# Patient Record
Sex: MALE | Race: WHITE
[De-identification: names, ages, dates, MRNs, and addresses within clinical notes are randomized per-mention and may not be internally consistent; named-entity substitution may affect disease eponyms.]

---

## 2017-02-17 ENCOUNTER — APPOINTMENT (OUTPATIENT)
Dept: OPHTHALMOLOGY | Facility: CLINIC | Age: 82
End: 2017-02-17

## 2017-03-22 ENCOUNTER — APPOINTMENT (OUTPATIENT)
Dept: OPHTHALMOLOGY | Facility: CLINIC | Age: 82
End: 2017-03-22

## 2017-04-07 ENCOUNTER — RX RENEWAL (OUTPATIENT)
Age: 82
End: 2017-04-07

## 2017-04-07 RX ORDER — PREDNISOLONE ACETATE 10 MG/ML
1 SUSPENSION/ DROPS OPHTHALMIC 4 TIMES DAILY
Qty: 1 | Refills: 5 | Status: ACTIVE | COMMUNITY
Start: 2017-04-07 | End: 1900-01-01

## 2017-04-07 RX ORDER — MOXIFLOXACIN HYDROCHLORIDE 5 MG/ML
0.5 SOLUTION/ DROPS OPHTHALMIC 4 TIMES DAILY
Qty: 1 | Refills: 2 | Status: ACTIVE | COMMUNITY
Start: 2017-04-07 | End: 1900-01-01

## 2017-04-26 ENCOUNTER — APPOINTMENT (OUTPATIENT)
Dept: OPHTHALMOLOGY | Facility: CLINIC | Age: 82
End: 2017-04-26

## 2017-04-27 ENCOUNTER — OUTPATIENT (OUTPATIENT)
Dept: OUTPATIENT SERVICES | Facility: HOSPITAL | Age: 82
LOS: 1 days | End: 2017-04-27
Payer: MEDICARE

## 2017-04-27 ENCOUNTER — TRANSCRIPTION ENCOUNTER (OUTPATIENT)
Age: 82
End: 2017-04-27

## 2017-04-27 VITALS
SYSTOLIC BLOOD PRESSURE: 143 MMHG | RESPIRATION RATE: 18 BRPM | HEART RATE: 71 BPM | OXYGEN SATURATION: 98 % | DIASTOLIC BLOOD PRESSURE: 57 MMHG

## 2017-04-27 VITALS
HEART RATE: 64 BPM | DIASTOLIC BLOOD PRESSURE: 62 MMHG | OXYGEN SATURATION: 98 % | TEMPERATURE: 98 F | WEIGHT: 145.51 LBS | SYSTOLIC BLOOD PRESSURE: 149 MMHG | RESPIRATION RATE: 17 BRPM | HEIGHT: 66 IN

## 2017-04-27 DIAGNOSIS — Z98.890 OTHER SPECIFIED POSTPROCEDURAL STATES: Chronic | ICD-10-CM

## 2017-04-27 DIAGNOSIS — Z90.89 ACQUIRED ABSENCE OF OTHER ORGANS: Chronic | ICD-10-CM

## 2017-04-27 DIAGNOSIS — H25.812 COMBINED FORMS OF AGE-RELATED CATARACT, LEFT EYE: ICD-10-CM

## 2017-04-27 PROCEDURE — 66984 XCAPSL CTRC RMVL W/O ECP: CPT | Mod: LT

## 2017-04-27 PROCEDURE — C1780: CPT

## 2017-04-27 NOTE — ASU DISCHARGE PLAN (ADULT/PEDIATRIC). - SPECIAL INSTRUCTIONS
Use predforte and ciprofloxacin 3 times today and once tomorrow morning.  Keep eye shield on the eye at all times when drops are not going in.  Call for severe pain, nausea or vomiting

## 2017-04-28 ENCOUNTER — APPOINTMENT (OUTPATIENT)
Dept: OPHTHALMOLOGY | Facility: CLINIC | Age: 82
End: 2017-04-28

## 2017-05-05 ENCOUNTER — APPOINTMENT (OUTPATIENT)
Dept: OPHTHALMOLOGY | Facility: CLINIC | Age: 82
End: 2017-05-05

## 2017-05-05 DIAGNOSIS — H25.13 AGE-RELATED NUCLEAR CATARACT, BILATERAL: ICD-10-CM

## 2017-05-19 ENCOUNTER — APPOINTMENT (OUTPATIENT)
Dept: OPHTHALMOLOGY | Facility: CLINIC | Age: 82
End: 2017-05-19

## 2017-06-16 ENCOUNTER — APPOINTMENT (OUTPATIENT)
Dept: OPHTHALMOLOGY | Facility: CLINIC | Age: 82
End: 2017-06-16

## 2017-09-18 ENCOUNTER — APPOINTMENT (OUTPATIENT)
Dept: OPHTHALMOLOGY | Facility: CLINIC | Age: 82
End: 2017-09-18
Payer: MEDICARE

## 2017-09-18 DIAGNOSIS — H31.011 MACULA SCARS OF POSTERIOR POLE (POSTINFLAMMATORY) (POST-TRAUMATIC), RIGHT EYE: ICD-10-CM

## 2017-09-18 PROCEDURE — 92012 INTRM OPH EXAM EST PATIENT: CPT

## 2018-03-19 ENCOUNTER — APPOINTMENT (OUTPATIENT)
Dept: OPHTHALMOLOGY | Facility: CLINIC | Age: 83
End: 2018-03-19
Payer: MEDICARE

## 2018-03-19 PROCEDURE — 92134 CPTRZ OPH DX IMG PST SGM RTA: CPT

## 2018-03-19 PROCEDURE — 92014 COMPRE OPH EXAM EST PT 1/>: CPT

## 2018-03-23 ENCOUNTER — RX RENEWAL (OUTPATIENT)
Age: 83
End: 2018-03-23

## 2018-06-11 ENCOUNTER — RX RENEWAL (OUTPATIENT)
Age: 83
End: 2018-06-11

## 2018-10-03 ENCOUNTER — APPOINTMENT (OUTPATIENT)
Dept: OPHTHALMOLOGY | Facility: CLINIC | Age: 83
End: 2018-10-03
Payer: MEDICARE

## 2018-10-03 PROCEDURE — 92012 INTRM OPH EXAM EST PATIENT: CPT

## 2018-10-04 PROBLEM — C61 MALIGNANT NEOPLASM OF PROSTATE: Chronic | Status: ACTIVE | Noted: 2017-04-27

## 2018-10-04 PROBLEM — D64.9 ANEMIA, UNSPECIFIED: Chronic | Status: ACTIVE | Noted: 2017-04-27

## 2018-10-04 PROBLEM — I10 ESSENTIAL (PRIMARY) HYPERTENSION: Chronic | Status: ACTIVE | Noted: 2017-04-27

## 2018-10-04 PROBLEM — E78.5 HYPERLIPIDEMIA, UNSPECIFIED: Chronic | Status: ACTIVE | Noted: 2017-04-27

## 2018-10-04 PROBLEM — M10.9 GOUT, UNSPECIFIED: Chronic | Status: ACTIVE | Noted: 2017-04-27

## 2019-04-03 ENCOUNTER — APPOINTMENT (OUTPATIENT)
Dept: OPHTHALMOLOGY | Facility: CLINIC | Age: 84
End: 2019-04-03
Payer: MEDICARE

## 2019-04-03 DIAGNOSIS — H25.89 OTHER AGE-RELATED CATARACT: ICD-10-CM

## 2019-04-03 DIAGNOSIS — H35.30 UNSPECIFIED MACULAR DEGENERATION: ICD-10-CM

## 2019-04-03 DIAGNOSIS — Z98.42 CATARACT EXTRACTION STATUS, LEFT EYE: ICD-10-CM

## 2019-04-03 DIAGNOSIS — H40.053 OCULAR HYPERTENSION, BILATERAL: ICD-10-CM

## 2019-04-03 PROCEDURE — 92014 COMPRE OPH EXAM EST PT 1/>: CPT

## 2019-04-03 PROCEDURE — 92134 CPTRZ OPH DX IMG PST SGM RTA: CPT

## 2019-12-17 ENCOUNTER — INPATIENT (INPATIENT)
Facility: HOSPITAL | Age: 84
LOS: 6 days | Discharge: HOME | End: 2019-12-24
Attending: INTERNAL MEDICINE | Admitting: INTERNAL MEDICINE
Payer: MEDICARE

## 2019-12-17 VITALS
SYSTOLIC BLOOD PRESSURE: 140 MMHG | RESPIRATION RATE: 20 BRPM | DIASTOLIC BLOOD PRESSURE: 64 MMHG | OXYGEN SATURATION: 92 % | TEMPERATURE: 98 F | HEART RATE: 78 BPM

## 2019-12-17 DIAGNOSIS — Z90.89 ACQUIRED ABSENCE OF OTHER ORGANS: Chronic | ICD-10-CM

## 2019-12-17 DIAGNOSIS — Z98.890 OTHER SPECIFIED POSTPROCEDURAL STATES: Chronic | ICD-10-CM

## 2019-12-17 LAB
BASOPHILS # BLD AUTO: 0.04 K/UL — SIGNIFICANT CHANGE UP (ref 0–0.2)
BASOPHILS NFR BLD AUTO: 0.5 % — SIGNIFICANT CHANGE UP (ref 0–2)
EOSINOPHIL # BLD AUTO: 0.03 K/UL — SIGNIFICANT CHANGE UP (ref 0–0.5)
EOSINOPHIL NFR BLD AUTO: 0.4 % — SIGNIFICANT CHANGE UP (ref 0–6)
HCT VFR BLD CALC: 25.4 % — LOW (ref 39–50)
HGB BLD-MCNC: 7.8 G/DL — LOW (ref 13–17)
IMM GRANULOCYTES NFR BLD AUTO: 0.4 % — SIGNIFICANT CHANGE UP (ref 0–1.5)
LYMPHOCYTES # BLD AUTO: 0.64 K/UL — LOW (ref 1–3.3)
LYMPHOCYTES # BLD AUTO: 8.4 % — LOW (ref 13–44)
MCHC RBC-ENTMCNC: 30.7 % — LOW (ref 32–36)
MCHC RBC-ENTMCNC: 32 PG — SIGNIFICANT CHANGE UP (ref 27–34)
MCV RBC AUTO: 104.1 FL — HIGH (ref 80–100)
MONOCYTES # BLD AUTO: 0.79 K/UL — SIGNIFICANT CHANGE UP (ref 0–0.9)
MONOCYTES NFR BLD AUTO: 10.4 % — SIGNIFICANT CHANGE UP (ref 2–14)
NEUTROPHILS # BLD AUTO: 6.05 K/UL — SIGNIFICANT CHANGE UP (ref 1.8–7.4)
NEUTROPHILS NFR BLD AUTO: 79.9 % — HIGH (ref 43–77)
NRBC # FLD: 0 K/UL — SIGNIFICANT CHANGE UP (ref 0–0)
PLATELET # BLD AUTO: 236 K/UL — SIGNIFICANT CHANGE UP (ref 150–400)
PMV BLD: 10.9 FL — SIGNIFICANT CHANGE UP (ref 7–13)
RBC # BLD: 2.44 M/UL — LOW (ref 4.2–5.8)
RBC # FLD: 18.1 % — HIGH (ref 10.3–14.5)
WBC # BLD: 7.58 K/UL — SIGNIFICANT CHANGE UP (ref 3.8–10.5)
WBC # FLD AUTO: 7.58 K/UL — SIGNIFICANT CHANGE UP (ref 3.8–10.5)

## 2019-12-17 PROCEDURE — 71045 X-RAY EXAM CHEST 1 VIEW: CPT | Mod: 26

## 2019-12-17 NOTE — ED ADULT NURSE NOTE - OBJECTIVE STATEMENT
Lu RN: Patient received in room #25 c/o shortness of breath and dyspnea on exertion x4 days. Patient is from and asissted living home; A&OX3, ambulatory with cane. Patient denies any O2 use at home. patient received with NC. Patient denies any pain. pitting edema observed to B/L LE. VS as noted. Report given to primary RN Nora. Lu RN: Patient received in room #25 c/o shortness of breath and dyspnea on exertion x4 days. Patient is from [facility] asissted living home; A&OX3, ambulatory with cane. Patient denies any O2 use at home. patient received with NC. Patient denies any pain. pitting edema observed to B/L LE. VS as noted. Report given to primary RN Nora.

## 2019-12-17 NOTE — ED PROVIDER NOTE - CLINICAL SUMMARY MEDICAL DECISION MAKING FREE TEXT BOX
89 y/o M with CAD, COPD, HTN, Prostate CA presents with dyspnea upon exertion x 5 days. symptoms likely due to COPD vs heart failure. plan for labs including trop, probnp and CXR

## 2019-12-17 NOTE — ED PROVIDER NOTE - ATTENDING CONTRIBUTION TO CARE
Attending note:   After face to face evaluation of this patient, I concur with above noted hx, pe, and care plan for this patient.  89 y/o M who denies chf or copd with dyapnea on exertion of any type for four days.   Lungs clear, +chronic pedal vi.   Evaluation in progress Attending note:   After face to face evaluation of this patient, I concur with above noted hx, pe, and care plan for this patient.  89 y/o M who denies chf or copd with dyspnea on exertion of any type for four days.   Lungs clear, +chronic pedal edema.   Evaluation in progress

## 2019-12-17 NOTE — ED ADULT NURSE NOTE - INTERVENTIONS DEFINITIONS
Stretcher in lowest position, wheels locked, appropriate side rails in place/Monitor for mental status changes and reorient to person, place, and time/Reinforce activity limits and safety measures with patient and family/Call bell, personal items and telephone within reach/Instruct patient to call for assistance/[place] to call system/Non-slip footwear when patient is off stretcher/Physically safe environment: no spills, clutter or unnecessary equipment

## 2019-12-17 NOTE — ED PROVIDER NOTE - OBJECTIVE STATEMENT
Patient is a 89 y/o M pmhx of CAD, COPD, HTN, Prostate CA presents with c/o dyspnea upon exertion x 3-5 days. He reports when walking or exerting himself he feels SOB, no associated chest pain, palpitations, dizziness, lightheadedness, diaphoresis, cough, URI like symptoms, n/v/d. patient states that he can lay flat doesn't require multiple pillows to sleep.

## 2019-12-18 DIAGNOSIS — E11.9 TYPE 2 DIABETES MELLITUS WITHOUT COMPLICATIONS: ICD-10-CM

## 2019-12-18 DIAGNOSIS — I10 ESSENTIAL (PRIMARY) HYPERTENSION: ICD-10-CM

## 2019-12-18 DIAGNOSIS — D64.9 ANEMIA, UNSPECIFIED: ICD-10-CM

## 2019-12-18 DIAGNOSIS — I25.10 ATHEROSCLEROTIC HEART DISEASE OF NATIVE CORONARY ARTERY WITHOUT ANGINA PECTORIS: ICD-10-CM

## 2019-12-18 DIAGNOSIS — H40.9 UNSPECIFIED GLAUCOMA: ICD-10-CM

## 2019-12-18 DIAGNOSIS — R60.0 LOCALIZED EDEMA: ICD-10-CM

## 2019-12-18 DIAGNOSIS — R06.02 SHORTNESS OF BREATH: ICD-10-CM

## 2019-12-18 DIAGNOSIS — R09.89 OTHER SPECIFIED SYMPTOMS AND SIGNS INVOLVING THE CIRCULATORY AND RESPIRATORY SYSTEMS: ICD-10-CM

## 2019-12-18 DIAGNOSIS — N18.9 CHRONIC KIDNEY DISEASE, UNSPECIFIED: ICD-10-CM

## 2019-12-18 DIAGNOSIS — Z29.9 ENCOUNTER FOR PROPHYLACTIC MEASURES, UNSPECIFIED: ICD-10-CM

## 2019-12-18 DIAGNOSIS — I35.0 NONRHEUMATIC AORTIC (VALVE) STENOSIS: ICD-10-CM

## 2019-12-18 LAB
ALBUMIN SERPL ELPH-MCNC: 3.7 G/DL — SIGNIFICANT CHANGE UP (ref 3.3–5)
ALBUMIN SERPL ELPH-MCNC: 3.8 G/DL — SIGNIFICANT CHANGE UP (ref 3.3–5)
ALP SERPL-CCNC: 91 U/L — SIGNIFICANT CHANGE UP (ref 40–120)
ALP SERPL-CCNC: 92 U/L — SIGNIFICANT CHANGE UP (ref 40–120)
ALT FLD-CCNC: 27 U/L — SIGNIFICANT CHANGE UP (ref 4–41)
ALT FLD-CCNC: 27 U/L — SIGNIFICANT CHANGE UP (ref 4–41)
ANION GAP SERPL CALC-SCNC: 16 MMO/L — HIGH (ref 7–14)
ANION GAP SERPL CALC-SCNC: 17 MMO/L — HIGH (ref 7–14)
ANION GAP SERPL CALC-SCNC: 17 MMO/L — HIGH (ref 7–14)
APPEARANCE UR: CLEAR — SIGNIFICANT CHANGE UP
APTT BLD: 30.5 SEC — SIGNIFICANT CHANGE UP (ref 27.5–36.3)
AST SERPL-CCNC: 25 U/L — SIGNIFICANT CHANGE UP (ref 4–40)
AST SERPL-CCNC: 26 U/L — SIGNIFICANT CHANGE UP (ref 4–40)
B PERT DNA SPEC QL NAA+PROBE: NOT DETECTED — SIGNIFICANT CHANGE UP
BASOPHILS # BLD AUTO: 0.03 K/UL — SIGNIFICANT CHANGE UP (ref 0–0.2)
BASOPHILS # BLD AUTO: 0.05 K/UL — SIGNIFICANT CHANGE UP (ref 0–0.2)
BASOPHILS NFR BLD AUTO: 0.4 % — SIGNIFICANT CHANGE UP (ref 0–2)
BASOPHILS NFR BLD AUTO: 0.8 % — SIGNIFICANT CHANGE UP (ref 0–2)
BILIRUB SERPL-MCNC: 0.3 MG/DL — SIGNIFICANT CHANGE UP (ref 0.2–1.2)
BILIRUB SERPL-MCNC: 0.3 MG/DL — SIGNIFICANT CHANGE UP (ref 0.2–1.2)
BILIRUB UR-MCNC: NEGATIVE — SIGNIFICANT CHANGE UP
BLOOD UR QL VISUAL: SIGNIFICANT CHANGE UP
BUN SERPL-MCNC: 56 MG/DL — HIGH (ref 7–23)
BUN SERPL-MCNC: 57 MG/DL — HIGH (ref 7–23)
BUN SERPL-MCNC: 58 MG/DL — HIGH (ref 7–23)
C PNEUM DNA SPEC QL NAA+PROBE: NOT DETECTED — SIGNIFICANT CHANGE UP
CALCIUM SERPL-MCNC: 8.9 MG/DL — SIGNIFICANT CHANGE UP (ref 8.4–10.5)
CALCIUM SERPL-MCNC: 8.9 MG/DL — SIGNIFICANT CHANGE UP (ref 8.4–10.5)
CALCIUM SERPL-MCNC: 9.2 MG/DL — SIGNIFICANT CHANGE UP (ref 8.4–10.5)
CHLORIDE SERPL-SCNC: 108 MMOL/L — HIGH (ref 98–107)
CHLORIDE SERPL-SCNC: 110 MMOL/L — HIGH (ref 98–107)
CHLORIDE SERPL-SCNC: 110 MMOL/L — HIGH (ref 98–107)
CHOLEST SERPL-MCNC: 118 MG/DL — LOW (ref 120–199)
CO2 SERPL-SCNC: 18 MMOL/L — LOW (ref 22–31)
CO2 SERPL-SCNC: 18 MMOL/L — LOW (ref 22–31)
CO2 SERPL-SCNC: 21 MMOL/L — LOW (ref 22–31)
COLOR SPEC: SIGNIFICANT CHANGE UP
CREAT SERPL-MCNC: 2.52 MG/DL — HIGH (ref 0.5–1.3)
CREAT SERPL-MCNC: 2.54 MG/DL — HIGH (ref 0.5–1.3)
CREAT SERPL-MCNC: 2.57 MG/DL — HIGH (ref 0.5–1.3)
EOSINOPHIL # BLD AUTO: 0.07 K/UL — SIGNIFICANT CHANGE UP (ref 0–0.5)
EOSINOPHIL # BLD AUTO: 0.12 K/UL — SIGNIFICANT CHANGE UP (ref 0–0.5)
EOSINOPHIL NFR BLD AUTO: 1 % — SIGNIFICANT CHANGE UP (ref 0–6)
EOSINOPHIL NFR BLD AUTO: 2 % — SIGNIFICANT CHANGE UP (ref 0–6)
FERRITIN SERPL-MCNC: 53.67 NG/ML — SIGNIFICANT CHANGE UP (ref 30–400)
FLUAV H1 2009 PAND RNA SPEC QL NAA+PROBE: NOT DETECTED — SIGNIFICANT CHANGE UP
FLUAV H1 RNA SPEC QL NAA+PROBE: NOT DETECTED — SIGNIFICANT CHANGE UP
FLUAV H3 RNA SPEC QL NAA+PROBE: NOT DETECTED — SIGNIFICANT CHANGE UP
FLUAV SUBTYP SPEC NAA+PROBE: NOT DETECTED — SIGNIFICANT CHANGE UP
FLUBV RNA SPEC QL NAA+PROBE: NOT DETECTED — SIGNIFICANT CHANGE UP
FOLATE SERPL-MCNC: 14.7 NG/ML — SIGNIFICANT CHANGE UP (ref 4.7–20)
GLUCOSE BLDC GLUCOMTR-MCNC: 116 MG/DL — HIGH (ref 70–99)
GLUCOSE BLDC GLUCOMTR-MCNC: 121 MG/DL — HIGH (ref 70–99)
GLUCOSE BLDC GLUCOMTR-MCNC: 123 MG/DL — HIGH (ref 70–99)
GLUCOSE BLDC GLUCOMTR-MCNC: 157 MG/DL — HIGH (ref 70–99)
GLUCOSE SERPL-MCNC: 132 MG/DL — HIGH (ref 70–99)
GLUCOSE SERPL-MCNC: 142 MG/DL — HIGH (ref 70–99)
GLUCOSE SERPL-MCNC: 168 MG/DL — HIGH (ref 70–99)
GLUCOSE UR-MCNC: NEGATIVE — SIGNIFICANT CHANGE UP
HADV DNA SPEC QL NAA+PROBE: NOT DETECTED — SIGNIFICANT CHANGE UP
HAV IGM SER-ACNC: NONREACTIVE — SIGNIFICANT CHANGE UP
HBA1C BLD-MCNC: 5.5 % — SIGNIFICANT CHANGE UP (ref 4–5.6)
HBV CORE IGM SER-ACNC: NONREACTIVE — SIGNIFICANT CHANGE UP
HBV SURFACE AG SER-ACNC: NONREACTIVE — SIGNIFICANT CHANGE UP
HCOV PNL SPEC NAA+PROBE: SIGNIFICANT CHANGE UP
HCT VFR BLD CALC: 24.9 % — LOW (ref 39–50)
HCT VFR BLD CALC: 25.6 % — LOW (ref 39–50)
HCV AB S/CO SERPL IA: 0.2 S/CO — SIGNIFICANT CHANGE UP (ref 0–0.99)
HCV AB SERPL-IMP: SIGNIFICANT CHANGE UP
HDLC SERPL-MCNC: 58 MG/DL — HIGH (ref 35–55)
HGB BLD-MCNC: 7.5 G/DL — LOW (ref 13–17)
HGB BLD-MCNC: 7.8 G/DL — LOW (ref 13–17)
HMPV RNA SPEC QL NAA+PROBE: NOT DETECTED — SIGNIFICANT CHANGE UP
HPIV1 RNA SPEC QL NAA+PROBE: NOT DETECTED — SIGNIFICANT CHANGE UP
HPIV2 RNA SPEC QL NAA+PROBE: NOT DETECTED — SIGNIFICANT CHANGE UP
HPIV3 RNA SPEC QL NAA+PROBE: NOT DETECTED — SIGNIFICANT CHANGE UP
HPIV4 RNA SPEC QL NAA+PROBE: NOT DETECTED — SIGNIFICANT CHANGE UP
IMM GRANULOCYTES NFR BLD AUTO: 0.1 % — SIGNIFICANT CHANGE UP (ref 0–1.5)
IMM GRANULOCYTES NFR BLD AUTO: 0.3 % — SIGNIFICANT CHANGE UP (ref 0–1.5)
INR BLD: 1.05 — SIGNIFICANT CHANGE UP (ref 0.88–1.17)
IRON SATN MFR SERPL: 19 UG/DL — LOW (ref 45–165)
IRON SATN MFR SERPL: 271 UG/DL — SIGNIFICANT CHANGE UP (ref 155–535)
KETONES UR-MCNC: NEGATIVE — SIGNIFICANT CHANGE UP
LDH SERPL L TO P-CCNC: 280 U/L — HIGH (ref 135–225)
LEUKOCYTE ESTERASE UR-ACNC: NEGATIVE — SIGNIFICANT CHANGE UP
LIPID PNL WITH DIRECT LDL SERPL: 52 MG/DL — SIGNIFICANT CHANGE UP
LYMPHOCYTES # BLD AUTO: 0.71 K/UL — LOW (ref 1–3.3)
LYMPHOCYTES # BLD AUTO: 0.8 K/UL — LOW (ref 1–3.3)
LYMPHOCYTES # BLD AUTO: 10 % — LOW (ref 13–44)
LYMPHOCYTES # BLD AUTO: 13.4 % — SIGNIFICANT CHANGE UP (ref 13–44)
MAGNESIUM SERPL-MCNC: 2.7 MG/DL — HIGH (ref 1.6–2.6)
MAGNESIUM SERPL-MCNC: 3 MG/DL — HIGH (ref 1.6–2.6)
MCHC RBC-ENTMCNC: 30.1 % — LOW (ref 32–36)
MCHC RBC-ENTMCNC: 30.5 % — LOW (ref 32–36)
MCHC RBC-ENTMCNC: 31.3 PG — SIGNIFICANT CHANGE UP (ref 27–34)
MCHC RBC-ENTMCNC: 31.5 PG — SIGNIFICANT CHANGE UP (ref 27–34)
MCV RBC AUTO: 102.8 FL — HIGH (ref 80–100)
MCV RBC AUTO: 104.6 FL — HIGH (ref 80–100)
MONOCYTES # BLD AUTO: 0.5 K/UL — SIGNIFICANT CHANGE UP (ref 0–0.9)
MONOCYTES # BLD AUTO: 0.67 K/UL — SIGNIFICANT CHANGE UP (ref 0–0.9)
MONOCYTES NFR BLD AUTO: 8.4 % — SIGNIFICANT CHANGE UP (ref 2–14)
MONOCYTES NFR BLD AUTO: 9.5 % — SIGNIFICANT CHANGE UP (ref 2–14)
NEUTROPHILS # BLD AUTO: 4.47 K/UL — SIGNIFICANT CHANGE UP (ref 1.8–7.4)
NEUTROPHILS # BLD AUTO: 5.58 K/UL — SIGNIFICANT CHANGE UP (ref 1.8–7.4)
NEUTROPHILS NFR BLD AUTO: 75.1 % — SIGNIFICANT CHANGE UP (ref 43–77)
NEUTROPHILS NFR BLD AUTO: 79 % — HIGH (ref 43–77)
NITRITE UR-MCNC: NEGATIVE — SIGNIFICANT CHANGE UP
NRBC # FLD: 0 K/UL — SIGNIFICANT CHANGE UP (ref 0–0)
NRBC # FLD: 0 K/UL — SIGNIFICANT CHANGE UP (ref 0–0)
NT-PROBNP SERPL-SCNC: SIGNIFICANT CHANGE UP PG/ML
NT-PROBNP SERPL-SCNC: SIGNIFICANT CHANGE UP PG/ML
PH UR: 6 — SIGNIFICANT CHANGE UP (ref 5–8)
PHOSPHATE SERPL-MCNC: 5.3 MG/DL — HIGH (ref 2.5–4.5)
PLATELET # BLD AUTO: 254 K/UL — SIGNIFICANT CHANGE UP (ref 150–400)
PLATELET # BLD AUTO: 257 K/UL — SIGNIFICANT CHANGE UP (ref 150–400)
PMV BLD: 11.2 FL — SIGNIFICANT CHANGE UP (ref 7–13)
PMV BLD: 11.5 FL — SIGNIFICANT CHANGE UP (ref 7–13)
POTASSIUM SERPL-MCNC: 4 MMOL/L — SIGNIFICANT CHANGE UP (ref 3.5–5.3)
POTASSIUM SERPL-MCNC: 4.4 MMOL/L — SIGNIFICANT CHANGE UP (ref 3.5–5.3)
POTASSIUM SERPL-MCNC: 4.5 MMOL/L — SIGNIFICANT CHANGE UP (ref 3.5–5.3)
POTASSIUM SERPL-SCNC: 4 MMOL/L — SIGNIFICANT CHANGE UP (ref 3.5–5.3)
POTASSIUM SERPL-SCNC: 4.4 MMOL/L — SIGNIFICANT CHANGE UP (ref 3.5–5.3)
POTASSIUM SERPL-SCNC: 4.5 MMOL/L — SIGNIFICANT CHANGE UP (ref 3.5–5.3)
PROT SERPL-MCNC: 6.7 G/DL — SIGNIFICANT CHANGE UP (ref 6–8.3)
PROT SERPL-MCNC: 6.8 G/DL — SIGNIFICANT CHANGE UP (ref 6–8.3)
PROT UR-MCNC: 70 — SIGNIFICANT CHANGE UP
PROTHROM AB SERPL-ACNC: 11.7 SEC — SIGNIFICANT CHANGE UP (ref 9.8–13.1)
PTH-INTACT SERPL-MCNC: 89.27 PG/ML — HIGH (ref 15–65)
RBC # BLD: 2.38 M/UL — LOW (ref 4.2–5.8)
RBC # BLD: 2.49 M/UL — LOW (ref 4.2–5.8)
RBC # FLD: 18 % — HIGH (ref 10.3–14.5)
RBC # FLD: 18.1 % — HIGH (ref 10.3–14.5)
RBC CASTS # UR COMP ASSIST: SIGNIFICANT CHANGE UP (ref 0–?)
RSV RNA SPEC QL NAA+PROBE: NOT DETECTED — SIGNIFICANT CHANGE UP
RV+EV RNA SPEC QL NAA+PROBE: NOT DETECTED — SIGNIFICANT CHANGE UP
SODIUM SERPL-SCNC: 144 MMOL/L — SIGNIFICANT CHANGE UP (ref 135–145)
SODIUM SERPL-SCNC: 145 MMOL/L — SIGNIFICANT CHANGE UP (ref 135–145)
SODIUM SERPL-SCNC: 146 MMOL/L — HIGH (ref 135–145)
SP GR SPEC: 1.01 — SIGNIFICANT CHANGE UP (ref 1–1.04)
T4 FREE SERPL-MCNC: 1.06 NG/DL — SIGNIFICANT CHANGE UP (ref 0.9–1.8)
TRIGL SERPL-MCNC: 65 MG/DL — SIGNIFICANT CHANGE UP (ref 10–149)
TROPONIN T, HIGH SENSITIVITY: 192 NG/L — CRITICAL HIGH (ref ?–14)
TROPONIN T, HIGH SENSITIVITY: 193 NG/L — CRITICAL HIGH (ref ?–14)
TROPONIN T, HIGH SENSITIVITY: 198 NG/L — CRITICAL HIGH (ref ?–14)
TSH SERPL-MCNC: 2.75 UIU/ML — SIGNIFICANT CHANGE UP (ref 0.27–4.2)
UIBC SERPL-MCNC: 252.2 UG/DL — SIGNIFICANT CHANGE UP (ref 110–370)
UROBILINOGEN FLD QL: NORMAL — SIGNIFICANT CHANGE UP
VIT B12 SERPL-MCNC: 594 PG/ML — SIGNIFICANT CHANGE UP (ref 200–900)
WBC # BLD: 5.96 K/UL — SIGNIFICANT CHANGE UP (ref 3.8–10.5)
WBC # BLD: 7.07 K/UL — SIGNIFICANT CHANGE UP (ref 3.8–10.5)
WBC # FLD AUTO: 5.96 K/UL — SIGNIFICANT CHANGE UP (ref 3.8–10.5)
WBC # FLD AUTO: 7.07 K/UL — SIGNIFICANT CHANGE UP (ref 3.8–10.5)

## 2019-12-18 PROCEDURE — 71250 CT THORAX DX C-: CPT | Mod: 26

## 2019-12-18 PROCEDURE — 99223 1ST HOSP IP/OBS HIGH 75: CPT

## 2019-12-18 PROCEDURE — 93970 EXTREMITY STUDY: CPT | Mod: 26

## 2019-12-18 PROCEDURE — 74176 CT ABD & PELVIS W/O CONTRAST: CPT | Mod: 26

## 2019-12-18 RX ORDER — ALLOPURINOL 300 MG
100 TABLET ORAL DAILY
Refills: 0 | Status: DISCONTINUED | OUTPATIENT
Start: 2019-12-18 | End: 2019-12-24

## 2019-12-18 RX ORDER — DEXTROSE 50 % IN WATER 50 %
15 SYRINGE (ML) INTRAVENOUS ONCE
Refills: 0 | Status: DISCONTINUED | OUTPATIENT
Start: 2019-12-18 | End: 2019-12-24

## 2019-12-18 RX ORDER — HEPARIN SODIUM 5000 [USP'U]/ML
5000 INJECTION INTRAVENOUS; SUBCUTANEOUS EVERY 8 HOURS
Refills: 0 | Status: DISCONTINUED | OUTPATIENT
Start: 2019-12-18 | End: 2019-12-24

## 2019-12-18 RX ORDER — CHOLECALCIFEROL (VITAMIN D3) 125 MCG
1000 CAPSULE ORAL DAILY
Refills: 0 | Status: DISCONTINUED | OUTPATIENT
Start: 2019-12-18 | End: 2019-12-24

## 2019-12-18 RX ORDER — DEXTROSE 50 % IN WATER 50 %
25 SYRINGE (ML) INTRAVENOUS ONCE
Refills: 0 | Status: DISCONTINUED | OUTPATIENT
Start: 2019-12-18 | End: 2019-12-24

## 2019-12-18 RX ORDER — ATORVASTATIN CALCIUM 80 MG/1
40 TABLET, FILM COATED ORAL AT BEDTIME
Refills: 0 | Status: DISCONTINUED | OUTPATIENT
Start: 2019-12-18 | End: 2019-12-24

## 2019-12-18 RX ORDER — FUROSEMIDE 40 MG
40 TABLET ORAL ONCE
Refills: 0 | Status: COMPLETED | OUTPATIENT
Start: 2019-12-18 | End: 2019-12-18

## 2019-12-18 RX ORDER — BRINZOLAMIDE 10 MG/ML
1 SUSPENSION/ DROPS OPHTHALMIC
Refills: 0 | Status: DISCONTINUED | OUTPATIENT
Start: 2019-12-18 | End: 2019-12-24

## 2019-12-18 RX ORDER — SENNA PLUS 8.6 MG/1
2 TABLET ORAL AT BEDTIME
Refills: 0 | Status: DISCONTINUED | OUTPATIENT
Start: 2019-12-18 | End: 2019-12-24

## 2019-12-18 RX ORDER — AMLODIPINE BESYLATE 2.5 MG/1
5 TABLET ORAL DAILY
Refills: 0 | Status: DISCONTINUED | OUTPATIENT
Start: 2019-12-18 | End: 2019-12-24

## 2019-12-18 RX ORDER — FERROUS SULFATE 325(65) MG
325 TABLET ORAL DAILY
Refills: 0 | Status: DISCONTINUED | OUTPATIENT
Start: 2019-12-18 | End: 2019-12-24

## 2019-12-18 RX ORDER — FUROSEMIDE 40 MG
40 TABLET ORAL DAILY
Refills: 0 | Status: DISCONTINUED | OUTPATIENT
Start: 2019-12-18 | End: 2019-12-19

## 2019-12-18 RX ORDER — SODIUM CHLORIDE 9 MG/ML
1000 INJECTION, SOLUTION INTRAVENOUS
Refills: 0 | Status: DISCONTINUED | OUTPATIENT
Start: 2019-12-18 | End: 2019-12-24

## 2019-12-18 RX ORDER — DEXTROSE 50 % IN WATER 50 %
12.5 SYRINGE (ML) INTRAVENOUS ONCE
Refills: 0 | Status: DISCONTINUED | OUTPATIENT
Start: 2019-12-18 | End: 2019-12-24

## 2019-12-18 RX ORDER — INSULIN LISPRO 100/ML
VIAL (ML) SUBCUTANEOUS
Refills: 0 | Status: DISCONTINUED | OUTPATIENT
Start: 2019-12-18 | End: 2019-12-24

## 2019-12-18 RX ORDER — GLUCAGON INJECTION, SOLUTION 0.5 MG/.1ML
1 INJECTION, SOLUTION SUBCUTANEOUS ONCE
Refills: 0 | Status: DISCONTINUED | OUTPATIENT
Start: 2019-12-18 | End: 2019-12-24

## 2019-12-18 RX ADMIN — HEPARIN SODIUM 5000 UNIT(S): 5000 INJECTION INTRAVENOUS; SUBCUTANEOUS at 13:59

## 2019-12-18 RX ADMIN — Medication 100 MILLIGRAM(S): at 12:16

## 2019-12-18 RX ADMIN — HEPARIN SODIUM 5000 UNIT(S): 5000 INJECTION INTRAVENOUS; SUBCUTANEOUS at 05:47

## 2019-12-18 RX ADMIN — Medication 325 MILLIGRAM(S): at 12:16

## 2019-12-18 RX ADMIN — AMLODIPINE BESYLATE 5 MILLIGRAM(S): 2.5 TABLET ORAL at 05:48

## 2019-12-18 RX ADMIN — Medication 40 MILLIGRAM(S): at 05:48

## 2019-12-18 RX ADMIN — Medication 40 MILLIGRAM(S): at 01:09

## 2019-12-18 RX ADMIN — Medication 1000 UNIT(S): at 12:15

## 2019-12-18 RX ADMIN — ATORVASTATIN CALCIUM 40 MILLIGRAM(S): 80 TABLET, FILM COATED ORAL at 21:18

## 2019-12-18 RX ADMIN — HEPARIN SODIUM 5000 UNIT(S): 5000 INJECTION INTRAVENOUS; SUBCUTANEOUS at 21:18

## 2019-12-18 NOTE — H&P ADULT - ATTENDING COMMENTS
Pt was seen & examined by me, Dr. JAZMIN Fraser on 12/18/19.    Dr. eJnsen will resume the care of pt in AM.

## 2019-12-18 NOTE — H&P ADULT - HISTORY OF PRESENT ILLNESS
Patient is a 89 y/o Male from [facility] , with pmhx of CAD, COPD, HTN, Prostate CA, S/P Radiation therapy years ago, CKD,  Anemia, DM on No Meds, GOUT, Glaucoma, Aortic Stenosis , Osteoporosis , Legs edema, A+O x 4, presents with c/o dyspnea upon exertion x 3-5 days. He reports when walking or exerting himself he feels SOB, no associated chest pain, palpitations, dizziness, lightheadedness, diaphoresis, cough, URI like symptoms, No N/V, no diarrhea, no Fever, NO CP, NO HA, no Dizziness, No abdominal pain, + chronic LBP, + constipation , Last BM Yesterday No sore Throat, uses cane to Ambulate No legs pain, C/O + Orthopnea, + dysuria, Chest X ray prelim c/w B/L Pleural Effusions, BNP 28512, Troponin , pt s/p IV Lasix 40 mg x 1 for Possible Acute CHF given by ER tonight, pt is making urine, Patient is a 89 y/o Male from [facility] , with pmhx of CAD, COPD, HTN, Prostate CA, S/P Radiation therapy years ago, CKD,  Anemia, DM on No Meds, GOUT, Glaucoma, Aortic Stenosis , Osteoporosis , Legs edema, A+O x 4, presents with c/o dyspnea upon exertion x 3-5 days. He reports when walking or exerting himself he feels SOB, no associated chest pain, palpitations, dizziness, lightheadedness, diaphoresis, cough, URI like symptoms, No N/V, no diarrhea, no Fever, NO CP, NO HA, no Dizziness, No abdominal pain, + chronic LBP, + constipation , Last BM Yesterday No sore Throat, uses cane to Ambulate No legs pain, C/O + Orthopnea, + dysuria, Chest X ray prelim c/w B/L Pleural Effusions, BNP 83307, Troponin , pt s/p IV Lasix 40 mg x 1 for Possible Acute CHF given by ER tonight, pt is making urine, I called Cardiology consult tonight, I Ordered Venous Doppler legs: R/O DVT, CT Chest/abdomen /pelvis NO Contrast  ordered due to + B/L Pleural effusions, HX of Prostate CA, CKD, Anemia, Patient is a 89 y/o Male from [facility] , with pmhx of CAD, COPD, HTN, Prostate CA, S/P Radiation therapy years ago, CKD,  Anemia, DM on No Meds, GOUT, Glaucoma, Aortic Stenosis , Osteoporosis , Legs edema, A+O x 4, presents with c/o dyspnea upon exertion x 3-5 days. He reports when walking or exerting himself he feels SOB, no associated chest pain, palpitations, dizziness, lightheadedness, diaphoresis, cough, URI like symptoms, No N/V, no diarrhea, no Fever, NO CP, NO HA, no Dizziness, No abdominal pain, + chronic LBP, + constipation , Last BM Yesterday No sore Throat, uses cane to Ambulate No legs pain, C/O + Orthopnea, + dysuria, Chest X ray prelim c/w B/L Pleural Effusions, BNP 70676, Troponin , pt s/p IV Lasix 40 mg x 1 for Possible Acute CHF given by ER tonight, pt is making urine, I called Cardiology consult tonight, I Ordered Venous Doppler legs: R/O DVT, CT Chest/abdomen /pelvis NO Contrast  ordered due to + B/L Pleural effusions, HX of Prostate CA, CKD, Anemia, RVP Ordered as well,     LABS: HgbA1c 5.5%, Na 144, K+ 4.4, CO2 18, BUN 58, Creatinine 2.57, glucose 142, LFT Normal, Troponin , BNP 76138, WBC 7.58, Hgb 7.8, .1, platelet 236, PT 11.7, INR 1.05, PTT 30.5,   UA: Trace Blood, Protein 70,     Vitals: Tem 97.8, RR 67, /57, RR 17, 97% on 4 Lit O2 NC, Patient is a 89 y/o Male from [facility] , with pmhx of CAD, COPD, HTN, Prostate CA, S/P Radiation therapy years ago, CKD,  Anemia, DM on No Meds, GOUT, Glaucoma, Aortic Stenosis , Osteoporosis , Legs edema, A+O x 4, presents with c/o dyspnea upon exertion x 3-5 days. He reports when walking or exerting himself he feels SOB, no associated chest pain, palpitations, dizziness, lightheadedness, diaphoresis, cough, URI like symptoms, No N/V, no diarrhea, no Fever, NO CP, NO HA, no Dizziness, No abdominal pain, + chronic LBP, + constipation , Last BM Yesterday No sore Throat, uses cane to Ambulate No legs pain, C/O + Orthopnea, + dysuria, Chest X ray prelim c/w B/L Pleural Effusions, BNP 00788, Troponin , pt s/p IV Lasix 40 mg x 1 for Possible Acute CHF given by ER tonight, pt is making urine, I called Cardiology consult tonight, I Ordered Venous Doppler legs: R/O DVT, CT Chest/abdomen /pelvis NO Contrast  ordered due to + B/L Pleural effusions, HX of Prostate CA, CKD, Anemia, RVP Ordered as well,     LABS: HgbA1c 5.5%, Na 144, K+ 4.4, CO2 18, BUN 58, Creatinine 2.57, glucose 142, LFT Normal, Troponin /198, BNP 45650, WBC 7.58, Hgb 7.8, .1, platelet 236, PT 11.7, INR 1.05, PTT 30.5,   UA: Trace Blood, Protein 70, TSH 2.75, cholesterol 118, Iron 19,     Vitals: Tem 98.7,  HR  74 , /50 , RR 16, 98% RA, Patient is a 89 y/o Male from [facility] , with pmhx of CAD, COPD, HTN, Prostate CA, S/P Radiation therapy years ago, CKD,  Anemia, DM on No Meds, GOUT, Glaucoma, Aortic Stenosis , Osteoporosis , Legs edema, A+O x 4, presents with c/o dyspnea upon exertion x 3-5 days. He reports when walking or exerting himself he feels SOB, no associated chest pain, palpitations, dizziness, lightheadedness, diaphoresis, cough, URI like symptoms, No N/V, no diarrhea, no Fever, NO CP, NO HA, no Dizziness, No abdominal pain, + chronic LBP, + constipation , Last BM Yesterday No sore Throat, uses cane to Ambulate No legs pain, C/O + Orthopnea, + dysuria, Chest X ray prelim c/w B/L Pleural Effusions, BNP 82266, Troponin , pt s/p IV Lasix 40 mg x 1 for Possible Acute CHF given by ER tonight, pt is making urine, I called Cardiology consult tonight, I Ordered Venous Doppler legs: R/O DVT, CT Chest/abdomen /pelvis NO Contrast  ordered due to + B/L Pleural effusions, HX of Prostate CA, CKD, Anemia, RVP: Negative, pt was seen by [facility] as well,     LABS: HgbA1c 5.5%, Na 144, K+ 4.4, CO2 18, BUN 58, Creatinine 2.57, glucose 142, LFT Normal, Troponin /198, BNP 99491, WBC 7.58, Hgb 7.8, .1, platelet 236, PT 11.7, INR 1.05, PTT 30.5,   UA: Trace Blood, Protein 70, TSH 2.75, cholesterol 118, Iron 19, RVP Neg., PTH 89.27,     Vitals: Tem 98.7,  HR  74 , /50 , RR 16, 98% RA,

## 2019-12-18 NOTE — H&P ADULT - NSICDXPASTMEDICALHX_GEN_ALL_CORE_FT
PAST MEDICAL HISTORY:  Anemia     Gout     HLD (hyperlipidemia)     Hypertension     Prostate cancer PAST MEDICAL HISTORY:  Anemia     Aortic stenosis     CAD (coronary artery disease)     Chronic kidney disease (CKD)     DM type 2 (diabetes mellitus, type 2)     Glaucoma     Gout     HLD (hyperlipidemia)     Hypertension     Leg edema     Osteoporosis     Prostate cancer

## 2019-12-18 NOTE — H&P ADULT - NSICDXPASTSURGICALHX_GEN_ALL_CORE_FT
PAST SURGICAL HISTORY:  H/O inguinal hernia repair     History of colonoscopy     History of tonsillectomy childhood

## 2019-12-18 NOTE — H&P ADULT - ASSESSMENT
Patient is a 89 y/o Male from [facility] , with pmhx of CAD, COPD, HTN, Prostate CA, S/P Radiation therapy years ago, CKD,  Anemia, DM on No Meds, GOUT, Glaucoma, Aortic Stenosis , Osteoporosis , Legs edema, A+O x 4, presents with c/o dyspnea upon exertion x 3-5 days. He reports when walking or exerting himself he feels SOB, no associated chest pain, palpitations, dizziness, lightheadedness, diaphoresis, cough, URI like symptoms, No N/V, no diarrhea, no Fever, NO CP, NO HA, no Dizziness, No abdominal pain, + chronic LBP, + constipation , Last BM Yesterday No sore Throat, uses cane to Ambulate No legs pain, C/O + Orthopnea, + dysuria, Chest X ray prelim c/w B/L Pleural Effusions, BNP 46558, Troponin , pt s/p IV Lasix 40 mg x 1 for Possible Acute CHF given by ER tonight, pt is making urine, I called Cardiology consult tonight, I Ordered Venous Doppler legs: R/O DVT, CT Chest/abdomen /pelvis NO Contrast  ordered due to + B/L Pleural effusions, HX of Prostate CA, CKD, Anemia, RVP Ordered as well, Patient is a 89 y/o Male from [facility] , with pmhx of CAD, COPD, HTN, Prostate CA, S/P Radiation therapy years ago, CKD,  Anemia, DM on No Meds, GOUT, Glaucoma, Aortic Stenosis , Osteoporosis , Legs edema, A+O x 4, presents with c/o dyspnea upon exertion x 3-5 days. He reports when walking or exerting himself he feels SOB, no associated chest pain, palpitations, dizziness, lightheadedness, diaphoresis, cough, URI like symptoms, No N/V, no diarrhea, no Fever, NO CP, NO HA, no Dizziness, No abdominal pain, + chronic LBP, + constipation , Last BM Yesterday No sore Throat, uses cane to Ambulate No legs pain, C/O + Orthopnea, + dysuria, Chest X ray prelim c/w B/L Pleural Effusions, BNP 53147, Troponin , pt s/p IV Lasix 40 mg x 1 for Possible Acute CHF given by ER tonight, pt is making urine, I called Cardiology consult tonight, I Ordered Venous Doppler legs: R/O DVT, CT Chest/abdomen /pelvis NO Contrast  ordered due to + B/L Pleural effusions, HX of Prostate CA, CKD, Anemia, RVP: Negative, pt was seen by [facility] as well,

## 2019-12-18 NOTE — CHART NOTE - NSCHARTNOTEFT_GEN_A_CORE
CT chest- Nonspecific groundglass opacities within the right upper lobe and right lower lobe may be infectious. f/u procalcitonin- if high consider abx (hold off abx for now- d/w Dr Claros)

## 2019-12-18 NOTE — CONSULT NOTE ADULT - SUBJECTIVE AND OBJECTIVE BOX
HPI:  89 y/o M from [facility] with PMHx of CAD, aortic stenosis , COPD, HTN, prostate cancer, s/p radiation therapy years ago, CKD,  anemia, DM, gout, osteoporosis chronic LBP, chronic lower extremity edema, glaucoma presents with shortness of breath and dyspnea upon mild exertion such as walking for 1 week now. He endorsed orthopnea and worsening lower extremity edema, and dysuria. Denies associated chest pain, palpitations, dizziness, lightheadedness, diaphoresis, cough, N/V, diarrhea, fever chills.  Patient has chronic low back pain and constipation.  Preliminary CXR showed bilateral pleural effusions, BNP 99630, Troponin . He was given IV Lasix 40 mg x 1 for possible ADHF, diuresed 575ml. Cardiology consulted.    Vitals:   T 98.7, /50, HR 74, RR 16, 02 sat 98%.    Allergies  ACE inhibitors (Unknown)  aspirin (Unknown)  enalapril (Unknown)      MEDICATIONS  amLODIPine   Tablet 5 milliGRAM(s) Oral daily  furosemide   Injectable 40 milliGRAM(s) IV Push daily  heparin  Injectable 5000 Unit(s) SubCutaneous every 8 hours  senna 2 Tablet(s) Oral at bedtime  allopurinol 100 milliGRAM(s) Oral daily  atorvastatin 40 milliGRAM(s) Oral at bedtime  dextrose 40% Gel 15 Gram(s) Oral once PRN  dextrose 50% Injectable 12.5 Gram(s) IV Push once  dextrose 50% Injectable 25 Gram(s) IV Push once  dextrose 50% Injectable 25 Gram(s) IV Push once  glucagon  Injectable 1 milliGRAM(s) IntraMuscular once PRN  insulin lispro (HumaLOG) corrective regimen sliding scale   SubCutaneous Before meals and at bedtime  brinzolamide 1% Ophthalmic Suspension 1 Drop(s) Both EYES two times a day  cholecalciferol 1000 Unit(s) Oral daily  dextrose 5%. 1000 milliLiter(s) IV Continuous <Continuous>  ferrous    sulfate 325 milliGRAM(s) Oral daily      PAST MEDICAL & SURGICAL HISTORY:  Leg edema  Osteoporosis  Aortic stenosis  Glaucoma  DM type 2 (diabetes mellitus, type 2)  CAD (coronary artery disease)  Chronic kidney disease (CKD)  Anemia  Prostate cancer  Gout  HLD (hyperlipidemia)  Hypertension  History of colonoscopy  History of tonsillectomy: childhood  H/O inguinal hernia repair      FAMILY HISTORY:  Family hx of lung cancer  Family history of CVA      SOCIAL HISTORY  lives at [facility],     SUBSTANCE USE  Tobacco Usage: denies  Alcohol Usage: denies  Recreational drugs: denies    REVIEW OF SYSTEMS:  CONSTITUTIONAL: No fevers, No chills, + fatigue, No weight gain  EYES: No vision changes   ENT: No congestion, No ear pain, No sore throat.  NECK: No pain, No stiffness  RESPIRATORY: No shortness of breath, No cough, No wheezing, No hemoptysis  CARDIOVASCULAR: No chest pain. No palpitations, + GARCIA, + orthopnea, No paroxysmal nocturnal dyspnea, No pleuritic pain  GASTROINTESTINAL: No abdominal pain, No nausea, No vomiting, No hematemesis, No diarrhea + constipation. No melena  GENITOURINARY: +dysuria, No frequency, No incontinence, No hematuria  NEUROLOGICAL: No dizziness, No lightheadedness, No syncope, No LOC, No headache, No numbness or weakness  EXTREMITIES: +b/l lower Edema, No joint pain, No joint swelling.  PSYCHIATRIC: No anxiety, No depression  SKIN: No diaphoresis. No itching, No rashes, No pressure ulcers  HEME/LYMPH: No easy bruising, or bleeding gums  ALLERY AND IMMUNOLOGIC: No hives or eczema    All other review of systems is negative unless indicated above.    VITAL SIGNS  T(C): 37.1 (19 @ 03:43), Max: 37.1 (19 @ 03:43)  HR: 74 (19 @ 03:43) (67 - 81)  BP: 143/50 (19 @ 03:43) (140/64 - 148/57)  RR: 16 (19 @ 03:43) (16 - 25)  SpO2: 98% (19 @ 03:43) (92% - 98%)  Wt(kg): --    PHYSICAL EXAM:  Appearance: NAD, no distress  HEENT:   Normal oral mucosa, PERRL, EOMI  Cardiovascular: Regular rate and rhythm, Normal S1 S2, No JVD, + murmurs  Respiratory: b/l bibasilar crackles, No rhonchi, No wheezing. No tenderness to palpation  Gastrointestinal:  Soft, Non-tender, + BS  Neurologic: Non-focal, A&Ox3  Skin: Warm and dry, No rashes, No ecchymosis, No cyanosis  Extremities: No clubbing, No cyanosis, b/l lower extremity edema  Vascular: Peripheral pulses palpable 2+ bilaterally  Psychiatry: Mood & affect appropriate      I&O's Summary    17 Dec 2019 07:01  -  18 Dec 2019 03:59  --------------------------------------------------------  IN: 0 mL / OUT: 575 mL / NET: -575 mL        LABORATORY VALUES	 	                          7.5    7.07  )-----------( 257      ( 18 Dec 2019 02:46 )             24.9           144  |  110<H>  |  58<H>  ----------------------------<  142<H>  4.4   |  18<L>  |  2.57<H>    Ca    8.9      17 Dec 2019 23:10    TPro  6.7  /  Alb  3.8  /  TBili  0.3  /  DBili  x   /  AST  26  /  ALT  27  /  AlkPhos  91      LIVER FUNCTIONS - ( 17 Dec 2019 23:10 )  Alb: 3.8 g/dL / Pro: 6.7 g/dL / ALK PHOS: 91 u/L / ALT: 27 u/L / AST: 26 u/L / GGT: x           Prothrombin Time, Plasma: 11.7 SEC ( @ 02:46)      CARDIAC MARKERS:  Troponin T, High Sensitivity: 198 ng/L (19 @ 23:10)    Serum Pro-Brain Natriuretic Peptide: 45193 pg/mL ( @ 23:10)    Hemoglobin A1C, Whole Blood: 5.5 % ( @ 02:46)    Urinalysis Basic - ( 18 Dec 2019 02:46 )    Color: LT. YELLOW / Appearance: CLEAR / S.015 / pH: 6.0  Gluc: NEGATIVE / Ketone: NEGATIVE  / Bili: NEGATIVE / Urobili: NORMAL   Blood: TRACE / Protein: 70 / Nitrite: NEGATIVE   Leuk Esterase: NEGATIVE / RBC: 0-2 / WBC x   Sq Epi: x / Non Sq Epi: x / Bacteria:     TELEMETRY: 	    ECG:  	  RADIOLOGY:	  	INTERPRETATION:  CLINICAL INFORMATION: Shortness of breath  	  	EXAM: Frontal view of chest.  	  	COMPARISON: Chest radiograph from 2005  	  	FINDINGS:  	  	Bilateral pleural effusions.   	Bibasilar linear atelectasis.   	No pneumothorax.   	Normal heart size.  	No acute osseous abnormality  	  	IMPRESSION:   	Bilateral pleural effusions.   	******PRELIMINARY REPORT******        	  	ELE SHANKAR M.D., RADIOLOGY RESIDENT  OTHER: 	    PREVIOUS DIAGNOSTIC TESTING:    [ ] Echocardiogram: pending    ASSESSMENT AND PLAN  89 y/o M from [facility] with PMHx of CAD, aortic stenosis , COPD, HTN, prostate cancer, s/p radiation therapy years ago, CKD,  anemia, DM, gout, osteoporosis chronic LBP, chronic lower extremity edema, glaucoma presents with shortness of breath and GARCIA. Admit to telemetry. Cardiology to follow    PLAN  #shortness of breath  Patient p/w SOB, GARCIA, orthopnea. Has no Hx of CHF   CXR showed bilateral pleural effusion  pro BNP  27888  Diuresis: lasix 40mg IVP x1, diuresed ~600ml in ED. would continue lasix 40mg IVP daily  initial hST 198, repeat 192- continue to trend every 6hrs to r/o ACS though unlikely, he is chest pain free. hst likely demand vs 2/2 elevated Cr  Trend BMP 2/2 diuresis and replete as needed  Daily weight monitoring, Strict I/O, Low sodium DASH diet  Check ECHO to evaluate LV/RV function and valvular abnormalities      # [id number] HPI:  89 y/o M from [facility] with PMHx of CAD, aortic stenosis , COPD, HTN, prostate cancer, s/p radiation therapy years ago, CKD,  anemia, DM, gout, osteoporosis chronic LBP, chronic lower extremity edema, glaucoma presents with shortness of breath and dyspnea upon mild exertion such as walking x4 days. He endorsed orthopnea and worsening lower extremity edema, and dysuria. Denies associated chest pain, palpitations, dizziness, lightheadedness, diaphoresis, cough, N/V, diarrhea, fever chills.  Patient has chronic low back pain and constipation.  Preliminary CXR showed bilateral pleural effusions, BNP 46509, Troponin . He was given IV Lasix 40 mg x 1 for possible ADHF, diuresed 575ml. Cardiology consulted.    Vitals:   T 98.7, /50, HR 74, RR 16, 02 sat 98%.    Allergies  ACE inhibitors (Unknown)  aspirin (Unknown)  enalapril (Unknown)      MEDICATIONS  amLODIPine   Tablet 5 milliGRAM(s) Oral daily  furosemide   Injectable 40 milliGRAM(s) IV Push daily  heparin  Injectable 5000 Unit(s) SubCutaneous every 8 hours  senna 2 Tablet(s) Oral at bedtime  allopurinol 100 milliGRAM(s) Oral daily  atorvastatin 40 milliGRAM(s) Oral at bedtime  dextrose 40% Gel 15 Gram(s) Oral once PRN  dextrose 50% Injectable 12.5 Gram(s) IV Push once  dextrose 50% Injectable 25 Gram(s) IV Push once  dextrose 50% Injectable 25 Gram(s) IV Push once  glucagon  Injectable 1 milliGRAM(s) IntraMuscular once PRN  insulin lispro (HumaLOG) corrective regimen sliding scale   SubCutaneous Before meals and at bedtime  brinzolamide 1% Ophthalmic Suspension 1 Drop(s) Both EYES two times a day  cholecalciferol 1000 Unit(s) Oral daily  dextrose 5%. 1000 milliLiter(s) IV Continuous <Continuous>  ferrous    sulfate 325 milliGRAM(s) Oral daily      PAST MEDICAL & SURGICAL HISTORY:  Leg edema  Osteoporosis  Aortic stenosis  Glaucoma  DM type 2 (diabetes mellitus, type 2)  CAD (coronary artery disease)  Chronic kidney disease (CKD)  Anemia  Prostate cancer  Gout  HLD (hyperlipidemia)  Hypertension  History of colonoscopy  History of tonsillectomy: childhood  H/O inguinal hernia repair      FAMILY HISTORY:  Family hx of lung cancer  Family history of CVA      SOCIAL HISTORY  lives at [facility],     SUBSTANCE USE  Tobacco Usage: denies  Alcohol Usage: denies  Recreational drugs: denies    REVIEW OF SYSTEMS:  CONSTITUTIONAL: No fevers, No chills, + fatigue, No weight gain  EYES: No vision changes   ENT: No congestion, No ear pain, No sore throat.  NECK: No pain, No stiffness  RESPIRATORY: No shortness of breath, No cough, No wheezing, No hemoptysis  CARDIOVASCULAR: No chest pain. No palpitations, + GARCIA, + orthopnea, No paroxysmal nocturnal dyspnea, No pleuritic pain  GASTROINTESTINAL: No abdominal pain, No nausea, No vomiting, No hematemesis, No diarrhea + constipation. No melena  GENITOURINARY: +dysuria, No frequency, No incontinence, No hematuria  NEUROLOGICAL: No dizziness, No lightheadedness, No syncope, No LOC, No headache, No numbness or weakness  EXTREMITIES: +b/l lower Edema, No joint pain, No joint swelling.  PSYCHIATRIC: No anxiety, No depression  SKIN: No diaphoresis. No itching, No rashes, No pressure ulcers  HEME/LYMPH: No easy bruising, or bleeding gums  ALLERY AND IMMUNOLOGIC: No hives or eczema    All other review of systems is negative unless indicated above.    VITAL SIGNS  T(C): 37.1 (- @ 03:43), Max: 37.1 (19 @ 03:43)  HR: 74 (19 @ 03:43) (67 - 81)  BP: 143/50 (19 @ 03:43) (140/64 - 148/57)  RR: 16 (19 @ 03:43) (16 - 25)  SpO2: 98% (19 @ 03:43) (92% - 98%)  Wt(kg): --    PHYSICAL EXAM:  Appearance: NAD, no distress  HEENT:   Normal oral mucosa, PERRL, EOMI  Cardiovascular: Regular rate and rhythm, Normal S1 S2, No JVD, + murmurs  Respiratory: b/l bibasilar crackles, No rhonchi, No wheezing. No tenderness to palpation  Gastrointestinal:  Soft, Non-tender, + BS  Neurologic: Non-focal, A&Ox3  Skin: Warm and dry, No rashes, No ecchymosis, No cyanosis  Extremities: No clubbing, No cyanosis, b/l lower extremity edema  Vascular: Peripheral pulses palpable 2+ bilaterally  Psychiatry: Mood & affect appropriate      I&O's Summary    17 Dec 2019 07:01  -  18 Dec 2019 03:59  --------------------------------------------------------  IN: 0 mL / OUT: 575 mL / NET: -575 mL        LABORATORY VALUES	 	                          7.5    7.07  )-----------( 257      ( 18 Dec 2019 02:46 )             24.9           144  |  110<H>  |  58<H>  ----------------------------<  142<H>  4.4   |  18<L>  |  2.57<H>    Ca    8.9      17 Dec 2019 23:10    TPro  6.7  /  Alb  3.8  /  TBili  0.3  /  DBili  x   /  AST  26  /  ALT  27  /  AlkPhos  91      LIVER FUNCTIONS - ( 17 Dec 2019 23:10 )  Alb: 3.8 g/dL / Pro: 6.7 g/dL / ALK PHOS: 91 u/L / ALT: 27 u/L / AST: 26 u/L / GGT: x           Prothrombin Time, Plasma: 11.7 SEC ( @ 02:46)      CARDIAC MARKERS:  Troponin T, High Sensitivity: 198 ng/L (19 @ 23:10)    Serum Pro-Brain Natriuretic Peptide: 89725 pg/mL ( @ 23:10)    Hemoglobin A1C, Whole Blood: 5.5 % ( @ 02:46)    Urinalysis Basic - ( 18 Dec 2019 02:46 )    Color: LT. YELLOW / Appearance: CLEAR / S.015 / pH: 6.0  Gluc: NEGATIVE / Ketone: NEGATIVE  / Bili: NEGATIVE / Urobili: NORMAL   Blood: TRACE / Protein: 70 / Nitrite: NEGATIVE   Leuk Esterase: NEGATIVE / RBC: 0-2 / WBC x   Sq Epi: x / Non Sq Epi: x / Bacteria:     TELEMETRY: 	  NSR  ECG:  	NSR w/ 1st degree Ab block TWI in lateral leads  RADIOLOGY:	  	INTERPRETATION:  CLINICAL INFORMATION: Shortness of breath  	  	EXAM: Frontal view of chest.  	  	COMPARISON: Chest radiograph from 2005  	  	FINDINGS:  	  	Bilateral pleural effusions.   	Bibasilar linear atelectasis.   	No pneumothorax.   	Normal heart size.  	No acute osseous abnormality  	  	IMPRESSION:   	Bilateral pleural effusions.   	******PRELIMINARY REPORT******        	  	ELE SHANKAR M.D., RADIOLOGY RESIDENT  OTHER: 	    PREVIOUS DIAGNOSTIC TESTING:    [ ] Echocardiogram: pending    ASSESSMENT AND PLAN  89 y/o M from [facility] with PMHx of CAD, aortic stenosis , COPD, HTN, prostate cancer, s/p radiation therapy years ago, CKD,  anemia, DM, gout, osteoporosis chronic LBP, chronic lower extremity edema, glaucoma presents with shortness of breath and GARCIA. Admit to telemetry. Cardiology to follow    PLAN  #shortness of breath  Patient p/w SOB, GARCIA, orthopnea. Has no Hx of CHF   CXR showed bilateral pleural effusion  pro BNP  18145  Diuresis: lasix 40mg IVP x1, diuresed ~600ml in ED. would continue lasix 40mg IVP daily  initial hST 198, repeat 192- continue to trend every 6hrs to r/o ACS though unlikely, he is chest pain free. hst likely demand vs 2/2 elevated Cr  Trend BMP 2/2 diuresis and replete as needed  Daily weight monitoring, Strict I/O, Low sodium DASH diet  Check ECHO to evaluate LV/RV function and valvular abnormalities    Discussed Dr. Beck (cardiology fellow)      # [id number]

## 2019-12-18 NOTE — H&P ADULT - PROBLEM SELECTOR PLAN 6
Renal consult, CT A/P, HX of Prostate CA, F/U BMP, CBC, Urine Culture,     GOUT: on allopurinol, Renal consult, CT A/P, HX of Prostate CA, F/U BMP, CBC, Urine Culture,     Hep A,B,C profile,   GOUT: on allopurinol,

## 2019-12-18 NOTE — H&P ADULT - PROBLEM SELECTOR PLAN 2
Cardiology F/U, on TELE, ECHO, R/O Acute CHF, IV Lasix 40 mg QD, F/U BMP,  Troponin HS,    High Cholesterol:  On Lipitor,

## 2019-12-18 NOTE — ED ADULT NURSE REASSESSMENT NOTE - NS ED NURSE REASSESS COMMENT FT1
Rpt given to ESSU1 RN. Labs ordered, drawn and sent. RVP sent. Urine sent. Pt to be changed and repositioned and then brought over to ESSU. Pt in NAD at present.

## 2019-12-18 NOTE — H&P ADULT - PROBLEM SELECTOR PLAN 1
R/O Acute CHF, + GARCIA, TELE, Elevated BNP and Troponin HS, Cardiology Note Appreciated, CT Chest No contrast, F/U Troponin HS, IV Lasix 40 mg QD, Fall/aspiration precaution, Cardiology F/U,   ECHO, F/U BMP,  CBC,  CT Chest No Contrast,    PT Consult , R/O Acute CHF, + GARCIA, TELE, Elevated BNP and Troponin HS, Cardiology Note Appreciated, CT Chest No contrast, F/U Troponin HS, IV Lasix 40 mg QD, Fall/aspiration precaution, Cardiology F/U,   ECHO, F/U BMP,  CBC,  CT Chest No Contrast,    PT Consult , Daily Weight,

## 2019-12-18 NOTE — H&P ADULT - NSHPLABSRESULTS_GEN_ALL_CORE
ECG: NSR @ 78, first Degree AVB, LAD, incomplete RBBB, Inverted T in V5-V6, AVL, Q in V1-V3,  msec.,

## 2019-12-19 LAB
ALBUMIN SERPL ELPH-MCNC: 3.2 G/DL — LOW (ref 3.3–5)
ALP SERPL-CCNC: 80 U/L — SIGNIFICANT CHANGE UP (ref 40–120)
ALT FLD-CCNC: 17 U/L — SIGNIFICANT CHANGE UP (ref 4–41)
ANION GAP SERPL CALC-SCNC: 16 MMO/L — HIGH (ref 7–14)
AST SERPL-CCNC: 19 U/L — SIGNIFICANT CHANGE UP (ref 4–40)
BACTERIA UR CULT: SIGNIFICANT CHANGE UP
BASOPHILS # BLD AUTO: 0.05 K/UL — SIGNIFICANT CHANGE UP (ref 0–0.2)
BASOPHILS NFR BLD AUTO: 0.8 % — SIGNIFICANT CHANGE UP (ref 0–2)
BILIRUB SERPL-MCNC: 0.3 MG/DL — SIGNIFICANT CHANGE UP (ref 0.2–1.2)
BLD GP AB SCN SERPL QL: NEGATIVE — SIGNIFICANT CHANGE UP
BUN SERPL-MCNC: 59 MG/DL — HIGH (ref 7–23)
CALCIUM SERPL-MCNC: 8.9 MG/DL — SIGNIFICANT CHANGE UP (ref 8.4–10.5)
CHLORIDE SERPL-SCNC: 109 MMOL/L — HIGH (ref 98–107)
CO2 SERPL-SCNC: 21 MMOL/L — LOW (ref 22–31)
CREAT ?TM UR-MCNC: 20.2 MG/DL — SIGNIFICANT CHANGE UP
CREAT SERPL-MCNC: 2.54 MG/DL — HIGH (ref 0.5–1.3)
EOSINOPHIL # BLD AUTO: 0.2 K/UL — SIGNIFICANT CHANGE UP (ref 0–0.5)
EOSINOPHIL NFR BLD AUTO: 3.4 % — SIGNIFICANT CHANGE UP (ref 0–6)
GLUCOSE BLDC GLUCOMTR-MCNC: 107 MG/DL — HIGH (ref 70–99)
GLUCOSE BLDC GLUCOMTR-MCNC: 111 MG/DL — HIGH (ref 70–99)
GLUCOSE BLDC GLUCOMTR-MCNC: 123 MG/DL — HIGH (ref 70–99)
GLUCOSE BLDC GLUCOMTR-MCNC: 147 MG/DL — HIGH (ref 70–99)
GLUCOSE SERPL-MCNC: 107 MG/DL — HIGH (ref 70–99)
HBA1C BLD-MCNC: 5.6 % — SIGNIFICANT CHANGE UP (ref 4–5.6)
HCT VFR BLD CALC: 25.1 % — LOW (ref 39–50)
HCT VFR BLD CALC: 29.5 % — LOW (ref 39–50)
HGB BLD-MCNC: 7.4 G/DL — LOW (ref 13–17)
HGB BLD-MCNC: 9.1 G/DL — LOW (ref 13–17)
IMM GRANULOCYTES NFR BLD AUTO: 0.3 % — SIGNIFICANT CHANGE UP (ref 0–1.5)
LYMPHOCYTES # BLD AUTO: 0.79 K/UL — LOW (ref 1–3.3)
LYMPHOCYTES # BLD AUTO: 13.3 % — SIGNIFICANT CHANGE UP (ref 13–44)
MAGNESIUM SERPL-MCNC: 2.6 MG/DL — SIGNIFICANT CHANGE UP (ref 1.6–2.6)
MCHC RBC-ENTMCNC: 29.5 % — LOW (ref 32–36)
MCHC RBC-ENTMCNC: 30.8 % — LOW (ref 32–36)
MCHC RBC-ENTMCNC: 30.8 PG — SIGNIFICANT CHANGE UP (ref 27–34)
MCHC RBC-ENTMCNC: 31.1 PG — SIGNIFICANT CHANGE UP (ref 27–34)
MCV RBC AUTO: 100.7 FL — HIGH (ref 80–100)
MCV RBC AUTO: 104.6 FL — HIGH (ref 80–100)
MONOCYTES # BLD AUTO: 0.67 K/UL — SIGNIFICANT CHANGE UP (ref 0–0.9)
MONOCYTES NFR BLD AUTO: 11.3 % — SIGNIFICANT CHANGE UP (ref 2–14)
NEUTROPHILS # BLD AUTO: 4.22 K/UL — SIGNIFICANT CHANGE UP (ref 1.8–7.4)
NEUTROPHILS NFR BLD AUTO: 70.9 % — SIGNIFICANT CHANGE UP (ref 43–77)
NRBC # FLD: 0 K/UL — SIGNIFICANT CHANGE UP (ref 0–0)
NRBC # FLD: 0 K/UL — SIGNIFICANT CHANGE UP (ref 0–0)
PHOSPHATE SERPL-MCNC: 6 MG/DL — HIGH (ref 2.5–4.5)
PLATELET # BLD AUTO: 241 K/UL — SIGNIFICANT CHANGE UP (ref 150–400)
PLATELET # BLD AUTO: 251 K/UL — SIGNIFICANT CHANGE UP (ref 150–400)
PMV BLD: 11.4 FL — SIGNIFICANT CHANGE UP (ref 7–13)
PMV BLD: 11.6 FL — SIGNIFICANT CHANGE UP (ref 7–13)
POTASSIUM SERPL-MCNC: 3.8 MMOL/L — SIGNIFICANT CHANGE UP (ref 3.5–5.3)
POTASSIUM SERPL-SCNC: 3.8 MMOL/L — SIGNIFICANT CHANGE UP (ref 3.5–5.3)
PROCALCITONIN SERPL-MCNC: 0.19 NG/ML — HIGH (ref 0.02–0.1)
PROT SERPL-MCNC: 6 G/DL — SIGNIFICANT CHANGE UP (ref 6–8.3)
PROT UR-MCNC: 12.1 MG/DL — SIGNIFICANT CHANGE UP
RBC # BLD: 2.4 M/UL — LOW (ref 4.2–5.8)
RBC # BLD: 2.93 M/UL — LOW (ref 4.2–5.8)
RBC # FLD: 17.4 % — HIGH (ref 10.3–14.5)
RBC # FLD: 18.2 % — HIGH (ref 10.3–14.5)
RH IG SCN BLD-IMP: POSITIVE — SIGNIFICANT CHANGE UP
SODIUM SERPL-SCNC: 146 MMOL/L — HIGH (ref 135–145)
SPECIMEN SOURCE: SIGNIFICANT CHANGE UP
WBC # BLD: 5.95 K/UL — SIGNIFICANT CHANGE UP (ref 3.8–10.5)
WBC # BLD: 6.87 K/UL — SIGNIFICANT CHANGE UP (ref 3.8–10.5)
WBC # FLD AUTO: 5.95 K/UL — SIGNIFICANT CHANGE UP (ref 3.8–10.5)
WBC # FLD AUTO: 6.87 K/UL — SIGNIFICANT CHANGE UP (ref 3.8–10.5)

## 2019-12-19 PROCEDURE — 99233 SBSQ HOSP IP/OBS HIGH 50: CPT

## 2019-12-19 RX ORDER — ACETAMINOPHEN 500 MG
650 TABLET ORAL ONCE
Refills: 0 | Status: COMPLETED | OUTPATIENT
Start: 2019-12-19 | End: 2019-12-19

## 2019-12-19 RX ORDER — CALCIUM ACETATE 667 MG
667 TABLET ORAL
Refills: 0 | Status: DISCONTINUED | OUTPATIENT
Start: 2019-12-19 | End: 2019-12-24

## 2019-12-19 RX ORDER — FUROSEMIDE 40 MG
40 TABLET ORAL
Refills: 0 | Status: DISCONTINUED | OUTPATIENT
Start: 2019-12-19 | End: 2019-12-20

## 2019-12-19 RX ADMIN — Medication 100 MILLIGRAM(S): at 12:06

## 2019-12-19 RX ADMIN — HEPARIN SODIUM 5000 UNIT(S): 5000 INJECTION INTRAVENOUS; SUBCUTANEOUS at 15:00

## 2019-12-19 RX ADMIN — Medication 325 MILLIGRAM(S): at 12:06

## 2019-12-19 RX ADMIN — HEPARIN SODIUM 5000 UNIT(S): 5000 INJECTION INTRAVENOUS; SUBCUTANEOUS at 21:23

## 2019-12-19 RX ADMIN — Medication 40 MILLIGRAM(S): at 18:03

## 2019-12-19 RX ADMIN — ATORVASTATIN CALCIUM 40 MILLIGRAM(S): 80 TABLET, FILM COATED ORAL at 21:23

## 2019-12-19 RX ADMIN — AMLODIPINE BESYLATE 5 MILLIGRAM(S): 2.5 TABLET ORAL at 05:33

## 2019-12-19 RX ADMIN — HEPARIN SODIUM 5000 UNIT(S): 5000 INJECTION INTRAVENOUS; SUBCUTANEOUS at 05:33

## 2019-12-19 RX ADMIN — Medication 1000 UNIT(S): at 12:06

## 2019-12-19 RX ADMIN — Medication 40 MILLIGRAM(S): at 05:33

## 2019-12-19 RX ADMIN — Medication 650 MILLIGRAM(S): at 12:06

## 2019-12-19 RX ADMIN — BRINZOLAMIDE 1 DROP(S): 10 SUSPENSION/ DROPS OPHTHALMIC at 18:03

## 2019-12-19 RX ADMIN — BRINZOLAMIDE 1 DROP(S): 10 SUSPENSION/ DROPS OPHTHALMIC at 05:41

## 2019-12-19 NOTE — PROGRESS NOTE ADULT - SUBJECTIVE AND OBJECTIVE BOX
Interval History: Reports voiding well overnight, still has dyspnea with movement.    Review Of Systems:  Constitutional: [ ] Fever [ ] Chills [ ] Fatigue [ ] Weight change   HEENT: [ ] Blurred vision [ ] Eye Pain [ ] Headache [ ] Runny nose [ ] Sore Throat   Respiratory: [ ] Cough [ ] Wheezing [ ] Shortness of breath  Cardiovascular: [ ] Chest Pain [ ] Palpitations [x] GARCIA [ ] PND [ ] Orthopnea  Gastrointestinal: [ ] Abdominal Pain [ ] Diarrhea [ ] Constipation [ ] Hemorrhoids [ ] Nausea [ ] Vomiting  Genitourinary: [ ] Nocturia [ ] Dysuria [ ] Incontinence  Extremities: [ ] Swelling [ ] Joint Pain  Neurologic: [ ] Focal deficit [ ] Paresthesias [ ] Syncope  Lymphatic: [ ] Swelling [ ] Lymphadenopathy   Skin: [ ] Rash [ ] Ecchymoses [ ] Wounds [ ] Lesions  Psychiatry: [ ] Depression [ ] Suicidal/Homicidal Ideation [ ] Anxiety [ ] Sleep Disturbances  [x] 10 point review of systems is otherwise negative except as mentioned above    Medications:  allopurinol 100 milliGRAM(s) Oral daily  amLODIPine   Tablet 5 milliGRAM(s) Oral daily  atorvastatin 40 milliGRAM(s) Oral at bedtime  brinzolamide 1% Ophthalmic Suspension 1 Drop(s) Both EYES two times a day  cholecalciferol 1000 Unit(s) Oral daily  dextrose 40% Gel 15 Gram(s) Oral once PRN  dextrose 5%. 1000 milliLiter(s) IV Continuous <Continuous>  dextrose 50% Injectable 12.5 Gram(s) IV Push once  dextrose 50% Injectable 25 Gram(s) IV Push once  dextrose 50% Injectable 25 Gram(s) IV Push once  ferrous    sulfate 325 milliGRAM(s) Oral daily  furosemide   Injectable 40 milliGRAM(s) IV Push daily  glucagon  Injectable 1 milliGRAM(s) IntraMuscular once PRN  heparin  Injectable 5000 Unit(s) SubCutaneous every 8 hours  insulin lispro (HumaLOG) corrective regimen sliding scale   SubCutaneous Before meals and at bedtime  senna 2 Tablet(s) Oral at bedtime    Vitals:  ICU Vital Signs Last 24 Hrs  T(C): 36.2 (19 Dec 2019 10:10), Max: 36.7 (18 Dec 2019 14:47)  T(F): 97.2 (19 Dec 2019 10:10), Max: 98.1 (19 Dec 2019 05:30)  HR: 76 (19 Dec 2019 10:10) (72 - 76)  BP: 121/60 (19 Dec 2019 10:10) (121/60 - 136/60)  BP(mean): --  ABP: --  ABP(mean): --  RR: 17 (19 Dec 2019 10:10) (17 - 21)  SpO2: 97% (19 Dec 2019 10:10) (96% - 98%)    Daily Height in cm: 172.72 (18 Dec 2019 20:57)    Daily Weight in k.4 (18 Dec 2019 20:57)  I&O's Summary    18 Dec 2019 07:01  -  19 Dec 2019 07:00  --------------------------------------------------------  IN: 400 mL / OUT: 1900 mL / NET: -1500 mL    19 Dec 2019 07:01  -  19 Dec 2019 10:26  --------------------------------------------------------  IN: 0 mL / OUT: 1250 mL / NET: -1250 mL    Physical Exam:  Appearance:  NAD, wearing NC, dyspneic with slight movement  HENT: NC/AT  Cardiovascular: Normal S1, soft S2, regular rate and rhythm, III/VI decrescendo murmur heard over upper sternal border, 2+ LE Edema Present bilaterally, unable to appreciate JVD  Respiratory: Scattered crackles over LLL and mid right lobe  Gastrointestinal: Soft  Psychiatry: [x] AAOx3  [x] Follows Commands  Skin: Intact    Labs:                        7.4    5.95  )-----------( 241      ( 19 Dec 2019 05:45 )             25.1         146<H>  |  109<H>  |  59<H>  ----------------------------<  107<H>  3.8   |  21<L>  |  2.54<H>    Ca    8.9      19 Dec 2019 05:45  Phos  6.0       Mg     2.6         TPro  6.0  /  Alb  3.2<L>  /  TBili  0.3  /  DBili  x   /  AST  19  /  ALT  17  /  AlkPhos  80      PT/INR - ( 18 Dec 2019 02:46 )   PT: 11.7 SEC;   INR: 1.05          PTT - ( 18 Dec 2019 02:46 )  PTT:30.5 SEC      Serum Pro-Brain Natriuretic Peptide: 24251 pg/mL ( @ 02:45)  Serum Pro-Brain Natriuretic Peptide: 73135 pg/mL ( @ 23:10)      Hemoglobin A1C, Whole Blood: 5.6 % ( @ 06:00)        Culture - Urine (collected 19 @ 04:22)  Source: URINE MIDSTREAM  Final Report (19 @ 08:45):    NO GROWTH AT 24 HOURS    Interpretation of Telemetry: sinus rhythm

## 2019-12-19 NOTE — CONSULT NOTE ADULT - ASSESSMENT
89 y/o Male from [facility] , with pmhx of CAD, COPD, HTN, Prostate CA, S/P Radiation therapy years ago, CKD,  Anemia, DM on No Meds, GOUT, Glaucoma, Aortic Stenosis , Osteoporosis , Legs edema, A+O x 4, presents with c/o dyspnea    renal failure  likely CKD stage 4 follow up withe nephrologist recently  unclear baseline, however is stable ~ 2.5 likely is baseline  avoid nephrotoxic agents, NSAIDs  monitor bmp  continue diuresing    anemia  iron study done iron sat 7%   likely iron def  r/o gib  continue iron supplement  transfuse to keep Hb>8    hypervolemia  pending echo  on lasix 40 bid  monitor i/o daily weight  f/u cardio    ckd-mbd  check phos and pth  on daily vit d  monitor phos and calcium daily  low phos diet  start phoslo 667 bid with meal    proteinuria  check urine p/c ratio  monitor    htn  controlled  monitor 89 y/o Male from [facility] , with pmhx of CAD, COPD, HTN, Prostate CA, S/P Radiation therapy years ago, CKD,  Anemia, DM on No Meds, GOUT, Glaucoma, Aortic Stenosis , Osteoporosis , Legs edema, A+O x 4, presents with c/o dyspnea    Renal failure  likely CKD stage 4 follow up withe nephrologist recently  unclear baseline, however is stable ~ 2.5 likely is baseline  Agree with Diuretics   avoid nephrotoxic agents, NSAIDs  monitor bmp    Anemia  iron study done iron sat 7%   likely iron def  r/o gib  continue iron supplement  transfuse to keep Hb>8    Hypervolemia  pending echo  on lasix 40 bid  monitor i/o daily weight  f/u cardio    ckd-mbd  check phos and pth  on daily vit d  monitor phos and calcium daily  low phos diet  start phoslo 667 bid with meal    Proteinuria  check urine p/c ratio  monitor    HTN  controlled  monitor

## 2019-12-19 NOTE — CONSULT NOTE ADULT - SUBJECTIVE AND OBJECTIVE BOX
[facility]   MD WALLACE DAHL, DO YULIANA GODFREY, HECTOR JAMES    TEL:  OFFICE: [phone]  DR SCOTT CELL: [phone]  DR. MAURICE CELL: [phone]  DR. GODFREY CELL: [phone]  DEJUAN MOREAU CELL: [phone]    HPI:  89 y/o Male from [facility] [place] , with pmhx of CAD, COPD, HTN, Prostate CA, S/P Radiation therapy years ago, CKD,  Anemia, DM on No Meds, GOUT, Glaucoma, Aortic Stenosis , Osteoporosis , Legs edema, A+O x 4, presents with c/o dyspnea upon exertion x 3-5 days no associated chest pain, palpitations, dizziness, lightheadedness, diaphoresis, cough, URI like symptoms, No N/V  not on diuretic at home  CKD follows up with nephrologist       Allergies:  ACE inhibitors (Unknown)  aspirin (Unknown)  enalapril (Unknown)      PAST MEDICAL & SURGICAL HISTORY:  Leg edema  Osteoporosis  Aortic stenosis  Glaucoma  DM type 2 (diabetes mellitus, type 2)  CAD (coronary artery disease)  Chronic kidney disease (CKD)  Anemia  Prostate cancer  Gout  HLD (hyperlipidemia)  Hypertension  History of colonoscopy  History of tonsillectomy: childhood  H/O inguinal hernia repair      Home Medications Reviewed    Hospital Medications:   MEDICATIONS  (STANDING):  allopurinol 100 milliGRAM(s) Oral daily  amLODIPine   Tablet 5 milliGRAM(s) Oral daily  atorvastatin 40 milliGRAM(s) Oral at bedtime  brinzolamide 1% Ophthalmic Suspension 1 Drop(s) Both EYES two times a day  cholecalciferol 1000 Unit(s) Oral daily  dextrose 5%. 1000 milliLiter(s) (50 mL/Hr) IV Continuous <Continuous>  dextrose 50% Injectable 12.5 Gram(s) IV Push once  dextrose 50% Injectable 25 Gram(s) IV Push once  dextrose 50% Injectable 25 Gram(s) IV Push once  ferrous    sulfate 325 milliGRAM(s) Oral daily  furosemide   Injectable 40 milliGRAM(s) IV Push two times a day  heparin  Injectable 5000 Unit(s) SubCutaneous every 8 hours  insulin lispro (HumaLOG) corrective regimen sliding scale   SubCutaneous Before meals and at bedtime  senna 2 Tablet(s) Oral at bedtime      SOCIAL HISTORY:  Denies ETOh, Smoking,     FAMILY HISTORY:  Family hx of lung cancer  Family history of CVA      REVIEW OF SYSTEMS:  CONSTITUTIONAL: No weakness, fevers or chills  EYES/ENT: No visual changes;  No vertigo or throat pain   NECK: No pain or stiffness  RESPIRATORY: see hpi  CARDIOVASCULAR: No chest pain or palpitations.  GASTROINTESTINAL: No abdominal or epigastric pain. No nausea, vomiting, or hematemesis; No diarrhea or constipation. No melena or hematochezia.  GENITOURINARY: No dysuria, frequency, foamy urine, urinary urgency, incontinence or hematuria  NEUROLOGICAL: No numbness or weakness  SKIN: No itching, burning, rashes, or lesions   VASCULAR: + bilateral lower extremity edema.   All other review of systems is negative unless indicated above.    VITALS:  T(F): 97.6 (19 @ 12:45), Max: 98.1 (19 @ 05:30)  HR: 66 (19 @ 12:45)  BP: 126/53 (19 @ 12:45)  RR: 19 (19 @ 12:45)  SpO2: 100% (19 @ 12:45)  Wt(kg): --     @ 07:01  -   @ 07:00  --------------------------------------------------------  IN: 400 mL / OUT: 1900 mL / NET: -1500 mL     @ 07:01  -   @ 16:39  --------------------------------------------------------  IN: 0 mL / OUT: 1550 mL / NET: -1550 mL      Height (cm): 172.7 ( @ 20:57)    PHYSICAL EXAM:  Constitutional: NAD  HEENT: anicteric sclera, oropharynx clear, MMM  Neck: No JVD  Respiratory: CTAB, no wheezes, rales or rhonchi  Cardiovascular: S1, S2, RRR  Gastrointestinal: BS+, soft, NT/ND  Extremities: 2+ peripheral edema  Neurological: A/O x 3, no focal deficits  Psychiatric: Normal mood, normal affect  : No CVA tenderness. No moreno.   Skin: No rashes      LABS:      146<H>  |  109<H>  |  59<H>  ----------------------------<  107<H>  3.8   |  21<L>  |  2.54<H>    Ca    8.9      19 Dec 2019 05:45  Phos  6.0       Mg     2.6         TPro  6.0  /  Alb  3.2<L>  /  TBili  0.3  /  DBili      /  AST  19  /  ALT  17  /  AlkPhos  80      Creatinine Trend: 2.54 <--, 2.54 <--, 2.52 <--, 2.57 <--                        7.4    5.95  )-----------( 241      ( 19 Dec 2019 05:45 )             25.1     Urine Studies:  Urinalysis Basic - ( 18 Dec 2019 02:46 )    Color: LT. YELLOW / Appearance: CLEAR / S.015 / pH: 6.0  Gluc: NEGATIVE / Ketone: NEGATIVE  / Bili: NEGATIVE / Urobili: NORMAL   Blood: TRACE / Protein: 70 / Nitrite: NEGATIVE   Leuk Esterase: NEGATIVE / RBC: 0-2 / WBC    Sq Epi:  / Non Sq Epi:  / Bacteria:           RADIOLOGY & ADDITIONAL STUDIES: [facility]   MD WALLACE DAHL DO MARYANNE SOURIAL, HECTOR JAMES    TEL:  OFFICE: [phone]  DR SCOTT CELL: [phone]  DR. MAURICE CELL: [phone]  DR. GODFREY CELL: [phone]  DEJUAN MOREAU CELL: [phone]    HPI:  89 y/o Male from [facility] [place] , with pmhx of CAD, COPD, HTN, Prostate CA, S/P Radiation therapy years ago, CKD,  Anemia, DM on No Meds, GOUT, Glaucoma, Aortic Stenosis , Osteoporosis , Legs edema, A+O x 4, presents with c/o dyspnea upon exertion x 3-5 days no associated chest pain, palpitations, dizziness, lightheadedness, diaphoresis, cough, URI like symptoms, No N/V  not on diuretic at home  CKD follows up with a outpatient nephrologist       Allergies:  ACE inhibitors (Unknown)  aspirin (Unknown)  enalapril (Unknown)      PAST MEDICAL & SURGICAL HISTORY:  Leg edema  Osteoporosis  Aortic stenosis  Glaucoma  DM type 2 (diabetes mellitus, type 2)  CAD (coronary artery disease)  Chronic kidney disease (CKD)  Anemia  Prostate cancer  Gout  HLD (hyperlipidemia)  Hypertension  History of colonoscopy  History of tonsillectomy: childhood  H/O inguinal hernia repair      Home Medications Reviewed    Hospital Medications:   MEDICATIONS  (STANDING):  allopurinol 100 milliGRAM(s) Oral daily  amLODIPine   Tablet 5 milliGRAM(s) Oral daily  atorvastatin 40 milliGRAM(s) Oral at bedtime  brinzolamide 1% Ophthalmic Suspension 1 Drop(s) Both EYES two times a day  cholecalciferol 1000 Unit(s) Oral daily  dextrose 5%. 1000 milliLiter(s) (50 mL/Hr) IV Continuous <Continuous>  dextrose 50% Injectable 12.5 Gram(s) IV Push once  dextrose 50% Injectable 25 Gram(s) IV Push once  dextrose 50% Injectable 25 Gram(s) IV Push once  ferrous    sulfate 325 milliGRAM(s) Oral daily  furosemide   Injectable 40 milliGRAM(s) IV Push two times a day  heparin  Injectable 5000 Unit(s) SubCutaneous every 8 hours  insulin lispro (HumaLOG) corrective regimen sliding scale   SubCutaneous Before meals and at bedtime  senna 2 Tablet(s) Oral at bedtime      SOCIAL HISTORY:  Denies ETOh, Smoking,     FAMILY HISTORY:  Family hx of lung cancer  Family history of CVA      REVIEW OF SYSTEMS:  CONSTITUTIONAL: No weakness, fevers or chills  EYES/ENT: No visual changes;  No vertigo or throat pain   NECK: No pain or stiffness  RESPIRATORY: see hpi  CARDIOVASCULAR: No chest pain or palpitations.  GASTROINTESTINAL: No abdominal or epigastric pain. No nausea, vomiting, or hematemesis; No diarrhea or constipation. No melena or hematochezia.  GENITOURINARY: No dysuria, frequency, foamy urine, urinary urgency, incontinence or hematuria  NEUROLOGICAL: No numbness or weakness  SKIN: No itching, burning, rashes, or lesions   VASCULAR: + bilateral lower extremity edema.   All other review of systems is negative unless indicated above.    VITALS:  T(F): 97.6 (19 @ 12:45), Max: 98.1 (19 @ 05:30)  HR: 66 (19 @ 12:45)  BP: 126/53 (19 @ 12:45)  RR: 19 (19 @ 12:45)  SpO2: 100% (19 @ 12:45)  Wt(kg): --     @ 07:01  -   @ 07:00  --------------------------------------------------------  IN: 400 mL / OUT: 1900 mL / NET: -1500 mL     @ 07:01  -   @ 16:39  --------------------------------------------------------  IN: 0 mL / OUT: 1550 mL / NET: -1550 mL      Height (cm): 172.7 ( @ 20:57)    PHYSICAL EXAM:  Constitutional: NAD  HEENT: anicteric sclera, oropharynx clear, MMM  Neck: No JVD  Respiratory: CTAB, no wheezes, rales or rhonchi  Cardiovascular: S1, S2, RRR  Gastrointestinal: BS+, soft, NT/ND  Extremities: 2+ peripheral edema  Neurological: A/O x 3, no focal deficits  Psychiatric: Normal mood, normal affect  : No CVA tenderness. No moreno.   Skin: No rashes      LABS:      146<H>  |  109<H>  |  59<H>  ----------------------------<  107<H>  3.8   |  21<L>  |  2.54<H>    Ca    8.9      19 Dec 2019 05:45  Phos  6.0       Mg     2.6         TPro  6.0  /  Alb  3.2<L>  /  TBili  0.3  /  DBili      /  AST  19  /  ALT  17  /  AlkPhos  80      Creatinine Trend: 2.54 <--, 2.54 <--, 2.52 <--, 2.57 <--                        7.4    5.95  )-----------( 241      ( 19 Dec 2019 05:45 )             25.1     Urine Studies:  Urinalysis Basic - ( 18 Dec 2019 02:46 )    Color: LT. YELLOW / Appearance: CLEAR / S.015 / pH: 6.0  Gluc: NEGATIVE / Ketone: NEGATIVE  / Bili: NEGATIVE / Urobili: NORMAL   Blood: TRACE / Protein: 70 / Nitrite: NEGATIVE   Leuk Esterase: NEGATIVE / RBC: 0-2 / WBC    Sq Epi:  / Non Sq Epi:  / Bacteria:           RADIOLOGY & ADDITIONAL STUDIES:

## 2019-12-19 NOTE — CONSULT NOTE ADULT - ATTENDING COMMENTS
Known AS, though no previously known "CHF" history, pt presents with acute CHF exacerbation due to aortic stenosis (unknown EF)  ECG with 1AVB, possible LAFB, ant infarct and lat precordial twi  LE edema, which is reportedly chronic, but has elevated JVP and pleural effusions. soft S2 present  Troponin not ACS - likely due to CHF exacerbation/AS    -Continue IV diuresis - can give additional 40mg IV BID this afternoon as he remains clinically volume overloaded and appears to have responded to the initial dose  -check TTE to evaluate AS
Monitor off antibtriocs

## 2019-12-19 NOTE — PROGRESS NOTE ADULT - PROBLEM SELECTOR PLAN 6
Renal consult  CT A/P, Hx of Prostate CA  Hep A,B,C profile  GOUT: on allopurinol, Cr about the same at baseline, phosphorus high.  Na high.   Renal consult  CT A/P, Hx of Prostate CA  Hep A,B,C profile  GOUT: on allopurinol

## 2019-12-19 NOTE — PROGRESS NOTE ADULT - SUBJECTIVE AND OBJECTIVE BOX
Patient is a 89y old  Male who presents with a chief complaint of SOB, GARCIA, R/O acute CHF, (19 Dec 2019 10:42)      SUBJECTIVE / OVERNIGHT EVENTS:  still feeling short of breath with any exertion  being diuresed.  cr stable.  phosphorus high  no cp, no n/v/.d       Vital Signs Last 24 Hrs  T(C): 36.4 (19 Dec 2019 12:45), Max: 36.7 (18 Dec 2019 14:47)  T(F): 97.6 (19 Dec 2019 12:45), Max: 98.1 (19 Dec 2019 05:30)  HR: 66 (19 Dec 2019 12:45) (66 - 76)  BP: 126/53 (19 Dec 2019 12:45) (121/60 - 136/60)  BP(mean): --  RR: 19 (19 Dec 2019 12:45) (17 - 21)  SpO2: 100% (19 Dec 2019 12:45) (96% - 100%)  I&O's Summary    18 Dec 2019 07:01  -  19 Dec 2019 07:00  --------------------------------------------------------  IN: 400 mL / OUT: 1900 mL / NET: -1500 mL    19 Dec 2019 07:01  -  19 Dec 2019 13:52  --------------------------------------------------------  IN: 0 mL / OUT: 1250 mL / NET: -1250 mL        PHYSICAL EXAM:  GENERAL: NAD, Comfortable, on nasal canula  HEAD:  Atraumatic, Normocephalic  EYES: EOMI, PERRLA, conjunctiva and sclera clear  NECK: Supple, No JVD  CHEST/LUNG: mild decrease breath sounds bilaterally; No wheeze   HEART: Regular rate and rhythm; No murmurs, rubs, or gallops  ABDOMEN: Soft, Nontender, Nondistended; Bowel sounds present  Neuro: AAO x 3, no focal deficit, 5/5 b/l extremities  EXTREMITIES:  2+ Peripheral Pulses, No clubbing, cyanosis, +edema  SKIN: No rashes or lesions      LABS:                        7.4    5.95  )-----------( 241      ( 19 Dec 2019 05:45 )             25.1         146<H>  |  109<H>  |  59<H>  ----------------------------<  107<H>  3.8   |  21<L>  |  2.54<H>    Ca    8.9      19 Dec 2019 05:45  Phos  6.0       Mg     2.6         TPro  6.0  /  Alb  3.2<L>  /  TBili  0.3  /  DBili  x   /  AST  19  /  ALT  17  /  AlkPhos  80      PT/INR - ( 18 Dec 2019 02:46 )   PT: 11.7 SEC;   INR: 1.05          PTT - ( 18 Dec 2019 02:46 )  PTT:30.5 SEC  CAPILLARY BLOOD GLUCOSE      POCT Blood Glucose.: 147 mg/dL (19 Dec 2019 12:56)  POCT Blood Glucose.: 107 mg/dL (19 Dec 2019 08:20)  POCT Blood Glucose.: 116 mg/dL (18 Dec 2019 21:35)  POCT Blood Glucose.: 121 mg/dL (18 Dec 2019 18:16)        Urinalysis Basic - ( 18 Dec 2019 02:46 )    Color: LT. YELLOW / Appearance: CLEAR / S.015 / pH: 6.0  Gluc: NEGATIVE / Ketone: NEGATIVE  / Bili: NEGATIVE / Urobili: NORMAL   Blood: TRACE / Protein: 70 / Nitrite: NEGATIVE   Leuk Esterase: NEGATIVE / RBC: 0-2 / WBC x   Sq Epi: x / Non Sq Epi: x / Bacteria: x        RADIOLOGY & ADDITIONAL TESTS:    Imaging Personally Reviewed:  [x] YES  [ ] NO    Consultant(s) Notes Reviewed:  [x] YES  [ ] NO      MEDICATIONS  (STANDING):  allopurinol 100 milliGRAM(s) Oral daily  amLODIPine   Tablet 5 milliGRAM(s) Oral daily  atorvastatin 40 milliGRAM(s) Oral at bedtime  brinzolamide 1% Ophthalmic Suspension 1 Drop(s) Both EYES two times a day  cholecalciferol 1000 Unit(s) Oral daily  dextrose 5%. 1000 milliLiter(s) (50 mL/Hr) IV Continuous <Continuous>  dextrose 50% Injectable 12.5 Gram(s) IV Push once  dextrose 50% Injectable 25 Gram(s) IV Push once  dextrose 50% Injectable 25 Gram(s) IV Push once  ferrous    sulfate 325 milliGRAM(s) Oral daily  furosemide   Injectable 40 milliGRAM(s) IV Push daily  heparin  Injectable 5000 Unit(s) SubCutaneous every 8 hours  insulin lispro (HumaLOG) corrective regimen sliding scale   SubCutaneous Before meals and at bedtime  senna 2 Tablet(s) Oral at bedtime    MEDICATIONS  (PRN):  dextrose 40% Gel 15 Gram(s) Oral once PRN Blood Glucose LESS THAN 70 milliGRAM(s)/deciliter  glucagon  Injectable 1 milliGRAM(s) IntraMuscular once PRN Glucose LESS THAN 70 milligrams/deciliter      Care Discussed with Consultants/Other Providers [x] YES  [ ] NO    HEALTH ISSUES - PROBLEM Dx:  Anemia: Anemia  Glaucoma: Glaucoma  Preventive measure: Preventive measure  Hypertension: Hypertension  Chronic kidney disease (CKD): Chronic kidney disease (CKD)  DM type 2 (diabetes mellitus, type 2): DM type 2 (diabetes mellitus, type 2)  Aortic stenosis: Aortic stenosis  Leg edema: Leg edema  CAD (coronary artery disease): CAD (coronary artery disease)  SOB (shortness of breath): SOB (shortness of breath)  Suspected deep vein thrombosis (DVT)

## 2019-12-19 NOTE — PROGRESS NOTE ADULT - ASSESSMENT
89 yo M transferred from [facility] with PMH of CAD, aortic stenosis, COPD, HTN, prostate cancer (s/p radiation therapy), CKD, anemia, DM and gout, presenting with worsening dyspnea on exertion, admitted for decompensated heart failure.    #ADHF  #Elevated troponin  - Likely 2/2 diastolic dysfunction/known AS, responding well to IV diuresis  - Net negative 1.5L in last 24h  - Continue Lasix 40mg IV BID today, can transition to daily tomorrow  - Daily BMP, replete carefully for K > 4, Mg > 2 given CKD   - Daily weight, strict I/Os  - TTE ordered, will follow up on results    #?CAD  #Elevated troponin  - Troponin elevation demand in setting of CHF, CKD, stable, not ACS, ECG without new ischemic changes with evidence of infarct  - Continue home atorvastatin 40mg daily  - Not on aspirin, unclear if secondary to chronic anemia    #HTN  - Continue home amlodipine    Patient to be discussed with attending.    Marlena Barton MD  Cardiology Fellow  [phone]  All Cardiology service information can be found 24/7 on [email or web address], password: [email or web address]

## 2019-12-19 NOTE — CONSULT NOTE ADULT - PROBLEM SELECTOR RECOMMENDATION 9
likely secondary to diastolic heart failure: His ct chest is reviewed: no evidence of emphysema: does have impacted distal airways and non specific GGo in rul and rll: HE HAS NO FEVER AND HAS NO LEUCOCYTOSIS:I don't think he has pneumonia: proccal is mildly elevated: need to be monitored off antibiotics: his RVP is negative: try to titrate down fio2: he is on 4 L of oxygen at this time:

## 2019-12-19 NOTE — PROGRESS NOTE ADULT - PROBLEM SELECTOR PLAN 10
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 on Ferrous Sulfate  hx of rectal hemorrhoids, intermittent red on toilet paper. not worse.  had multiple colonoscopies in the past. not interested in another one  will give 1 unit PRBC to optimize. This may help with dyspnea. (Seeing heme/onc Dr. Winter as outpt as well)

## 2019-12-19 NOTE — PROGRESS NOTE ADULT - ASSESSMENT
89 y/o Male from [facility], with pmhx of CAD, COPD, HTN, Prostate CA, S/P Radiation therapy years ago, CKD,  Anemia, DM on No Meds, GOUT, Glaucoma, Aortic Stenosis , Osteoporosis , Legs edema, A+O x 4, presents with c/o dyspnea upon exertion x 3-5 days.

## 2019-12-19 NOTE — CONSULT NOTE ADULT - ASSESSMENT
Patient is a 89 y/o Male from [facility] , with pmhx of CAD, COPD, HTN, Prostate CA, S/P Radiation therapy years ago, CKD,  Anemia, DM on No Meds, GOUT, Glaucoma, Aortic Stenosis , Osteoporosis , Legs edema, A+O x 4, presents with c/o dyspnea upon exertion x 3-5 days. He reports when walking or exerting himself he feels SOB, no associated chest pain, palpitations, dizziness, lightheadedness, diaphoresis, cough, URI like symptoms, No N/V, no diarrhea, no Fever, NO CP, NO HA, no Dizziness, No abdominal pain, + chronic LBP, + constipation , Last BM Yesterday No sore Throat, uses cane to Ambulate No legs pain, C/O + Orthopnea, + dysuria, Chest X ray prelim c/w B/L Pleural Effusions, BNP [id number], Troponin , pt s/p IV Lasix 40 mg x 1 for Possible Acute CHF given by ER tonight, pt is making urine, I called Cardiology consult tonight, I Ordered Venous Doppler legs: R/O DVT, CT Chest/abdomen /pelvis NO Contrast  ordered due to + B/L Pleural effusions, HX of Prostate CA, CKD, Anemia, RVP: Negative, pt was seen by House Cardiology as well,

## 2019-12-19 NOTE — CONSULT NOTE ADULT - SUBJECTIVE AND OBJECTIVE BOX
Patient is a 89y old  Male who presents with a chief complaint of SOB, GARCIA, R/O acute CHF, (19 Dec 2019 10:26)      HPI:  Patient is a 89 y/o Male from [facility] , with pmhx of CAD, COPD, HTN, Prostate CA, S/P Radiation therapy years ago, CKD,  Anemia, DM on No Meds, GOUT, Glaucoma, Aortic Stenosis , Osteoporosis , Legs edema, A+O x 4, presents with c/o dyspnea upon exertion x 3-5 days. He reports when walking or exerting himself he feels SOB, no associated chest pain, palpitations, dizziness, lightheadedness, diaphoresis, cough, URI like symptoms, No N/V, no diarrhea, no Fever, NO CP, NO HA, no Dizziness, No abdominal pain, + chronic LBP, + constipation , Last BM Yesterday No sore Throat, uses cane to Ambulate No legs pain, C/O + Orthopnea, + dysuria, Chest X ray prelim c/w B/L Pleural Effusions, BNP 61235, Troponin , pt s/p IV Lasix 40 mg x 1 for Possible Acute CHF given by ER tonight, pt is making urine, I called Cardiology consult tonight, I Ordered Venous Doppler legs: R/O DVT, CT Chest/abdomen /pelvis NO Contrast  ordered due to + B/L Pleural effusions, HX of Prostate CA, CKD, Anemia, RVP: Negative, pt was seen by House [facility]iology as well,     LABS: HgbA1c 5.5%, Na 144, K+ 4.4, CO2 18, BUN 58, Creatinine 2.57, glucose 142, LFT Normal, Troponin /198, BNP 49468, WBC 7.58, Hgb 7.8, .1, platelet 236, PT 11.7, INR 1.05, PTT 30.5,   UA: Trace Blood, Protein 70, TSH 2.75, cholesterol 118, Iron 19, RVP Neg., PTH 89.27,     Vitals: Tem 98.7,  HR  74 , /50 , RR 16, 98% RA, (18 Dec 2019 01:59)    above c ondfirmed: to me he says he has no copd but per h%p : he has copd: he never smoked : he is not using any inhlaers at home:       ?FOLLOWING PRESENT  [x ] Hx of PE/DVT, [?] Hx COPD, x] Hx of Asthma, [ x] Hx of Hospitalization, x[ ]  Hx of BiPAP/CPAP use, [ ]x Hx of BRODY    Allergies    ACE inhibitors (Unknown)  aspirin (Unknown)  enalapril (Unknown)    Intolerances        PAST MEDICAL & SURGICAL HISTORY:  Leg edema  Osteoporosis  Aortic stenosis  Glaucoma  DM type 2 (diabetes mellitus, type 2)  CAD (coronary artery disease)  Chronic kidney disease (CKD)  Anemia  Prostate cancer  Gout  HLD (hyperlipidemia)  Hypertension  History of colonoscopy  History of tonsillectomy: childhood  H/O inguinal hernia repair      FAMILY HISTORY:  Family hx of lung cancer  Family history of CVA      Social History: x[  ] TOBACCO                  [x  ] ETOH                                 [ x ] IVDA/DRUGS    REVIEW OF SYSTEMS      General:	x    Skin/Breast:x  	  Ophthalmologic:x  	  ENMT:	  x  Respiratory and Thorax: sob, garcia   	  Cardiovascular:	x    Gastrointestinal:	x    Genitourinary:	x    Musculoskeletal:	x    Neurological:	  x  Psychiatric:	x    Hematology/Lymphatics:	x    Endocrine:	x  x  Allergic/Immunologic:	    MEDICATIONS  (STANDING):  allopurinol 100 milliGRAM(s) Oral daily  amLODIPine   Tablet 5 milliGRAM(s) Oral daily  atorvastatin 40 milliGRAM(s) Oral at bedtime  brinzolamide 1% Ophthalmic Suspension 1 Drop(s) Both EYES two times a day  cholecalciferol 1000 Unit(s) Oral daily  dextrose 5%. 1000 milliLiter(s) (50 mL/Hr) IV Continuous <Continuous>  dextrose 50% Injectable 12.5 Gram(s) IV Push once  dextrose 50% Injectable 25 Gram(s) IV Push once  dextrose 50% Injectable 25 Gram(s) IV Push once  ferrous    sulfate 325 milliGRAM(s) Oral daily  furosemide   Injectable 40 milliGRAM(s) IV Push daily  heparin  Injectable 5000 Unit(s) SubCutaneous every 8 hours  insulin lispro (HumaLOG) corrective regimen sliding scale   SubCutaneous Before meals and at bedtime  senna 2 Tablet(s) Oral at bedtime    MEDICATIONS  (PRN):  dextrose 40% Gel 15 Gram(s) Oral once PRN Blood Glucose LESS THAN 70 milliGRAM(s)/deciliter  glucagon  Injectable 1 milliGRAM(s) IntraMuscular once PRN Glucose LESS THAN 70 milligrams/deciliter       Vital Signs Last 24 Hrs  T(C): 36.2 (19 Dec 2019 10:10), Max: 36.7 (18 Dec 2019 14:47)  T(F): 97.2 (19 Dec 2019 10:10), Max: 98.1 (19 Dec 2019 05:30)  HR: 76 (19 Dec 2019 10:10) (72 - 76)  BP: 121/60 (19 Dec 2019 10:10) (121/60 - 136/60)  BP(mean): --  RR: 17 (19 Dec 2019 10:10) (17 - 21)  SpO2: 97% (19 Dec 2019 10:10) (96% - 98%)        I&O's Summary    18 Dec 2019 07:01  -  19 Dec 2019 07:00  --------------------------------------------------------  IN: 400 mL / OUT: 1900 mL / NET: -1500 mL    19 Dec 2019 07:01  -  19 Dec 2019 10:43  --------------------------------------------------------  IN: 0 mL / OUT: 1250 mL / NET: -1250 mL        Physical Exam:   GENERAL: NAD, well-groomed, well-developed  HEENT: CHIO/   Atraumatic, Normocephalic  ENMT: No tonsillar erythema, exudates, or enlargement; Moist mucous membranes, Good dentition, No lesions  NECK: Supple, No JVD, Normal thyroid  CHEST/LUNG: bibasilar crackles+  CVS: Regular rate and rhythm; No murmurs, rubs, or gallops  GI: : Soft, Nontender, Nondistended; Bowel sounds present  NERVOUS SYSTEM:  Alert & Oriented X3c  EXTREMITIES:  1+ edema  LYMPH: No lymphadenopathy noted  SKIN: No rashes or lesions  ENDOCRINOLOGY: No Thyromegaly  PSYCH: Appropriate    Labs:                              7.4    5.95  )-----------( 241      ( 19 Dec 2019 05:45 )             25.1                         7.8    5.96  )-----------( 254      ( 18 Dec 2019 11:30 )             25.6                         7.5    7.07  )-----------( 257      ( 18 Dec 2019 02:46 )             24.9                         7.8    7.58  )-----------( 236      ( 17 Dec 2019 23:10 )             25.4         146<H>  |  109<H>  |  59<H>  ----------------------------<  107<H>  3.8   |  21<L>  |  2.54<H>      146<H>  |  108<H>  |  56<H>  ----------------------------<  168<H>  4.0   |  21<L>  |  2.54<H>      145  |  110<H>  |  57<H>  ----------------------------<  132<H>  4.5   |  18<L>  |  2.52<H>      144  |  110<H>  |  58<H>  ----------------------------<  142<H>  4.4   |  18<L>  |  2.57<H>    Ca    8.9      19 Dec 2019 05:45  Ca    9.2      18 Dec 2019 11:30  Ca    8.9      18 Dec 2019 02:45  Ca    8.9      17 Dec 2019 23:10  Phos  6.0     -  Phos  5.3     12-18  Mg     2.6     -  Mg     2.7     12-18  Mg     3.0     -    TPro  6.0  /  Alb  3.2<L>  /  TBili  0.3  /  DBili  x   /  AST  19  /  ALT  17  /  AlkPhos  80    TPro  6.8  /  Alb  3.7  /  TBili  0.3  /  DBili  x   /  AST  25  /  ALT  27  /  AlkPhos  92    TPro  6.7  /  Alb  3.8  /  TBili  0.3  /  DBili  x   /  AST  26  /  ALT  27  /  AlkPhos  91  12-17    CAPILLARY BLOOD GLUCOSE      POCT Blood Glucose.: 107 mg/dL (19 Dec 2019 08:20)  POCT Blood Glucose.: 116 mg/dL (18 Dec 2019 21:35)  POCT Blood Glucose.: 121 mg/dL (18 Dec 2019 18:16)  POCT Blood Glucose.: 157 mg/dL (18 Dec 2019 13:25)    LIVER FUNCTIONS - ( 19 Dec 2019 05:45 )  Alb: 3.2 g/dL / Pro: 6.0 g/dL / ALK PHOS: 80 u/L / ALT: 17 u/L / AST: 19 u/L / GGT: x           PT/INR - ( 18 Dec 2019 02:46 )   PT: 11.7 SEC;   INR: 1.05          PTT - ( 18 Dec 2019 02:46 )  PTT:30.5 SEC  Urinalysis Basic - ( 18 Dec 2019 02:46 )    Color: LT. YELLOW / Appearance: CLEAR / S.015 / pH: 6.0  Gluc: NEGATIVE / Ketone: NEGATIVE  / Bili: NEGATIVE / Urobili: NORMAL   Blood: TRACE / Protein: 70 / Nitrite: NEGATIVE   Leuk Esterase: NEGATIVE / RBC: 0-2 / WBC x   Sq Epi: x / Non Sq Epi: x / Bacteria: x      D DImer  Procalcitonin, Serum: 0.19 ng/mL ( @ 05:45)  Serum Pro-Brain Natriuretic Peptide: 22606 pg/mL ( @ 02:45)  Serum Pro-Brain Natriuretic Peptide: 70061 pg/mL ( @ 23:10)      Studies  Chest X-RAY  CT SCAN Chest   CT Abdomen  Venous Dopplers: LE:   Others    < from: US Duplex Venous Lower Ext Complete, Bilateral (19 @ 10:24) >  FINDINGS:    There is normal compressibility of the bilateral common femoral, femoral   and popliteal veins.     Doppler examination shows normal spontaneous and phasic flow.    No calf vein thrombosis is detected.    IMPRESSION:     No evidence of deep venous thrombosis in either lower extremity.    < from: CT Chest No Cont (19 @ 04:43) >    IMPRESSION:     Small bilateral pleural effusions, right greater than left.    Distal impacted airways within the right middle lobe and lingula,   progressed from prior imaging in 2015.    Nonspecific groundglass opacities within the right upper lobe and right   lower lobe may be infectious.                      LUCIAN MARIA M.D., ATTENDING RADIOLOGIST  This document has been electronically signed. Dec 18 2019  9:38AM              < end of copied text >  < from: CT Chest No Cont (12.18.19 @ 04:43) >    IMPRESSION:     Small bilateral pleural effusions, right greater than left.    Distal impacted airways within the right middle lobe and lingula,   progressed from prior imaging in 2015.    Nonspecific groundglass opacities within the right upper lobe and right   lower lobe may be infectious.                      LUCIAN MARIA M.D., ATTENDING RADIOLOGIST  This document has been electronically signed. Dec 18 2019  9:38AM              < end of copied text >                STACIE SAM M.D., ATTENDING RADIOLOGIST  This document has been electronically signed. Dec 18 2019  1:35PM        < end of copied text >

## 2019-12-19 NOTE — PROGRESS NOTE ADULT - PROBLEM SELECTOR PLAN 1
R/O Acute CHF, + GARCIA, TELE, Elevated BNP and Troponin HS, Cardiology Note Appreciated  , F/U Troponin HS, IV Lasix 40 mg QD, Fall/aspiration precaution, Cardiology F/U,   ECHO, F/U BMP,  CBC,  CT Chest No Contrast: distal impacted airways within the right middle lobe and lingula. procalcitonin is low. observe off abx  PT Consult, Daily Weight

## 2019-12-20 LAB
ALBUMIN SERPL ELPH-MCNC: 3.4 G/DL — SIGNIFICANT CHANGE UP (ref 3.3–5)
ALP SERPL-CCNC: 83 U/L — SIGNIFICANT CHANGE UP (ref 40–120)
ALT FLD-CCNC: 19 U/L — SIGNIFICANT CHANGE UP (ref 4–41)
ANION GAP SERPL CALC-SCNC: 16 MMO/L — HIGH (ref 7–14)
AST SERPL-CCNC: 18 U/L — SIGNIFICANT CHANGE UP (ref 4–40)
BASOPHILS # BLD AUTO: 0.04 K/UL — SIGNIFICANT CHANGE UP (ref 0–0.2)
BASOPHILS NFR BLD AUTO: 0.6 % — SIGNIFICANT CHANGE UP (ref 0–2)
BILIRUB SERPL-MCNC: 0.5 MG/DL — SIGNIFICANT CHANGE UP (ref 0.2–1.2)
BUN SERPL-MCNC: 70 MG/DL — HIGH (ref 7–23)
CALCIUM SERPL-MCNC: 9.1 MG/DL — SIGNIFICANT CHANGE UP (ref 8.4–10.5)
CHLORIDE SERPL-SCNC: 106 MMOL/L — SIGNIFICANT CHANGE UP (ref 98–107)
CO2 SERPL-SCNC: 23 MMOL/L — SIGNIFICANT CHANGE UP (ref 22–31)
CREAT SERPL-MCNC: 2.84 MG/DL — HIGH (ref 0.5–1.3)
EOSINOPHIL # BLD AUTO: 0.23 K/UL — SIGNIFICANT CHANGE UP (ref 0–0.5)
EOSINOPHIL NFR BLD AUTO: 3.7 % — SIGNIFICANT CHANGE UP (ref 0–6)
GLUCOSE BLDC GLUCOMTR-MCNC: 113 MG/DL — HIGH (ref 70–99)
GLUCOSE BLDC GLUCOMTR-MCNC: 116 MG/DL — HIGH (ref 70–99)
GLUCOSE BLDC GLUCOMTR-MCNC: 127 MG/DL — HIGH (ref 70–99)
GLUCOSE BLDC GLUCOMTR-MCNC: 192 MG/DL — HIGH (ref 70–99)
GLUCOSE SERPL-MCNC: 105 MG/DL — HIGH (ref 70–99)
HCT VFR BLD CALC: 29.7 % — LOW (ref 39–50)
HGB BLD-MCNC: 9.5 G/DL — LOW (ref 13–17)
IMM GRANULOCYTES NFR BLD AUTO: 0.3 % — SIGNIFICANT CHANGE UP (ref 0–1.5)
LYMPHOCYTES # BLD AUTO: 1 K/UL — SIGNIFICANT CHANGE UP (ref 1–3.3)
LYMPHOCYTES # BLD AUTO: 15.9 % — SIGNIFICANT CHANGE UP (ref 13–44)
MAGNESIUM SERPL-MCNC: 2.4 MG/DL — SIGNIFICANT CHANGE UP (ref 1.6–2.6)
MCHC RBC-ENTMCNC: 31.5 PG — SIGNIFICANT CHANGE UP (ref 27–34)
MCHC RBC-ENTMCNC: 32 % — SIGNIFICANT CHANGE UP (ref 32–36)
MCV RBC AUTO: 98.3 FL — SIGNIFICANT CHANGE UP (ref 80–100)
MONOCYTES # BLD AUTO: 0.78 K/UL — SIGNIFICANT CHANGE UP (ref 0–0.9)
MONOCYTES NFR BLD AUTO: 12.4 % — SIGNIFICANT CHANGE UP (ref 2–14)
NEUTROPHILS # BLD AUTO: 4.21 K/UL — SIGNIFICANT CHANGE UP (ref 1.8–7.4)
NEUTROPHILS NFR BLD AUTO: 67.1 % — SIGNIFICANT CHANGE UP (ref 43–77)
NRBC # FLD: 0 K/UL — SIGNIFICANT CHANGE UP (ref 0–0)
PHOSPHATE SERPL-MCNC: 5.9 MG/DL — HIGH (ref 2.5–4.5)
PLATELET # BLD AUTO: 244 K/UL — SIGNIFICANT CHANGE UP (ref 150–400)
PMV BLD: 11.5 FL — SIGNIFICANT CHANGE UP (ref 7–13)
POTASSIUM SERPL-MCNC: 3.7 MMOL/L — SIGNIFICANT CHANGE UP (ref 3.5–5.3)
POTASSIUM SERPL-SCNC: 3.7 MMOL/L — SIGNIFICANT CHANGE UP (ref 3.5–5.3)
PROT SERPL-MCNC: 6.2 G/DL — SIGNIFICANT CHANGE UP (ref 6–8.3)
PTH-INTACT SERPL-MCNC: 60.31 PG/ML — SIGNIFICANT CHANGE UP (ref 15–65)
RBC # BLD: 3.02 M/UL — LOW (ref 4.2–5.8)
RBC # FLD: 18.1 % — HIGH (ref 10.3–14.5)
SODIUM SERPL-SCNC: 145 MMOL/L — SIGNIFICANT CHANGE UP (ref 135–145)
WBC # BLD: 6.28 K/UL — SIGNIFICANT CHANGE UP (ref 3.8–10.5)
WBC # FLD AUTO: 6.28 K/UL — SIGNIFICANT CHANGE UP (ref 3.8–10.5)

## 2019-12-20 PROCEDURE — 93306 TTE W/DOPPLER COMPLETE: CPT | Mod: 26

## 2019-12-20 RX ORDER — FUROSEMIDE 40 MG
40 TABLET ORAL DAILY
Refills: 0 | Status: DISCONTINUED | OUTPATIENT
Start: 2019-12-21 | End: 2019-12-23

## 2019-12-20 RX ADMIN — Medication 100 MILLIGRAM(S): at 12:55

## 2019-12-20 RX ADMIN — HEPARIN SODIUM 5000 UNIT(S): 5000 INJECTION INTRAVENOUS; SUBCUTANEOUS at 05:21

## 2019-12-20 RX ADMIN — SENNA PLUS 2 TABLET(S): 8.6 TABLET ORAL at 21:34

## 2019-12-20 RX ADMIN — Medication 40 MILLIGRAM(S): at 05:21

## 2019-12-20 RX ADMIN — Medication 667 MILLIGRAM(S): at 18:30

## 2019-12-20 RX ADMIN — Medication 667 MILLIGRAM(S): at 12:55

## 2019-12-20 RX ADMIN — BRINZOLAMIDE 1 DROP(S): 10 SUSPENSION/ DROPS OPHTHALMIC at 05:21

## 2019-12-20 RX ADMIN — Medication 1000 UNIT(S): at 12:55

## 2019-12-20 RX ADMIN — HEPARIN SODIUM 5000 UNIT(S): 5000 INJECTION INTRAVENOUS; SUBCUTANEOUS at 21:34

## 2019-12-20 RX ADMIN — HEPARIN SODIUM 5000 UNIT(S): 5000 INJECTION INTRAVENOUS; SUBCUTANEOUS at 12:56

## 2019-12-20 RX ADMIN — Medication 325 MILLIGRAM(S): at 12:56

## 2019-12-20 RX ADMIN — ATORVASTATIN CALCIUM 40 MILLIGRAM(S): 80 TABLET, FILM COATED ORAL at 21:34

## 2019-12-20 RX ADMIN — AMLODIPINE BESYLATE 5 MILLIGRAM(S): 2.5 TABLET ORAL at 05:21

## 2019-12-20 RX ADMIN — BRINZOLAMIDE 1 DROP(S): 10 SUSPENSION/ DROPS OPHTHALMIC at 18:30

## 2019-12-20 NOTE — PROGRESS NOTE ADULT - ASSESSMENT
89 y/o Male from [facility] , with pmhx of CAD, COPD, HTN, Prostate CA, S/P Radiation therapy years ago, CKD,  Anemia, DM on No Meds, GOUT, Glaucoma, Aortic Stenosis , Osteoporosis , Legs edema, A+O x 4, presents with c/o dyspnea    Renal failure  likely CKD stage 4 follow up withe nephrologist recently  unclear baseline, however is stable ~ 2.5 likely is baseline  bun/cr elevated today, on lasix 40 bid, diuresing well, fluid status much better.   Agree with decrease Diuretics per cardio   avoid nephrotoxic agents, NSAIDs  monitor bmp    Anemia  iron study done iron sat 7%   likely iron def  r/o gib  continue iron supplement  transfuse to keep Hb>8    Hypervolemia  better  lasix decreased  monitor i/o daily weight  f/u cardio    ckd-mbd  pth optimal for ckd  elevated phos continue phoslo  on daily vit d  monitor phos and calcium daily  low phos diet    Proteinuria  urine p/c ratio 599mg/day  likely sec to htn  monitor    HTN  controlled  monitor

## 2019-12-20 NOTE — PROGRESS NOTE ADULT - SUBJECTIVE AND OBJECTIVE BOX
Patient is a 89y old  Male who presents with a chief complaint of SOB, GARCIA, R/O acute CHF, (20 Dec 2019 12:31)      Any change in ROS: not SOb and is on oxygen     MEDICATIONS  (STANDING):  allopurinol 100 milliGRAM(s) Oral daily  amLODIPine   Tablet 5 milliGRAM(s) Oral daily  atorvastatin 40 milliGRAM(s) Oral at bedtime  brinzolamide 1% Ophthalmic Suspension 1 Drop(s) Both EYES two times a day  calcium acetate 667 milliGRAM(s) Oral two times a day with meals  cholecalciferol 1000 Unit(s) Oral daily  dextrose 5%. 1000 milliLiter(s) (50 mL/Hr) IV Continuous <Continuous>  dextrose 50% Injectable 12.5 Gram(s) IV Push once  dextrose 50% Injectable 25 Gram(s) IV Push once  dextrose 50% Injectable 25 Gram(s) IV Push once  ferrous    sulfate 325 milliGRAM(s) Oral daily  heparin  Injectable 5000 Unit(s) SubCutaneous every 8 hours  insulin lispro (HumaLOG) corrective regimen sliding scale   SubCutaneous Before meals and at bedtime  senna 2 Tablet(s) Oral at bedtime    MEDICATIONS  (PRN):  dextrose 40% Gel 15 Gram(s) Oral once PRN Blood Glucose LESS THAN 70 milliGRAM(s)/deciliter  glucagon  Injectable 1 milliGRAM(s) IntraMuscular once PRN Glucose LESS THAN 70 milligrams/deciliter    Vital Signs Last 24 Hrs  T(C): 36.7 (20 Dec 2019 12:00), Max: 36.7 (20 Dec 2019 12:00)  T(F): 98.1 (20 Dec 2019 12:00), Max: 98.1 (20 Dec 2019 12:00)  HR: 72 (20 Dec 2019 12:00) (62 - 84)  BP: 135/63 (20 Dec 2019 12:00) (114/59 - 136/62)  BP(mean): --  RR: 18 (20 Dec 2019 12:00) (17 - 19)  SpO2: 99% (20 Dec 2019 12:00) (97% - 100%)    I&O's Summary    19 Dec 2019 07:01  -  20 Dec 2019 07:00  --------------------------------------------------------  IN: 240 mL / OUT: 2850 mL / NET: -2610 mL    20 Dec 2019 07:01  -  20 Dec 2019 12:44  --------------------------------------------------------  IN: 0 mL / OUT: 550 mL / NET: -550 mL          Physical Exam:   GENERAL: NAD, well-groomed, well-developed  HEENT: CHIO/   Atraumatic, Normocephalic  ENMT: No tonsillar erythema, exudates, or enlargement; Moist mucous membranes, Good dentition, No lesions  NECK: Supple, No JVD, Normal thyroid  CHEST/LUNG: Clear to auscultaion, ; No rales, rhonchi, wheezing, or rubs  CVS: Regular rate and rhythm; No murmurs, rubs, or gallops  GI: : Soft, Nontender, Nondistended; Bowel sounds present  NERVOUS SYSTEM:  Alert & Oriented X3  EXTREMITIES:  2+ Peripheral Pulses, No clubbing, cyanosis, or edema  LYMPH: No lymphadenopathy noted  SKIN: No rashes or lesions  ENDOCRINOLOGY: No Thyromegaly  PSYCH: Appropriate    Labs:                              9.5    6.28  )-----------( 244      ( 20 Dec 2019 04:37 )             29.7                         9.1    6.87  )-----------( 251      ( 19 Dec 2019 21:15 )             29.5                         7.4    5.95  )-----------( 241      ( 19 Dec 2019 05:45 )             25.1                         7.8    5.96  )-----------( 254      ( 18 Dec 2019 11:30 )             25.6                         7.5    7.07  )-----------( 257      ( 18 Dec 2019 02:46 )             24.9                         7.8    7.58  )-----------( 236      ( 17 Dec 2019 23:10 )             25.4     12-20    145  |  106  |  70<H>  ----------------------------<  105<H>  3.7   |  23  |  2.84<H>  12-19    146<H>  |  109<H>  |  59<H>  ----------------------------<  107<H>  3.8   |  21<L>  |  2.54<H>  12-18    146<H>  |  108<H>  |  56<H>  ----------------------------<  168<H>  4.0   |  21<L>  |  2.54<H>  12-18    145  |  110<H>  |  57<H>  ----------------------------<  132<H>  4.5   |  18<L>  |  2.52<H>  12-17    144  |  110<H>  |  58<H>  ----------------------------<  142<H>  4.4   |  18<L>  |  2.57<H>    Ca    9.1      20 Dec 2019 04:37  Ca    8.9      19 Dec 2019 05:45  Phos  5.9     12-20  Phos  6.0     12-19  Mg     2.4     12-20  Mg     2.6     12-19    TPro  6.2  /  Alb  3.4  /  TBili  0.5  /  DBili  x   /  AST  18  /  ALT  19  /  AlkPhos  83  12-20  TPro  6.0  /  Alb  3.2<L>  /  TBili  0.3  /  DBili  x   /  AST  19  /  ALT  17  /  AlkPhos  80  12-19  TPro  6.8  /  Alb  3.7  /  TBili  0.3  /  DBili  x   /  AST  25  /  ALT  27  /  AlkPhos  92  12-18  TPro  6.7  /  Alb  3.8  /  TBili  0.3  /  DBili  x   /  AST  26  /  ALT  27  /  AlkPhos  91  12-17    CAPILLARY BLOOD GLUCOSE      POCT Blood Glucose.: 127 mg/dL (20 Dec 2019 12:39)  POCT Blood Glucose.: 113 mg/dL (20 Dec 2019 08:21)  POCT Blood Glucose.: 123 mg/dL (19 Dec 2019 21:15)  POCT Blood Glucose.: 111 mg/dL (19 Dec 2019 17:47)  POCT Blood Glucose.: 147 mg/dL (19 Dec 2019 12:56)      LIVER FUNCTIONS - ( 20 Dec 2019 04:37 )  Alb: 3.4 g/dL / Pro: 6.2 g/dL / ALK PHOS: 83 u/L / ALT: 19 u/L / AST: 18 u/L / GGT: x               Procalcitonin, Serum: 0.19 ng/mL (12-19 @ 05:45)  Serum Pro-Brain Natriuretic Peptide: 75180 pg/mL (12-18 @ 02:45)  Serum Pro-Brain Natriuretic Peptide: 88427 pg/mL (12-17 @ 23:10)        RECENT CULTURES:  12-18 @ 04:22 URINE MIDSTREAM                < from: US Duplex Venous Lower Ext Complete, Bilateral (12.18.19 @ 10:24) >  COMPARISON: None available.    TECHNIQUE: Duplex sonography of the BILATERAL LOWER extremity veins with   color and spectral Doppler, with and without compression.      FINDINGS:    There is normal compressibility of the bilateral common femoral, femoral   and popliteal veins.     Doppler examination shows normal spontaneous and phasic flow.    No calf vein thrombosis is detected.    IMPRESSION:     No evidence of deep venous thrombosis in either lower extremity.        < from: CT Chest No Cont (12.18.19 @ 04:43) >  BONES: Degenerative changes.    IMPRESSION:     Small bilateral pleural effusions, right greater than left.    Distal impacted airways within the right middle lobe and lingula,   progressed from prior imaging in December 2015.    Nonspecific groundglass opacities within the right upper lobe and right   lower lobe may be infectious.                      LUCIAN MARIA M.D., ATTENDING RADIOLOGIST  This document has been electronically signed. Dec 18 2019  9:38AM              < end of copied text >            STACIE SAM M.D., ATTENDING RADIOLOGIST    < end of copied text >          RESPIRATORY CULTURES:          Studies  Chest X-RAY  CT SCAN Chest   Venous Dopplers: LE:   CT Abdomen  Others

## 2019-12-20 NOTE — PROGRESS NOTE ADULT - ATTENDING COMMENTS
Noted increase in BUN/Cr, though remains volume overloaded on exam with elevated JVP.  Decrease diuresis as above to allow more time for volume redistribution in the setting of AS.

## 2019-12-20 NOTE — PROGRESS NOTE ADULT - ASSESSMENT
89 y/o Male from [facility], with pmhx of CAD, COPD, HTN, Prostate CA, S/P Radiation therapy years ago, CKD,  Anemia, DM on No Meds, GOUT, Glaucoma, Aortic Stenosis , Osteoporosis , Legs edema, A+O x 4, presents with c/o dyspnea upon exertion x 3-5 days.

## 2019-12-20 NOTE — PROGRESS NOTE ADULT - PROBLEM SELECTOR PLAN 3
likely CKD induced. on Ferrous Sulfate  hx of rectal hemorrhoids, intermittent red on toilet paper. not worse.  had multiple colonoscopies in the past. not interested in another one  s/p 1 unit PRBC to optimize. This may help with dyspnea. (Seeing heme/onc Dr. Winter as outpt as well)  hgb much improved.

## 2019-12-20 NOTE — PROGRESS NOTE ADULT - ASSESSMENT
89 yo M transferred from [facility] with PMH of CAD, aortic stenosis, COPD, HTN, prostate cancer (s/p radiation therapy), CKD, anemia, DM and gout, presenting with worsening dyspnea on exertion, admitted for decompensated heart failure.    #ADHF  - Likely 2/2 diastolic dysfunction/known AS, responding well to IV diuresis  - Net negative 2.6L in last 24h, 4.5L since admission  - Decrease Lasix to 40mg IV daily  - Daily BMP, replete carefully for K > 4, Mg > 2 given CKD   - Daily weight, strict I/Os  - TTE ordered, will follow up on results    #?CAD  #Elevated troponin  - Troponin elevation demand in setting of CHF, CKD, stable, not ACS, ECG without new ischemic changes with evidence of infarct  - Continue home atorvastatin 40mg daily  - Not on aspirin, unclear if secondary to chronic anemia    #HTN  - Continue home amlodipine    Patient to be staffed with attending. Please await attending addendum.    Marlena Barton MD  Cardiology Fellow  [phone]  All Cardiology service information can be found 24/7 on [email or web address], password: [email or web address] 89 yo M transferred from [facility] with PMH of CAD, aortic stenosis, COPD, HTN, prostate cancer (s/p radiation therapy), CKD, anemia, DM and gout, presenting with worsening dyspnea on exertion, admitted for decompensated heart failure.    #ADHF  - Likely 2/2 diastolic dysfunction/known AS, responding well to IV diuresis  - Net negative 2.6L in last 24h, 4.5L since admission  - Decrease to Lasix 40mg IV daily today, will follow for continued need for diuresis  - Daily BMP, replete carefully for K > 4, Mg > 2 given CKD   - Daily weight, strict I/Os  - TTE ordered, will follow up on results    #?CAD  #Elevated troponin  - Troponin elevation demand in setting of CHF, CKD, stable, not ACS, ECG without new ischemic changes with evidence of infarct  - Continue home atorvastatin 40mg daily  - Not on aspirin, unclear if secondary to chronic anemia    #HTN  - Continue home amlodipine    Patient to be staffed with attending. Please await attending addendum.    Marlena Barton MD  Cardiology Fellow  [phone]  All Cardiology service information can be found 24/7 on [email or web address], password: [email or web address] 89 yo M transferred from [facility] with PMH of CAD, aortic stenosis, COPD, HTN, prostate cancer (s/p radiation therapy), CKD, anemia, DM and gout, presenting with worsening dyspnea on exertion, admitted for decompensated heart failure.    #ADHF  - Likely 2/2 diastolic dysfunction/known AS, responding well to IV diuresis  - Net negative 2.6L in last 24h, 4.5L since admission  - Decrease to Lasix 40mg IV daily today, will follow for continued need for diuresis  - Daily BMP, replete carefully for K > 4, Mg > 2 given CKD   - Daily weight, strict I/Os  - TTE ordered, will follow up on results    #?CAD  #Elevated troponin  - Troponin elevation demand in setting of CHF, CKD, stable, not ACS, ECG without new ischemic changes with evidence of infarct  - Continue home atorvastatin 40mg daily  - Not on aspirin, unclear if secondary to chronic anemia    #HTN  - Continue home amlodipine    Marlena Barton MD  Cardiology Fellow  [phone]  All Cardiology service information can be found 24/7 on [email or web address], password: [email or web address]

## 2019-12-20 NOTE — PROGRESS NOTE ADULT - SUBJECTIVE AND OBJECTIVE BOX
Interval History: Continues to void well, still feels dyspneic with exertion.    Review Of Systems:  Constitutional: [ ] Fever [ ] Chills [ ] Fatigue [ ] Weight change   HEENT: [ ] Blurred vision [ ] Eye Pain [ ] Headache [ ] Runny nose [ ] Sore Throat   Respiratory: [ ] Cough [ ] Wheezing [x] Shortness of breath  Cardiovascular: [ ] Chest Pain [ ] Palpitations [x] GARCIA [ ] PND [ ] Orthopnea  Gastrointestinal: [ ] Abdominal Pain [ ] Diarrhea [ ] Constipation [ ] Hemorrhoids [ ] Nausea [ ] Vomiting  Genitourinary: [ ] Nocturia [ ] Dysuria [ ] Incontinence  Extremities: [x] Swelling [ ] Joint Pain  Neurologic: [ ] Focal deficit [ ] Paresthesias [ ] Syncope  Lymphatic: [ ] Swelling [ ] Lymphadenopathy   Skin: [ ] Rash [ ] Ecchymoses [ ] Wounds [ ] Lesions  Psychiatry: [ ] Depression [ ] Suicidal/Homicidal Ideation [ ] Anxiety [ ] Sleep Disturbances  [x] 10 point review of systems is otherwise negative except as mentioned above    Medications:  allopurinol 100 milliGRAM(s) Oral daily  amLODIPine   Tablet 5 milliGRAM(s) Oral daily  atorvastatin 40 milliGRAM(s) Oral at bedtime  brinzolamide 1% Ophthalmic Suspension 1 Drop(s) Both EYES two times a day  calcium acetate 667 milliGRAM(s) Oral two times a day with meals  cholecalciferol 1000 Unit(s) Oral daily  dextrose 40% Gel 15 Gram(s) Oral once PRN  dextrose 5%. 1000 milliLiter(s) IV Continuous <Continuous>  dextrose 50% Injectable 12.5 Gram(s) IV Push once  dextrose 50% Injectable 25 Gram(s) IV Push once  dextrose 50% Injectable 25 Gram(s) IV Push once  ferrous    sulfate 325 milliGRAM(s) Oral daily  furosemide   Injectable 40 milliGRAM(s) IV Push two times a day  glucagon  Injectable 1 milliGRAM(s) IntraMuscular once PRN  heparin  Injectable 5000 Unit(s) SubCutaneous every 8 hours  insulin lispro (HumaLOG) corrective regimen sliding scale   SubCutaneous Before meals and at bedtime  senna 2 Tablet(s) Oral at bedtime      Vitals:  ICU Vital Signs Last 24 Hrs  T(C): 36.3 (20 Dec 2019 04:35), Max: 36.6 (19 Dec 2019 12:04)  T(F): 97.4 (20 Dec 2019 04:35), Max: 97.9 (19 Dec 2019 12:04)  HR: 69 (20 Dec 2019 04:35) (62 - 84)  BP: 114/59 (20 Dec 2019 04:35) (114/59 - 136/62)  BP(mean): --  ABP: --  ABP(mean): --  RR: 17 (20 Dec 2019 04:35) (17 - 19)  SpO2: 97% (20 Dec 2019 04:35) (97% - 100%)    Daily     Daily Weight in k.7 (20 Dec 2019 04:41)  I&O's Summary    19 Dec 2019 07:01  -  20 Dec 2019 07:00  --------------------------------------------------------  IN: 240 mL / OUT: 2850 mL / NET: -2610 mL      Physical Exam:  Appearance:  NAD, wearing NC, increased work of breathing  HENT: NC/AT  Cardiovascular: Normal S1, soft S2, regular rate and rhythm, III/VI decrescendo murmur heard over upper sternal border, 1+ LE Edema Present bilaterally  Respiratory: Crackles over bilateral bases  Gastrointestinal: Soft  Psychiatry: [x] AAOx3  [x] Follows Commands  Skin: Intact    Labs:                        9.5    6.28  )-----------( 244      ( 20 Dec 2019 04:37 )             29.7         145  |  106  |  70<H>  ----------------------------<  105<H>  3.7   |  23  |  2.84<H>    Ca    9.1      20 Dec 2019 04:37  Phos  5.9       Mg     2.4         TPro  6.2  /  Alb  3.4  /  TBili  0.5  /  DBili  x   /  AST  18  /  ALT  19  /  AlkPhos  83  12    Serum Pro-Brain Natriuretic Peptide: 17149 pg/mL ( @ 02:45)  Serum Pro-Brain Natriuretic Peptide: 59093 pg/mL ( @ 23:10)    Culture - Urine (collected 19 @ 04:22)  Source: URINE MIDSTREAM  Final Report (19 @ 08:45):    NO GROWTH AT 24 HOURS    Interpretation of Telemetry: sinus rhythm

## 2019-12-20 NOTE — PROGRESS NOTE ADULT - PROBLEM SELECTOR PLAN 1
R/O Acute CHF, + GARCIA, TELE, Elevated BNP and Troponin HS  Cardiology eval Appreciated  IV Lasix 40 mg BID to QD, Fall/aspiration precaution, Cardiology F/U,   TTE pending  CT Chest No Contrast: distal impacted airways within the right middle lobe and lingula. procalcitonin is low. observe off abx, pulm eval appreciated  PT Consult, Daily Weight

## 2019-12-20 NOTE — PROGRESS NOTE ADULT - SUBJECTIVE AND OBJECTIVE BOX
[facility] [place] [facility]   MD WALLACE DAHL, DO YULIANA GODFREY, HECTOR JAMES    TEL:  OFFICE: [phone]  DR SCOTT CELL: [phone]  DR. MAURICE CELL: [phone]  DR. GODFREY CELL: [phone]  DEJUAN MOREAU CELL: [phone]        Patient is a 89y old  Male who presents with a chief complaint of SOB, GARCIA, R/O acute CHF, (20 Dec 2019 12:44)      Patient seen and examined at bedside. No chest pain/sob    VITALS:  T(F): 98.1 (12-20-19 @ 12:00), Max: 98.1 (12-20-19 @ 12:00)  HR: 72 (12-20-19 @ 12:00)  BP: 135/63 (12-20-19 @ 12:00)  RR: 18 (12-20-19 @ 12:00)  SpO2: 99% (12-20-19 @ 12:00)  Wt(kg): --    12-19 @ 07:01  -  12-20 @ 07:00  --------------------------------------------------------  IN: 240 mL / OUT: 2850 mL / NET: -2610 mL    12-20 @ 07:01  -  12-20 @ 14:03  --------------------------------------------------------  IN: 0 mL / OUT: 550 mL / NET: -550 mL          PHYSICAL EXAM:  Constitutional: NAD  Neck: No JVD  Respiratory: CTAB, no wheezes, rales or rhonchi  Cardiovascular: S1, S2, RRR  Gastrointestinal: BS+, soft, NT/ND  Extremities: trace peripheral edema    Hospital Medications:   MEDICATIONS  (STANDING):  allopurinol 100 milliGRAM(s) Oral daily  amLODIPine   Tablet 5 milliGRAM(s) Oral daily  atorvastatin 40 milliGRAM(s) Oral at bedtime  brinzolamide 1% Ophthalmic Suspension 1 Drop(s) Both EYES two times a day  calcium acetate 667 milliGRAM(s) Oral two times a day with meals  cholecalciferol 1000 Unit(s) Oral daily  dextrose 5%. 1000 milliLiter(s) (50 mL/Hr) IV Continuous <Continuous>  dextrose 50% Injectable 12.5 Gram(s) IV Push once  dextrose 50% Injectable 25 Gram(s) IV Push once  dextrose 50% Injectable 25 Gram(s) IV Push once  ferrous    sulfate 325 milliGRAM(s) Oral daily  heparin  Injectable 5000 Unit(s) SubCutaneous every 8 hours  insulin lispro (HumaLOG) corrective regimen sliding scale   SubCutaneous Before meals and at bedtime  senna 2 Tablet(s) Oral at bedtime      LABS:  12-20    145  |  106  |  70<H>  ----------------------------<  105<H>  3.7   |  23  |  2.84<H>    Ca    9.1      20 Dec 2019 04:37  Phos  5.9     12-20  Mg     2.4     12-20    TPro  6.2  /  Alb  3.4  /  TBili  0.5  /  DBili      /  AST  18  /  ALT  19  /  AlkPhos  83  12-20    Creatinine Trend: 2.84 <--, 2.54 <--, 2.54 <--, 2.52 <--, 2.57 <--    Phosphorus Level, Serum: 5.9 mg/dL (12-20 @ 04:37)  Albumin, Serum: 3.4 g/dL (12-20 @ 04:37)    calcium--  intact pth--  parathyroid hormone intact, serum60.31                            9.5    6.28  )-----------( 244      ( 20 Dec 2019 04:37 )             29.7     Urine Studies:  Urinalysis - [12-18-19 @ 02:46]      Color LT. YELLOW / Appearance CLEAR / SG 1.015 / pH 6.0      Gluc NEGATIVE / Ketone NEGATIVE  / Bili NEGATIVE / Urobili NORMAL       Blood TRACE / Protein 70 / Leuk Est NEGATIVE / Nitrite NEGATIVE      RBC 0-2 / WBC  / Hyaline  / Gran  / Sq Epi  / Non Sq Epi  / Bacteria     Urine Creatinine 20.20      [12-19-19 @ 21:25]  Urine Protein 12.1      [12-19-19 @ 21:25]    Iron 19, TIBC 271, %sat --      [12-18-19 @ 02:45]  Ferritin 53.67      [12-18-19 @ 02:45]  PTH 60.31 (Ca --)      [12-20-19 @ 04:37]   --  PTH 89.27 (Ca --)      [12-18-19 @ 02:46]   --  HbA1c 5.6      [12-19-19 @ 06:00]  TSH 2.75      [12-18-19 @ 02:45]  Lipid: chol 118, TG 65, HDL 58, LDL 52      [12-18-19 @ 02:45]    HBsAg Nonreactive      [12-18-19 @ 02:46]  HCV 0.20, Nonreactive Hepatitis C AB  S/CO Ratio                        Interpretation  < 1.00                                   Non-Reactive  1.00 - 4.99                         Weakly-Reactive  >= 5.00                                Reactive  Non-Reactive: Aperson with a non-reactive HCV antibody  result is considered uninfected.  No further action is  needed unless recent infection is suspected.  In these  cases, consider repeat testing later to detect  seroconversion..  Weakly-Reactive: HCV antibody test is abnormal, HCV RNA  Qualitative test will follow.  Reactive: HCV antibody test is abnormal, HCV RNA  Qualitative test will follow.  Note: HCV antibody testing is performed on the [facility] system.      [12-18-19 @ 02:46]      RADIOLOGY & ADDITIONAL STUDIES:

## 2019-12-20 NOTE — PROGRESS NOTE ADULT - ASSESSMENT
Patient is a 89 y/o Male from [facility] , with pmhx of CAD, COPD, HTN, Prostate CA, S/P Radiation therapy years ago, CKD,  Anemia, DM on No Meds, GOUT, Glaucoma, Aortic Stenosis , Osteoporosis , Legs edema, A+O x 4, presents with c/o dyspnea upon exertion x 3-5 days. He reports when walking or exerting himself he feels SOB, no associated chest pain, palpitations, dizziness, lightheadedness, diaphoresis, cough, URI like symptoms, No N/V, no diarrhea, no Fever, NO CP, NO HA, no Dizziness, No abdominal pain, + chronic LBP, + constipation , Last BM Yesterday No sore Throat, uses cane to Ambulate No legs pain, C/O + Orthopnea, + dysuria, Chest X ray prelim c/w B/L Pleural Effusions, BNP 84063, Troponin , pt s/p IV Lasix 40 mg x 1 for Possible Acute CHF given by ER tonight, pt is making urine, I called Cardiology consult tonight, I Ordered Venous Doppler legs: R/O DVT, CT Chest/abdomen /pelvis NO Contrast  ordered due to + B/L Pleural effusions, HX of Prostate CA, CKD, Anemia, RVP: Negative, pt was seen by House [facility]iology as well,

## 2019-12-20 NOTE — PROGRESS NOTE ADULT - SUBJECTIVE AND OBJECTIVE BOX
Patient is a 89y old  Male who presents with a chief complaint of SOB, GARCIA, R/O acute CHF, (20 Dec 2019 10:09)      SUBJECTIVE / OVERNIGHT EVENTS:  breathing better  neg output  loosing water weight  no cp, no sob, no n/v/d. no abdominal pain.  no headache, no dizziness.   Cr slightly up, Lasix dec to qdialy      Vital Signs Last 24 Hrs  T(C): 36.7 (20 Dec 2019 12:00), Max: 36.7 (20 Dec 2019 12:00)  T(F): 98.1 (20 Dec 2019 12:00), Max: 98.1 (20 Dec 2019 12:00)  HR: 72 (20 Dec 2019 12:00) (62 - 84)  BP: 135/63 (20 Dec 2019 12:00) (114/59 - 136/62)  BP(mean): --  RR: 18 (20 Dec 2019 12:00) (17 - 19)  SpO2: 99% (20 Dec 2019 12:00) (97% - 100%)  I&O's Summary    19 Dec 2019 07:01  -  20 Dec 2019 07:00  --------------------------------------------------------  IN: 240 mL / OUT: 2850 mL / NET: -2610 mL        PHYSICAL EXAM:  GENERAL: NAD, Comfortable, on nasal canula  HEAD:  Atraumatic, Normocephalic  EYES: EOMI, PERRLA, conjunctiva and sclera clear  NECK: Supple, No JVD  CHEST/LUNG: mild decrease breath sounds bilaterally; No wheeze   HEART: Regular rate and rhythm; No murmurs, rubs, or gallops  ABDOMEN: Soft, Nontender, Nondistended; Bowel sounds present  Neuro: AAO x 3, no focal deficit, 5/5 b/l extremities  EXTREMITIES:  2+ Peripheral Pulses, No clubbing, cyanosis, +edema  SKIN: No rashes or lesions      LABS:                        9.5    6.28  )-----------( 244      ( 20 Dec 2019 04:37 )             29.7     12-20    145  |  106  |  70<H>  ----------------------------<  105<H>  3.7   |  23  |  2.84<H>    Ca    9.1      20 Dec 2019 04:37  Phos  5.9     12-20  Mg     2.4     12-20    TPro  6.2  /  Alb  3.4  /  TBili  0.5  /  DBili  x   /  AST  18  /  ALT  19  /  AlkPhos  83  12-20      CAPILLARY BLOOD GLUCOSE      POCT Blood Glucose.: 113 mg/dL (20 Dec 2019 08:21)  POCT Blood Glucose.: 123 mg/dL (19 Dec 2019 21:15)  POCT Blood Glucose.: 111 mg/dL (19 Dec 2019 17:47)  POCT Blood Glucose.: 147 mg/dL (19 Dec 2019 12:56)            RADIOLOGY & ADDITIONAL TESTS:    Imaging Personally Reviewed:  [x] YES  [ ] NO    Consultant(s) Notes Reviewed:  [x] YES  [ ] NO      MEDICATIONS  (STANDING):  allopurinol 100 milliGRAM(s) Oral daily  amLODIPine   Tablet 5 milliGRAM(s) Oral daily  atorvastatin 40 milliGRAM(s) Oral at bedtime  brinzolamide 1% Ophthalmic Suspension 1 Drop(s) Both EYES two times a day  calcium acetate 667 milliGRAM(s) Oral two times a day with meals  cholecalciferol 1000 Unit(s) Oral daily  dextrose 5%. 1000 milliLiter(s) (50 mL/Hr) IV Continuous <Continuous>  dextrose 50% Injectable 12.5 Gram(s) IV Push once  dextrose 50% Injectable 25 Gram(s) IV Push once  dextrose 50% Injectable 25 Gram(s) IV Push once  ferrous    sulfate 325 milliGRAM(s) Oral daily  heparin  Injectable 5000 Unit(s) SubCutaneous every 8 hours  insulin lispro (HumaLOG) corrective regimen sliding scale   SubCutaneous Before meals and at bedtime  senna 2 Tablet(s) Oral at bedtime    MEDICATIONS  (PRN):  dextrose 40% Gel 15 Gram(s) Oral once PRN Blood Glucose LESS THAN 70 milliGRAM(s)/deciliter  glucagon  Injectable 1 milliGRAM(s) IntraMuscular once PRN Glucose LESS THAN 70 milligrams/deciliter      Care Discussed with Consultants/Other Providers [x] YES  [ ] NO    HEALTH ISSUES - PROBLEM Dx:  Anemia: Anemia  Glaucoma: Glaucoma  Preventive measure: Preventive measure  Hypertension: Hypertension  Chronic kidney disease (CKD): Chronic kidney disease (CKD)  DM type 2 (diabetes mellitus, type 2): DM type 2 (diabetes mellitus, type 2)  Aortic stenosis: Aortic stenosis  Leg edema: Leg edema  CAD (coronary artery disease): CAD (coronary artery disease)  SOB (shortness of breath): SOB (shortness of breath)  Suspected deep vein thrombosis (DVT)

## 2019-12-21 LAB
ALBUMIN SERPL ELPH-MCNC: 3.3 G/DL — SIGNIFICANT CHANGE UP (ref 3.3–5)
ALP SERPL-CCNC: 82 U/L — SIGNIFICANT CHANGE UP (ref 40–120)
ALT FLD-CCNC: 14 U/L — SIGNIFICANT CHANGE UP (ref 4–41)
ANION GAP SERPL CALC-SCNC: 17 MMO/L — HIGH (ref 7–14)
AST SERPL-CCNC: 16 U/L — SIGNIFICANT CHANGE UP (ref 4–40)
BASOPHILS # BLD AUTO: 0.05 K/UL — SIGNIFICANT CHANGE UP (ref 0–0.2)
BASOPHILS NFR BLD AUTO: 0.8 % — SIGNIFICANT CHANGE UP (ref 0–2)
BILIRUB SERPL-MCNC: 0.3 MG/DL — SIGNIFICANT CHANGE UP (ref 0.2–1.2)
BUN SERPL-MCNC: 74 MG/DL — HIGH (ref 7–23)
CALCIUM SERPL-MCNC: 9.1 MG/DL — SIGNIFICANT CHANGE UP (ref 8.4–10.5)
CHLORIDE SERPL-SCNC: 103 MMOL/L — SIGNIFICANT CHANGE UP (ref 98–107)
CO2 SERPL-SCNC: 23 MMOL/L — SIGNIFICANT CHANGE UP (ref 22–31)
CREAT SERPL-MCNC: 2.94 MG/DL — HIGH (ref 0.5–1.3)
EOSINOPHIL # BLD AUTO: 0.2 K/UL — SIGNIFICANT CHANGE UP (ref 0–0.5)
EOSINOPHIL NFR BLD AUTO: 3.1 % — SIGNIFICANT CHANGE UP (ref 0–6)
GLUCOSE BLDC GLUCOMTR-MCNC: 110 MG/DL — HIGH (ref 70–99)
GLUCOSE BLDC GLUCOMTR-MCNC: 119 MG/DL — HIGH (ref 70–99)
GLUCOSE BLDC GLUCOMTR-MCNC: 169 MG/DL — HIGH (ref 70–99)
GLUCOSE BLDC GLUCOMTR-MCNC: 169 MG/DL — HIGH (ref 70–99)
GLUCOSE BLDC GLUCOMTR-MCNC: 180 MG/DL — HIGH (ref 70–99)
GLUCOSE SERPL-MCNC: 110 MG/DL — HIGH (ref 70–99)
HCT VFR BLD CALC: 29.2 % — LOW (ref 39–50)
HGB BLD-MCNC: 8.9 G/DL — LOW (ref 13–17)
IMM GRANULOCYTES NFR BLD AUTO: 0.3 % — SIGNIFICANT CHANGE UP (ref 0–1.5)
LYMPHOCYTES # BLD AUTO: 0.91 K/UL — LOW (ref 1–3.3)
LYMPHOCYTES # BLD AUTO: 13.9 % — SIGNIFICANT CHANGE UP (ref 13–44)
MAGNESIUM SERPL-MCNC: 2.4 MG/DL — SIGNIFICANT CHANGE UP (ref 1.6–2.6)
MCHC RBC-ENTMCNC: 30.5 % — LOW (ref 32–36)
MCHC RBC-ENTMCNC: 30.9 PG — SIGNIFICANT CHANGE UP (ref 27–34)
MCV RBC AUTO: 101.4 FL — HIGH (ref 80–100)
MONOCYTES # BLD AUTO: 0.85 K/UL — SIGNIFICANT CHANGE UP (ref 0–0.9)
MONOCYTES NFR BLD AUTO: 13 % — SIGNIFICANT CHANGE UP (ref 2–14)
NEUTROPHILS # BLD AUTO: 4.52 K/UL — SIGNIFICANT CHANGE UP (ref 1.8–7.4)
NEUTROPHILS NFR BLD AUTO: 68.9 % — SIGNIFICANT CHANGE UP (ref 43–77)
NRBC # FLD: 0 K/UL — SIGNIFICANT CHANGE UP (ref 0–0)
PHOSPHATE SERPL-MCNC: 5.7 MG/DL — HIGH (ref 2.5–4.5)
PLATELET # BLD AUTO: 237 K/UL — SIGNIFICANT CHANGE UP (ref 150–400)
PMV BLD: 11.4 FL — SIGNIFICANT CHANGE UP (ref 7–13)
POTASSIUM SERPL-MCNC: 3.6 MMOL/L — SIGNIFICANT CHANGE UP (ref 3.5–5.3)
POTASSIUM SERPL-SCNC: 3.6 MMOL/L — SIGNIFICANT CHANGE UP (ref 3.5–5.3)
PROT SERPL-MCNC: 6 G/DL — SIGNIFICANT CHANGE UP (ref 6–8.3)
RBC # BLD: 2.88 M/UL — LOW (ref 4.2–5.8)
RBC # FLD: 17.5 % — HIGH (ref 10.3–14.5)
SODIUM SERPL-SCNC: 143 MMOL/L — SIGNIFICANT CHANGE UP (ref 135–145)
WBC # BLD: 6.55 K/UL — SIGNIFICANT CHANGE UP (ref 3.8–10.5)
WBC # FLD AUTO: 6.55 K/UL — SIGNIFICANT CHANGE UP (ref 3.8–10.5)

## 2019-12-21 PROCEDURE — 99232 SBSQ HOSP IP/OBS MODERATE 35: CPT | Mod: GC

## 2019-12-21 RX ORDER — POLYETHYLENE GLYCOL 3350 17 G/17G
17 POWDER, FOR SOLUTION ORAL DAILY
Refills: 0 | Status: DISCONTINUED | OUTPATIENT
Start: 2019-12-21 | End: 2019-12-24

## 2019-12-21 RX ADMIN — HEPARIN SODIUM 5000 UNIT(S): 5000 INJECTION INTRAVENOUS; SUBCUTANEOUS at 23:31

## 2019-12-21 RX ADMIN — Medication 1000 UNIT(S): at 13:08

## 2019-12-21 RX ADMIN — Medication 40 MILLIGRAM(S): at 05:45

## 2019-12-21 RX ADMIN — Medication 325 MILLIGRAM(S): at 13:08

## 2019-12-21 RX ADMIN — Medication 667 MILLIGRAM(S): at 17:24

## 2019-12-21 RX ADMIN — ATORVASTATIN CALCIUM 40 MILLIGRAM(S): 80 TABLET, FILM COATED ORAL at 23:31

## 2019-12-21 RX ADMIN — SENNA PLUS 2 TABLET(S): 8.6 TABLET ORAL at 23:32

## 2019-12-21 RX ADMIN — POLYETHYLENE GLYCOL 3350 17 GRAM(S): 17 POWDER, FOR SOLUTION ORAL at 23:54

## 2019-12-21 RX ADMIN — AMLODIPINE BESYLATE 5 MILLIGRAM(S): 2.5 TABLET ORAL at 05:45

## 2019-12-21 RX ADMIN — Medication 667 MILLIGRAM(S): at 09:50

## 2019-12-21 RX ADMIN — HEPARIN SODIUM 5000 UNIT(S): 5000 INJECTION INTRAVENOUS; SUBCUTANEOUS at 13:07

## 2019-12-21 RX ADMIN — HEPARIN SODIUM 5000 UNIT(S): 5000 INJECTION INTRAVENOUS; SUBCUTANEOUS at 05:45

## 2019-12-21 RX ADMIN — BRINZOLAMIDE 1 DROP(S): 10 SUSPENSION/ DROPS OPHTHALMIC at 17:24

## 2019-12-21 RX ADMIN — BRINZOLAMIDE 1 DROP(S): 10 SUSPENSION/ DROPS OPHTHALMIC at 05:46

## 2019-12-21 RX ADMIN — Medication 100 MILLIGRAM(S): at 13:07

## 2019-12-21 NOTE — PROGRESS NOTE ADULT - ASSESSMENT
Patient is a 89 y/o Male from [facility] , with pmhx of CAD, COPD, HTN, Prostate CA, S/P Radiation therapy years ago, CKD,  Anemia, DM on No Meds, GOUT, Glaucoma, Aortic Stenosis , Osteoporosis , Legs edema, A+O x 4, presents with c/o dyspnea upon exertion x 3-5 days. He reports when walking or exerting himself he feels SOB, no associated chest pain, palpitations, dizziness, lightheadedness, diaphoresis, cough, URI like symptoms, No N/V, no diarrhea, no Fever, NO CP, NO HA, no Dizziness, No abdominal pain, + chronic LBP, + constipation , Last BM Yesterday No sore Throat, uses cane to Ambulate No legs pain, C/O + Orthopnea, + dysuria, Chest X ray prelim c/w B/L Pleural Effusions, BNP 16353, Troponin , pt s/p IV Lasix 40 mg x 1 for Possible Acute CHF given by ER tonight, pt is making urine, I called Cardiology consult tonight, I Ordered Venous Doppler legs: R/O DVT, CT Chest/abdomen /pelvis NO Contrast  ordered due to + B/L Pleural effusions, HX of Prostate CA, CKD, Anemia, RVP: Negative, pt was seen by House Cardiology as well,

## 2019-12-21 NOTE — PROGRESS NOTE ADULT - PROBLEM SELECTOR PLAN 1
R/O Acute systolic CHF (worsen LV function)   Elevated BNP and Troponin HS  Cardiology eval Appreciated  IV Lasix 40 mg BID to QD, Fall/aspiration precaution, Cardiology F/U,   CT Chest No Contrast: distal impacted airways within the right middle lobe and lingula. procalcitonin is low. observe off abx, pulm eval appreciated  PT Consult, Daily Weight  TTE with worse systolic function, though hard to visualize, suspected severe AS  card on the case. TAVR or any intervention is high risk  c/w gentle diuresis given he is preload dependent per card.

## 2019-12-21 NOTE — PROGRESS NOTE ADULT - SUBJECTIVE AND OBJECTIVE BOX
Patient seen and examined at bedside.    Overnight Events: No acute events overnight. Pt still with orthopnea.       REVIEW OF SYSTEMS:  Constitutional:     [ ] negative [ ] fevers [ ] chills [ ] weight loss [ ] weight gain  HEENT:                  [ ] negative [ ] dry eyes [ ] eye irritation [ ] postnasal drip [ ] nasal congestion  CV:                         [ ] negative  [ ] chest pain [ x] orthopnea [ ] palpitations [ ] murmur  Resp:                     [ ] negative [ ] cough [ ] shortness of breath [ ] dyspnea [ ] wheezing [ ] sputum [ ]hemoptysis  GI:                          [ ] negative [ ] nausea [ ] vomiting [ ] diarrhea [ ] constipation [ ] abd pain [ ] dysphagia   :                        [ ] negative [ ] dysuria [ ] nocturia [ ] hematuria [ ] increased urinary frequency  Musculoskeletal: [ ] negative [ ] back pain [ ] myalgias [ ] arthralgias [ ] fracture  Skin:                       [ ] negative [ ] rash [ ] itch  Neurological:        [ ] negative [ ] headache [ ] dizziness [ ] syncope [ ] weakness [ ] numbness  Psychiatric:           [ ] negative [ ] anxiety [ ] depression  Endocrine:            [ ] negative [ ] diabetes [ ] thyroid problem  Heme/Lymph:      [ ] negative [ ] anemia [ ] bleeding problem  Allergic/Immune: [ ] negative [ ] itchy eyes [ ] nasal discharge [ ] hives [ ] angioedema    [ x] All other systems negative  [ ] Unable to assess ROS due to    Current Meds:  allopurinol 100 milliGRAM(s) Oral daily  amLODIPine   Tablet 5 milliGRAM(s) Oral daily  atorvastatin 40 milliGRAM(s) Oral at bedtime  brinzolamide 1% Ophthalmic Suspension 1 Drop(s) Both EYES two times a day  calcium acetate 667 milliGRAM(s) Oral two times a day with meals  cholecalciferol 1000 Unit(s) Oral daily  dextrose 40% Gel 15 Gram(s) Oral once PRN  dextrose 5%. 1000 milliLiter(s) IV Continuous <Continuous>  dextrose 50% Injectable 12.5 Gram(s) IV Push once  dextrose 50% Injectable 25 Gram(s) IV Push once  dextrose 50% Injectable 25 Gram(s) IV Push once  ferrous    sulfate 325 milliGRAM(s) Oral daily  furosemide   Injectable 40 milliGRAM(s) IV Push daily  glucagon  Injectable 1 milliGRAM(s) IntraMuscular once PRN  heparin  Injectable 5000 Unit(s) SubCutaneous every 8 hours  insulin lispro (HumaLOG) corrective regimen sliding scale   SubCutaneous Before meals and at bedtime  senna 2 Tablet(s) Oral at bedtime      PAST MEDICAL & SURGICAL HISTORY:  Leg edema  Osteoporosis  Aortic stenosis  Glaucoma  DM type 2 (diabetes mellitus, type 2)  CAD (coronary artery disease)  Chronic kidney disease (CKD)  Anemia  Prostate cancer  Gout  HLD (hyperlipidemia)  Hypertension  History of colonoscopy  History of tonsillectomy: childhood  H/O inguinal hernia repair      Vitals:  T(F): 97.7 (12-21), Max: 98.4 (12-20)  HR: 68 (12-21) (68 - 75)  BP: 129/53 (12-21) (129/53 - 135/63)  RR: 17 (12-21)  SpO2: 99% (12-21)  I&O's Summary    20 Dec 2019 07:01  -  21 Dec 2019 07:00  --------------------------------------------------------  IN: 240 mL / OUT: 950 mL / NET: -710 mL        Physical Exam:  Appearance: No acute distress; well appearing  Eyes: PERRL, EOMI, pink conjunctiva  HENT: Normal oral mucosa  Cardiovascular: RRR, 3/6 systolic murmur, 2+ edema, elevated JVP  Respiratory: bibasilar rales  Gastrointestinal: soft, non-tender, non-distended with normal bowel sounds  Musculoskeletal: No clubbing; no joint deformity   Neurologic: Non-focal  Lymphatic: No lymphadenopathy  Psychiatry: AAOx2, mood & affect appropriate  Skin: No rashes, ecchymoses, or cyanosis                          8.9    6.55  )-----------( 237      ( 21 Dec 2019 05:36 )             29.2     12-21    143  |  103  |  74<H>  ----------------------------<  110<H>  3.6   |  23  |  2.94<H>    Ca    9.1      21 Dec 2019 05:36  Phos  5.7     12-21  Mg     2.4     12-21    TPro  6.0  /  Alb  3.3  /  TBili  0.3  /  DBili  x   /  AST  16  /  ALT  14  /  AlkPhos  82  12-21          Serum Pro-Brain Natriuretic Peptide: 05040 pg/mL (12-18 @ 02:45)  Serum Pro-Brain Natriuretic Peptide: 44591 pg/mL (12-17 @ 23:10)          New ECG(s): Personally reviewed    Echo:  < from: TTE with Doppler (w/Cont) (12.20.19 @ 16:51) >  CONCLUSIONS:  1. Mitral annular calcification, otherwise normal mitral  valve. Mild mitral regurgitation.  2. Aortic valve leaflet morphology not well visualized.  The valve is calcified. Peak transaortic valve gradient  equals 32 mm Hg, mean transaortic valve gradient equals 18  mm Hg.  In the setting of severe LV systolic dysfunction,  these gradients may reflect the presence of significant  aortic stenosis.  However, unable to accurately estimate  the aortic valve area. Mild aortic regurgitation.  3. Severe global left ventricular systolic dysfunction.  Endocardial visualization enhanced with intravenous  injection of echo contrast (Definity).  No LV thrombus  seen.  4. The right ventricle is not well visualized; grossly  normal right ventricular systolic function.    < end of copied text >      Interpretation of Telemetry:

## 2019-12-21 NOTE — PROGRESS NOTE ADULT - SUBJECTIVE AND OBJECTIVE BOX
[facility] [place] [facility]   MD WALLACE DAHL, DO YULIANA GODFREY, HECTOR JAMES    TEL:  OFFICE: [phone]  DR SCOTT CELL: [phone]  DR. MAURICE CELL: [phone]  DR. GODFREY CELL: [phone]  DEJUAN MOREAU CELL: [phone]        Patient is a 89y old  Male who presents with a chief complaint of SOB, GARCIA, R/O acute CHF, (21 Dec 2019 10:58)      Patient seen and examined at bedside. No chest pain/sob    VITALS:  T(F): 97.1 (12-21-19 @ 13:09), Max: 98.4 (12-20-19 @ 20:49)  HR: 66 (12-21-19 @ 13:09)  BP: 122/57 (12-21-19 @ 13:09)  RR: 17 (12-21-19 @ 13:09)  SpO2: 100% (12-21-19 @ 13:09)  Wt(kg): --    12-20 @ 07:01  -  12-21 @ 07:00  --------------------------------------------------------  IN: 240 mL / OUT: 950 mL / NET: -710 mL          PHYSICAL EXAM:  Constitutional: NAD  Neck: No JVD  Respiratory: CTAB, no wheezes, rales or rhonchi  Cardiovascular: S1, S2, RRR  Gastrointestinal: BS+, soft, NT/ND  Extremities: No peripheral edema    Hospital Medications:   MEDICATIONS  (STANDING):  allopurinol 100 milliGRAM(s) Oral daily  amLODIPine   Tablet 5 milliGRAM(s) Oral daily  atorvastatin 40 milliGRAM(s) Oral at bedtime  brinzolamide 1% Ophthalmic Suspension 1 Drop(s) Both EYES two times a day  calcium acetate 667 milliGRAM(s) Oral two times a day with meals  cholecalciferol 1000 Unit(s) Oral daily  dextrose 5%. 1000 milliLiter(s) (50 mL/Hr) IV Continuous <Continuous>  dextrose 50% Injectable 12.5 Gram(s) IV Push once  dextrose 50% Injectable 25 Gram(s) IV Push once  dextrose 50% Injectable 25 Gram(s) IV Push once  ferrous    sulfate 325 milliGRAM(s) Oral daily  furosemide   Injectable 40 milliGRAM(s) IV Push daily  heparin  Injectable 5000 Unit(s) SubCutaneous every 8 hours  insulin lispro (HumaLOG) corrective regimen sliding scale   SubCutaneous Before meals and at bedtime  senna 2 Tablet(s) Oral at bedtime      LABS:  12-21    143  |  103  |  74<H>  ----------------------------<  110<H>  3.6   |  23  |  2.94<H>    Ca    9.1      21 Dec 2019 05:36  Phos  5.7     12-21  Mg     2.4     12-21    TPro  6.0  /  Alb  3.3  /  TBili  0.3  /  DBili      /  AST  16  /  ALT  14  /  AlkPhos  82  12-21    Creatinine Trend: 2.94 <--, 2.84 <--, 2.54 <--, 2.54 <--, 2.52 <--, 2.57 <--    Phosphorus Level, Serum: 5.7 mg/dL (12-21 @ 05:36)  Albumin, Serum: 3.3 g/dL (12-21 @ 05:36)                              8.9    6.55  )-----------( 237      ( 21 Dec 2019 05:36 )             29.2     Urine Studies:  Urinalysis - [12-18-19 @ 02:46]      Color LT. YELLOW / Appearance CLEAR / SG 1.015 / pH 6.0      Gluc NEGATIVE / Ketone NEGATIVE  / Bili NEGATIVE / Urobili NORMAL       Blood TRACE / Protein 70 / Leuk Est NEGATIVE / Nitrite NEGATIVE      RBC 0-2 / WBC  / Hyaline  / Gran  / Sq Epi  / Non Sq Epi  / Bacteria     Urine Creatinine 20.20      [12-19-19 @ 21:25]  Urine Protein 12.1      [12-19-19 @ 21:25]    Iron 19, TIBC 271, %sat --      [12-18-19 @ 02:45]  Ferritin 53.67      [12-18-19 @ 02:45]  PTH 60.31 (Ca --)      [12-20-19 @ 04:37]   --  PTH 89.27 (Ca --)      [12-18-19 @ 02:46]   --  HbA1c 5.6      [12-19-19 @ 06:00]  TSH 2.75      [12-18-19 @ 02:45]  Lipid: chol 118, TG 65, HDL 58, LDL 52      [12-18-19 @ 02:45]    HBsAg Nonreactive      [12-18-19 @ 02:46]  HCV 0.20, Nonreactive Hepatitis C AB  S/CO Ratio                        Interpretation  < 1.00                                   Non-Reactive  1.00 - 4.99                         Weakly-Reactive  >= 5.00                                Reactive  Non-Reactive: Aperson with a non-reactive HCV antibody  result is considered uninfected.  No further action is  needed unless recent infection is suspected.  In these  cases, consider repeat testing later to detect  seroconversion..  Weakly-Reactive: HCV antibody test is abnormal, HCV RNA  Qualitative test will follow.  Reactive: HCV antibody test is abnormal, HCV RNA  Qualitative test will follow.  Note: HCV antibody testing is performed on the [employer]   system.      [12-18-19 @ 02:46]      RADIOLOGY & ADDITIONAL STUDIES:

## 2019-12-21 NOTE — PROGRESS NOTE ADULT - SUBJECTIVE AND OBJECTIVE BOX
Patient is a 89y old  Male who presents with a chief complaint of SOB, GARCIA, R/O acute CHF, (21 Dec 2019 10:25)      Any change in ROS: Doingok : on 3 L ofoxygen     MEDICATIONS  (STANDING):  allopurinol 100 milliGRAM(s) Oral daily  amLODIPine   Tablet 5 milliGRAM(s) Oral daily  atorvastatin 40 milliGRAM(s) Oral at bedtime  brinzolamide 1% Ophthalmic Suspension 1 Drop(s) Both EYES two times a day  calcium acetate 667 milliGRAM(s) Oral two times a day with meals  cholecalciferol 1000 Unit(s) Oral daily  dextrose 5%. 1000 milliLiter(s) (50 mL/Hr) IV Continuous <Continuous>  dextrose 50% Injectable 12.5 Gram(s) IV Push once  dextrose 50% Injectable 25 Gram(s) IV Push once  dextrose 50% Injectable 25 Gram(s) IV Push once  ferrous    sulfate 325 milliGRAM(s) Oral daily  furosemide   Injectable 40 milliGRAM(s) IV Push daily  heparin  Injectable 5000 Unit(s) SubCutaneous every 8 hours  insulin lispro (HumaLOG) corrective regimen sliding scale   SubCutaneous Before meals and at bedtime  senna 2 Tablet(s) Oral at bedtime    MEDICATIONS  (PRN):  dextrose 40% Gel 15 Gram(s) Oral once PRN Blood Glucose LESS THAN 70 milliGRAM(s)/deciliter  glucagon  Injectable 1 milliGRAM(s) IntraMuscular once PRN Glucose LESS THAN 70 milligrams/deciliter    Vital Signs Last 24 Hrs  T(C): 36.5 (21 Dec 2019 05:43), Max: 36.9 (20 Dec 2019 20:49)  T(F): 97.7 (21 Dec 2019 05:43), Max: 98.4 (20 Dec 2019 20:49)  HR: 68 (21 Dec 2019 05:43) (68 - 75)  BP: 129/53 (21 Dec 2019 05:43) (129/53 - 135/63)  BP(mean): --  RR: 17 (21 Dec 2019 05:43) (17 - 18)  SpO2: 99% (21 Dec 2019 05:43) (99% - 99%)    I&O's Summary    20 Dec 2019 07:01  -  21 Dec 2019 07:00  --------------------------------------------------------  IN: 240 mL / OUT: 950 mL / NET: -710 mL          Physical Exam:   GENERAL: NAD, well-groomed, well-developed  HEENT: CHIO/   Atraumatic, Normocephalic  ENMT: No tonsillar erythema, exudates, or enlargement; Moist mucous membranes, Good dentition, No lesions  NECK: Supple, No JVD, Normal thyroid  CHEST/LUNG: Clear to auscultaion  CVS: Regular rate and rhythm; No murmurs, rubs, or gallops  GI: : Soft, Nontender, Nondistended; Bowel sounds present  NERVOUS SYSTEM:  Alert & Oriented X3  EXTREMITIES:  2+ Peripheral Pulses, No clubbing, cyanosis, or edema  LYMPH: No lymphadenopathy noted  SKIN: No rashes or lesions  ENDOCRINOLOGY: No Thyromegaly  PSYCH: Appropriate    Labs:                              8.9    6.55  )-----------( 237      ( 21 Dec 2019 05:36 )             29.2                         9.5    6.28  )-----------( 244      ( 20 Dec 2019 04:37 )             29.7                         9.1    6.87  )-----------( 251      ( 19 Dec 2019 21:15 )             29.5                         7.4    5.95  )-----------( 241      ( 19 Dec 2019 05:45 )             25.1                         7.8    5.96  )-----------( 254      ( 18 Dec 2019 11:30 )             25.6                         7.5    7.07  )-----------( 257      ( 18 Dec 2019 02:46 )             24.9                         7.8    7.58  )-----------( 236      ( 17 Dec 2019 23:10 )             25.4     12-21    143  |  103  |  74<H>  ----------------------------<  110<H>  3.6   |  23  |  2.94<H>  12-20    145  |  106  |  70<H>  ----------------------------<  105<H>  3.7   |  23  |  2.84<H>  12-19    146<H>  |  109<H>  |  59<H>  ----------------------------<  107<H>  3.8   |  21<L>  |  2.54<H>  12-18    146<H>  |  108<H>  |  56<H>  ----------------------------<  168<H>  4.0   |  21<L>  |  2.54<H>  12-18    145  |  110<H>  |  57<H>  ----------------------------<  132<H>  4.5   |  18<L>  |  2.52<H>  12-17    144  |  110<H>  |  58<H>  ----------------------------<  142<H>  4.4   |  18<L>  |  2.57<H>    Ca    9.1      21 Dec 2019 05:36  Ca    9.1      20 Dec 2019 04:37  Phos  5.7     12-21  Phos  5.9     12-20  Mg     2.4     12-21  Mg     2.4     12-20    TPro  6.0  /  Alb  3.3  /  TBili  0.3  /  DBili  x   /  AST  16  /  ALT  14  /  AlkPhos  82  12-21  TPro  6.2  /  Alb  3.4  /  TBili  0.5  /  DBili  x   /  AST  18  /  ALT  19  /  AlkPhos  83  12-20  TPro  6.0  /  Alb  3.2<L>  /  TBili  0.3  /  DBili  x   /  AST  19  /  ALT  17  /  AlkPhos  80  12-19  TPro  6.8  /  Alb  3.7  /  TBili  0.3  /  DBili  x   /  AST  25  /  ALT  27  /  AlkPhos  92  12-18  TPro  6.7  /  Alb  3.8  /  TBili  0.3  /  DBili  x   /  AST  26  /  ALT  27  /  AlkPhos  91  12-17    CAPILLARY BLOOD GLUCOSE      POCT Blood Glucose.: 119 mg/dL (21 Dec 2019 08:38)  POCT Blood Glucose.: 192 mg/dL (20 Dec 2019 21:20)  POCT Blood Glucose.: 116 mg/dL (20 Dec 2019 17:51)  POCT Blood Glucose.: 127 mg/dL (20 Dec 2019 12:39)      LIVER FUNCTIONS - ( 21 Dec 2019 05:36 )  Alb: 3.3 g/dL / Pro: 6.0 g/dL / ALK PHOS: 82 u/L / ALT: 14 u/L / AST: 16 u/L / GGT: x               Procalcitonin, Serum: 0.19 ng/mL (12-19 @ 05:45)        RECENT CULTURES:  12-18 @ 04:22 URINE MIDSTREAM            < from: US Duplex Venous Lower Ext Complete, Bilateral (12.18.19 @ 10:24) >      PROCEDURE DATE:  Dec 18 2019         INTERPRETATION:  CLINICAL INFORMATION: Leg edema.    COMPARISON: None available.    TECHNIQUE: Duplex sonography of the BILATERAL LOWER extremity veins with   color and spectral Doppler, with and without compression.      FINDINGS:    There is normal compressibility of the bilateral common femoral, femoral   and popliteal veins.     Doppler examination shows normal spontaneous and phasic flow.    No calf vein thrombosis is detected.    IMPRESSION:     No evidence of deep venous thrombosis in either lower extremity.                  STACIE SAM M.D., ATTENDING RADIOLOGIST  This document has been electronically signed. Dec 18 2019  1:35PM        < end of copied text >              RESPIRATORY CULTURES:          Studies  Chest X-RAY  CT SCAN Chest   Venous Dopplers: LE:   CT Abdomen  Others    < from: TTE with Doppler (w/Cont) (12.20.19 @ 16:51) >  of echo contrast (Definity).  No LV  thrombus seen.  Right Heart: Normal right atrium. The right ventricle is  not well visualized; grossly normal right ventricular  systolic function. Normal tricuspid valve. Minimal  tricuspid regurgitation. Normal pulmonic valve.  Pericardium/PleuraNormal pericardium with no pericardial  effusion.  ------------------------------------------------------------------------  CONCLUSIONS:  1. Mitral annular calcification, otherwise normal mitral  valve. Mild mitral regurgitation.  2. Aortic valve leaflet morphology not well visualized.  The valve is calcified. Peak transaortic valve gradient  equals 32 mm Hg, mean transaortic valve gradient equals 18  mm Hg.  In the setting of severe LV systolic dysfunction,  these gradients may reflect the presence of significant  aortic stenosis.  However, unable to accurately estimate  the aortic valve area. Mild aortic regurgitation.  3. Severe global left ventricular systolic dysfunction.  Endocardial visualization enhanced with intravenous  injection of echo contrast (Definity).  No LV thrombus  seen.  4. The right ventricle is not well visualized; grossly  normal right ventricular systolic function.  *** No previous Echo exam.  ------------------------------------------------------------------------  Confirmed on  12/20/2019 - 17:50:53 by Ha Vuong M.D.  ------------------------------------------------------------------------    < end of copied text >

## 2019-12-21 NOTE — PROGRESS NOTE ADULT - ASSESSMENT
89 y/o Male from [facility], with pmhx of CAD, COPD, HTN, Prostate CA, S/P Radiation therapy years ago, CKD,  Anemia, DM on No Meds, GOUT, Glaucoma, Aortic Stenosis , Osteoporosis , Legs edema, A+O x 4, presents with c/o dyspnea upon exertion x 3-5 days.

## 2019-12-21 NOTE — PROGRESS NOTE ADULT - ASSESSMENT
89 y/o Male from [facility] , with pmhx of CAD, COPD, HTN, Prostate CA, S/P Radiation therapy years ago, CKD,  Anemia, DM on No Meds, GOUT, Glaucoma, Aortic Stenosis , Osteoporosis , Legs edema, A+O x 4, presents with c/o dyspnea    Renal failure  likely CKD stage 4 follow up withe nephrologist recently  unclear baseline, however is stable ~ 2.5 likely is baseline  bun/cr elevated lasix decreased from 40 bid to 40 daily iv 12/21, fluid status much improved, consider transition to po. f/u cardio  avoid nephrotoxic agents, NSAIDs  monitor bmp    Anemia  iron study done iron sat 7%   likely iron def  r/o gib  continue iron supplement  transfuse to keep Hb>8    Hypervolemia  better  lasix decreased  monitor i/o daily weight  f/u cardio    ckd-mbd  pth optimal for ckd  elevated phos continue phoslo  on daily vit d  monitor phos and calcium daily  low phos diet    Proteinuria  urine p/c ratio 599mg/day  likely sec to htn  monitor    HTN  controlled  monitor 89 y/o Male from [facility] [place] , with pmhx of CAD, COPD, HTN, Prostate CA, S/P Radiation therapy years ago, CKD,  Anemia, DM on No Meds, GOUT, Glaucoma, Aortic Stenosis , Osteoporosis , Legs edema, A+O x 4, presents with c/o dyspnea    Renal failure  likely CKD stage 4 follow up with a nephrologist recently does not know the name  unclear baseline, however is stable ~ 2.5 likely is baseline  bun/cr elevated lasix decreased from 40 bid to 40 daily iv 12/21, fluid status much improved, consider transition to po. f/u cardio  avoid nephrotoxic agents, NSAIDs  monitor bmp    Anemia  iron study done iron sat 7%   likely iron def  r/o gib  continue iron supplement  transfuse to keep Hb>8    Hypervolemia  better  lasix decreased  monitor i/o daily weight  f/u cardio    ckd-mbd  pth optimal for ckd  elevated phos continue phoslo  on daily vit d  monitor phos and calcium daily  low phos diet    Proteinuria  urine p/c ratio 599mg/day  likely sec to htn  monitor    HTN  controlled  monitor

## 2019-12-21 NOTE — PROGRESS NOTE ADULT - ASSESSMENT
89 yo M transferred from [facility] with PMH of CAD, aortic stenosis, COPD, HTN, prostate cancer (s/p radiation therapy), CKD, anemia, DM and gout, presenting with worsening dyspnea on exertion, admitted for decompensated heart failure. Echo consistent with probable severe AS and severe LVSDF.     #ADHFrEF w/ likely severe AS  - Pt with apparently new LV systolic dysfunction, which is likely a result of underlying severe AS, however, no ischemic eval seem in [place].   - The pt remains volume overloaded with mild elevated in BP  - C/w IV Lasix 40 QD with overall goal to keep net negative 500 cc to 1L.   - Would not diurese too quickly as patient is also partially preload dependent.   - Check Daily lactates, as Cr is worsening.   - Overall pt would need aortic valve replacement. The only one that he could possibly be a candidate for given age and comorbidities would be TAVR, and he would be high risk for that as well.   - Prior to that he would need to have an ischemic eval and possibly even a contrast CT. Undergoing the work up for TAVR and the procedure itself would likely result in the pt being started on HD.   - Additionally the pt may also need a  stress echo to prove contractile reserve, which he cannot have while he is volume overloaded. (And diuresis may make his kidney function worse)  - Given all this would consider palliative c/s to at least address pts goals of care.     #Elevated troponin  - No rise and fall in trop in pt with CKD  - Maybe some leak likely related to AS.     #HTN  - Continue home amlodipine  - Start hydralazine 25 TID    Richard Ott MD  Cardiology Fellow - PGY 5  Text or Call: [phone]  For all New Consults and Questions:  [email or web address]   Login: [email or web address]

## 2019-12-21 NOTE — PROGRESS NOTE ADULT - PROBLEM SELECTOR PLAN 1
U don't think he has pneumonia: keep off antibiotics: cont duresis  12/21: he seems to be doing much naty r: has sever global LV dysfunction

## 2019-12-22 LAB
ALBUMIN SERPL ELPH-MCNC: 3.5 G/DL — SIGNIFICANT CHANGE UP (ref 3.3–5)
ALP SERPL-CCNC: 80 U/L — SIGNIFICANT CHANGE UP (ref 40–120)
ALT FLD-CCNC: 17 U/L — SIGNIFICANT CHANGE UP (ref 4–41)
ANION GAP SERPL CALC-SCNC: 15 MMO/L — HIGH (ref 7–14)
AST SERPL-CCNC: 16 U/L — SIGNIFICANT CHANGE UP (ref 4–40)
BASOPHILS # BLD AUTO: 0.06 K/UL — SIGNIFICANT CHANGE UP (ref 0–0.2)
BASOPHILS NFR BLD AUTO: 0.9 % — SIGNIFICANT CHANGE UP (ref 0–2)
BILIRUB SERPL-MCNC: 0.4 MG/DL — SIGNIFICANT CHANGE UP (ref 0.2–1.2)
BUN SERPL-MCNC: 80 MG/DL — HIGH (ref 7–23)
CALCIUM SERPL-MCNC: 9.5 MG/DL — SIGNIFICANT CHANGE UP (ref 8.4–10.5)
CHLORIDE SERPL-SCNC: 102 MMOL/L — SIGNIFICANT CHANGE UP (ref 98–107)
CO2 SERPL-SCNC: 26 MMOL/L — SIGNIFICANT CHANGE UP (ref 22–31)
CREAT SERPL-MCNC: 3.13 MG/DL — HIGH (ref 0.5–1.3)
EOSINOPHIL # BLD AUTO: 0.19 K/UL — SIGNIFICANT CHANGE UP (ref 0–0.5)
EOSINOPHIL NFR BLD AUTO: 2.9 % — SIGNIFICANT CHANGE UP (ref 0–6)
GLUCOSE BLDC GLUCOMTR-MCNC: 108 MG/DL — HIGH (ref 70–99)
GLUCOSE BLDC GLUCOMTR-MCNC: 112 MG/DL — HIGH (ref 70–99)
GLUCOSE BLDC GLUCOMTR-MCNC: 131 MG/DL — HIGH (ref 70–99)
GLUCOSE BLDC GLUCOMTR-MCNC: 193 MG/DL — HIGH (ref 70–99)
GLUCOSE SERPL-MCNC: 108 MG/DL — HIGH (ref 70–99)
HCT VFR BLD CALC: 30 % — LOW (ref 39–50)
HGB BLD-MCNC: 9.2 G/DL — LOW (ref 13–17)
IMM GRANULOCYTES NFR BLD AUTO: 0.3 % — SIGNIFICANT CHANGE UP (ref 0–1.5)
LYMPHOCYTES # BLD AUTO: 0.83 K/UL — LOW (ref 1–3.3)
LYMPHOCYTES # BLD AUTO: 12.5 % — LOW (ref 13–44)
MAGNESIUM SERPL-MCNC: 2.1 MG/DL — SIGNIFICANT CHANGE UP (ref 1.6–2.6)
MCHC RBC-ENTMCNC: 30.6 PG — SIGNIFICANT CHANGE UP (ref 27–34)
MCHC RBC-ENTMCNC: 30.7 % — LOW (ref 32–36)
MCV RBC AUTO: 99.7 FL — SIGNIFICANT CHANGE UP (ref 80–100)
MONOCYTES # BLD AUTO: 0.76 K/UL — SIGNIFICANT CHANGE UP (ref 0–0.9)
MONOCYTES NFR BLD AUTO: 11.5 % — SIGNIFICANT CHANGE UP (ref 2–14)
NEUTROPHILS # BLD AUTO: 4.76 K/UL — SIGNIFICANT CHANGE UP (ref 1.8–7.4)
NEUTROPHILS NFR BLD AUTO: 71.9 % — SIGNIFICANT CHANGE UP (ref 43–77)
NRBC # FLD: 0 K/UL — SIGNIFICANT CHANGE UP (ref 0–0)
PHOSPHATE SERPL-MCNC: 5.1 MG/DL — HIGH (ref 2.5–4.5)
PLATELET # BLD AUTO: 255 K/UL — SIGNIFICANT CHANGE UP (ref 150–400)
PMV BLD: 11.8 FL — SIGNIFICANT CHANGE UP (ref 7–13)
POTASSIUM SERPL-MCNC: 3.5 MMOL/L — SIGNIFICANT CHANGE UP (ref 3.5–5.3)
POTASSIUM SERPL-SCNC: 3.5 MMOL/L — SIGNIFICANT CHANGE UP (ref 3.5–5.3)
PROT SERPL-MCNC: 6.3 G/DL — SIGNIFICANT CHANGE UP (ref 6–8.3)
RBC # BLD: 3.01 M/UL — LOW (ref 4.2–5.8)
RBC # FLD: 17.3 % — HIGH (ref 10.3–14.5)
SODIUM SERPL-SCNC: 143 MMOL/L — SIGNIFICANT CHANGE UP (ref 135–145)
WBC # BLD: 6.62 K/UL — SIGNIFICANT CHANGE UP (ref 3.8–10.5)
WBC # FLD AUTO: 6.62 K/UL — SIGNIFICANT CHANGE UP (ref 3.8–10.5)

## 2019-12-22 PROCEDURE — 99232 SBSQ HOSP IP/OBS MODERATE 35: CPT | Mod: GC

## 2019-12-22 RX ORDER — HYDRALAZINE HCL 50 MG
25 TABLET ORAL THREE TIMES A DAY
Refills: 0 | Status: DISCONTINUED | OUTPATIENT
Start: 2019-12-22 | End: 2019-12-24

## 2019-12-22 RX ORDER — METOPROLOL TARTRATE 50 MG
12.5 TABLET ORAL
Refills: 0 | Status: DISCONTINUED | OUTPATIENT
Start: 2019-12-22 | End: 2019-12-24

## 2019-12-22 RX ORDER — METOPROLOL TARTRATE 50 MG
12.5 TABLET ORAL ONCE
Refills: 0 | Status: COMPLETED | OUTPATIENT
Start: 2019-12-22 | End: 2019-12-22

## 2019-12-22 RX ADMIN — HEPARIN SODIUM 5000 UNIT(S): 5000 INJECTION INTRAVENOUS; SUBCUTANEOUS at 21:55

## 2019-12-22 RX ADMIN — Medication 25 MILLIGRAM(S): at 21:55

## 2019-12-22 RX ADMIN — Medication 667 MILLIGRAM(S): at 17:20

## 2019-12-22 RX ADMIN — BRINZOLAMIDE 1 DROP(S): 10 SUSPENSION/ DROPS OPHTHALMIC at 17:20

## 2019-12-22 RX ADMIN — ATORVASTATIN CALCIUM 40 MILLIGRAM(S): 80 TABLET, FILM COATED ORAL at 21:55

## 2019-12-22 RX ADMIN — AMLODIPINE BESYLATE 5 MILLIGRAM(S): 2.5 TABLET ORAL at 05:57

## 2019-12-22 RX ADMIN — HEPARIN SODIUM 5000 UNIT(S): 5000 INJECTION INTRAVENOUS; SUBCUTANEOUS at 12:52

## 2019-12-22 RX ADMIN — Medication 1000 UNIT(S): at 12:50

## 2019-12-22 RX ADMIN — Medication 25 MILLIGRAM(S): at 14:01

## 2019-12-22 RX ADMIN — Medication 667 MILLIGRAM(S): at 09:00

## 2019-12-22 RX ADMIN — Medication 325 MILLIGRAM(S): at 12:50

## 2019-12-22 RX ADMIN — Medication 0: at 12:49

## 2019-12-22 RX ADMIN — Medication 12.5 MILLIGRAM(S): at 17:20

## 2019-12-22 RX ADMIN — Medication 12.5 MILLIGRAM(S): at 09:00

## 2019-12-22 RX ADMIN — Medication 100 MILLIGRAM(S): at 12:49

## 2019-12-22 RX ADMIN — BRINZOLAMIDE 1 DROP(S): 10 SUSPENSION/ DROPS OPHTHALMIC at 05:58

## 2019-12-22 RX ADMIN — HEPARIN SODIUM 5000 UNIT(S): 5000 INJECTION INTRAVENOUS; SUBCUTANEOUS at 05:57

## 2019-12-22 RX ADMIN — SENNA PLUS 2 TABLET(S): 8.6 TABLET ORAL at 21:55

## 2019-12-22 RX ADMIN — Medication 40 MILLIGRAM(S): at 05:57

## 2019-12-22 NOTE — PROGRESS NOTE ADULT - ATTENDING COMMENTS
Agree with hydralazine can start with 10 TID and titrate up  Agree with Dr. Ott's input as described above

## 2019-12-22 NOTE — PROGRESS NOTE ADULT - ASSESSMENT
89 y/o Male from [facility] , with pmhx of CAD, COPD, HTN, Prostate CA, S/P Radiation therapy years ago, CKD,  Anemia, DM on No Meds, GOUT, Glaucoma, Aortic Stenosis , Osteoporosis , Legs edema, A+O x 4, presents with c/o dyspnea    Renal failure  likely CKD stage 4 follow up with a nephrologist recently does not know the name  unclear baseline. Cr continues to worsen  bun/cr elevated. lasix decreased from 40 bid to 40 daily iv 12/21, fluid status much improved, consider transition to po. f/u cardio  avoid nephrotoxic agents, NSAIDs  monitor bmp    Anemia  iron study done iron sat 7%   likely iron def  r/o gib  continue iron supplement  transfuse to keep Hb>8    Hypervolemia  better  lasix decreased  monitor i/o daily weight  f/u cardio    ckd-mbd  pth optimal for ckd  elevated phos continue phoslo  on daily vit d  monitor phos and calcium daily  low phos diet    Proteinuria  urine p/c ratio 599mg/day  likely sec to htn  monitor    HTN  controlled  monitor 89 y/o Male from [facility] [place] , with pmhx of CAD, COPD, HTN, Prostate CA, S/P Radiation therapy years ago, CKD,  Anemia, DM on No Meds, GOUT, Glaucoma, Aortic Stenosis , Osteoporosis , Legs edema, A+O x 4, presents with c/o dyspnea    Renal failure  likely CKD stage 4 follow up with a nephrologist recently does not know the name  unclear baseline. Cr continues to worsen  bun/cr elevated. lasix decreased from 40 bid to 40 daily iv 12/21, fluid status much improved, consider transition to po. f/u cardio  Monitor Renal function at present  avoid nephrotoxic agents, NSAIDs  monitor bmp    Anemia  iron study done iron sat 7%   likely iron def  r/o gib  continue iron supplement  transfuse to keep Hb>8    Hypervolemia  better  lasix dose decreased  monitor i/o daily weight  f/u cardio    ckd-mbd  pth optimal for ckd  elevated phos continue phoslo  on daily vit d  monitor phos and calcium daily  low phos diet    Proteinuria  urine p/c ratio 599mg/day  likely sec to htn  monitor    HTN  controlled  monitor

## 2019-12-22 NOTE — PROGRESS NOTE ADULT - ATTENDING COMMENTS
Monitor off antibiotics  12/21: stable without antibiotics: LV functions is pretty poor:  12/22: seesm to be doingbetter:

## 2019-12-22 NOTE — PROGRESS NOTE ADULT - ASSESSMENT
89 y/o Male from [facility], with pmhx of CAD, COPD, HTN, Prostate CA, S/P Radiation therapy years ago, CKD,  Anemia, DM on No Meds, GOUT, Glaucoma, Aortic Stenosis, Osteoporosis , Legs edema, A+O x 4, presents with c/o dyspnea upon exertion x 3-5 days.

## 2019-12-22 NOTE — PROGRESS NOTE ADULT - PROBLEM SELECTOR PLAN 1
U don't think he has pneumonia: keep off antibiotics: cont duresis  12/21: he seems to be doing much naty r: has sever global LV dysfunction  12/22: seems ot be doingbetter: off oxygen at this time: etiology of SOBis secondary to CHF

## 2019-12-22 NOTE — PROGRESS NOTE ADULT - PROBLEM SELECTOR PLAN 3
per charly team  ? secondary to chf  12/21: cont diuresis: seems better to me today  12/22: better: secondary to CHF

## 2019-12-22 NOTE — PROGRESS NOTE ADULT - ASSESSMENT
89 yo M transferred from [facility] with PMH of COPD, HTN, prostate cancer (s/p radiation therapy), CKD, anemia, DM and gout, presenting with worsening dyspnea on exertion, admitted for decompensated heart failure. Echo consistent with probable severe AS and severe LVSDF.     #ADHFrEF w/ likely severe AS  - Pt with apparently new LV systolic dysfunction, which is likely a result of underlying severe AS, however, no ischemic eval done prior  - The pt remains volume overloaded with mild elevated in BP  - C/w IV Lasix 40 QD with overall goal to keep net negative 500 cc to 1L.   - Would not diurese too quickly as patient is also partially preload dependent.   - Check Daily lactates, as Cr is worsening.   - Overall pt would need aortic valve replacement. The only one that he could possibly be a candidate for given age and comorbidities would be TAVR, and he would be high risk for that as well.   - Prior to that he would need to have an ischemic eval and possibly even a contrast CT. Undergoing the work up for TAVR and the procedure itself would likely result in the pt being started on HD.   - Additionally the pt may also need a  stress echo to prove contractile reserve, which he cannot have while he is volume overloaded. (And diuresis will likely make his serum Cr worse)  - I discussed this with the patient who understands the situation. He was reluctant to talk about HD, and differed to his son who will be here later today.   - Given all this would consider palliative c/s to at least address pts goals of care.     #Elevated troponin  - No rise and fall in trop in pt with CKD  - Maybe some leak likely related to AS.     #HTN  - Continue home amlodipine  - Start hydralazine 25 TID    Richard Ott MD  Cardiology Fellow - PGY 5  Text or Call: [phone]  For all New Consults and Questions:  [email or web address]   Login: [email or web address]

## 2019-12-22 NOTE — PROGRESS NOTE ADULT - ASSESSMENT
Patient is a 89 y/o Male from [facility] , with pmhx of CAD, COPD, HTN, Prostate CA, S/P Radiation therapy years ago, CKD,  Anemia, DM on No Meds, GOUT, Glaucoma, Aortic Stenosis , Osteoporosis , Legs edema, A+O x 4, presents with c/o dyspnea upon exertion x 3-5 days. He reports when walking or exerting himself he feels SOB, no associated chest pain, palpitations, dizziness, lightheadedness, diaphoresis, cough, URI like symptoms, No N/V, no diarrhea, no Fever, NO CP, NO HA, no Dizziness, No abdominal pain, + chronic LBP, + constipation , Last BM Yesterday No sore Throat, uses cane to Ambulate No legs pain, C/O + Orthopnea, + dysuria, Chest X ray prelim c/w B/L Pleural Effusions, BNP [id number], Troponin , pt s/p IV Lasix 40 mg x 1 for Possible Acute CHF given by ER tonight, pt is making urine, I called Cardiology consult tonight, I Ordered Venous Doppler legs: R/O DVT, CT Chest/abdomen /pelvis NO Contrast  ordered due to + B/L Pleural effusions, HX of Prostate CA, CKD, Anemia, RVP: Negative, pt was seen by [facility] as well,

## 2019-12-22 NOTE — PROGRESS NOTE ADULT - SUBJECTIVE AND OBJECTIVE BOX
Patient seen and examined at bedside. No chest pain/sob    VITALS:  T(F): 98.9 (12-22-19 @ 12:23), Max: 98.9 (12-22-19 @ 12:23)  HR: 72 (12-22-19 @ 14:01)  BP: 127/57 (12-22-19 @ 14:01)  RR: 17 (12-22-19 @ 12:23)  SpO2: 95% (12-22-19 @ 12:23)  Wt(kg): --    12-21 @ 07:01  -  12-22 @ 07:00  --------------------------------------------------------  IN: 500 mL / OUT: 925 mL / NET: -425 mL    12-22 @ 07:01  -  12-22 @ 15:39  --------------------------------------------------------  IN: 480 mL / OUT: 775 mL / NET: -295 mL          PHYSICAL EXAM:  Constitutional: NAD  Neck: No JVD  Respiratory: CTAB, no wheezes, rales or rhonchi  Cardiovascular: S1, S2, RRR  Gastrointestinal: BS+, soft, NT/ND  Extremities: No peripheral edema    Hospital Medications:   MEDICATIONS  (STANDING):  allopurinol 100 milliGRAM(s) Oral daily  amLODIPine   Tablet 5 milliGRAM(s) Oral daily  atorvastatin 40 milliGRAM(s) Oral at bedtime  brinzolamide 1% Ophthalmic Suspension 1 Drop(s) Both EYES two times a day  calcium acetate 667 milliGRAM(s) Oral two times a day with meals  cholecalciferol 1000 Unit(s) Oral daily  dextrose 5%. 1000 milliLiter(s) (50 mL/Hr) IV Continuous <Continuous>  dextrose 50% Injectable 12.5 Gram(s) IV Push once  dextrose 50% Injectable 25 Gram(s) IV Push once  dextrose 50% Injectable 25 Gram(s) IV Push once  ferrous    sulfate 325 milliGRAM(s) Oral daily  furosemide   Injectable 40 milliGRAM(s) IV Push daily  heparin  Injectable 5000 Unit(s) SubCutaneous every 8 hours  hydrALAZINE 25 milliGRAM(s) Oral three times a day  insulin lispro (HumaLOG) corrective regimen sliding scale   SubCutaneous Before meals and at bedtime  senna 2 Tablet(s) Oral at bedtime      LABS:  12-22    143  |  102  |  80<H>  ----------------------------<  108<H>  3.5   |  26  |  3.13<H>    Ca    9.5      22 Dec 2019 06:23  Phos  5.1     12-22  Mg     2.1     12-22    TPro  6.3  /  Alb  3.5  /  TBili  0.4  /  DBili      /  AST  16  /  ALT  17  /  AlkPhos  80  12-22    Creatinine Trend: 3.13 <--, 2.94 <--, 2.84 <--, 2.54 <--, 2.54 <--, 2.52 <--, 2.57 <--  Phosphorus Level, Serum: 5.1 mg/dL (12-22 @ 06:23)  Albumin, Serum: 3.5 g/dL (12-22 @ 06:23)                              9.2    6.62  )-----------( 255      ( 22 Dec 2019 06:23 )             30.0     Urine Studies:  Creatinine, Random Urine: 20.20 mg/dL (12-19 @ 21:25)  Protein, Random Urine: 12.1 mg/dL (12-19 @ 21:25)    Creatinine, Random Urine: 20.20 mg/dL (12-19 @ 21:25)  Protein, Random Urine: 12.1 mg/dL (12-19 @ 21:25)    Urinalysis - [12-18-19 @ 02:46]      Color LT. YELLOW / Appearance CLEAR / SG 1.015 / pH 6.0      Gluc NEGATIVE / Ketone NEGATIVE  / Bili NEGATIVE / Urobili NORMAL       Blood TRACE / Protein 70 / Leuk Est NEGATIVE / Nitrite NEGATIVE      RBC 0-2 / WBC  / Hyaline  / Gran  / Sq Epi  / Non Sq Epi  / Bacteria     Urine Creatinine 20.20      [12-19-19 @ 21:25]  Urine Protein 12.1      [12-19-19 @ 21:25]    Iron 19, TIBC 271, %sat --      [12-18-19 @ 02:45]  Ferritin 53.67      [12-18-19 @ 02:45]  PTH 60.31 (Ca --)      [12-20-19 @ 04:37]   --  PTH 89.27 (Ca --)      [12-18-19 @ 02:46]   --  HbA1c 5.6      [12-19-19 @ 06:00]  TSH 2.75      [12-18-19 @ 02:45]  Lipid: chol 118, TG 65, HDL 58, LDL 52      [12-18-19 @ 02:45]    HBsAg Nonreactive      [12-18-19 @ 02:46]  HCV 0.20, Nonreactive Hepatitis C AB  S/CO Ratio                        Interpretation  < 1.00                                   Non-Reactive  1.00 - 4.99                         Weakly-Reactive  >= 5.00                                Reactive  Non-Reactive: Aperson with a non-reactive HCV antibody  result is considered uninfected.  No further action is  needed unless recent infection is suspected.  In these  cases, consider repeat testing later to detect  seroconversion..  Weakly-Reactive: HCV antibody test is abnormal, HCV RNA  Qualitative test will follow.  Reactive: HCV antibody test is abnormal, HCV RNA  Qualitative test will follow.  Note: HCV antibody testing is performed on the [employer]   system.      [12-18-19 @ 02:46]      RADIOLOGY & ADDITIONAL STUDIES:

## 2019-12-22 NOTE — PROGRESS NOTE ADULT - SUBJECTIVE AND OBJECTIVE BOX
Patient seen and examined at bedside.    Overnight Events: Pt feels well. No acute events overnight. SOB much improved.       REVIEW OF SYSTEMS:  Constitutional:     [ ] negative [ ] fevers [ ] chills [ ] weight loss [ ] weight gain  HEENT:                  [ ] negative [ ] dry eyes [ ] eye irritation [ ] postnasal drip [ ] nasal congestion  CV:                         [ ] negative  [ ] chest pain [ ] orthopnea [ ] palpitations [ ] murmur  Resp:                     [ ] negative [ ] cough [ ] shortness of breath [ ] dyspnea [ ] wheezing [ ] sputum [ ]hemoptysis  GI:                          [ ] negative [ ] nausea [ ] vomiting [ ] diarrhea [ ] constipation [ ] abd pain [ ] dysphagia   :                        [ ] negative [ ] dysuria [ ] nocturia [ ] hematuria [ ] increased urinary frequency  Musculoskeletal: [ ] negative [ ] back pain [ ] myalgias [ ] arthralgias [ ] fracture  Skin:                       [ ] negative [ ] rash [ ] itch  Neurological:        [ ] negative [ ] headache [ ] dizziness [ ] syncope [ ] weakness [ ] numbness  Psychiatric:           [ ] negative [ ] anxiety [ ] depression  Endocrine:            [ ] negative [ ] diabetes [ ] thyroid problem  Heme/Lymph:      [ ] negative [ ] anemia [ ] bleeding problem  Allergic/Immune: [ ] negative [ ] itchy eyes [ ] nasal discharge [ ] hives [ ] angioedema    [ x] All other systems negative  [ ] Unable to assess ROS due to    Current Meds:  allopurinol 100 milliGRAM(s) Oral daily  amLODIPine   Tablet 5 milliGRAM(s) Oral daily  atorvastatin 40 milliGRAM(s) Oral at bedtime  bisacodyl Suppository 10 milliGRAM(s) Rectal daily PRN  brinzolamide 1% Ophthalmic Suspension 1 Drop(s) Both EYES two times a day  calcium acetate 667 milliGRAM(s) Oral two times a day with meals  cholecalciferol 1000 Unit(s) Oral daily  dextrose 40% Gel 15 Gram(s) Oral once PRN  dextrose 5%. 1000 milliLiter(s) IV Continuous <Continuous>  dextrose 50% Injectable 12.5 Gram(s) IV Push once  dextrose 50% Injectable 25 Gram(s) IV Push once  dextrose 50% Injectable 25 Gram(s) IV Push once  ferrous    sulfate 325 milliGRAM(s) Oral daily  furosemide   Injectable 40 milliGRAM(s) IV Push daily  glucagon  Injectable 1 milliGRAM(s) IntraMuscular once PRN  heparin  Injectable 5000 Unit(s) SubCutaneous every 8 hours  insulin lispro (HumaLOG) corrective regimen sliding scale   SubCutaneous Before meals and at bedtime  polyethylene glycol 3350 17 Gram(s) Oral daily PRN  senna 2 Tablet(s) Oral at bedtime      PAST MEDICAL & SURGICAL HISTORY:  Leg edema  Osteoporosis  Aortic stenosis  Glaucoma  DM type 2 (diabetes mellitus, type 2)  CAD (coronary artery disease)  Chronic kidney disease (CKD)  Anemia  Prostate cancer  Gout  HLD (hyperlipidemia)  Hypertension  History of colonoscopy  History of tonsillectomy: childhood  H/O inguinal hernia repair      Vitals:  T(F): 98 (12-22), Max: 98.1 (12-21)  HR: 69 (12-22) (66 - 75)  BP: 135/56 (12-22) (113/47 - 140/62)  RR: 16 (12-22)  SpO2: 94% (12-22)  I&O's Summary    21 Dec 2019 07:01  -  22 Dec 2019 07:00  --------------------------------------------------------  IN: 500 mL / OUT: 925 mL / NET: -425 mL    22 Dec 2019 07:01  -  22 Dec 2019 10:10  --------------------------------------------------------  IN: 0 mL / OUT: 775 mL / NET: -775 mL      Physical Exam:  Appearance: No acute distress; well appearing  Eyes: PERRL, EOMI, pink conjunctiva  HENT: Normal oral mucosa  Cardiovascular: RRR, 3/6 systolic murmur, 2+ edema, elevated JVP  Respiratory: bibasilar rales  Gastrointestinal: soft, non-tender, non-distended with normal bowel sounds  Musculoskeletal: No clubbing; no joint deformity   Neurologic: Non-focal  Lymphatic: No lymphadenopathy  Psychiatry: AAOx2, mood & affect appropriate  Skin: No rashes, ecchymoses, or cyanosis                          9.2    6.62  )-----------( 255      ( 22 Dec 2019 06:23 )             30.0     12-22    143  |  102  |  80<H>  ----------------------------<  108<H>  3.5   |  26  |  3.13<H>    Ca    9.5      22 Dec 2019 06:23  Phos  5.1     12-22  Mg     2.1     12-22    TPro  6.3  /  Alb  3.5  /  TBili  0.4  /  DBili  x   /  AST  16  /  ALT  17  /  AlkPhos  80  12-22          Serum Pro-Brain Natriuretic Peptide: 56962 pg/mL (12-18 @ 02:45)  Serum Pro-Brain Natriuretic Peptide: 02487 pg/mL (12-17 @ 23:10)          New ECG(s): Personally reviewed    Echo:  < from: TTE with Doppler (w/Cont) (12.20.19 @ 16:51) >  1. Mitral annular calcification, otherwise normal mitral  valve. Mild mitral regurgitation.  2. Aortic valve leaflet morphology not well visualized.  The valve is calcified. Peak transaortic valve gradient  equals 32 mm Hg, mean transaortic valve gradient equals 18  mm Hg.  In the setting of severe LV systolic dysfunction,  these gradients may reflect the presence of significant  aortic stenosis.  However, unable to accurately estimate  the aortic valve area. Mild aortic regurgitation.  3. Severe global left ventricular systolic dysfunction.  Endocardial visualization enhanced with intravenous  injection of echo contrast (Definity).  No LV thrombus  seen.  4. The right ventricle is not well visualized; grossly  normal right ventricular systolic function.  *** No previous Echo exam.    < end of copied text >    Interpretation of Telemetry: Sinus 1st degree

## 2019-12-22 NOTE — PROGRESS NOTE ADULT - SUBJECTIVE AND OBJECTIVE BOX
Patient is a 89y old  Male who presents with a chief complaint of SOB, GARCIA, R/O acute CHF, (22 Dec 2019 10:09)      Any change in ROS: Doingok : no SOB     MEDICATIONS  (STANDING):  allopurinol 100 milliGRAM(s) Oral daily  amLODIPine   Tablet 5 milliGRAM(s) Oral daily  atorvastatin 40 milliGRAM(s) Oral at bedtime  brinzolamide 1% Ophthalmic Suspension 1 Drop(s) Both EYES two times a day  calcium acetate 667 milliGRAM(s) Oral two times a day with meals  cholecalciferol 1000 Unit(s) Oral daily  dextrose 5%. 1000 milliLiter(s) (50 mL/Hr) IV Continuous <Continuous>  dextrose 50% Injectable 12.5 Gram(s) IV Push once  dextrose 50% Injectable 25 Gram(s) IV Push once  dextrose 50% Injectable 25 Gram(s) IV Push once  ferrous    sulfate 325 milliGRAM(s) Oral daily  furosemide   Injectable 40 milliGRAM(s) IV Push daily  heparin  Injectable 5000 Unit(s) SubCutaneous every 8 hours  insulin lispro (HumaLOG) corrective regimen sliding scale   SubCutaneous Before meals and at bedtime  senna 2 Tablet(s) Oral at bedtime    MEDICATIONS  (PRN):  bisacodyl Suppository 10 milliGRAM(s) Rectal daily PRN Constipation  dextrose 40% Gel 15 Gram(s) Oral once PRN Blood Glucose LESS THAN 70 milliGRAM(s)/deciliter  glucagon  Injectable 1 milliGRAM(s) IntraMuscular once PRN Glucose LESS THAN 70 milligrams/deciliter  polyethylene glycol 3350 17 Gram(s) Oral daily PRN Constipation    Vital Signs Last 24 Hrs  T(C): 37.2 (22 Dec 2019 12:23), Max: 37.2 (22 Dec 2019 12:23)  T(F): 98.9 (22 Dec 2019 12:23), Max: 98.9 (22 Dec 2019 12:23)  HR: 63 (22 Dec 2019 12:23) (63 - 75)  BP: 126/56 (22 Dec 2019 12:23) (113/47 - 140/62)  BP(mean): --  RR: 17 (22 Dec 2019 12:23) (16 - 17)  SpO2: 95% (22 Dec 2019 12:23) (94% - 100%)    I&O's Summary    21 Dec 2019 07:01  -  22 Dec 2019 07:00  --------------------------------------------------------  IN: 500 mL / OUT: 925 mL / NET: -425 mL    22 Dec 2019 07:01  -  22 Dec 2019 12:34  --------------------------------------------------------  IN: 0 mL / OUT: 775 mL / NET: -775 mL          Physical Exam:   GENERAL: NAD, well-groomed, well-developed  HEENT: CHIO/   Atraumatic, Normocephalic  ENMT: No tonsillar erythema, exudates, or enlargement; Moist mucous membranes, Good dentition, No lesions  NECK: Supple, No JVD, Normal thyroid  CHEST/LUNG: Miniaml bibasilar crackles+  CVS: Regular rate and rhythm; No murmurs, rubs, or gallops  GI: : Soft, Nontender, Nondistended; Bowel sounds present  NERVOUS SYSTEM:  Alert & Oriented X3  EXTREMITIES:  2+ Peripheral Pulses, No clubbing, cyanosis, or edema  LYMPH: No lymphadenopathy noted  SKIN: No rashes or lesions  ENDOCRINOLOGY: No Thyromegaly  PSYCH: Appropriate    Labs:                              9.2    6.62  )-----------( 255      ( 22 Dec 2019 06:23 )             30.0                         8.9    6.55  )-----------( 237      ( 21 Dec 2019 05:36 )             29.2                         9.5    6.28  )-----------( 244      ( 20 Dec 2019 04:37 )             29.7                         9.1    6.87  )-----------( 251      ( 19 Dec 2019 21:15 )             29.5                         7.4    5.95  )-----------( 241      ( 19 Dec 2019 05:45 )             25.1     12-22    143  |  102  |  80<H>  ----------------------------<  108<H>  3.5   |  26  |  3.13<H>  12-21    143  |  103  |  74<H>  ----------------------------<  110<H>  3.6   |  23  |  2.94<H>  12-20    145  |  106  |  70<H>  ----------------------------<  105<H>  3.7   |  23  |  2.84<H>  12-19    146<H>  |  109<H>  |  59<H>  ----------------------------<  107<H>  3.8   |  21<L>  |  2.54<H>    Ca    9.5      22 Dec 2019 06:23  Ca    9.1      21 Dec 2019 05:36  Phos  5.1     12-22  Phos  5.7     12-21  Mg     2.1     12-22  Mg     2.4     12-21    TPro  6.3  /  Alb  3.5  /  TBili  0.4  /  DBili  x   /  AST  16  /  ALT  17  /  AlkPhos  80  12-22  TPro  6.0  /  Alb  3.3  /  TBili  0.3  /  DBili  x   /  AST  16  /  ALT  14  /  AlkPhos  82  12-21  TPro  6.2  /  Alb  3.4  /  TBili  0.5  /  DBili  x   /  AST  18  /  ALT  19  /  AlkPhos  83  12-20  TPro  6.0  /  Alb  3.2<L>  /  TBili  0.3  /  DBili  x   /  AST  19  /  ALT  17  /  AlkPhos  80  12-19    CAPILLARY BLOOD GLUCOSE      POCT Blood Glucose.: 193 mg/dL (22 Dec 2019 12:15)  POCT Blood Glucose.: 112 mg/dL (22 Dec 2019 08:46)  POCT Blood Glucose.: 169 mg/dL (21 Dec 2019 21:41)  POCT Blood Glucose.: 110 mg/dL (21 Dec 2019 17:11)  POCT Blood Glucose.: 169 mg/dL (21 Dec 2019 13:16)      LIVER FUNCTIONS - ( 22 Dec 2019 06:23 )  Alb: 3.5 g/dL / Pro: 6.3 g/dL / ALK PHOS: 80 u/L / ALT: 17 u/L / AST: 16 u/L / GGT: x               Procalcitonin, Serum: 0.19 ng/mL (12-19 @ 05:45)        RECENT CULTURES:  12-18 @ 04:22 URINE MIDSTREAM         < from: US Duplex Venous Lower Ext Complete, Bilateral (12.18.19 @ 10:24) >    TECHNIQUE: Duplex sonography of the BILATERAL LOWER extremity veins with   color and spectral Doppler, with and without compression.      FINDINGS:    There is normal compressibility of the bilateral common femoral, femoral   and popliteal veins.     Doppler examination shows normal spontaneous and phasic flow.    No calf vein thrombosis is detected.    IMPRESSION:     No evidence of deep venous thrombosis in either lower extremity.      < from: CT Chest No Cont (12.18.19 @ 04:43) >    IMPRESSION:     Small bilateral pleural effusions, right greater than left.    Distal impacted airways within the right middle lobe and lingula,   progressed from prior imaging in December 2015.    Nonspecific groundglass opacities within the right upper lobe and right   lower lobe may be infectious.                      LUCIAN MARIA M.D., ATTENDING RADIOLOGIST  This document has been electronically signed. Dec 18 2019  9:38AM        < end of copied text >              STACIE SAM M.D., ATTENDING RADIOLOGIST  This document has been electronically signed. Dec 18 2019  1:35PM        < end of copied text >                 RESPIRATORY CULTURES:          Studies  Chest X-RAY  CT SCAN Chest   Venous Dopplers: LE:   CT Abdomen  Others

## 2019-12-22 NOTE — PROGRESS NOTE ADULT - PROBLEM SELECTOR PLAN 1
Acute systolic CHF (worsen LV function)   Cardiology eval Appreciated  IV Lasix 40 mg BID to QD, Fall/aspiration precaution, Cardiology F/U,   CT Chest No Contrast: distal impacted airways within the right middle lobe and lingula. procalcitonin is low. observe off abx, pulm eval appreciated  PT Consult, Daily Weight  TTE with worse systolic function, though hard to visualize, suspected severe AS  card on the case. TAVR or any intervention may be high risk  c/w gentle diuresis given he is preload dependent per card.  monitor Cr

## 2019-12-22 NOTE — PROGRESS NOTE ADULT - SUBJECTIVE AND OBJECTIVE BOX
Patient is a 89y old  Male who presents with a chief complaint of SOB, GARCIA, R/O acute CHF, (22 Dec 2019 12:33)      SUBJECTIVE / OVERNIGHT EVENTS:  feels well  breathing much better  on IV Lasix  tele no events  no cp, no sob, no n/v/d. no abdominal pain.  no headache, no dizziness.       Vital Signs Last 24 Hrs  T(C): 37.2 (22 Dec 2019 12:23), Max: 37.2 (22 Dec 2019 12:23)  T(F): 98.9 (22 Dec 2019 12:23), Max: 98.9 (22 Dec 2019 12:23)  HR: 72 (22 Dec 2019 14:01) (63 - 75)  BP: 127/57 (22 Dec 2019 14:01) (113/47 - 140/62)  BP(mean): --  RR: 17 (22 Dec 2019 12:23) (16 - 17)  SpO2: 95% (22 Dec 2019 12:23) (94% - 95%)  I&O's Summary    21 Dec 2019 07:01  -  22 Dec 2019 07:00  --------------------------------------------------------  IN: 500 mL / OUT: 925 mL / NET: -425 mL    22 Dec 2019 07:01  -  22 Dec 2019 14:32  --------------------------------------------------------  IN: 480 mL / OUT: 775 mL / NET: -295 mL        PHYSICAL EXAM:  GENERAL: NAD, Comfortable, on nasal canula, sitting up eating breakfast  HEAD:  Atraumatic, Normocephalic  EYES: EOMI, PERRLA, conjunctiva and sclera clear  NECK: Supple, No JVD  CHEST/LUNG: mild decrease breath sounds bilaterally; No wheeze   HEART: Regular rate and rhythm; No murmurs, rubs, or gallops  ABDOMEN: Soft, Nontender, Nondistended; Bowel sounds present  Neuro: AAO x 3, no focal deficit, 5/5 b/l extremities  EXTREMITIES:  2+ Peripheral Pulses, No clubbing, cyanosis, improved edema  SKIN: No rashes or lesions      LABS:                        9.2    6.62  )-----------( 255      ( 22 Dec 2019 06:23 )             30.0     12-22    143  |  102  |  80<H>  ----------------------------<  108<H>  3.5   |  26  |  3.13<H>    Ca    9.5      22 Dec 2019 06:23  Phos  5.1     12-22  Mg     2.1     12-22    TPro  6.3  /  Alb  3.5  /  TBili  0.4  /  DBili  x   /  AST  16  /  ALT  17  /  AlkPhos  80  12-22      CAPILLARY BLOOD GLUCOSE      POCT Blood Glucose.: 193 mg/dL (22 Dec 2019 12:15)  POCT Blood Glucose.: 112 mg/dL (22 Dec 2019 08:46)  POCT Blood Glucose.: 169 mg/dL (21 Dec 2019 21:41)  POCT Blood Glucose.: 110 mg/dL (21 Dec 2019 17:11)            RADIOLOGY & ADDITIONAL TESTS:    Imaging Personally Reviewed:  [x] YES  [ ] NO    Consultant(s) Notes Reviewed:  [x] YES  [ ] NO      MEDICATIONS  (STANDING):  allopurinol 100 milliGRAM(s) Oral daily  amLODIPine   Tablet 5 milliGRAM(s) Oral daily  atorvastatin 40 milliGRAM(s) Oral at bedtime  brinzolamide 1% Ophthalmic Suspension 1 Drop(s) Both EYES two times a day  calcium acetate 667 milliGRAM(s) Oral two times a day with meals  cholecalciferol 1000 Unit(s) Oral daily  dextrose 5%. 1000 milliLiter(s) (50 mL/Hr) IV Continuous <Continuous>  dextrose 50% Injectable 12.5 Gram(s) IV Push once  dextrose 50% Injectable 25 Gram(s) IV Push once  dextrose 50% Injectable 25 Gram(s) IV Push once  ferrous    sulfate 325 milliGRAM(s) Oral daily  furosemide   Injectable 40 milliGRAM(s) IV Push daily  heparin  Injectable 5000 Unit(s) SubCutaneous every 8 hours  hydrALAZINE 25 milliGRAM(s) Oral three times a day  insulin lispro (HumaLOG) corrective regimen sliding scale   SubCutaneous Before meals and at bedtime  senna 2 Tablet(s) Oral at bedtime    MEDICATIONS  (PRN):  bisacodyl Suppository 10 milliGRAM(s) Rectal daily PRN Constipation  dextrose 40% Gel 15 Gram(s) Oral once PRN Blood Glucose LESS THAN 70 milliGRAM(s)/deciliter  glucagon  Injectable 1 milliGRAM(s) IntraMuscular once PRN Glucose LESS THAN 70 milligrams/deciliter  polyethylene glycol 3350 17 Gram(s) Oral daily PRN Constipation      Care Discussed with Consultants/Other Providers [x] YES  [ ] NO    HEALTH ISSUES - PROBLEM Dx:  Anemia: Anemia  Glaucoma: Glaucoma  Preventive measure: Preventive measure  Hypertension: Hypertension  Chronic kidney disease (CKD): Chronic kidney disease (CKD)  DM type 2 (diabetes mellitus, type 2): DM type 2 (diabetes mellitus, type 2)  Aortic stenosis: Aortic stenosis  Leg edema: Leg edema  CAD (coronary artery disease): CAD (coronary artery disease)  SOB (shortness of breath): SOB (shortness of breath)  Suspected deep vein thrombosis (DVT)

## 2019-12-23 LAB
ALBUMIN SERPL ELPH-MCNC: 3.7 G/DL — SIGNIFICANT CHANGE UP (ref 3.3–5)
ALP SERPL-CCNC: 82 U/L — SIGNIFICANT CHANGE UP (ref 40–120)
ALT FLD-CCNC: 15 U/L — SIGNIFICANT CHANGE UP (ref 4–41)
ANION GAP SERPL CALC-SCNC: 14 MMO/L — SIGNIFICANT CHANGE UP (ref 7–14)
AST SERPL-CCNC: 19 U/L — SIGNIFICANT CHANGE UP (ref 4–40)
BASOPHILS # BLD AUTO: 0.04 K/UL — SIGNIFICANT CHANGE UP (ref 0–0.2)
BASOPHILS NFR BLD AUTO: 0.5 % — SIGNIFICANT CHANGE UP (ref 0–2)
BILIRUB SERPL-MCNC: 0.5 MG/DL — SIGNIFICANT CHANGE UP (ref 0.2–1.2)
BUN SERPL-MCNC: 86 MG/DL — HIGH (ref 7–23)
CALCIUM SERPL-MCNC: 9.5 MG/DL — SIGNIFICANT CHANGE UP (ref 8.4–10.5)
CHLORIDE SERPL-SCNC: 101 MMOL/L — SIGNIFICANT CHANGE UP (ref 98–107)
CO2 SERPL-SCNC: 27 MMOL/L — SIGNIFICANT CHANGE UP (ref 22–31)
CREAT SERPL-MCNC: 3.28 MG/DL — HIGH (ref 0.5–1.3)
EOSINOPHIL # BLD AUTO: 0.22 K/UL — SIGNIFICANT CHANGE UP (ref 0–0.5)
EOSINOPHIL NFR BLD AUTO: 3 % — SIGNIFICANT CHANGE UP (ref 0–6)
GLUCOSE BLDC GLUCOMTR-MCNC: 107 MG/DL — HIGH (ref 70–99)
GLUCOSE BLDC GLUCOMTR-MCNC: 118 MG/DL — HIGH (ref 70–99)
GLUCOSE BLDC GLUCOMTR-MCNC: 151 MG/DL — HIGH (ref 70–99)
GLUCOSE BLDC GLUCOMTR-MCNC: 181 MG/DL — HIGH (ref 70–99)
GLUCOSE SERPL-MCNC: 115 MG/DL — HIGH (ref 70–99)
HCT VFR BLD CALC: 31.6 % — LOW (ref 39–50)
HGB BLD-MCNC: 9.8 G/DL — LOW (ref 13–17)
IMM GRANULOCYTES NFR BLD AUTO: 0.4 % — SIGNIFICANT CHANGE UP (ref 0–1.5)
LYMPHOCYTES # BLD AUTO: 0.98 K/UL — LOW (ref 1–3.3)
LYMPHOCYTES # BLD AUTO: 13.3 % — SIGNIFICANT CHANGE UP (ref 13–44)
MAGNESIUM SERPL-MCNC: 2.2 MG/DL — SIGNIFICANT CHANGE UP (ref 1.6–2.6)
MCHC RBC-ENTMCNC: 31 % — LOW (ref 32–36)
MCHC RBC-ENTMCNC: 31.6 PG — SIGNIFICANT CHANGE UP (ref 27–34)
MCV RBC AUTO: 101.9 FL — HIGH (ref 80–100)
MONOCYTES # BLD AUTO: 0.93 K/UL — HIGH (ref 0–0.9)
MONOCYTES NFR BLD AUTO: 12.7 % — SIGNIFICANT CHANGE UP (ref 2–14)
NEUTROPHILS # BLD AUTO: 5.15 K/UL — SIGNIFICANT CHANGE UP (ref 1.8–7.4)
NEUTROPHILS NFR BLD AUTO: 70.1 % — SIGNIFICANT CHANGE UP (ref 43–77)
NRBC # FLD: 0 K/UL — SIGNIFICANT CHANGE UP (ref 0–0)
PHOSPHATE SERPL-MCNC: 5.2 MG/DL — HIGH (ref 2.5–4.5)
PLATELET # BLD AUTO: 257 K/UL — SIGNIFICANT CHANGE UP (ref 150–400)
PMV BLD: 11.8 FL — SIGNIFICANT CHANGE UP (ref 7–13)
POTASSIUM SERPL-MCNC: 3.5 MMOL/L — SIGNIFICANT CHANGE UP (ref 3.5–5.3)
POTASSIUM SERPL-SCNC: 3.5 MMOL/L — SIGNIFICANT CHANGE UP (ref 3.5–5.3)
PROT SERPL-MCNC: 6.5 G/DL — SIGNIFICANT CHANGE UP (ref 6–8.3)
RBC # BLD: 3.1 M/UL — LOW (ref 4.2–5.8)
RBC # FLD: 17.4 % — HIGH (ref 10.3–14.5)
SODIUM SERPL-SCNC: 142 MMOL/L — SIGNIFICANT CHANGE UP (ref 135–145)
WBC # BLD: 7.35 K/UL — SIGNIFICANT CHANGE UP (ref 3.8–10.5)
WBC # FLD AUTO: 7.35 K/UL — SIGNIFICANT CHANGE UP (ref 3.8–10.5)

## 2019-12-23 PROCEDURE — 99232 SBSQ HOSP IP/OBS MODERATE 35: CPT | Mod: GC

## 2019-12-23 RX ORDER — SODIUM CHLORIDE 9 MG/ML
500 INJECTION INTRAMUSCULAR; INTRAVENOUS; SUBCUTANEOUS
Refills: 0 | Status: DISCONTINUED | OUTPATIENT
Start: 2019-12-23 | End: 2019-12-24

## 2019-12-23 RX ADMIN — HEPARIN SODIUM 5000 UNIT(S): 5000 INJECTION INTRAVENOUS; SUBCUTANEOUS at 05:13

## 2019-12-23 RX ADMIN — BRINZOLAMIDE 1 DROP(S): 10 SUSPENSION/ DROPS OPHTHALMIC at 17:01

## 2019-12-23 RX ADMIN — Medication 25 MILLIGRAM(S): at 13:48

## 2019-12-23 RX ADMIN — Medication 25 MILLIGRAM(S): at 05:13

## 2019-12-23 RX ADMIN — Medication 1000 UNIT(S): at 13:48

## 2019-12-23 RX ADMIN — Medication 12.5 MILLIGRAM(S): at 17:00

## 2019-12-23 RX ADMIN — Medication 667 MILLIGRAM(S): at 17:00

## 2019-12-23 RX ADMIN — HEPARIN SODIUM 5000 UNIT(S): 5000 INJECTION INTRAVENOUS; SUBCUTANEOUS at 13:48

## 2019-12-23 RX ADMIN — SODIUM CHLORIDE 50 MILLILITER(S): 9 INJECTION INTRAMUSCULAR; INTRAVENOUS; SUBCUTANEOUS at 13:56

## 2019-12-23 RX ADMIN — Medication 100 MILLIGRAM(S): at 13:48

## 2019-12-23 RX ADMIN — Medication 667 MILLIGRAM(S): at 09:22

## 2019-12-23 RX ADMIN — Medication 40 MILLIGRAM(S): at 05:13

## 2019-12-23 RX ADMIN — BRINZOLAMIDE 1 DROP(S): 10 SUSPENSION/ DROPS OPHTHALMIC at 05:13

## 2019-12-23 RX ADMIN — AMLODIPINE BESYLATE 5 MILLIGRAM(S): 2.5 TABLET ORAL at 05:13

## 2019-12-23 RX ADMIN — Medication 12.5 MILLIGRAM(S): at 05:13

## 2019-12-23 RX ADMIN — Medication 325 MILLIGRAM(S): at 13:48

## 2019-12-23 NOTE — PROGRESS NOTE ADULT - PROBLEM SELECTOR PLAN 1
Acute systolic CHF (worsen LV function)   Cardiology eval Appreciated  s/p IV Lasix 40 mg QD, Cr rising. switch to PO Lasix 40 mg qdaily per cardiology  CT Chest No Contrast: distal impacted airways within the right middle lobe and lingula. procalcitonin is low. observe off abx, pulm eval appreciated  PT Consult, Daily Weight  TTE with worse systolic function, though hard to visualize, suspected severe AS  card on the case. TAVR or any intervention may be high risk per card  c/w gentle diuresis given he is preload dependent  monitor Cr  Plan of TAVR inpt vs. outpt.

## 2019-12-23 NOTE — PROGRESS NOTE ADULT - ATTENDING COMMENTS
The patient's care was discussed with the consulting cardiology fellow.  I independently evaluated the patient.    I agree with the assessment and recommendations noted above. The patient's clinical situation is complicated, and the risks of further work-up and eligibility for TAVR should be discussed with the valve team prior to pursuing additional testing that may worsen the patient's renal function. He is currently comfortable-appearing and in no acute distress.     If there are concerns regarding the patient's use of diuretics and renal function, they can be held temporarily and adjusted. I would be cautious regarding giving the patient fluid back. I would suspect that this patient will require some amount of maintenance diuretics and that they should not be indefinitely discontinued.    Christoph Reinoso MD  Cardiology

## 2019-12-23 NOTE — PROGRESS NOTE ADULT - ASSESSMENT
89 y/o Male from [facility], with pmhx of CAD, COPD, HTN, Prostate CA, S/P Radiation therapy years ago, CKD,  Anemia, DM on No Meds, GOUT, Glaucoma, Aortic Stenosis, Osteoporosis , Legs edema, A+O x 4, presents with c/o dyspnea upon exertion x 3-5 days.

## 2019-12-23 NOTE — PROGRESS NOTE ADULT - SUBJECTIVE AND OBJECTIVE BOX
Interval History: Still with mild dyspnea, no other complaints.     Review Of Systems:  Constitutional: [ ] Fever [ ] Chills [ ] Fatigue [ ] Weight change   HEENT: [ ] Blurred vision [ ] Eye Pain [ ] Headache [ ] Runny nose [ ] Sore Throat   Respiratory: [ ] Cough [ ] Wheezing [x] Shortness of breath  Cardiovascular: [ ] Chest Pain [ ] Palpitations [ ] GARCIA [ ] PND [ ] Orthopnea  Gastrointestinal: [ ] Abdominal Pain [ ] Diarrhea [ ] Constipation [ ] Hemorrhoids [ ] Nausea [ ] Vomiting  Genitourinary: [ ] Nocturia [ ] Dysuria [ ] Incontinence  Extremities: [ ] Swelling [ ] Joint Pain  Neurologic: [ ] Focal deficit [ ] Paresthesias [ ] Syncope  Lymphatic: [ ] Swelling [ ] Lymphadenopathy   Skin: [ ] Rash [ ] Ecchymoses [ ] Wounds [ ] Lesions  Psychiatry: [ ] Depression [ ] Suicidal/Homicidal Ideation [ ] Anxiety [ ] Sleep Disturbances  [x] 10 point review of systems is otherwise negative except as mentioned above    Medications:  allopurinol 100 milliGRAM(s) Oral daily  amLODIPine   Tablet 5 milliGRAM(s) Oral daily  atorvastatin 40 milliGRAM(s) Oral at bedtime  bisacodyl Suppository 10 milliGRAM(s) Rectal daily PRN  brinzolamide 1% Ophthalmic Suspension 1 Drop(s) Both EYES two times a day  calcium acetate 667 milliGRAM(s) Oral two times a day with meals  cholecalciferol 1000 Unit(s) Oral daily  dextrose 40% Gel 15 Gram(s) Oral once PRN  dextrose 5%. 1000 milliLiter(s) IV Continuous <Continuous>  dextrose 50% Injectable 12.5 Gram(s) IV Push once  dextrose 50% Injectable 25 Gram(s) IV Push once  dextrose 50% Injectable 25 Gram(s) IV Push once  ferrous    sulfate 325 milliGRAM(s) Oral daily  glucagon  Injectable 1 milliGRAM(s) IntraMuscular once PRN  heparin  Injectable 5000 Unit(s) SubCutaneous every 8 hours  hydrALAZINE 25 milliGRAM(s) Oral three times a day  insulin lispro (HumaLOG) corrective regimen sliding scale   SubCutaneous Before meals and at bedtime  metoprolol tartrate 12.5 milliGRAM(s) Oral two times a day  polyethylene glycol 3350 17 Gram(s) Oral daily PRN  senna 2 Tablet(s) Oral at bedtime  sodium chloride 0.9%. 500 milliLiter(s) IV Continuous <Continuous>      Vitals:  ICU Vital Signs Last 24 Hrs  T(C): 36.6 (23 Dec 2019 11:45), Max: 37.1 (22 Dec 2019 22:31)  T(F): 97.8 (23 Dec 2019 11:45), Max: 98.7 (22 Dec 2019 22:31)  HR: 85 (23 Dec 2019 13:44) (62 - 85)  BP: 135/68 (23 Dec 2019 13:44) (117/49 - 137/70)  BP(mean): --  ABP: --  ABP(mean): --  RR: 16 (23 Dec 2019 11:45) (16 - 16)  SpO2: 96% (23 Dec 2019 11:45) (96% - 98%)    Daily     Daily Weight in k.2 (23 Dec 2019 08:26)  I&O's Summary    22 Dec 2019 07:01  -  23 Dec 2019 07:00  --------------------------------------------------------  IN: 1040 mL / OUT: 1875 mL / NET: -835 mL    23 Dec 2019 07:01  -  23 Dec 2019 14:03  --------------------------------------------------------  IN: 600 mL / OUT: 475 mL / NET: 125 mL    Physical Exam:  Appearance:  NAD, wearing NC, no increased work of breathing  HENT: NC/AT  Cardiovascular: Normal S1, soft S2, regular rate and rhythm, III/VI decrescendo murmur heard over upper sternal border, 1+ LE Edema Present bilaterally  Respiratory: Crackles over RLL  Gastrointestinal: Soft  Psychiatry: [x] AAOx3  [x] Follows Commands  Skin: Intact    Labs:                        9.8    7.35  )-----------( 257      ( 23 Dec 2019 06:08 )             31.6     12-    142  |  101  |  86<H>  ----------------------------<  115<H>  3.5   |  27  |  3.28<H>    Ca    9.5      23 Dec 2019 06:08  Phos  5.2       Mg     2.2         TPro  6.5  /  Alb  3.7  /  TBili  0.5  /  DBili  x   /  AST  19  /  ALT  15  /  AlkPhos  82        Serum Pro-Brain Natriuretic Peptide: 84772 pg/mL ( @ 02:45)  Serum Pro-Brain Natriuretic Peptide: 51368 pg/mL ( @ 23:10)      Echo:  < from: TTE with Doppler (w/Cont) (19 @ 16:51) >  CONCLUSIONS:  1. Mitral annular calcification, otherwise normal mitral  valve. Mild mitral regurgitation.  2. Aortic valve leaflet morphology not well visualized.  The valve is calcified. Peak transaortic valve gradient  equals 32 mm Hg, mean transaortic valve gradient equals 18  mm Hg.  In the setting of severe LV systolic dysfunction,  these gradients may reflect the presence of significant  aortic stenosis.  However, unable to accurately estimate  the aortic valve area. Mild aortic regurgitation.  3. Severe global left ventricular systolic dysfunction.  Endocardial visualization enhanced with intravenous  injection of echo contrast (Definity).  No LV thrombus  seen.  4. The right ventricle is not well visualized; grossly  normal right ventricular systolic function.    < end of copied text >      Interpretation of Telemetry: sinus with 1st degree AV block, NSVT

## 2019-12-23 NOTE — PROGRESS NOTE ADULT - ASSESSMENT
89 y/o Male from [facility] , with pmhx of CAD, COPD, HTN, Prostate CA, S/P Radiation therapy years ago, CKD,  Anemia, DM on No Meds, GOUT, Glaucoma, Aortic Stenosis , Osteoporosis , Legs edema, A+O x 4, presents with c/o dyspnea    CLIFFORD  scr worsening  possible sec to diuresis in setting of severe AS  hold diuretic, give gentle hydration with NS @ 50cc/hr x 10 hrs  check UA, urine cr, urea  check bladder scan to r/o obstruction  monitor bmp  renal function worsen, has plan for possible angiogram. would hold angiogram until renal function stabilize     Renal failure  likely CKD stage 4 follow up with a nephrologist   baseline unclear likely ~ 2.5  Monitor Renal function at present  avoid nephrotoxic agents, NSAIDs  monitor bmp    Anemia  iron study done iron sat 7%   likely iron def  r/o gib  continue iron supplement  transfuse to keep Hb>8    Hypervolemia  better  lasix dose decreased  monitor i/o daily weight  f/u cardio    ckd-mbd  pth optimal for ckd  elevated phos continue phoslo  on daily vit d  monitor phos and calcium daily  low phos diet    Proteinuria  urine p/c ratio 599mg/day  likely sec to htn  monitor    HTN  controlled  monitor 89 y/o Male from [facility] , with pmhx of CAD, COPD, HTN, Prostate CA, S/P Radiation therapy years ago, CKD,  Anemia, DM on No Meds, GOUT, Glaucoma, Aortic Stenosis , Osteoporosis , Legs edema, A+O x 4, presents with c/o dyspnea    CLIFFORD  scr worsening  possible sec to diuresis in setting of severe AS  hold diuretic, give gentle hydration with NS @ 50cc/hr x 10 hrs  check UA, urine cr, urea  check bladder scan to r/o obstruction  monitor bmp  renal function worsen, has plan for possible angiogram. would hold angiogram until renal function stabilize     Renal failure  likely CKD stage 4 follow up with a nephrologist   baseline unclear likely ~ 2.5  Monitor Renal function at present  avoid nephrotoxic agents, NSAIDs  monitor bmp    Anemia  iron study done iron sat 7%   likely iron def  r/o gib  continue iron supplement  transfuse to keep Hb>8    Hypervolemia  better  lasix dose decreased  monitor i/o daily weight  f/u cardio    ckd-mbd  pth optimal for ckd  elevated phos continue phoslo  on daily vit d  monitor phos and calcium daily  low phos diet    Proteinuria  urine p/c ratio 599mg/day  likely sec to htn  monitor    HTN  controlled  monitor

## 2019-12-23 NOTE — PROGRESS NOTE ADULT - SUBJECTIVE AND OBJECTIVE BOX
Patient is a 89y old  Male who presents with a chief complaint of SOB, GARCIA, R/O acute CHF, (22 Dec 2019 15:39)      SUBJECTIVE / OVERNIGHT EVENTS:  breathing is better  overwhelmed  his wife passed away a few days ago,  today  cardio team at bedside.  Pt upset. unclear what to do in regards to TAVR  family will be here in the evening.  no cp, no sob, no n/v/d. no abdominal pain.  no headache, no dizziness.       Vital Signs Last 24 Hrs  T(C): 37.1 (23 Dec 2019 05:09), Max: 37.2 (22 Dec 2019 12:23)  T(F): 98.7 (23 Dec 2019 05:09), Max: 98.9 (22 Dec 2019 12:23)  HR: 75 (23 Dec 2019 05:09) (63 - 78)  BP: 124/58 (23 Dec 2019 05:09) (124/58 - 137/70)  BP(mean): --  RR: 16 (23 Dec 2019 05:09) (16 - 17)  SpO2: 98% (23 Dec 2019 05:09) (95% - 98%)  I&O's Summary    22 Dec 2019 07:01  -  23 Dec 2019 07:00  --------------------------------------------------------  IN: 1040 mL / OUT: 1875 mL / NET: -835 mL    23 Dec 2019 07:01  -  23 Dec 2019 10:27  --------------------------------------------------------  IN: 360 mL / OUT: 475 mL / NET: -115 mL        PHYSICAL EXAM:  GENERAL: NAD, Comfortable, on room air  HEAD:  Atraumatic, Normocephalic  EYES: EOMI, PERRLA, conjunctiva and sclera clear  NECK: Supple, No JVD  CHEST/LUNG: mild decrease breath sounds bilaterally; No wheeze   HEART: Regular rate and rhythm; No murmurs, rubs, or gallops  ABDOMEN: Soft, Nontender, Nondistended; Bowel sounds present  Neuro: AAO x 3, no focal deficit, 5/5 b/l extremities  EXTREMITIES:  2+ Peripheral Pulses, No clubbing, cyanosis, improved edema  SKIN: No rashes or lesions      LABS:                        9.8    7.35  )-----------( 257      ( 23 Dec 2019 06:08 )             31.6         142  |  101  |  86<H>  ----------------------------<  115<H>  3.5   |  27  |  3.28<H>    Ca    9.5      23 Dec 2019 06:08  Phos  5.2       Mg     2.2         TPro  6.5  /  Alb  3.7  /  TBili  0.5  /  DBili  x   /  AST  19  /  ALT  15  /  AlkPhos  82        CAPILLARY BLOOD GLUCOSE      POCT Blood Glucose.: 107 mg/dL (23 Dec 2019 08:19)  POCT Blood Glucose.: 131 mg/dL (22 Dec 2019 22:37)  POCT Blood Glucose.: 108 mg/dL (22 Dec 2019 17:22)  POCT Blood Glucose.: 193 mg/dL (22 Dec 2019 12:15)            RADIOLOGY & ADDITIONAL TESTS:    Imaging Personally Reviewed:  [x] YES  [ ] NO    Consultant(s) Notes Reviewed:  [x] YES  [ ] NO      MEDICATIONS  (STANDING):  allopurinol 100 milliGRAM(s) Oral daily  amLODIPine   Tablet 5 milliGRAM(s) Oral daily  atorvastatin 40 milliGRAM(s) Oral at bedtime  brinzolamide 1% Ophthalmic Suspension 1 Drop(s) Both EYES two times a day  calcium acetate 667 milliGRAM(s) Oral two times a day with meals  cholecalciferol 1000 Unit(s) Oral daily  dextrose 5%. 1000 milliLiter(s) (50 mL/Hr) IV Continuous <Continuous>  dextrose 50% Injectable 12.5 Gram(s) IV Push once  dextrose 50% Injectable 25 Gram(s) IV Push once  dextrose 50% Injectable 25 Gram(s) IV Push once  ferrous    sulfate 325 milliGRAM(s) Oral daily  heparin  Injectable 5000 Unit(s) SubCutaneous every 8 hours  hydrALAZINE 25 milliGRAM(s) Oral three times a day  insulin lispro (HumaLOG) corrective regimen sliding scale   SubCutaneous Before meals and at bedtime  metoprolol tartrate 12.5 milliGRAM(s) Oral two times a day  senna 2 Tablet(s) Oral at bedtime    MEDICATIONS  (PRN):  bisacodyl Suppository 10 milliGRAM(s) Rectal daily PRN Constipation  dextrose 40% Gel 15 Gram(s) Oral once PRN Blood Glucose LESS THAN 70 milliGRAM(s)/deciliter  glucagon  Injectable 1 milliGRAM(s) IntraMuscular once PRN Glucose LESS THAN 70 milligrams/deciliter  polyethylene glycol 3350 17 Gram(s) Oral daily PRN Constipation      Care Discussed with Consultants/Other Providers [x] YES  [ ] NO    HEALTH ISSUES - PROBLEM Dx:  Anemia: Anemia  Glaucoma: Glaucoma  Preventive measure: Preventive measure  Hypertension: Hypertension  Chronic kidney disease (CKD): Chronic kidney disease (CKD)  DM type 2 (diabetes mellitus, type 2): DM type 2 (diabetes mellitus, type 2)  Aortic stenosis: Aortic stenosis  Leg edema: Leg edema  CAD (coronary artery disease): CAD (coronary artery disease)  SOB (shortness of breath): SOB (shortness of breath)  Suspected deep vein thrombosis (DVT)

## 2019-12-23 NOTE — PROGRESS NOTE ADULT - ATTENDING COMMENTS
Monitor off antibiotics  12/21: stable without antibiotics: LV functions is pretty poor:  12/22: seesm to be doing better:  12/23; doing OK: no SOB :

## 2019-12-23 NOTE — PROGRESS NOTE ADULT - SUBJECTIVE AND OBJECTIVE BOX
[facility]   MD WALLACE DAHL, DO YULIANA GODFREY, HECTOR JAMES    TEL:  OFFICE: [phone]  DR SCOTT CELL: [phone]  DR. MAURICE CELL: [phone]  DR. GODFREY CELL: [phone]  DEJUAN MOREAU CELL: [phone]        Patient is a 89y old  Male who presents with a chief complaint of SOB, GARCIA, R/O acute CHF, (23 Dec 2019 12:56)      Patient seen and examined at bedside. No chest pain/sob    VITALS:  T(F): 97.8 (12-23-19 @ 11:45), Max: 98.7 (12-22-19 @ 22:31)  HR: 62 (12-23-19 @ 11:45)  BP: 117/49 (12-23-19 @ 11:45)  RR: 16 (12-23-19 @ 11:45)  SpO2: 96% (12-23-19 @ 11:45)  Wt(kg): --    12-22 @ 07:01  -  12-23 @ 07:00  --------------------------------------------------------  IN: 1040 mL / OUT: 1875 mL / NET: -835 mL    12-23 @ 07:01  -  12-23 @ 13:09  --------------------------------------------------------  IN: 360 mL / OUT: 475 mL / NET: -115 mL          PHYSICAL EXAM:  Constitutional: NAD  Neck: No JVD  Respiratory: CTAB, no wheezes, rales or rhonchi  Cardiovascular: S1, S2, RRR  Gastrointestinal: BS+, soft, NT/ND  Extremities: No peripheral edema    Hospital Medications:   MEDICATIONS  (STANDING):  allopurinol 100 milliGRAM(s) Oral daily  amLODIPine   Tablet 5 milliGRAM(s) Oral daily  atorvastatin 40 milliGRAM(s) Oral at bedtime  brinzolamide 1% Ophthalmic Suspension 1 Drop(s) Both EYES two times a day  calcium acetate 667 milliGRAM(s) Oral two times a day with meals  cholecalciferol 1000 Unit(s) Oral daily  dextrose 5%. 1000 milliLiter(s) (50 mL/Hr) IV Continuous <Continuous>  dextrose 50% Injectable 12.5 Gram(s) IV Push once  dextrose 50% Injectable 25 Gram(s) IV Push once  dextrose 50% Injectable 25 Gram(s) IV Push once  ferrous    sulfate 325 milliGRAM(s) Oral daily  heparin  Injectable 5000 Unit(s) SubCutaneous every 8 hours  hydrALAZINE 25 milliGRAM(s) Oral three times a day  insulin lispro (HumaLOG) corrective regimen sliding scale   SubCutaneous Before meals and at bedtime  metoprolol tartrate 12.5 milliGRAM(s) Oral two times a day  senna 2 Tablet(s) Oral at bedtime      LABS:  12-23    142  |  101  |  86<H>  ----------------------------<  115<H>  3.5   |  27  |  3.28<H>    Ca    9.5      23 Dec 2019 06:08  Phos  5.2     12-23  Mg     2.2     12-23    TPro  6.5  /  Alb  3.7  /  TBili  0.5  /  DBili      /  AST  19  /  ALT  15  /  AlkPhos  82  12-23    Creatinine Trend: 3.28 <--, 3.13 <--, 2.94 <--, 2.84 <--, 2.54 <--, 2.54 <--, 2.52 <--, 2.57 <--    Phosphorus Level, Serum: 5.2 mg/dL (12-23 @ 06:08)  Albumin, Serum: 3.7 g/dL (12-23 @ 06:08)                              9.8    7.35  )-----------( 257      ( 23 Dec 2019 06:08 )             31.6     Urine Studies:  Urinalysis - [12-18-19 @ 02:46]      Color LT. YELLOW / Appearance CLEAR / SG 1.015 / pH 6.0      Gluc NEGATIVE / Ketone NEGATIVE  / Bili NEGATIVE / Urobili NORMAL       Blood TRACE / Protein 70 / Leuk Est NEGATIVE / Nitrite NEGATIVE      RBC 0-2 / WBC  / Hyaline  / Gran  / Sq Epi  / Non Sq Epi  / Bacteria     Urine Creatinine 20.20      [12-19-19 @ 21:25]  Urine Protein 12.1      [12-19-19 @ 21:25]    Iron 19, TIBC 271, %sat --      [12-18-19 @ 02:45]  Ferritin 53.67      [12-18-19 @ 02:45]  PTH 60.31 (Ca --)      [12-20-19 @ 04:37]   --  PTH 89.27 (Ca --)      [12-18-19 @ 02:46]   --  HbA1c 5.6      [12-19-19 @ 06:00]  TSH 2.75      [12-18-19 @ 02:45]  Lipid: chol 118, TG 65, HDL 58, LDL 52      [12-18-19 @ 02:45]    HBsAg Nonreactive      [12-18-19 @ 02:46]  HCV 0.20, Nonreactive Hepatitis C AB  S/CO Ratio                        Interpretation  < 1.00                                   Non-Reactive  1.00 - 4.99                         Weakly-Reactive  >= 5.00                                Reactive  Non-Reactive: Aperson with a non-reactive HCV antibody  result is considered uninfected.  No further action is  needed unless recent infection is suspected.  In these  cases, consider repeat testing later to detect  seroconversion..  Weakly-Reactive: HCV antibody test is abnormal, HCV RNA  Qualitative test will follow.  Reactive: HCV antibody test is abnormal, HCV RNA  Qualitative test will follow.  Note: HCV antibody testing is performed on the [employer]   system.      [12-18-19 @ 02:46]      RADIOLOGY & ADDITIONAL STUDIES:

## 2019-12-23 NOTE — PROGRESS NOTE ADULT - ASSESSMENT
Patient is a 89 y/o Male from [facility] , with pmhx of CAD, COPD, HTN, Prostate CA, S/P Radiation therapy years ago, CKD,  Anemia, DM on No Meds, GOUT, Glaucoma, Aortic Stenosis , Osteoporosis , Legs edema, A+O x 4, presents with c/o dyspnea upon exertion x 3-5 days. He reports when walking or exerting himself he feels SOB, no associated chest pain, palpitations, dizziness, lightheadedness, diaphoresis, cough, URI like symptoms, No N/V, no diarrhea, no Fever, NO CP, NO HA, no Dizziness, No abdominal pain, + chronic LBP, + constipation , Last BM Yesterday No sore Throat, uses cane to Ambulate No legs pain, C/O + Orthopnea, + dysuria, Chest X ray prelim c/w B/L Pleural Effusions, BNP 57297, Troponin , pt s/p IV Lasix 40 mg x 1 for Possible Acute CHF given by ER tonight, pt is making urine, I called Cardiology consult tonight, I Ordered Venous Doppler legs: R/O DVT, CT Chest/abdomen /pelvis NO Contrast  ordered due to + B/L Pleural effusions, HX of Prostate CA, CKD, Anemia, RVP: Negative, pt was seen by House Cardiology as well,

## 2019-12-23 NOTE — PROGRESS NOTE ADULT - PROBLEM SELECTOR PLAN 1
U don't think he has pneumonia: keep off antibiotics: cont duresis  12/21: he seems to be doing much naty r: has sever global LV dysfunction  12/22: seems ot be doingbetter: off oxygen at this time: etiology of SOBis secondary to CHF  12/23: doing OK:no SOB : feels better: has some impacted airways with mucus plugging

## 2019-12-23 NOTE — PROGRESS NOTE ADULT - ATTENDING COMMENTS
card will discuss with pt and family about TAVR.  if outpt follow up, he can possibly be discharged home. His wife  is today.    - Dr. JOHN Claros ([facility])  - [phone] card will discuss with pt and family about TAVR. Currently pt very emotional and overwhelmed to make any decision. requesting our team to come back at a later time.   if outpt follow up is advised per card, he can possibly be discharged home on PO Lasix with follow up card in 1 week. His wife  is today.    - [facility]  - [phone]

## 2019-12-23 NOTE — PROGRESS NOTE ADULT - ASSESSMENT
89 yo M transferred from [facility] with PMH of COPD, HTN, prostate cancer (s/p radiation therapy), CKD, anemia, DM and gout, presenting with worsening dyspnea on exertion, admitted for decompensated heart failure. Echo consistent with probable severe AS and new severe LVSDF.    #ADHF 2/2 HFrEF  #Severe AS  - Pt with apparently new LV systolic dysfunction, which is likely a result of underlying severe AS, however, no ischemic eval done prior  - Patient remains volume overloaded on exam, Lasix held today per nephrology given worsening renal function, receiving 500cc NS IVF  - Will reassess daily need for diuresis, would be careful with giving fluid back given patient's HFrEF and AS  - Patient high risk patient for TAVR given CKD, age and comorbidties, will need multidisciplinary discussion with nephrology and family, as patient will likely require HD if plan for LHC/RHC and further workup for TAVR  - Consider palliative c/s to at least address patient's goals of care  - Continue metoprolol tartrate 12.5mg BID, hydralazine 25mg TID, ACEi/ARB on hold given CKD    #Elevated troponin  - No rise and fall in trop in pt with CKD  - Maybe some leak likely related to AS.     #HTN  - Continue home amlodipine, metoprolol and hydralazine as above  - If BP remains elevated can increase metoprolol to 25mg BID    Patient to be staffed with attending. Please await attending addendum.    Marlena Barton MD  Cardiology Fellow  [phone]  All Cardiology service information can be found 24/7 on [email or web address], password: [email or web address]

## 2019-12-23 NOTE — PROGRESS NOTE ADULT - SUBJECTIVE AND OBJECTIVE BOX
Patient is a 89y old  Male who presents with a chief complaint of SOB, GARCIA, R/O acute CHF, (23 Dec 2019 10:27)      Any change in ROS: Doing much better     MEDICATIONS  (STANDING):  allopurinol 100 milliGRAM(s) Oral daily  amLODIPine   Tablet 5 milliGRAM(s) Oral daily  atorvastatin 40 milliGRAM(s) Oral at bedtime  brinzolamide 1% Ophthalmic Suspension 1 Drop(s) Both EYES two times a day  calcium acetate 667 milliGRAM(s) Oral two times a day with meals  cholecalciferol 1000 Unit(s) Oral daily  dextrose 5%. 1000 milliLiter(s) (50 mL/Hr) IV Continuous <Continuous>  dextrose 50% Injectable 12.5 Gram(s) IV Push once  dextrose 50% Injectable 25 Gram(s) IV Push once  dextrose 50% Injectable 25 Gram(s) IV Push once  ferrous    sulfate 325 milliGRAM(s) Oral daily  heparin  Injectable 5000 Unit(s) SubCutaneous every 8 hours  hydrALAZINE 25 milliGRAM(s) Oral three times a day  insulin lispro (HumaLOG) corrective regimen sliding scale   SubCutaneous Before meals and at bedtime  metoprolol tartrate 12.5 milliGRAM(s) Oral two times a day  senna 2 Tablet(s) Oral at bedtime    MEDICATIONS  (PRN):  bisacodyl Suppository 10 milliGRAM(s) Rectal daily PRN Constipation  dextrose 40% Gel 15 Gram(s) Oral once PRN Blood Glucose LESS THAN 70 milliGRAM(s)/deciliter  glucagon  Injectable 1 milliGRAM(s) IntraMuscular once PRN Glucose LESS THAN 70 milligrams/deciliter  polyethylene glycol 3350 17 Gram(s) Oral daily PRN Constipation    Vital Signs Last 24 Hrs  T(C): 36.6 (23 Dec 2019 11:45), Max: 37.1 (22 Dec 2019 22:31)  T(F): 97.8 (23 Dec 2019 11:45), Max: 98.7 (22 Dec 2019 22:31)  HR: 62 (23 Dec 2019 11:45) (62 - 78)  BP: 117/49 (23 Dec 2019 11:45) (117/49 - 137/70)  BP(mean): --  RR: 16 (23 Dec 2019 11:45) (16 - 16)  SpO2: 96% (23 Dec 2019 11:45) (96% - 98%)    I&O's Summary    22 Dec 2019 07:01  -  23 Dec 2019 07:00  --------------------------------------------------------  IN: 1040 mL / OUT: 1875 mL / NET: -835 mL    23 Dec 2019 07:01  -  23 Dec 2019 12:56  --------------------------------------------------------  IN: 360 mL / OUT: 475 mL / NET: -115 mL          Physical Exam:   GENERAL: NAD, well-groomed, well-developed  HEENT: CHIO/   Atraumatic, Normocephalic  ENMT: No tonsillar erythema, exudates, or enlargement; Moist mucous membranes, Good dentition, No lesions  NECK: Supple, No JVD, Normal thyroid  CHEST/LUNG: Clear to auscultaion  CVS: Regular rate and rhythm; No murmurs, rubs, or gallops  GI: : Soft, Nontender, Nondistended; Bowel sounds present  NERVOUS SYSTEM:  Alert & Oriented X3  EXTREMITIES:  2+ Peripheral Pulses, No clubbing, cyanosis, or edema  LYMPH: No lymphadenopathy noted  SKIN: No rashes or lesions  ENDOCRINOLOGY: No Thyromegaly  PSYCH: Appropriate    Labs:                              9.8    7.35  )-----------( 257      ( 23 Dec 2019 06:08 )             31.6                         9.2    6.62  )-----------( 255      ( 22 Dec 2019 06:23 )             30.0                         8.9    6.55  )-----------( 237      ( 21 Dec 2019 05:36 )             29.2                         9.5    6.28  )-----------( 244      ( 20 Dec 2019 04:37 )             29.7                         9.1    6.87  )-----------( 251      ( 19 Dec 2019 21:15 )             29.5     12-23    142  |  101  |  86<H>  ----------------------------<  115<H>  3.5   |  27  |  3.28<H>  12-22    143  |  102  |  80<H>  ----------------------------<  108<H>  3.5   |  26  |  3.13<H>  12-21    143  |  103  |  74<H>  ----------------------------<  110<H>  3.6   |  23  |  2.94<H>  12-20    145  |  106  |  70<H>  ----------------------------<  105<H>  3.7   |  23  |  2.84<H>    Ca    9.5      23 Dec 2019 06:08  Ca    9.5      22 Dec 2019 06:23  Phos  5.2     12-23  Phos  5.1     12-22  Mg     2.2     12-23  Mg     2.1     12-22    TPro  6.5  /  Alb  3.7  /  TBili  0.5  /  DBili  x   /  AST  19  /  ALT  15  /  AlkPhos  82  12-23  TPro  6.3  /  Alb  3.5  /  TBili  0.4  /  DBili  x   /  AST  16  /  ALT  17  /  AlkPhos  80  12-22  TPro  6.0  /  Alb  3.3  /  TBili  0.3  /  DBili  x   /  AST  16  /  ALT  14  /  AlkPhos  82  12-21  TPro  6.2  /  Alb  3.4  /  TBili  0.5  /  DBili  x   /  AST  18  /  ALT  19  /  AlkPhos  83  12-20    CAPILLARY BLOOD GLUCOSE      POCT Blood Glucose.: 181 mg/dL (23 Dec 2019 12:30)  POCT Blood Glucose.: 107 mg/dL (23 Dec 2019 08:19)  POCT Blood Glucose.: 131 mg/dL (22 Dec 2019 22:37)  POCT Blood Glucose.: 108 mg/dL (22 Dec 2019 17:22)      LIVER FUNCTIONS - ( 23 Dec 2019 06:08 )  Alb: 3.7 g/dL / Pro: 6.5 g/dL / ALK PHOS: 82 u/L / ALT: 15 u/L / AST: 19 u/L / GGT: x             < from: US Duplex Venous Lower Ext Complete, Bilateral (12.18.19 @ 10:24) >      INTERPRETATION:  CLINICAL INFORMATION: Leg edema.    COMPARISON: None available.    TECHNIQUE: Duplex sonography of the BILATERAL LOWER extremity veins with   color and spectral Doppler, with and without compression.      FINDINGS:    There is normal compressibility of the bilateral common femoral, femoral   and popliteal veins.     Doppler examination shows normal spontaneous and phasic flow.    No calf vein thrombosis is detected.    IMPRESSION:     No evidence of deep venous thrombosis in either lower extremity.          < from: CT Chest No Cont (12.18.19 @ 04:43) >  ABDOMEN AND PELVIS:    LIVER: Within normal limits.  BILE DUCTS: Normal caliber.  GALLBLADDER: Within normal limits.  SPLEEN: Within normal limits.  PANCREAS: Within normal limits.  ADRENALS: Within normal limits.  KIDNEYS/URETERS: Bilateral renal cysts and subcentimeter hyperdense foci   likely to represent hemorrhagic cysts.    BLADDER: Within normal limits.  REPRODUCTIVE ORGANS: Prostate within normal limits.    BOWEL: Moderate hiatal hernia. No bowel obstruction. Appendix is normal.  PERITONEUM: No ascites.  VESSELS: Atherosclerotic changes.  RETROPERITONEUM/LYMPH NODES: No lymphadenopathy.    ABDOMINAL WALL: Left inguinal hernia repair. Right inguinal hernia   containing fat and nonobstructed small bowel.  BONES: Degenerative changes.    IMPRESSION:     Small bilateral pleural effusions, right greater than left.    Distal impacted airways within the right middle lobe and lingula,   progressed from prior imaging in December 2015.    Nonspecific groundglass opacities within the right upper lobe and right   lower lobe may be infectious.                      LUCIAN MARIA M.D., ATTENDING RADIOLOGIST  This document has been electronically signed. Dec 18 2019  9:38AM        < end of copied text >          STACIE SAM M.D., ATTENDING RADIOLOGIST  This document has been electronically signed. Dec 18 2019  1:35PM              < end of copied text >          RECENT CULTURES:  12-18 @ 04:22 URINE MIDSTREAM                        RESPIRATORY CULTURES:          Studies  Chest X-RAY  CT SCAN Chest   Venous Dopplers: LE:   CT Abdomen  Others

## 2019-12-24 ENCOUNTER — TRANSCRIPTION ENCOUNTER (OUTPATIENT)
Age: 84
End: 2019-12-24

## 2019-12-24 VITALS — HEART RATE: 68 BPM | DIASTOLIC BLOOD PRESSURE: 59 MMHG | SYSTOLIC BLOOD PRESSURE: 130 MMHG

## 2019-12-24 LAB
ALBUMIN SERPL ELPH-MCNC: 3.6 G/DL — SIGNIFICANT CHANGE UP (ref 3.3–5)
ALP SERPL-CCNC: 83 U/L — SIGNIFICANT CHANGE UP (ref 40–120)
ALT FLD-CCNC: 16 U/L — SIGNIFICANT CHANGE UP (ref 4–41)
ANION GAP SERPL CALC-SCNC: 15 MMO/L — HIGH (ref 7–14)
AST SERPL-CCNC: 15 U/L — SIGNIFICANT CHANGE UP (ref 4–40)
BASOPHILS # BLD AUTO: 0.05 K/UL — SIGNIFICANT CHANGE UP (ref 0–0.2)
BASOPHILS NFR BLD AUTO: 0.7 % — SIGNIFICANT CHANGE UP (ref 0–2)
BILIRUB SERPL-MCNC: 0.5 MG/DL — SIGNIFICANT CHANGE UP (ref 0.2–1.2)
BUN SERPL-MCNC: 87 MG/DL — HIGH (ref 7–23)
CALCIUM SERPL-MCNC: 9.6 MG/DL — SIGNIFICANT CHANGE UP (ref 8.4–10.5)
CHLORIDE SERPL-SCNC: 103 MMOL/L — SIGNIFICANT CHANGE UP (ref 98–107)
CO2 SERPL-SCNC: 26 MMOL/L — SIGNIFICANT CHANGE UP (ref 22–31)
CREAT SERPL-MCNC: 3.2 MG/DL — HIGH (ref 0.5–1.3)
EOSINOPHIL # BLD AUTO: 0.2 K/UL — SIGNIFICANT CHANGE UP (ref 0–0.5)
EOSINOPHIL NFR BLD AUTO: 2.9 % — SIGNIFICANT CHANGE UP (ref 0–6)
GLUCOSE BLDC GLUCOMTR-MCNC: 119 MG/DL — HIGH (ref 70–99)
GLUCOSE BLDC GLUCOMTR-MCNC: 140 MG/DL — HIGH (ref 70–99)
GLUCOSE SERPL-MCNC: 121 MG/DL — HIGH (ref 70–99)
HCT VFR BLD CALC: 31.1 % — LOW (ref 39–50)
HGB BLD-MCNC: 9.6 G/DL — LOW (ref 13–17)
IMM GRANULOCYTES NFR BLD AUTO: 0.1 % — SIGNIFICANT CHANGE UP (ref 0–1.5)
LACTATE SERPL-SCNC: 0.9 MMOL/L — SIGNIFICANT CHANGE UP (ref 0.5–2)
LYMPHOCYTES # BLD AUTO: 0.77 K/UL — LOW (ref 1–3.3)
LYMPHOCYTES # BLD AUTO: 11.1 % — LOW (ref 13–44)
MAGNESIUM SERPL-MCNC: 2.3 MG/DL — SIGNIFICANT CHANGE UP (ref 1.6–2.6)
MCHC RBC-ENTMCNC: 30.9 % — LOW (ref 32–36)
MCHC RBC-ENTMCNC: 31 PG — SIGNIFICANT CHANGE UP (ref 27–34)
MCV RBC AUTO: 100.3 FL — HIGH (ref 80–100)
MONOCYTES # BLD AUTO: 0.84 K/UL — SIGNIFICANT CHANGE UP (ref 0–0.9)
MONOCYTES NFR BLD AUTO: 12.2 % — SIGNIFICANT CHANGE UP (ref 2–14)
NEUTROPHILS # BLD AUTO: 5.04 K/UL — SIGNIFICANT CHANGE UP (ref 1.8–7.4)
NEUTROPHILS NFR BLD AUTO: 73 % — SIGNIFICANT CHANGE UP (ref 43–77)
NRBC # FLD: 0 K/UL — SIGNIFICANT CHANGE UP (ref 0–0)
PHOSPHATE SERPL-MCNC: 5.3 MG/DL — HIGH (ref 2.5–4.5)
PLATELET # BLD AUTO: 256 K/UL — SIGNIFICANT CHANGE UP (ref 150–400)
PMV BLD: 11.8 FL — SIGNIFICANT CHANGE UP (ref 7–13)
POTASSIUM SERPL-MCNC: 3.6 MMOL/L — SIGNIFICANT CHANGE UP (ref 3.5–5.3)
POTASSIUM SERPL-SCNC: 3.6 MMOL/L — SIGNIFICANT CHANGE UP (ref 3.5–5.3)
PROT SERPL-MCNC: 6.4 G/DL — SIGNIFICANT CHANGE UP (ref 6–8.3)
RBC # BLD: 3.1 M/UL — LOW (ref 4.2–5.8)
RBC # FLD: 17.2 % — HIGH (ref 10.3–14.5)
SODIUM SERPL-SCNC: 144 MMOL/L — SIGNIFICANT CHANGE UP (ref 135–145)
WBC # BLD: 6.91 K/UL — SIGNIFICANT CHANGE UP (ref 3.8–10.5)
WBC # FLD AUTO: 6.91 K/UL — SIGNIFICANT CHANGE UP (ref 3.8–10.5)

## 2019-12-24 PROCEDURE — 99238 HOSP IP/OBS DSCHRG MGMT 30/<: CPT

## 2019-12-24 PROCEDURE — 99232 SBSQ HOSP IP/OBS MODERATE 35: CPT | Mod: GC

## 2019-12-24 RX ORDER — METOPROLOL TARTRATE 50 MG
12.5 TABLET ORAL
Qty: 0 | Refills: 0 | DISCHARGE
Start: 2019-12-24

## 2019-12-24 RX ORDER — FUROSEMIDE 40 MG
1 TABLET ORAL
Qty: 0 | Refills: 0 | DISCHARGE
Start: 2019-12-24 | End: 2020-01-22

## 2019-12-24 RX ORDER — CALCIUM ACETATE 667 MG
667 TABLET ORAL
Qty: 0 | Refills: 0 | DISCHARGE
Start: 2019-12-24

## 2019-12-24 RX ORDER — FUROSEMIDE 40 MG
1 TABLET ORAL
Qty: 30 | Refills: 0
Start: 2019-12-24 | End: 2020-01-22

## 2019-12-24 RX ORDER — CALCIUM ACETATE 667 MG
1 TABLET ORAL
Qty: 0 | Refills: 0 | DISCHARGE
Start: 2019-12-24 | End: 2020-01-22

## 2019-12-24 RX ORDER — METOPROLOL TARTRATE 50 MG
0.5 TABLET ORAL
Qty: 30 | Refills: 0
Start: 2019-12-24 | End: 2020-01-22

## 2019-12-24 RX ORDER — HYDRALAZINE HCL 50 MG
1 TABLET ORAL
Qty: 90 | Refills: 0
Start: 2019-12-24 | End: 2020-01-22

## 2019-12-24 RX ORDER — CALCIUM ACETATE 667 MG
667 TABLET ORAL
Qty: 40020 | Refills: 0
Start: 2019-12-24 | End: 2020-01-22

## 2019-12-24 RX ORDER — HYDRALAZINE HCL 50 MG
1 TABLET ORAL
Qty: 0 | Refills: 0 | DISCHARGE
Start: 2019-12-24

## 2019-12-24 RX ADMIN — ATORVASTATIN CALCIUM 40 MILLIGRAM(S): 80 TABLET, FILM COATED ORAL at 00:45

## 2019-12-24 RX ADMIN — Medication 667 MILLIGRAM(S): at 09:05

## 2019-12-24 RX ADMIN — Medication 1000 UNIT(S): at 13:00

## 2019-12-24 RX ADMIN — HEPARIN SODIUM 5000 UNIT(S): 5000 INJECTION INTRAVENOUS; SUBCUTANEOUS at 08:06

## 2019-12-24 RX ADMIN — Medication 25 MILLIGRAM(S): at 15:07

## 2019-12-24 RX ADMIN — AMLODIPINE BESYLATE 5 MILLIGRAM(S): 2.5 TABLET ORAL at 08:06

## 2019-12-24 RX ADMIN — Medication 100 MILLIGRAM(S): at 13:00

## 2019-12-24 RX ADMIN — Medication 325 MILLIGRAM(S): at 13:00

## 2019-12-24 RX ADMIN — Medication 25 MILLIGRAM(S): at 00:45

## 2019-12-24 RX ADMIN — Medication 12.5 MILLIGRAM(S): at 08:06

## 2019-12-24 RX ADMIN — BRINZOLAMIDE 1 DROP(S): 10 SUSPENSION/ DROPS OPHTHALMIC at 08:06

## 2019-12-24 RX ADMIN — HEPARIN SODIUM 5000 UNIT(S): 5000 INJECTION INTRAVENOUS; SUBCUTANEOUS at 13:00

## 2019-12-24 RX ADMIN — Medication 25 MILLIGRAM(S): at 08:06

## 2019-12-24 NOTE — PROGRESS NOTE ADULT - ASSESSMENT
Patient is a 89 y/o Male from [facility] , with pmhx of CAD, COPD, HTN, Prostate CA, S/P Radiation therapy years ago, CKD,  Anemia, DM on No Meds, GOUT, Glaucoma, Aortic Stenosis , Osteoporosis , Legs edema, A+O x 4, presents with c/o dyspnea upon exertion x 3-5 days. He reports when walking or exerting himself he feels SOB, no associated chest pain, palpitations, dizziness, lightheadedness, diaphoresis, cough, URI like symptoms, No N/V, no diarrhea, no Fever, NO CP, NO HA, no Dizziness, No abdominal pain, + chronic LBP, + constipation , Last BM Yesterday No sore Throat, uses cane to Ambulate No legs pain, C/O + Orthopnea, + dysuria, Chest X ray prelim c/w B/L Pleural Effusions, BNP 28999, Troponin , pt s/p IV Lasix 40 mg x 1 for Possible Acute CHF given by ER tonight, pt is making urine, I called Cardiology consult tonight, I Ordered Venous Doppler legs: R/O DVT, CT Chest/abdomen /pelvis NO Contrast  ordered due to + B/L Pleural effusions, HX of Prostate CA, CKD, Anemia, RVP: Negative, pt was seen by House Cardiology as well,

## 2019-12-24 NOTE — DISCHARGE NOTE NURSING/CASE MANAGEMENT/SOCIAL WORK - PATIENT PORTAL LINK FT
You can access the FollowMyHealth Patient Portal offered by [facility] by registering at the following website: [email or web address]. By joining [facility]’s FollowMyHealth portal, you will also be able to view your health information using other applications (apps) compatible with our system.

## 2019-12-24 NOTE — PROGRESS NOTE ADULT - REASON FOR ADMISSION
SOB, GARCIA, R/O acute CHF,

## 2019-12-24 NOTE — PROGRESS NOTE ADULT - PROBLEM SELECTOR PROBLEM 8
Preventive measure
Preventive measure
Hypertension
Preventive measure

## 2019-12-24 NOTE — DISCHARGE NOTE PROVIDER - HOSPITAL COURSE
Patient is a 89 y/o Male from [facility] , with pmhx of CAD, COPD, HTN, Prostate CA, S/P Radiation therapy years ago, CKD,  Anemia, DM on No Meds, GOUT, Glaucoma, Aortic Stenosis , Osteoporosis , Legs edema, A+O x 4, presents with c/o dyspnea upon exertion x 3-5 days. Admitted to telemetry for shortness of breath. Patient was diuresed with IV Lasix and transitioned to PO lasix. TTE reveals worsening systolic function and suspected severe AS. House cardiology followed. Patient had never had an ischemic evaluation. Patient is a high risk for TAVR given CKD, age and comorbidties. It is likely that contrast load for TAVR workup (LHC/RHC) will cause the patient to require HD. Conversation regarding risks and benefits of TAVR were discussed with patient/family by Dr. Claros and cardiology. The patient and family are opting.... Course complicated by anemia (likely CKD induced). Patient with h/o of rectal hemorrhoids with intermittent red on toilet paper. S/p 1U PRBC with improved H/H/         Case discussed with Dr. Claros on..... The patient is medically stable for discharge and has appropriate follow up. Patient is a 89 y/o Male from [facility] , with pmhx of CAD, COPD, HTN, Prostate CA, S/P Radiation therapy years ago, CKD,  Anemia, DM on No Meds, GOUT, Glaucoma, Aortic Stenosis , Osteoporosis , Legs edema, A+O x 4, presents with c/o dyspnea upon exertion x 3-5 days. Admitted to telemetry for shortness of breath. Patient was diuresed with IV Lasix and transitioned to PO lasix. TTE reveals worsening systolic function and suspected severe AS. House cardiology followed. Patient had never had an ischemic evaluation. Patient is a high risk for TAVR given CKD, age and comorbidties. It is likely that contrast load for TAVR workup (LHC/RHC) will cause the patient to require HD. Conversation regarding risks and benefits of TAVR were discussed with patient/family by Dr. Claros and cardiology. The patient and family are opting to hold off on TAVR at this time and decide if they wish to proceed with TAVR at home. If they do wish to proceed, they are instructed to go to [facility] for further care. Course complicated by anemia (likely CKD induced). Patient with h/o of rectal hemorrhoids with intermittent red on toilet paper. S/p 1U PRBC with improved H/H.        Case discussed with Dr. Claros on 12/24/19. The patient is medically stable for discharge and has appropriate follow up. Patient is a 89 y/o Male from [facility] , with pmhx of CAD, COPD, HTN, Prostate CA, S/P Radiation therapy years ago, CKD,  Anemia, DM on No Meds, GOUT, Glaucoma, Aortic Stenosis , Osteoporosis , Legs edema, A+O x 4, presents with c/o dyspnea upon exertion x 3-5 days. Admitted to telemetry for shortness of breath. Patient was diuresed with IV Lasix and transitioned to PO lasix. TTE reveals worsening systolic function and suspected severe AS. House cardiology followed. Patient had never had an ischemic evaluation. Patient is a high risk for TAVR given CKD, age and comorbidties. It is likely that contrast load for TAVR workup (LHC/RHC) will cause the patient to require HD. Conversation regarding risks and benefits of TAVR were discussed with patient/family by Dr. Claros and cardiology. The patient and family are opting to hold off on TAVR at this time and decide if they wish to proceed with TAVR at home. If they do wish to proceed, they are instructed to go to [facility] for further care. Course complicated by anemia (likely CKD induced). Patient with h/o of rectal hemorrhoids with intermittent red on toilet paper. S/p 1U PRBC with improved H/H.        Unable to make appt at cardiology clinic at [facility]. Office is closed for holiday.         Case discussed with Dr. Claros on 12/24/19. The patient is medically stable for discharge and has appropriate follow up. Patient is a 89 y/o Male from [facility] , with pmhx of CAD, COPD, HTN, Prostate CA, S/P Radiation therapy years ago, CKD,  Anemia, DM on No Meds, GOUT, Glaucoma, Aortic Stenosis , Osteoporosis , Legs edema, A+O x 4, presents with c/o dyspnea upon exertion x 3-5 days. Admitted to telemetry for shortness of breath. Patient was diuresed with IV Lasix and transitioned to PO lasix. TTE reveals worsening systolic function and suspected severe AS. House cardiology followed. Patient had never had an ischemic evaluation. Patient is a high risk for TAVR given CKD, age and comorbidties. It is likely that contrast load for TAVR workup (LHC/RHC) will cause the patient to require HD. Conversation regarding risks and benefits of TAVR were discussed with patient/family by Dr. Claros and cardiology. The patient and family are opting to hold off on TAVR at this time and decide if they wish to proceed with TAVR at home. If they do wish to proceed, they are instructed to go to [facility] for further care. Course complicated by anemia (likely CKD induced). Patient with h/o of rectal hemorrhoids with intermittent red on toilet paper. S/p 1U PRBC with improved H/H.        Unable to make appt at cardiology clinic at [facility]. Office is closed for holiday.         Case discussed with Dr. Claros on 12/24/19. The patient is medically stable for discharge and has appropriate follow up.         Attending Addendum:    Patient seen and examined by me on the discharge day. Medications reviewed.    All questions answered in details. Follow up plan explained.    More than 30 mins were spent evaluating patient and coordinating care for discharge.    Discharge summary sent to pt's primary care physician at [facility].

## 2019-12-24 NOTE — PROGRESS NOTE ADULT - SUBJECTIVE AND OBJECTIVE BOX
Interval History: Feeling well this morning, ready to walk around. Denies dyspnea, palpitations, chest pain. Leaning more towards going home first and following up with structural as outpatient.    Review Of Systems:  Constitutional: [ ] Fever [ ] Chills [ ] Fatigue [ ] Weight change   HEENT: [ ] Blurred vision [ ] Eye Pain [ ] Headache [ ] Runny nose [ ] Sore Throat   Respiratory: [ ] Cough [ ] Wheezing [ ] Shortness of breath  Cardiovascular: [ ] Chest Pain [ ] Palpitations [ ] GARCIA [ ] PND [ ] Orthopnea  Gastrointestinal: [ ] Abdominal Pain [ ] Diarrhea [ ] Constipation [ ] Hemorrhoids [ ] Nausea [ ] Vomiting  Genitourinary: [ ] Nocturia [ ] Dysuria [ ] Incontinence  Extremities: [ ] Swelling [ ] Joint Pain  Neurologic: [ ] Focal deficit [ ] Paresthesias [ ] Syncope  Lymphatic: [ ] Swelling [ ] Lymphadenopathy   Skin: [ ] Rash [ ] Ecchymoses [ ] Wounds [ ] Lesions  Psychiatry: [ ] Depression [ ] Suicidal/Homicidal Ideation [ ] Anxiety [ ] Sleep Disturbances  [x] 10 point review of systems is otherwise negative except as mentioned above    Medications:  allopurinol 100 milliGRAM(s) Oral daily  amLODIPine   Tablet 5 milliGRAM(s) Oral daily  atorvastatin 40 milliGRAM(s) Oral at bedtime  bisacodyl Suppository 10 milliGRAM(s) Rectal daily PRN  brinzolamide 1% Ophthalmic Suspension 1 Drop(s) Both EYES two times a day  calcium acetate 667 milliGRAM(s) Oral two times a day with meals  cholecalciferol 1000 Unit(s) Oral daily  dextrose 40% Gel 15 Gram(s) Oral once PRN  dextrose 5%. 1000 milliLiter(s) IV Continuous <Continuous>  dextrose 50% Injectable 12.5 Gram(s) IV Push once  dextrose 50% Injectable 25 Gram(s) IV Push once  dextrose 50% Injectable 25 Gram(s) IV Push once  ferrous    sulfate 325 milliGRAM(s) Oral daily  glucagon  Injectable 1 milliGRAM(s) IntraMuscular once PRN  heparin  Injectable 5000 Unit(s) SubCutaneous every 8 hours  hydrALAZINE 25 milliGRAM(s) Oral three times a day  insulin lispro (HumaLOG) corrective regimen sliding scale   SubCutaneous Before meals and at bedtime  metoprolol tartrate 12.5 milliGRAM(s) Oral two times a day  polyethylene glycol 3350 17 Gram(s) Oral daily PRN  senna 2 Tablet(s) Oral at bedtime  sodium chloride 0.9%. 500 milliLiter(s) IV Continuous <Continuous>    Vitals:  ICU Vital Signs Last 24 Hrs  T(C): 36.9 (24 Dec 2019 08:00), Max: 36.9 (24 Dec 2019 08:00)  T(F): 98.5 (24 Dec 2019 08:00), Max: 98.5 (24 Dec 2019 08:00)  HR: 72 (24 Dec 2019 08:00) (62 - 85)  BP: 125/51 (24 Dec 2019 08:00) (117/40 - 135/68)  BP(mean): 61 (23 Dec 2019 23:53) (61 - 61)  ABP: --  ABP(mean): --  RR: 18 (24 Dec 2019 08:00) (16 - 18)  SpO2: 98% (24 Dec 2019 08:00) (93% - 98%)    Daily     Daily Weight in k (24 Dec 2019 09:18)  I&O's Summary    23 Dec 2019 07:01  -  24 Dec 2019 07:00  --------------------------------------------------------  IN: 1530 mL / OUT: 875 mL / NET: 655 mL    24 Dec 2019 07:01  -  24 Dec 2019 10:06  --------------------------------------------------------  IN: 0 mL / OUT: 350 mL / NET: -350 mL    Physical Exam:  Appearance:  NAD, sitting up comfortably in bed in room air, no increased work of breathing  HENT: NC/AT  Cardiovascular: Equal S1 and S2, regular rate and rhythm, III/VI mid decrescendo murmur heard over upper sternal border, no LE edema bilaterally  Respiratory: Mild crackles over RLL  Gastrointestinal: Soft  Psychiatry: [x] AAOx3  [x] Follows Commands  Skin: Intact    Labs:                        9.6    6.91  )-----------( 256      ( 24 Dec 2019 06:04 )             31.1         144  |  103  |  87<H>  ----------------------------<  121<H>  3.6   |  26  |  3.20<H>    Ca    9.6      24 Dec 2019 06:04  Phos  5.3       Mg     2.3         TPro  6.4  /  Alb  3.6  /  TBili  0.5  /  DBili  x   /  AST  15  /  ALT  16  /  AlkPhos  83      Serum Pro-Brain Natriuretic Peptide: 62906 pg/mL ( @ 02:45)  Serum Pro-Brain Natriuretic Peptide: 97829 pg/mL ( @ 23:10)    Echo:  < from: TTE with Doppler (w/Cont) (. @ 16:51) >  CONCLUSIONS:  1. Mitral annular calcification, otherwise normal mitral  valve. Mild mitral regurgitation.  2. Aortic valve leaflet morphology not well visualized.  The valve is calcified. Peak transaortic valve gradient  equals 32 mm Hg, mean transaortic valve gradient equals 18  mm Hg.  In the setting of severe LV systolic dysfunction,  these gradients may reflect the presence of significant  aortic stenosis.  However, unable to accurately estimate  the aortic valve area. Mild aortic regurgitation.  3. Severe global left ventricular systolic dysfunction.  Endocardial visualization enhanced with intravenous  injection of echo contrast (Definity).  No LV thrombus  seen.  4. The right ventricle is not well visualized; grossly  normal right ventricular systolic function.    < end of copied text >    Interpretation of Telemetry: sinus with 1st degree block

## 2019-12-24 NOTE — PROGRESS NOTE ADULT - ATTENDING COMMENTS
Monitor off antibiotics  12/21: stable without antibiotics: LV functions is pretty poor:  12/22: seesm to be doing better:  12/23; doing OK: no SOB :  12/24: for dc today

## 2019-12-24 NOTE — DISCHARGE NOTE PROVIDER - PROVIDER TOKENS
PROVIDER:[TOKEN:[[id number]]],FREE:[LAST:[Dr. Samuel ([facility] nephrology)],PHONE:[(   )    -],FAX:[(   )    -]],FREE:[LAST:[Dr. Diallo (PMD)],PHONE:[(   )    -],FAX:[(   )    -]]

## 2019-12-24 NOTE — PROGRESS NOTE ADULT - ATTENDING COMMENTS
The patient's care was discussed with the consulting cardiology fellow.  I independently evaluated the patient.    The patient's care was discussed with the valve team at [place]. Outpatient referral for TAVR evaluation prior to pursuing additional tests that have potential risks to the patient--including risks to his renal function--is likely the most appropriate management plan for this patient at this time. Decision-making regarding pursing TAVR is not emergent. The patient is not currently in heart failure and is comfortable-appearing. It would be ideal to have a better sense from the valve team that this patient is a reasonable candidate for the procedure before exposing the patient to the risks of pre-procedure testing.     Christoph Reinoso MD  Cardiology

## 2019-12-24 NOTE — PROGRESS NOTE ADULT - SUBJECTIVE AND OBJECTIVE BOX
Patient is a 89y old  Male who presents with a chief complaint of SOB, GARCIA, R/O acute CHF, (24 Dec 2019 11:22)      SUBJECTIVE / OVERNIGHT EVENTS:  feeling a lot better  Pt seen and examined at bedside.   No overnight event.  no cp, no sob, no n/v/d.   tele no events        Vital Signs Last 24 Hrs  T(C): 36.9 (24 Dec 2019 08:00), Max: 36.9 (24 Dec 2019 08:00)  T(F): 98.5 (24 Dec 2019 08:00), Max: 98.5 (24 Dec 2019 08:00)  HR: 72 (24 Dec 2019 08:00) (65 - 85)  BP: 125/51 (24 Dec 2019 08:00) (117/40 - 135/68)  BP(mean): 61 (23 Dec 2019 23:53) (61 - 61)  RR: 17 (24 Dec 2019 10:45) (17 - 18)  SpO2: 96% (24 Dec 2019 10:45) (93% - 98%)  I&O's Summary    23 Dec 2019 07:01  -  24 Dec 2019 07:00  --------------------------------------------------------  IN: 1530 mL / OUT: 875 mL / NET: 655 mL    24 Dec 2019 07:01  -  24 Dec 2019 11:58  --------------------------------------------------------  IN: 0 mL / OUT: 350 mL / NET: -350 mL      PHYSICAL EXAM:  GENERAL: NAD, Comfortable, on room air, sitting up  HEAD:  Atraumatic, Normocephalic  EYES: EOMI, PERRLA, conjunctiva and sclera clear  NECK: Supple, No JVD  CHEST/LUNG: mild decrease breath sounds bilaterally; No wheeze   HEART: Regular rate and rhythm; No murmurs, rubs, or gallops  ABDOMEN: Soft, Nontender, Nondistended; Bowel sounds present  Neuro: AAO x 3, no focal deficit, 5/5 b/l extremities  EXTREMITIES:  2+ Peripheral Pulses, No clubbing, cyanosis, no further edema  SKIN: No rashes or lesions      LABS:                        9.6    6.91  )-----------( 256      ( 24 Dec 2019 06:04 )             31.1     12-24    144  |  103  |  87<H>  ----------------------------<  121<H>  3.6   |  26  |  3.20<H>    Ca    9.6      24 Dec 2019 06:04  Phos  5.3     12-24  Mg     2.3     12-24    TPro  6.4  /  Alb  3.6  /  TBili  0.5  /  DBili  x   /  AST  15  /  ALT  16  /  AlkPhos  83  12-24      CAPILLARY BLOOD GLUCOSE      POCT Blood Glucose.: 119 mg/dL (24 Dec 2019 08:24)  POCT Blood Glucose.: 151 mg/dL (23 Dec 2019 21:25)  POCT Blood Glucose.: 118 mg/dL (23 Dec 2019 17:06)  POCT Blood Glucose.: 181 mg/dL (23 Dec 2019 12:30)            RADIOLOGY & ADDITIONAL TESTS:    Imaging Personally Reviewed:  [x] YES  [ ] NO    Consultant(s) Notes Reviewed:  [x] YES  [ ] NO      MEDICATIONS  (STANDING):  allopurinol 100 milliGRAM(s) Oral daily  amLODIPine   Tablet 5 milliGRAM(s) Oral daily  atorvastatin 40 milliGRAM(s) Oral at bedtime  brinzolamide 1% Ophthalmic Suspension 1 Drop(s) Both EYES two times a day  calcium acetate 667 milliGRAM(s) Oral two times a day with meals  cholecalciferol 1000 Unit(s) Oral daily  dextrose 5%. 1000 milliLiter(s) (50 mL/Hr) IV Continuous <Continuous>  dextrose 50% Injectable 12.5 Gram(s) IV Push once  dextrose 50% Injectable 25 Gram(s) IV Push once  dextrose 50% Injectable 25 Gram(s) IV Push once  ferrous    sulfate 325 milliGRAM(s) Oral daily  heparin  Injectable 5000 Unit(s) SubCutaneous every 8 hours  hydrALAZINE 25 milliGRAM(s) Oral three times a day  insulin lispro (HumaLOG) corrective regimen sliding scale   SubCutaneous Before meals and at bedtime  metoprolol tartrate 12.5 milliGRAM(s) Oral two times a day  senna 2 Tablet(s) Oral at bedtime  sodium chloride 0.9%. 500 milliLiter(s) (50 mL/Hr) IV Continuous <Continuous>    MEDICATIONS  (PRN):  bisacodyl Suppository 10 milliGRAM(s) Rectal daily PRN Constipation  dextrose 40% Gel 15 Gram(s) Oral once PRN Blood Glucose LESS THAN 70 milliGRAM(s)/deciliter  glucagon  Injectable 1 milliGRAM(s) IntraMuscular once PRN Glucose LESS THAN 70 milligrams/deciliter  polyethylene glycol 3350 17 Gram(s) Oral daily PRN Constipation      Care Discussed with Consultants/Other Providers [x] YES  [ ] NO    HEALTH ISSUES - PROBLEM Dx:  Anemia: Anemia  Glaucoma: Glaucoma  Preventive measure: Preventive measure  Hypertension: Hypertension  Chronic kidney disease (CKD): Chronic kidney disease (CKD)  DM type 2 (diabetes mellitus, type 2): DM type 2 (diabetes mellitus, type 2)  Aortic stenosis: Aortic stenosis  Leg edema: Leg edema  CAD (coronary artery disease): CAD (coronary artery disease)  SOB (shortness of breath): SOB (shortness of breath)  Suspected deep vein thrombosis (DVT)

## 2019-12-24 NOTE — PROGRESS NOTE ADULT - PROBLEM SELECTOR PLAN 3
per priamry team  ? secondary to chf  12/21: cont diuresis: seems better to me today  12/22: better: secondary to CHF  12/24: resolved

## 2019-12-24 NOTE — PROGRESS NOTE ADULT - PROBLEM SELECTOR PLAN 1
U don't think he has pneumonia: keep off antibiotics: cont duresis  12/21: he seems to be doing much naty r: has sever global LV dysfunction  12/22: seems ot be doingbetter: off oxygen at this time: etiology of SOBis secondary to CHF  12/23: doing OK:no SOB : feels better: has some impacted airways with mucus plugging  12/24: winifred castillo

## 2019-12-24 NOTE — PROGRESS NOTE ADULT - PROBLEM SELECTOR PLAN 7
Controlled
Controlled  12/21: controlled
Controlled  12/21: controlled  12/22: Controlled
Controlled  12/21: controlled  12/22: Controlled  12/23: controlled
Controlled  12/21: controlled  12/22: Controlled  12/23: controlled
Cr slightly higher, renal on the case  Diuretics as above, lowered to qdaily   GOUT: on allopurinol
on Norvasc, ECHO

## 2019-12-24 NOTE — DISCHARGE NOTE PROVIDER - CARE PROVIDERS DIRECT ADDRESSES
[email or web address],DirectAddress_Unknown,DirectAddress_Unknown

## 2019-12-24 NOTE — DISCHARGE NOTE PROVIDER - CARE PROVIDER_API CALL
Christoph Reinoso (MD)  Cardiology; Internal Medicine  [address] [address] [place] [address]  Phone: [phone]  Fax: [phone]  Follow Up Time:     Dr. Samuel ([facility] nephrology),   Phone: (   )    -  Fax: (   )    -  Follow Up Time:     Dr. Diallo (PMD),   Phone: (   )    -  Fax: (   )    -  Follow Up Time:

## 2019-12-24 NOTE — PROGRESS NOTE ADULT - SUBJECTIVE AND OBJECTIVE BOX
Patient is a 89y old  Male who presents with a chief complaint of SOB, GARCIA, R/O acute CHF, (24 Dec 2019 11:58)      Any change in ROS: Doingok: no SOB     MEDICATIONS  (STANDING):  allopurinol 100 milliGRAM(s) Oral daily  amLODIPine   Tablet 5 milliGRAM(s) Oral daily  atorvastatin 40 milliGRAM(s) Oral at bedtime  brinzolamide 1% Ophthalmic Suspension 1 Drop(s) Both EYES two times a day  calcium acetate 667 milliGRAM(s) Oral two times a day with meals  cholecalciferol 1000 Unit(s) Oral daily  dextrose 5%. 1000 milliLiter(s) (50 mL/Hr) IV Continuous <Continuous>  dextrose 50% Injectable 12.5 Gram(s) IV Push once  dextrose 50% Injectable 25 Gram(s) IV Push once  dextrose 50% Injectable 25 Gram(s) IV Push once  ferrous    sulfate 325 milliGRAM(s) Oral daily  heparin  Injectable 5000 Unit(s) SubCutaneous every 8 hours  hydrALAZINE 25 milliGRAM(s) Oral three times a day  insulin lispro (HumaLOG) corrective regimen sliding scale   SubCutaneous Before meals and at bedtime  metoprolol tartrate 12.5 milliGRAM(s) Oral two times a day  senna 2 Tablet(s) Oral at bedtime  sodium chloride 0.9%. 500 milliLiter(s) (50 mL/Hr) IV Continuous <Continuous>    MEDICATIONS  (PRN):  bisacodyl Suppository 10 milliGRAM(s) Rectal daily PRN Constipation  dextrose 40% Gel 15 Gram(s) Oral once PRN Blood Glucose LESS THAN 70 milliGRAM(s)/deciliter  glucagon  Injectable 1 milliGRAM(s) IntraMuscular once PRN Glucose LESS THAN 70 milligrams/deciliter  polyethylene glycol 3350 17 Gram(s) Oral daily PRN Constipation    Vital Signs Last 24 Hrs  T(C): 36.6 (24 Dec 2019 12:13), Max: 36.9 (24 Dec 2019 08:00)  T(F): 97.9 (24 Dec 2019 12:13), Max: 98.5 (24 Dec 2019 08:00)  HR: 67 (24 Dec 2019 12:13) (65 - 85)  BP: 126/59 (24 Dec 2019 12:13) (117/40 - 135/68)  BP(mean): 61 (23 Dec 2019 23:53) (61 - 61)  RR: 18 (24 Dec 2019 12:13) (17 - 18)  SpO2: 98% (24 Dec 2019 12:13) (93% - 98%)    I&O's Summary    23 Dec 2019 07:01  -  24 Dec 2019 07:00  --------------------------------------------------------  IN: 1530 mL / OUT: 875 mL / NET: 655 mL    24 Dec 2019 07:01  -  24 Dec 2019 12:19  --------------------------------------------------------  IN: 240 mL / OUT: 550 mL / NET: -310 mL          Physical Exam:   GENERAL: NAD, well-groomed, well-developed  HEENT: CHIO/   Atraumatic, Normocephalic  ENMT: No tonsillar erythema, exudates, or enlargement; Moist mucous membranes, Good dentition, No lesions  NECK: Supple, No JVD, Normal thyroid  CHEST/LUNG: Clear to auscultaion, ; No rales, rhonchi, wheezing, or rubs  CVS: Regular rate and rhythm; No murmurs, rubs, or gallops  GI: : Soft, Nontender, Nondistended; Bowel sounds present  NERVOUS SYSTEM:  Alert & Oriented X3  EXTREMITIES:  2+ Peripheral Pulses, No clubbing, cyanosis, or edema  LYMPH: No lymphadenopathy noted  SKIN: No rashes or lesions  ENDOCRINOLOGY: No Thyromegaly  PSYCH: Appropriate    Labs:                              9.6    6.91  )-----------( 256      ( 24 Dec 2019 06:04 )             31.1                         9.8    7.35  )-----------( 257      ( 23 Dec 2019 06:08 )             31.6                         9.2    6.62  )-----------( 255      ( 22 Dec 2019 06:23 )             30.0                         8.9    6.55  )-----------( 237      ( 21 Dec 2019 05:36 )             29.2     12-24    144  |  103  |  87<H>  ----------------------------<  121<H>  3.6   |  26  |  3.20<H>  12-23    142  |  101  |  86<H>  ----------------------------<  115<H>  3.5   |  27  |  3.28<H>  12-22    143  |  102  |  80<H>  ----------------------------<  108<H>  3.5   |  26  |  3.13<H>  12-21    143  |  103  |  74<H>  ----------------------------<  110<H>  3.6   |  23  |  2.94<H>    Ca    9.6      24 Dec 2019 06:04  Ca    9.5      23 Dec 2019 06:08  Phos  5.3     12-24  Phos  5.2     12-23  Mg     2.3     12-24  Mg     2.2     12-23    TPro  6.4  /  Alb  3.6  /  TBili  0.5  /  DBili  x   /  AST  15  /  ALT  16  /  AlkPhos  83  12-24  TPro  6.5  /  Alb  3.7  /  TBili  0.5  /  DBili  x   /  AST  19  /  ALT  15  /  AlkPhos  82  12-23  TPro  6.3  /  Alb  3.5  /  TBili  0.4  /  DBili  x   /  AST  16  /  ALT  17  /  AlkPhos  80  12-22  TPro  6.0  /  Alb  3.3  /  TBili  0.3  /  DBili  x   /  AST  16  /  ALT  14  /  AlkPhos  82  12-21    CAPILLARY BLOOD GLUCOSE      POCT Blood Glucose.: 119 mg/dL (24 Dec 2019 08:24)  POCT Blood Glucose.: 151 mg/dL (23 Dec 2019 21:25)  POCT Blood Glucose.: 118 mg/dL (23 Dec 2019 17:06)  POCT Blood Glucose.: 181 mg/dL (23 Dec 2019 12:30)      LIVER FUNCTIONS - ( 24 Dec 2019 06:04 )  Alb: 3.6 g/dL / Pro: 6.4 g/dL / ALK PHOS: 83 u/L / ALT: 16 u/L / AST: 15 u/L / GGT: x               Lactate, Blood: 0.9 mmol/L (12-24 @ 06:04)        RECENT CULTURES:  12-18 @ 04:22 URINE MIDSTREAM            < from: US Duplex Venous Lower Ext Complete, Bilateral (12.18.19 @ 10:24) >  EXAM:  US DPLX LWR EXT VEINS COMPL BI        PROCEDURE DATE:  Dec 18 2019         INTERPRETATION:  CLINICAL INFORMATION: Leg edema.    COMPARISON: None available.    TECHNIQUE: Duplex sonography of the BILATERAL LOWER extremity veins with   color and spectral Doppler, with and without compression.      FINDINGS:    There is normal compressibility of the bilateral common femoral, femoral   and popliteal veins.     Doppler examination shows normal spontaneous and phasic flow.    No calf vein thrombosis is detected.    IMPRESSION:     No evidence of deep venous thrombosis in either lower extremity.            < from: CT Chest No Cont (12.18.19 @ 04:43) >  REPRODUCTIVE ORGANS: Prostate within normal limits.    BOWEL: Moderate hiatal hernia. No bowel obstruction. Appendix is normal.  PERITONEUM: No ascites.  VESSELS: Atherosclerotic changes.  RETROPERITONEUM/LYMPH NODES: No lymphadenopathy.    ABDOMINAL WALL: Left inguinal hernia repair. Right inguinal hernia   containing fat and nonobstructed small bowel.  BONES: Degenerative changes.    IMPRESSION:     Small bilateral pleural effusions, right greater than left.    Distal impacted airways within the right middle lobe and lingula,   progressed from prior imaging in December 2015.    Nonspecific groundglass opacities within the right upper lobe and right   lower lobe may be infectious.                      LUCIAN MARIA M.D., ATTENDING RADIOLOGIST  This document has been electronically signed. Dec 18 2019  9:38AM        < end of copied text >        STACIE SAM M.D., ATTENDING RADIOLOGIST  This document has been electronically signed. Dec 18 2019  1:35PM              < end of copied text >              RESPIRATORY CULTURES:          Studies  Chest X-RAY  CT SCAN Chest   Venous Dopplers: LE:   CT Abdomen  Others

## 2019-12-24 NOTE — PROGRESS NOTE ADULT - ASSESSMENT
89 yo M transferred from [facility] with PMH of COPD, HTN, prostate cancer (s/p radiation therapy), CKD, anemia, DM and gout, presenting with worsening dyspnea on exertion, admitted for decompensated heart failure, echo suggestive of severe AS and new severe LVSDF.    #ADHF 2/2 HFrEF  #Severe AS  - Pt with apparently new LV systolic dysfunction, which is likely a result of underlying severe AS, however, no ischemic eval done prior  - Mild volume on exam, will likely require maintenance diuresis, would discharge on Lasix 40mg PO daily  - Patient high risk patient for TAVR given CKD, age and comorbidties, after discussing with structural team can evaluate on outpatient basis for candidacy, will need multidisciplinary discussion with nephrology given CKD that will likely progress to ESRD on HD with planned additional workup for TAVR (CTA, LHC/RHC)  - Consider palliative consult to address patient's goals of care  - Can arrange initial TAVR evaluation with structural clinic on Tuesdays, please call [phone] if within patient's goals of care, or can provide to patient's cardiologist for future  - Patient should follow up with patient's outpatient cardiologist in 1-2 weeks for CHF admission  - Continue metoprolol tartrate 12.5mg BID, hydralazine 25mg TID, ACEi/ARB on hold given ?CLIFFORD on CKD, can likely start if Cr stabilizes    #Elevated troponin  - No rise and fall in trop in pt with CKD  - Maybe some leak likely related to AS    #HTN  - Continue home amlodipine, metoprolol and hydralazine as above    Patient to be staffed with attending. Please await attending addendum.    Marlena Barton MD  Cardiology Fellow  [phone]  All Cardiology service information can be found 24/7 on [email or web address], password: [email or web address]

## 2019-12-24 NOTE — DISCHARGE NOTE PROVIDER - NSDCCPCAREPLAN_GEN_ALL_CORE_FT
PRINCIPAL DISCHARGE DIAGNOSIS  Diagnosis: Shortness of breath  Assessment and Plan of Treatment:       SECONDARY DISCHARGE DIAGNOSES  Diagnosis: CAD (coronary artery disease)  Assessment and Plan of Treatment: CAD (coronary artery disease)    Diagnosis: Hypertension  Assessment and Plan of Treatment: Hypertension    Diagnosis: DM type 2 (diabetes mellitus, type 2)  Assessment and Plan of Treatment: DM type 2 (diabetes mellitus, type 2)    Diagnosis: Anemia  Assessment and Plan of Treatment: Anemia PRINCIPAL DISCHARGE DIAGNOSIS  Diagnosis: Shortness of breath  Assessment and Plan of Treatment: You came to the hospital for shortness of breath for which you were treated with IV Lasix (water pill) which improved your symptoms. You are to continue taking the water pill as prescribed. Your shortness of breath is likely secodnary to your decreased ejection fraction (heart function) and severe aortic stenosis (valve problem). Cardiology has discussed the risks and benefits of a TAVR at [facility] with you. You have opted to go home and decide whether you wish to have this procedure in the future. It is important you follow up with cardiology and your primary care physician within one week of discharge.      SECONDARY DISCHARGE DIAGNOSES  Diagnosis: Hypertension  Assessment and Plan of Treatment: Continue your blood pressure medications as prescribed. New blood pressure medications/heart failure medications were prescribed to you: Lasix, Hydralazine and metoprolol. Low salt diet.    Diagnosis: Chronic kidney disease (CKD)  Assessment and Plan of Treatment: Follow up with your nephrologist and primary care physician. Avoid medications that may damage your kidney. Continue with your phosphorus lowering supplement.    Diagnosis: Anemia  Assessment and Plan of Treatment: You have anemia likely secondary to your kidney disease and were given one unit of blood in the hospital.

## 2019-12-24 NOTE — PROGRESS NOTE ADULT - ATTENDING COMMENTS
In pt TAVR vs. outpt follow up discussed at length with pt and family. await decision.   Risk of renal failure and dialysis with cath/CT coronaries explained.   Pt leaning toward going home first and see how he feels on low dose Lasix. (given risk of worsening renal process with TAVR evaluation)  Pt and family understands that if he develops pulm edema again, will have to go to [facility] since CT surgery team is over there.  (Currently transfer process from [facility] to [facility] may not be worth. Will need to first wait of the bed. Even after diagnostic work up, he may not even be offered TAVR).  Tentative dc planning after pt and family discuss.     - Dr. JOHN Claros ([facility])  - [phone]

## 2019-12-24 NOTE — PROGRESS NOTE ADULT - ASSESSMENT
89 y/o Male from [facility], with pmhx of CAD, COPD, HTN, Prostate CA, S/P Radiation therapy years ago, CKD,  Anemia, DM on No Meds, GOUT, Glaucoma, Aortic Stenosis, Osteoporosis , Legs edema, A+O x 4, presents with c/o dyspnea upon exertion x 3-5 days.

## 2019-12-24 NOTE — PROGRESS NOTE ADULT - ASSESSMENT
89 y/o Male from [facility] [place] , with pmhx of CAD, COPD, HTN, Prostate CA, S/P Radiation therapy years ago, CKD,  Anemia, DM on No Meds, GOUT, Glaucoma, Aortic Stenosis , Osteoporosis , Legs edema, A+O x 4, presents with c/o dyspnea    CLIFFORD  possible sec to diuresis in setting of severe AS  hold diuretic, give gentle hydration with NS @ 50cc/hr x 10 hrs  renal function improving today  monitor bmp    Renal failure  likely CKD stage 4 follow up with a nephrologist   baseline unclear likely ~ 2.5  Monitor Renal function at present  avoid nephrotoxic agents, NSAIDs  monitor bmp    Anemia  iron study done iron sat 7%   likely iron def  r/o gib  continue iron supplement  transfuse to keep Hb>8    Hypervolemia  better  monitor i/o daily weight  f/u cardio    ckd-mbd  pth optimal for ckd  elevated phos continue phoslo  on daily vit d  monitor phos and calcium daily  low phos diet    Proteinuria  urine p/c ratio 599mg/day  likely sec to htn  monitor    HTN  controlled  monitor 89 y/o Male from [facility] [place] , with pmhx of CAD, COPD, HTN, Prostate CA, S/P Radiation therapy years ago, CKD,  Anemia, DM on No Meds, GOUT, Glaucoma, Aortic Stenosis , Osteoporosis , Legs edema, A+O x 4, presents with c/o dyspnea    CLIFFORD  possible sec to diuresis in setting of severe AS  hold diuretic, give gentle hydration with NS @ 50cc/hr x 10 hrs  renal function remains stable today   monitor bmp    Renal failure  likely CKD stage 4 follow up with a nephrologist   baseline unclear likely ~ 2.5  Monitor Renal function at present  avoid nephrotoxic agents, NSAIDs  monitor bmp    Anemia  iron study done iron sat 7%   likely iron def  r/o gib  continue iron supplement  transfuse to keep Hb>8    Hypervolemia  better  monitor i/o daily weight  f/u cardio    ckd-mbd  pth optimal for ckd  elevated phos continue phoslo  on daily vit d  monitor phos and calcium daily  low phos diet    Proteinuria  urine p/c ratio 599mg/day  likely sec to htn  monitor    HTN  controlled  monitor

## 2019-12-24 NOTE — DISCHARGE NOTE PROVIDER - INSTRUCTIONS
Low salt, low fat and low cholesterol diet.   Renal diet (low sodium, low potassium and low phosphorus).

## 2019-12-24 NOTE — PROGRESS NOTE ADULT - PROBLEM SELECTOR PLAN 1
Acute systolic CHF (worsen LV function)   Cardiology eval Appreciated  s/p IV Lasix 40 mg QD, Cr rising. switch to PO Lasix 40 mg qdaily per cardiology  CT Chest No Contrast: distal impacted airways within the right middle lobe and lingula. procalcitonin is low. observe off abx, pulm eval appreciated  PT Consult, Daily Weight  TTE with worse systolic function, though hard to visualize, suspected severe AS  card on the case. TAVR or any intervention may be high risk per card  on gentle diuresis given he is preload dependent  monitor Cr, s/p 500 cc IVF yesterday x 1.   Cr stable.   d/w cardiology. Likely will need Lasix PO to maintain current status of euvolemia  In pt TAVR vs. outpt follow up discussed at length with pt and family. await decision.   check room air O2 and on ambulation.

## 2019-12-24 NOTE — PROGRESS NOTE ADULT - NSHPATTENDINGPLANDISCUSS_GEN_ALL_CORE
PMD
PMD
PA
pt and HECTOR Lin d/w the daughter in law who will speak with the son Benitez ()
pt and NP alexis Wilson/sarmad ponce fellow and renal (Dr. Poe)
pt and PA
pt and PA
pt and PA, d/w pulm and card
pt and pulm

## 2019-12-24 NOTE — PROGRESS NOTE ADULT - PROBLEM SELECTOR PROBLEM 7
Hypertension
Hypertension
Chronic kidney disease (CKD)
Hypertension

## 2019-12-26 PROBLEM — H40.9 UNSPECIFIED GLAUCOMA: Chronic | Status: ACTIVE | Noted: 2019-12-18

## 2019-12-26 PROBLEM — R60.0 LOCALIZED EDEMA: Chronic | Status: ACTIVE | Noted: 2019-12-18

## 2019-12-26 PROBLEM — I25.10 ATHEROSCLEROTIC HEART DISEASE OF NATIVE CORONARY ARTERY WITHOUT ANGINA PECTORIS: Chronic | Status: ACTIVE | Noted: 2019-12-18

## 2019-12-26 PROBLEM — M81.0 AGE-RELATED OSTEOPOROSIS WITHOUT CURRENT PATHOLOGICAL FRACTURE: Chronic | Status: ACTIVE | Noted: 2019-12-18

## 2019-12-26 PROBLEM — E11.9 TYPE 2 DIABETES MELLITUS WITHOUT COMPLICATIONS: Chronic | Status: ACTIVE | Noted: 2019-12-18

## 2019-12-26 PROBLEM — I35.0 NONRHEUMATIC AORTIC (VALVE) STENOSIS: Chronic | Status: ACTIVE | Noted: 2019-12-18

## 2019-12-26 PROBLEM — N18.9 CHRONIC KIDNEY DISEASE, UNSPECIFIED: Chronic | Status: ACTIVE | Noted: 2019-12-18

## 2019-12-30 PROBLEM — R91.8 GROUND GLASS OPACITY PRESENT ON IMAGING OF LUNG: Status: RESOLVED | Noted: 2019-12-30 | Resolved: 2019-12-30

## 2019-12-30 PROBLEM — I35.9 AORTIC VALVE DISORDER: Status: ACTIVE | Noted: 2019-12-30

## 2019-12-30 PROBLEM — Z92.3 S/P RADIATION THERAPY: Status: RESOLVED | Noted: 2019-12-30 | Resolved: 2019-12-30

## 2019-12-30 PROBLEM — Z85.46 HISTORY OF MALIGNANT NEOPLASM OF PROSTATE: Status: RESOLVED | Noted: 2019-12-30 | Resolved: 2019-12-30

## 2019-12-30 PROBLEM — Z86.69 HISTORY OF BLURRY VISION: Status: RESOLVED | Noted: 2019-12-30 | Resolved: 2019-12-30

## 2019-12-30 NOTE — CONSULT LETTER
[Dear  ___] : Dear ~WESTLEY, [Courtesy Letter:] : I had the pleasure of seeing your patient, [unfilled], in my office today. [Consult Closing:] : Thank you very much for allowing me to participate in the care of this patient.  If you have any questions, please do not hesitate to contact me. [Please see my note below.] : Please see my note below. [Sincerely,] : Sincerely, [FreeTextEntry2] : Christoph Reinoso M.D.\par [address]\[place], [place], [address] [FreeTextEntry3] : Yuan Mora MD, FACS\par Attending Surgeon \par Cardiovascular & Thoracic Surgery\par  \par [employer]\par

## 2019-12-30 NOTE — HISTORY OF PRESENT ILLNESS
[FreeTextEntry1] : Justo is a 89 year old male who lives in assistive living the [place] with PMH of CAD, COPD, prostate cancer (s/p radiation therapy), CKD, anemia, cataracts, HTN, and aortic valve disorder. He was referred to valve clinic for consideration for RAMONA following recent hospital admission for acute heart failure. He was diuresed with IV Lasix and referred for outpatient consultation.

## 2019-12-30 NOTE — ASSESSMENT
[FreeTextEntry1] : Justo is a 89 year old male who lives in assistive living the [place] with PMH of CAD, COPD, prostate cancer (s/p radiation therapy), CKD, anemia, cataracts, HTN, and aortic valve disorder. He was referred to valve clinic for consideration for RAMNOA following recent hospital admission for acute heart failure. He was diuresed with IV Lasix and referred for outpatient consultation.

## 2019-12-31 ENCOUNTER — APPOINTMENT (OUTPATIENT)
Dept: CARDIOTHORACIC SURGERY | Facility: CLINIC | Age: 84
End: 2019-12-31

## 2019-12-31 DIAGNOSIS — Z85.46 PERSONAL HISTORY OF MALIGNANT NEOPLASM OF PROSTATE: ICD-10-CM

## 2019-12-31 DIAGNOSIS — Z92.3 PERSONAL HISTORY OF IRRADIATION: ICD-10-CM

## 2019-12-31 DIAGNOSIS — Z86.69 PERSONAL HISTORY OF OTHER DISEASES OF THE NERVOUS SYSTEM AND SENSE ORGANS: ICD-10-CM

## 2019-12-31 DIAGNOSIS — I51.9 HEART DISEASE, UNSPECIFIED: ICD-10-CM

## 2019-12-31 DIAGNOSIS — R91.8 OTHER NONSPECIFIC ABNORMAL FINDING OF LUNG FIELD: ICD-10-CM

## 2019-12-31 DIAGNOSIS — I35.9 NONRHEUMATIC AORTIC VALVE DISORDER, UNSPECIFIED: ICD-10-CM

## 2019-12-31 RX ORDER — ALENDRONATE SODIUM 70 MG/1
70 TABLET ORAL
Qty: 12 | Refills: 0 | Status: DISCONTINUED | COMMUNITY
Start: 2019-08-23

## 2019-12-31 RX ORDER — DORZOLAMIDE HYDROCHLORIDE 20 MG/ML
2 SOLUTION OPHTHALMIC TWICE DAILY
Qty: 1 | Refills: 3 | Status: COMPLETED | OUTPATIENT
Start: 2018-03-23 | End: 2019-12-31

## 2019-12-31 RX ORDER — METOPROLOL TARTRATE 25 MG/1
25 TABLET, FILM COATED ORAL
Qty: 30 | Refills: 0 | Status: ACTIVE | COMMUNITY
Start: 2019-12-24

## 2019-12-31 RX ORDER — HYDRALAZINE HYDROCHLORIDE 25 MG/1
25 TABLET ORAL
Qty: 90 | Refills: 0 | Status: ACTIVE | COMMUNITY
Start: 2019-12-24

## 2019-12-31 RX ORDER — BRINZOLAMIDE 10 MG/ML
1 SUSPENSION/ DROPS OPHTHALMIC
Qty: 30 | Refills: 0 | Status: ACTIVE | COMMUNITY
Start: 2019-08-03

## 2019-12-31 RX ORDER — ALLOPURINOL 100 MG/1
100 TABLET ORAL
Qty: 180 | Refills: 0 | Status: ACTIVE | COMMUNITY
Start: 2019-09-20

## 2019-12-31 RX ORDER — CALCIUM ACETATE 667 MG
667 TABLET ORAL
Qty: 60 | Refills: 0 | Status: ACTIVE | COMMUNITY
Start: 2019-12-24

## 2019-12-31 RX ORDER — SIMVASTATIN 80 MG/1
80 TABLET, FILM COATED ORAL
Qty: 90 | Refills: 0 | Status: ACTIVE | COMMUNITY
Start: 2019-10-04

## 2019-12-31 RX ORDER — AMLODIPINE BESYLATE 5 MG/1
5 TABLET ORAL
Qty: 135 | Refills: 0 | Status: ACTIVE | COMMUNITY
Start: 2019-08-09

## 2019-12-31 RX ORDER — FUROSEMIDE 40 MG/1
40 TABLET ORAL
Qty: 30 | Refills: 0 | Status: ACTIVE | COMMUNITY
Start: 2019-12-24

## 2019-12-31 RX ORDER — AMLODIPINE BESYLATE 10 MG/1
10 TABLET ORAL
Qty: 90 | Refills: 0 | Status: ACTIVE | COMMUNITY
Start: 2019-10-28

## 2020-01-02 ENCOUNTER — APPOINTMENT (OUTPATIENT)
Dept: CARDIOLOGY | Facility: CLINIC | Age: 85
End: 2020-01-02

## 2020-02-14 ENCOUNTER — INPATIENT (INPATIENT)
Facility: HOSPITAL | Age: 85
LOS: 1 days | Discharge: ANOTHER HOSPITAL | DRG: 377 | End: 2020-02-16
Attending: FAMILY MEDICINE | Admitting: FAMILY MEDICINE
Payer: MEDICARE

## 2020-02-14 VITALS
TEMPERATURE: 97 F | WEIGHT: 139.99 LBS | OXYGEN SATURATION: 98 % | DIASTOLIC BLOOD PRESSURE: 64 MMHG | HEIGHT: 67 IN | SYSTOLIC BLOOD PRESSURE: 137 MMHG | HEART RATE: 65 BPM | RESPIRATION RATE: 16 BRPM

## 2020-02-14 DIAGNOSIS — I25.10 ATHEROSCLEROTIC HEART DISEASE OF NATIVE CORONARY ARTERY WITHOUT ANGINA PECTORIS: ICD-10-CM

## 2020-02-14 DIAGNOSIS — Z90.89 ACQUIRED ABSENCE OF OTHER ORGANS: Chronic | ICD-10-CM

## 2020-02-14 DIAGNOSIS — K62.5 HEMORRHAGE OF ANUS AND RECTUM: ICD-10-CM

## 2020-02-14 DIAGNOSIS — Z98.890 OTHER SPECIFIED POSTPROCEDURAL STATES: Chronic | ICD-10-CM

## 2020-02-14 DIAGNOSIS — Z29.9 ENCOUNTER FOR PROPHYLACTIC MEASURES, UNSPECIFIED: ICD-10-CM

## 2020-02-14 DIAGNOSIS — N17.0 ACUTE KIDNEY FAILURE WITH TUBULAR NECROSIS: ICD-10-CM

## 2020-02-14 DIAGNOSIS — K92.1 MELENA: ICD-10-CM

## 2020-02-14 DIAGNOSIS — I50.22 CHRONIC SYSTOLIC (CONGESTIVE) HEART FAILURE: ICD-10-CM

## 2020-02-14 DIAGNOSIS — D62 ACUTE POSTHEMORRHAGIC ANEMIA: ICD-10-CM

## 2020-02-14 LAB
ALBUMIN SERPL ELPH-MCNC: 2.7 G/DL — LOW (ref 3.3–5)
ALP SERPL-CCNC: 111 U/L — SIGNIFICANT CHANGE UP (ref 40–120)
ALT FLD-CCNC: 27 U/L — SIGNIFICANT CHANGE UP (ref 12–78)
ANION GAP SERPL CALC-SCNC: 9 MMOL/L — SIGNIFICANT CHANGE UP (ref 5–17)
APTT BLD: 30.9 SEC — SIGNIFICANT CHANGE UP (ref 27.5–36.3)
AST SERPL-CCNC: 33 U/L — SIGNIFICANT CHANGE UP (ref 15–37)
BASOPHILS # BLD AUTO: 0.03 K/UL — SIGNIFICANT CHANGE UP (ref 0–0.2)
BASOPHILS NFR BLD AUTO: 0.4 % — SIGNIFICANT CHANGE UP (ref 0–2)
BILIRUB SERPL-MCNC: 0.3 MG/DL — SIGNIFICANT CHANGE UP (ref 0.2–1.2)
BUN SERPL-MCNC: 111 MG/DL — HIGH (ref 7–23)
CALCIUM SERPL-MCNC: 8.7 MG/DL — SIGNIFICANT CHANGE UP (ref 8.5–10.1)
CHLORIDE SERPL-SCNC: 97 MMOL/L — SIGNIFICANT CHANGE UP (ref 96–108)
CO2 SERPL-SCNC: 32 MMOL/L — HIGH (ref 22–31)
CREAT SERPL-MCNC: 5.13 MG/DL — HIGH (ref 0.5–1.3)
EOSINOPHIL # BLD AUTO: 0.12 K/UL — SIGNIFICANT CHANGE UP (ref 0–0.5)
EOSINOPHIL NFR BLD AUTO: 1.5 % — SIGNIFICANT CHANGE UP (ref 0–6)
GLUCOSE SERPL-MCNC: 155 MG/DL — HIGH (ref 70–99)
HCT VFR BLD CALC: 23.5 % — LOW (ref 39–50)
HGB BLD-MCNC: 7.3 G/DL — LOW (ref 13–17)
IMM GRANULOCYTES NFR BLD AUTO: 0.4 % — SIGNIFICANT CHANGE UP (ref 0–1.5)
INR BLD: 1.07 RATIO — SIGNIFICANT CHANGE UP (ref 0.88–1.16)
LYMPHOCYTES # BLD AUTO: 0.65 K/UL — LOW (ref 1–3.3)
LYMPHOCYTES # BLD AUTO: 8.3 % — LOW (ref 13–44)
MCHC RBC-ENTMCNC: 30.2 PG — SIGNIFICANT CHANGE UP (ref 27–34)
MCHC RBC-ENTMCNC: 31.1 GM/DL — LOW (ref 32–36)
MCV RBC AUTO: 97.1 FL — SIGNIFICANT CHANGE UP (ref 80–100)
MONOCYTES # BLD AUTO: 0.64 K/UL — SIGNIFICANT CHANGE UP (ref 0–0.9)
MONOCYTES NFR BLD AUTO: 8.2 % — SIGNIFICANT CHANGE UP (ref 2–14)
NEUTROPHILS # BLD AUTO: 6.36 K/UL — SIGNIFICANT CHANGE UP (ref 1.8–7.4)
NEUTROPHILS NFR BLD AUTO: 81.2 % — HIGH (ref 43–77)
PLATELET # BLD AUTO: 164 K/UL — SIGNIFICANT CHANGE UP (ref 150–400)
POTASSIUM SERPL-MCNC: 3.9 MMOL/L — SIGNIFICANT CHANGE UP (ref 3.5–5.3)
POTASSIUM SERPL-SCNC: 3.9 MMOL/L — SIGNIFICANT CHANGE UP (ref 3.5–5.3)
PROT SERPL-MCNC: 7.1 GM/DL — SIGNIFICANT CHANGE UP (ref 6–8.3)
PROTHROM AB SERPL-ACNC: 11.9 SEC — SIGNIFICANT CHANGE UP (ref 10–12.9)
RBC # BLD: 2.42 M/UL — LOW (ref 4.2–5.8)
RBC # FLD: 17 % — HIGH (ref 10.3–14.5)
SODIUM SERPL-SCNC: 138 MMOL/L — SIGNIFICANT CHANGE UP (ref 135–145)
TROPONIN I SERPL-MCNC: 0.23 NG/ML — HIGH (ref 0.01–0.04)
TROPONIN I SERPL-MCNC: 0.26 NG/ML — HIGH (ref 0.01–0.04)
TROPONIN I SERPL-MCNC: 0.26 NG/ML — HIGH (ref 0.01–0.04)
TROPONIN I SERPL-MCNC: 0.28 NG/ML — HIGH (ref 0.01–0.04)
WBC # BLD: 7.83 K/UL — SIGNIFICANT CHANGE UP (ref 3.8–10.5)
WBC # FLD AUTO: 7.83 K/UL — SIGNIFICANT CHANGE UP (ref 3.8–10.5)

## 2020-02-14 PROCEDURE — 93010 ELECTROCARDIOGRAM REPORT: CPT

## 2020-02-14 PROCEDURE — 85014 HEMATOCRIT: CPT

## 2020-02-14 PROCEDURE — 74176 CT ABD & PELVIS W/O CONTRAST: CPT | Mod: 26

## 2020-02-14 PROCEDURE — 84484 ASSAY OF TROPONIN QUANT: CPT

## 2020-02-14 PROCEDURE — 84300 ASSAY OF URINE SODIUM: CPT

## 2020-02-14 PROCEDURE — 80048 BASIC METABOLIC PNL TOTAL CA: CPT

## 2020-02-14 PROCEDURE — 85027 COMPLETE CBC AUTOMATED: CPT

## 2020-02-14 PROCEDURE — 83605 ASSAY OF LACTIC ACID: CPT

## 2020-02-14 PROCEDURE — 82570 ASSAY OF URINE CREATININE: CPT

## 2020-02-14 PROCEDURE — 87040 BLOOD CULTURE FOR BACTERIA: CPT

## 2020-02-14 PROCEDURE — 85025 COMPLETE CBC W/AUTO DIFF WBC: CPT

## 2020-02-14 PROCEDURE — 71045 X-RAY EXAM CHEST 1 VIEW: CPT | Mod: 26

## 2020-02-14 PROCEDURE — 99223 1ST HOSP IP/OBS HIGH 75: CPT

## 2020-02-14 PROCEDURE — C9113: CPT

## 2020-02-14 PROCEDURE — 36430 TRANSFUSION BLD/BLD COMPNT: CPT

## 2020-02-14 PROCEDURE — 82962 GLUCOSE BLOOD TEST: CPT

## 2020-02-14 PROCEDURE — 74176 CT ABD & PELVIS W/O CONTRAST: CPT

## 2020-02-14 PROCEDURE — 85018 HEMOGLOBIN: CPT

## 2020-02-14 PROCEDURE — 80053 COMPREHEN METABOLIC PANEL: CPT

## 2020-02-14 PROCEDURE — 84443 ASSAY THYROID STIM HORMONE: CPT

## 2020-02-14 PROCEDURE — 81001 URINALYSIS AUTO W/SCOPE: CPT

## 2020-02-14 PROCEDURE — P9016: CPT

## 2020-02-14 PROCEDURE — 36415 COLL VENOUS BLD VENIPUNCTURE: CPT

## 2020-02-14 RX ORDER — SIMVASTATIN 20 MG/1
1 TABLET, FILM COATED ORAL
Qty: 0 | Refills: 0 | DISCHARGE

## 2020-02-14 RX ORDER — DOCUSATE SODIUM 100 MG
1 CAPSULE ORAL
Qty: 0 | Refills: 0 | DISCHARGE

## 2020-02-14 RX ORDER — PANTOPRAZOLE SODIUM 20 MG/1
8 TABLET, DELAYED RELEASE ORAL
Qty: 80 | Refills: 0 | Status: DISCONTINUED | OUTPATIENT
Start: 2020-02-14 | End: 2020-02-16

## 2020-02-14 RX ORDER — FUROSEMIDE 40 MG
40 TABLET ORAL
Refills: 0 | Status: DISCONTINUED | OUTPATIENT
Start: 2020-02-14 | End: 2020-02-16

## 2020-02-14 RX ORDER — ALLOPURINOL 300 MG
100 TABLET ORAL DAILY
Refills: 0 | Status: DISCONTINUED | OUTPATIENT
Start: 2020-02-14 | End: 2020-02-16

## 2020-02-14 RX ORDER — CARBAMIDE PEROXIDE 81.86 MG/ML
4 SOLUTION/ DROPS AURICULAR (OTIC)
Refills: 0 | Status: DISCONTINUED | OUTPATIENT
Start: 2020-02-14 | End: 2020-02-16

## 2020-02-14 RX ORDER — AMLODIPINE BESYLATE 2.5 MG/1
1 TABLET ORAL
Qty: 0 | Refills: 0 | DISCHARGE

## 2020-02-14 RX ORDER — ONDANSETRON 8 MG/1
4 TABLET, FILM COATED ORAL EVERY 6 HOURS
Refills: 0 | Status: DISCONTINUED | OUTPATIENT
Start: 2020-02-14 | End: 2020-02-16

## 2020-02-14 RX ORDER — FERROUS SULFATE 325(65) MG
0 TABLET ORAL
Qty: 0 | Refills: 0 | DISCHARGE

## 2020-02-14 RX ORDER — DORZOLAMIDE HYDROCHLORIDE 20 MG/ML
1 SOLUTION/ DROPS OPHTHALMIC THREE TIMES A DAY
Refills: 0 | Status: DISCONTINUED | OUTPATIENT
Start: 2020-02-14 | End: 2020-02-16

## 2020-02-14 RX ORDER — ATORVASTATIN CALCIUM 80 MG/1
40 TABLET, FILM COATED ORAL AT BEDTIME
Refills: 0 | Status: DISCONTINUED | OUTPATIENT
Start: 2020-02-14 | End: 2020-02-16

## 2020-02-14 RX ORDER — SODIUM CHLORIDE 9 MG/ML
500 INJECTION INTRAMUSCULAR; INTRAVENOUS; SUBCUTANEOUS ONCE
Refills: 0 | Status: COMPLETED | OUTPATIENT
Start: 2020-02-14 | End: 2020-02-14

## 2020-02-14 RX ORDER — CHOLECALCIFEROL (VITAMIN D3) 125 MCG
1 CAPSULE ORAL
Qty: 0 | Refills: 0 | DISCHARGE

## 2020-02-14 RX ORDER — CALCIUM ACETATE 667 MG
667 TABLET ORAL
Refills: 0 | Status: DISCONTINUED | OUTPATIENT
Start: 2020-02-14 | End: 2020-02-16

## 2020-02-14 RX ORDER — METOPROLOL TARTRATE 50 MG
12.5 TABLET ORAL
Refills: 0 | Status: DISCONTINUED | OUTPATIENT
Start: 2020-02-14 | End: 2020-02-16

## 2020-02-14 RX ORDER — BRINZOLAMIDE 10 MG/ML
1 SUSPENSION/ DROPS OPHTHALMIC
Qty: 0 | Refills: 0 | DISCHARGE

## 2020-02-14 RX ADMIN — SODIUM CHLORIDE 500 MILLILITER(S): 9 INJECTION INTRAMUSCULAR; INTRAVENOUS; SUBCUTANEOUS at 15:52

## 2020-02-14 RX ADMIN — CARBAMIDE PEROXIDE 4 DROP(S): 81.86 SOLUTION/ DROPS AURICULAR (OTIC) at 23:27

## 2020-02-14 RX ADMIN — Medication 12.5 MILLIGRAM(S): at 18:59

## 2020-02-14 RX ADMIN — SODIUM CHLORIDE 500 MILLILITER(S): 9 INJECTION INTRAMUSCULAR; INTRAVENOUS; SUBCUTANEOUS at 14:51

## 2020-02-14 RX ADMIN — DORZOLAMIDE HYDROCHLORIDE 1 DROP(S): 20 SOLUTION/ DROPS OPHTHALMIC at 21:07

## 2020-02-14 RX ADMIN — CARBAMIDE PEROXIDE 4 DROP(S): 81.86 SOLUTION/ DROPS AURICULAR (OTIC) at 21:08

## 2020-02-14 RX ADMIN — Medication 40 MILLIGRAM(S): at 18:59

## 2020-02-14 RX ADMIN — ATORVASTATIN CALCIUM 40 MILLIGRAM(S): 80 TABLET, FILM COATED ORAL at 21:08

## 2020-02-14 RX ADMIN — Medication 667 MILLIGRAM(S): at 18:59

## 2020-02-14 NOTE — ED ADULT NURSE NOTE - OBJECTIVE STATEMENT
Pt sent from [facility] for low H/H - pt states he had episode of SOB but denies at this time, RA oxygen sat 94%, pt placed on 2L NC with level up to 97%, Dr. Castro aware. Pt denies bloody or black stools. 2+ b/l LE edema

## 2020-02-14 NOTE — H&P ADULT - PROBLEM SELECTOR PLAN 1
possibly 2/2 GIB- report of melena  transfuse 2 units PRBC  monitor h/h and vitals- transfuse further if indicated

## 2020-02-14 NOTE — H&P ADULT - ASSESSMENT
89M w/PMH CAD, COPD, HTN, Prostate CA s/p RT 25yrs ago, CKD4-5, anemia (iron def, CKD), Gout, glaucoma, severe AS (declined TAVR), chronic leg edema, recent admission in Dec at [facility] for sob, offered TAVR, but d/t renal failure and likely to end up on HD, family and pt had declined. Recently LHC and 2 stents,   +anemia, melena from [facility]  In ED, Hg 7.3 (last 9.6), BUN/cr 111/5.1 (prior cr ~3), 2 units PRBC ordered, vss,   CXR Impression: 1. Left lower lobe atelectatic change/infiltrate and small left effusion. Improved right basilar infiltrate with tiny residual right effusion   CT a/p- neg      I also d/w pt regarding code status and he fully understands what this entails and agrees to FULL CODE.  Son also in agreement w/ this.  (GOC discussion 20 mins).

## 2020-02-14 NOTE — ED PROVIDER NOTE - PMH
Anemia    Aortic stenosis    CAD (coronary artery disease)    Chronic kidney disease (CKD)    DM type 2 (diabetes mellitus, type 2)    Glaucoma    Gout    HLD (hyperlipidemia)    Hypertension    Leg edema    Osteoporosis    Prostate cancer

## 2020-02-14 NOTE — H&P ADULT - PROBLEM SELECTOR PLAN 2
possibly upper GIB given elevated BUN  will start on PPI gtt  hold effient until cleared by GI  CLD and NPO >MN in EGD anticipated   monitor vitals, labs

## 2020-02-14 NOTE — ED PROVIDER NOTE - PROGRESS NOTE DETAILS
89 y/o M sent in from [facility] for dark stools. Pt reports dark stools intermittently the past few days, had BW checked and had a hb of 6.5. Pt is on effient. Denies fever/chills, n/v/d, CP, SOB, abd pain, HA, dizziness, or other complaints at this time  Rectal: Melena on BALA. No hemmorhoids appreciated. abd: soft nd, NTTP. -Ambrocio Estrada PA-C Discussed case with Dr. Cormier, recommends CT to asses for obstruction, will admit pt.

## 2020-02-14 NOTE — ED ADULT NURSE NOTE - CHIEF COMPLAINT QUOTE
Patient comes in from [facility] with Hemoglobin of 6.5, patient states he has dark stools. Denies dizziness, abd pain. Patient had 4 cardiac stents placed last week,

## 2020-02-14 NOTE — H&P ADULT - HISTORY OF PRESENT ILLNESS
HPI:  89M w/PMH CAD, COPD, HTN, Prostate CA s/p RT 25yrs ago, CKD4-5, anemia (iron def, CKD), Gout, glaucoma, severe AS (declined TAVR), chronic leg edema, recent admission in Dec at [facility] for sob, offered TAVR, but d/t renal failure and likely to end up on HD, family and pt had declined. Also had been found anemic, transfused 1 unit PRBC. 1 week ago, pt reportedly had LHC and 2 stents placed at [facility] and d/c'd to [facility].  Per report from [facility] today, pt had an episode of melena, hg 6.5 and pt sent here for eval.  Pt did not notice melena, denies dizziness, worsening sob, cp, n/v/d, abd pain.  Pt does endorse having hemorrhoids and very occasionally finds some spotting on tissue after BM, but he did not see melena today.      In ED, Hg 7.3 (last 9.6), BUN/cr 111/5.1 (prior cr ~3), 2 units PRBC ordered, vss,   CXR Impression: 1. Left lower lobe atelectatic change/infiltrate and small left effusion. Improved right basilar infiltrate with tiny residual right effusion   CT a/p- neg     As per pt's request, I called and s/w sonManuel (POA), updating up on pt's status and GOC.  Pt on effient, explained would have to hold in setting of possible GIB until cleared by GI/cardio.  Pt and son verbalized understanding of risks.  I also d/w pt regarding code status and he fully understands what this entails and agrees to FULL CODE.  Son also in agreement w/ this.  (GOC discussion 20 mins).       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  EYES: No eye pain or discharge.  ENMT:  No sinus or throat pain  NECK: No pain or stiffness  RESPIRATORY: No cough, wheezing, chills or hemoptysis; No shortness of breath  CARDIOVASCULAR: No chest pain, palpitations, dizziness, or leg swelling  GASTROINTESTINAL: No abdominal or epigastric pain. No nausea, vomiting, or hematemesis; No diarrhea or constipation. No melena or hematochezia.  GENITOURINARY: No dysuria or incontinence  NEUROLOGICAL: No headaches, memory loss, loss of strength, numbness, or tremors  SKIN: No rashes.  LYMPH NODES: No enlarged glands  ENDOCRINE: No heat or cold intolerance; No hair loss  MUSCULOSKELETAL: No joint pain or swelling; No muscle, back, or extremity pain  PSYCHIATRIC: No depression, anxiety, mood swings, or difficulty sleeping  HEME/LYMPH: No easy bruising, or bleeding gums  ALLERY AND IMMUNOLOGIC: No hives or eczema    Allergies    ACE inhibitors (Unknown)  aspirin (Unknown)  enalapril (Unknown)      Social History:   requires assistance  uses FWW  denies smoking/etoh    FAMILY HISTORY:  Family hx of lung cancer  Family history of CVA      Home Medications:  acetaminophen 650 mg oral tablet: 1 tab(s) orally every 6 hours, As Needed - for fever - for mild pain (14 Feb 2020 15:35)  alendronate 70 mg oral tablet: 1 tab(s) orally once a week  ***Tuesdays*** (14 Feb 2020 15:35)  allopurinol 100 mg oral tablet: 1 tab(s) orally once a day (14 Feb 2020 15:35)  amLODIPine 5 mg oral tablet: 1 tab(s) orally once a day (14 Feb 2020 15:35)  atorvastatin 40 mg oral tablet: 1 tab(s) orally once a day (14 Feb 2020 15:35)  calcium acetate 667 mg oral tablet: 1 tab(s) orally 3 times a day (14 Feb 2020 15:35)  cholecalciferol 50,000 intl units (1250 mcg) oral capsule: 1 cap(s) orally once a month  ****12th of every month*** (14 Feb 2020 15:35)  Colace 100 mg oral capsule: 2 cap(s) orally once a day (at bedtime) (14 Feb 2020 15:35)  Debrox 6.5% otic solution: 4 drop(s) in each ear every 4 hours (14 Feb 2020 15:35)  dorzolamide 2% ophthalmic solution: 1 drop(s) in each eye 3 times a day (14 Feb 2020 15:35)  ferrous gluconate 324 mg (38 mg elemental iron) oral tablet: 1 tab(s) orally once a day (14 Feb 2020 15:35)  Lasix 40 mg oral tablet: 1 tab(s) orally 2 times a day (14 Feb 2020 15:35)  metOLazone 2.5 mg oral tablet: 1 tab(s) orally once a day (14 Feb 2020 15:35)  Milk of Magnesia 8% oral suspension: 30 milliliter(s) orally once a day (at bedtime), As Needed - for constipation (14 Feb 2020 15:35)  MiraLax oral powder for reconstitution: 17 gram(s) orally once a day (14 Feb 2020 15:35)  prasugrel 5 mg oral tablet: 1 tab(s) orally once a day (14 Feb 2020 15:35)  Retacrit 10,000 units/mL preservative-free injectable solution: 1 dose(s) injectable once a week  ***Fridays**** (14 Feb 2020 15:35)      MEDICATIONS  (STANDING):  allopurinol 100 milliGRAM(s) Oral daily  atorvastatin 40 milliGRAM(s) Oral at bedtime  calcium acetate 667 milliGRAM(s) Oral three times a day with meals  carbamide peroxide Otic Solution 4 Drop(s) Both Ears <User Schedule>  dorzolamide 2% Ophthalmic Solution 1 Drop(s) Both EYES three times a day  pantoprazole Infusion 8 mG/Hr (10 mL/Hr) IV Continuous <Continuous>    MEDICATIONS  (PRN):  ondansetron Injectable 4 milliGRAM(s) IV Push every 6 hours PRN Nausea        PHYSICAL EXAM:  Vital Signs Last 24 Hrs  T(C): 36.4 (14 Feb 2020 15:15), Max: 36.4 (14 Feb 2020 15:15)  T(F): 97.5 (14 Feb 2020 15:15), Max: 97.5 (14 Feb 2020 15:15)  HR: 60 (14 Feb 2020 15:15) (60 - 65)  BP: 120/52 (14 Feb 2020 15:15) (120/52 - 137/64)  BP(mean): --  RR: 16 (14 Feb 2020 15:15) (16 - 16)  SpO2: 96% (14 Feb 2020 15:15) (96% - 98%)  GENERAL: NAD, well-developed for age  HEAD:  Atraumatic, Normocephalic  EYES: EOMI, PERRLA, conjunctiva and sclera clear  ENT: normal hearing, no nasal discharge, throat clear, dentition normal for age  NECK: Supple, No JVD, no LAD, no thyromegaly   CHEST/LUNG: Clear to auscultation bilaterally; No wheeze, respirations unlabored  HEART: Regular rate and rhythm; No murmurs, rubs, or gallops  ABDOMEN: Soft, Nontender, Nondistended; Bowel sounds present, no HSM  EXTREMITIES:  2+ Peripheral Pulses, No clubbing, cyanosis, b/l LE pitting edema, R>L  PSYCH: AAOx3, normal behavior  NEUROLOGY: non-focal, sensory and cn 2-12 intact, speech/language intact  SKIN: No visible rashes or lesions    LABS:                        7.3    7.83  )-----------( 164      ( 14 Feb 2020 13:27 )             23.5     02-14    138  |  97  |  111<H>  ----------------------------<  155<H>  3.9   |  32<H>  |  5.13<H>    Ca    8.7      14 Feb 2020 13:27    TPro  7.1  /  Alb  2.7<L>  /  TBili  0.3  /  DBili  x   /  AST  33  /  ALT  27  /  AlkPhos  111  02-14    PT/INR - ( 14 Feb 2020 13:27 )   PT: 11.9 sec;   INR: 1.07 ratio         PTT - ( 14 Feb 2020 13:27 )  PTT:30.9 sec  CARDIAC MARKERS ( 14 Feb 2020 13:27 )  0.283 ng/mL / x     / x     / x     / x              RADIOLOGY & ADDITIONAL TESTS:    Imaging Personally Reviewed:  CXR Impression: 1. Left lower lobe atelectatic change/infiltrate and small left effusion. Improved right basilar infiltrate with tiny residual right effusion   CT a/p- neg   EKG Personally Reviewed:  NSR    Consultant(s) Notes Reviewed:    prior chart    Care Discussed with Consultants/Other Providers:  ED PA

## 2020-02-14 NOTE — ED ADULT TRIAGE NOTE - CHIEF COMPLAINT QUOTE
Patient comes in from [facility] with Hemoglobin of 6.5, patient states he has dark stools. Denies dizziness, abd pain. Patient had 4 cardiac stents placed last week,

## 2020-02-14 NOTE — ED PROVIDER NOTE - CONSTITUTIONAL, MLM
normal... Pale, chronically ill appearing, awake, alert, oriented to person, place, time/situation and in no apparent distress.

## 2020-02-14 NOTE — ED ADULT NURSE REASSESSMENT NOTE - NSIMPLEMENTINTERV_GEN_ALL_ED
Implemented All Fall with Harm Risk Interventions:  [place] to call system. Call bell, personal items and telephone within reach. Instruct patient to call for assistance. Room bathroom lighting operational. Non-slip footwear when patient is off stretcher. Physically safe environment: no spills, clutter or unnecessary equipment. Stretcher in lowest position, wheels locked, appropriate side rails in place. Provide visual cue, wrist band, yellow gown, etc. Monitor gait and stability. Monitor for mental status changes and reorient to person, place, and time. Review medications for side effects contributing to fall risk. Reinforce activity limits and safety measures with patient and family. Provide visual clues: red socks.

## 2020-02-14 NOTE — ED ADULT NURSE REASSESSMENT NOTE - NS ED NURSE REASSESS COMMENT FT1
Pt alert and oriented x3 VSS afebrile. Pt resting comfortably-vitals stable. Blood infusing and protonix infusing. Pt denies any knowledge of rectal bleeding, but some redness noted to pad patient is wearing underneath underwear. Son at bedside. Plan of care reviewed. Pt ready for transfer to floor, all needs addressed and safety maintained. Call bell in reach.

## 2020-02-14 NOTE — H&P ADULT - PROBLEM SELECTOR PLAN 4
likely 2/2 anemia, recent cath  monitor am labs  renal cs for further input  will hold off further IVF given low EF, severe AS, receiving 2 units PRBCs

## 2020-02-14 NOTE — H&P ADULT - PROBLEM SELECTOR PLAN 5
clinically stable  monitor for fluid overload,   daily wts, strict i/o's  restrict fluid/sodium  c/w home dose lasix- monitor vitals, renal status   further per cardio/renal

## 2020-02-14 NOTE — H&P ADULT - PROBLEM SELECTOR PLAN 3
recent stents placed  cardio cs for further recs as effient will have to be on hold in setting of GIB and anemia  will c/w bb, monitor bp  elevated trop - possibly 2/2 anemia, recent cath- will check serial trops, although pt not c/o cp

## 2020-02-15 DIAGNOSIS — N17.9 ACUTE KIDNEY FAILURE, UNSPECIFIED: ICD-10-CM

## 2020-02-15 DIAGNOSIS — I38 ENDOCARDITIS, VALVE UNSPECIFIED: ICD-10-CM

## 2020-02-15 LAB
ADD ON TEST-SPECIMEN IN LAB: SIGNIFICANT CHANGE UP
ANION GAP SERPL CALC-SCNC: 8 MMOL/L — SIGNIFICANT CHANGE UP (ref 5–17)
APPEARANCE UR: CLEAR — SIGNIFICANT CHANGE UP
BASOPHILS # BLD AUTO: 0.03 K/UL — SIGNIFICANT CHANGE UP (ref 0–0.2)
BASOPHILS NFR BLD AUTO: 0.5 % — SIGNIFICANT CHANGE UP (ref 0–2)
BILIRUB UR-MCNC: NEGATIVE — SIGNIFICANT CHANGE UP
BUN SERPL-MCNC: 109 MG/DL — HIGH (ref 7–23)
CALCIUM SERPL-MCNC: 8.7 MG/DL — SIGNIFICANT CHANGE UP (ref 8.5–10.1)
CHLORIDE SERPL-SCNC: 98 MMOL/L — SIGNIFICANT CHANGE UP (ref 96–108)
CO2 SERPL-SCNC: 32 MMOL/L — HIGH (ref 22–31)
COLOR SPEC: YELLOW — SIGNIFICANT CHANGE UP
CREAT ?TM UR-MCNC: 49 MG/DL — SIGNIFICANT CHANGE UP
CREAT SERPL-MCNC: 4.75 MG/DL — HIGH (ref 0.5–1.3)
DIFF PNL FLD: ABNORMAL
EOSINOPHIL # BLD AUTO: 0.12 K/UL — SIGNIFICANT CHANGE UP (ref 0–0.5)
EOSINOPHIL NFR BLD AUTO: 1.8 % — SIGNIFICANT CHANGE UP (ref 0–6)
GLUCOSE SERPL-MCNC: 109 MG/DL — HIGH (ref 70–99)
GLUCOSE UR QL: NEGATIVE MG/DL — SIGNIFICANT CHANGE UP
HCT VFR BLD CALC: 25.1 % — LOW (ref 39–50)
HCT VFR BLD CALC: 26.3 % — LOW (ref 39–50)
HGB BLD-MCNC: 7.8 G/DL — LOW (ref 13–17)
HGB BLD-MCNC: 8.2 G/DL — LOW (ref 13–17)
IMM GRANULOCYTES NFR BLD AUTO: 0.5 % — SIGNIFICANT CHANGE UP (ref 0–1.5)
KETONES UR-MCNC: NEGATIVE — SIGNIFICANT CHANGE UP
LACTATE SERPL-SCNC: 0.5 MMOL/L — LOW (ref 0.7–2)
LEUKOCYTE ESTERASE UR-ACNC: NEGATIVE — SIGNIFICANT CHANGE UP
LYMPHOCYTES # BLD AUTO: 0.49 K/UL — LOW (ref 1–3.3)
LYMPHOCYTES # BLD AUTO: 7.5 % — LOW (ref 13–44)
MCHC RBC-ENTMCNC: 29.5 PG — SIGNIFICANT CHANGE UP (ref 27–34)
MCHC RBC-ENTMCNC: 31.2 GM/DL — LOW (ref 32–36)
MCV RBC AUTO: 94.6 FL — SIGNIFICANT CHANGE UP (ref 80–100)
MONOCYTES # BLD AUTO: 0.66 K/UL — SIGNIFICANT CHANGE UP (ref 0–0.9)
MONOCYTES NFR BLD AUTO: 10.1 % — SIGNIFICANT CHANGE UP (ref 2–14)
NEUTROPHILS # BLD AUTO: 5.23 K/UL — SIGNIFICANT CHANGE UP (ref 1.8–7.4)
NEUTROPHILS NFR BLD AUTO: 79.6 % — HIGH (ref 43–77)
NITRITE UR-MCNC: NEGATIVE — SIGNIFICANT CHANGE UP
PH UR: 7 — SIGNIFICANT CHANGE UP (ref 5–8)
PLATELET # BLD AUTO: 151 K/UL — SIGNIFICANT CHANGE UP (ref 150–400)
POTASSIUM SERPL-MCNC: 3.6 MMOL/L — SIGNIFICANT CHANGE UP (ref 3.5–5.3)
POTASSIUM SERPL-SCNC: 3.6 MMOL/L — SIGNIFICANT CHANGE UP (ref 3.5–5.3)
PROT UR-MCNC: 15 MG/DL
RBC # BLD: 2.78 M/UL — LOW (ref 4.2–5.8)
RBC # FLD: 17.7 % — HIGH (ref 10.3–14.5)
SODIUM SERPL-SCNC: 138 MMOL/L — SIGNIFICANT CHANGE UP (ref 135–145)
SODIUM UR-SCNC: 77 MMOL/L — SIGNIFICANT CHANGE UP
SP GR SPEC: 1 — LOW (ref 1.01–1.02)
TSH SERPL-MCNC: 2.21 UU/ML — SIGNIFICANT CHANGE UP (ref 0.34–4.82)
UROBILINOGEN FLD QL: NEGATIVE MG/DL — SIGNIFICANT CHANGE UP
WBC # BLD: 6.56 K/UL — SIGNIFICANT CHANGE UP (ref 3.8–10.5)
WBC # FLD AUTO: 6.56 K/UL — SIGNIFICANT CHANGE UP (ref 3.8–10.5)

## 2020-02-15 PROCEDURE — 99223 1ST HOSP IP/OBS HIGH 75: CPT

## 2020-02-15 PROCEDURE — 99233 SBSQ HOSP IP/OBS HIGH 50: CPT

## 2020-02-15 RX ORDER — HEPARIN SODIUM 5000 [USP'U]/ML
5000 INJECTION INTRAVENOUS; SUBCUTANEOUS EVERY 12 HOURS
Refills: 0 | Status: DISCONTINUED | OUTPATIENT
Start: 2020-02-15 | End: 2020-02-16

## 2020-02-15 RX ORDER — CEFEPIME 1 G/1
250 INJECTION, POWDER, FOR SOLUTION INTRAMUSCULAR; INTRAVENOUS EVERY 24 HOURS
Refills: 0 | Status: DISCONTINUED | OUTPATIENT
Start: 2020-02-15 | End: 2020-02-16

## 2020-02-15 RX ORDER — ACETAMINOPHEN 500 MG
1 TABLET ORAL
Qty: 0 | Refills: 0 | DISCHARGE

## 2020-02-15 RX ORDER — ALENDRONATE SODIUM 70 MG/1
1 TABLET ORAL
Qty: 0 | Refills: 0 | DISCHARGE

## 2020-02-15 RX ORDER — POLYETHYLENE GLYCOL 3350 17 G/17G
17 POWDER, FOR SOLUTION ORAL
Qty: 0 | Refills: 0 | DISCHARGE

## 2020-02-15 RX ORDER — MAGNESIUM HYDROXIDE 400 MG/1
30 TABLET, CHEWABLE ORAL
Qty: 0 | Refills: 0 | DISCHARGE

## 2020-02-15 RX ORDER — CHOLECALCIFEROL (VITAMIN D3) 125 MCG
1 CAPSULE ORAL
Qty: 0 | Refills: 0 | DISCHARGE

## 2020-02-15 RX ORDER — CEFEPIME 1 G/1
0 INJECTION, POWDER, FOR SOLUTION INTRAMUSCULAR; INTRAVENOUS
Qty: 0 | Refills: 0 | DISCHARGE
Start: 2020-02-15

## 2020-02-15 RX ORDER — ALLOPURINOL 300 MG
1 TABLET ORAL
Qty: 0 | Refills: 0 | DISCHARGE

## 2020-02-15 RX ORDER — PANTOPRAZOLE SODIUM 20 MG/1
0 TABLET, DELAYED RELEASE ORAL
Qty: 0 | Refills: 0 | DISCHARGE
Start: 2020-02-15

## 2020-02-15 RX ORDER — PRASUGREL 5 MG/1
5 TABLET, FILM COATED ORAL DAILY
Refills: 0 | Status: DISCONTINUED | OUTPATIENT
Start: 2020-02-15 | End: 2020-02-16

## 2020-02-15 RX ORDER — AMLODIPINE BESYLATE 2.5 MG/1
1 TABLET ORAL
Qty: 0 | Refills: 0 | DISCHARGE

## 2020-02-15 RX ADMIN — DORZOLAMIDE HYDROCHLORIDE 1 DROP(S): 20 SOLUTION/ DROPS OPHTHALMIC at 05:32

## 2020-02-15 RX ADMIN — ATORVASTATIN CALCIUM 40 MILLIGRAM(S): 80 TABLET, FILM COATED ORAL at 21:26

## 2020-02-15 RX ADMIN — Medication 40 MILLIGRAM(S): at 11:15

## 2020-02-15 RX ADMIN — Medication 12.5 MILLIGRAM(S): at 17:12

## 2020-02-15 RX ADMIN — DORZOLAMIDE HYDROCHLORIDE 1 DROP(S): 20 SOLUTION/ DROPS OPHTHALMIC at 13:45

## 2020-02-15 RX ADMIN — Medication 100 MILLIGRAM(S): at 11:12

## 2020-02-15 RX ADMIN — PRASUGREL 5 MILLIGRAM(S): 5 TABLET, FILM COATED ORAL at 17:10

## 2020-02-15 RX ADMIN — HEPARIN SODIUM 5000 UNIT(S): 5000 INJECTION INTRAVENOUS; SUBCUTANEOUS at 17:14

## 2020-02-15 RX ADMIN — CARBAMIDE PEROXIDE 4 DROP(S): 81.86 SOLUTION/ DROPS AURICULAR (OTIC) at 05:32

## 2020-02-15 RX ADMIN — CEFEPIME 100 MILLIGRAM(S): 1 INJECTION, POWDER, FOR SOLUTION INTRAMUSCULAR; INTRAVENOUS at 11:11

## 2020-02-15 RX ADMIN — Medication 40 MILLIGRAM(S): at 17:12

## 2020-02-15 RX ADMIN — Medication 667 MILLIGRAM(S): at 12:01

## 2020-02-15 RX ADMIN — DORZOLAMIDE HYDROCHLORIDE 1 DROP(S): 20 SOLUTION/ DROPS OPHTHALMIC at 21:26

## 2020-02-15 RX ADMIN — PANTOPRAZOLE SODIUM 10 MG/HR: 20 TABLET, DELAYED RELEASE ORAL at 13:44

## 2020-02-15 RX ADMIN — Medication 667 MILLIGRAM(S): at 17:11

## 2020-02-15 NOTE — PROGRESS NOTE ADULT - SUBJECTIVE AND OBJECTIVE BOX
cc: anemia  hpi: 89y male w/ pmh CAD s/p PCI, stent last week at [facility] on Effient, severe AS, CKD4, anemia of chronic diasease, htn, prostate ca s/p RT 25yr ago, COPD sent to ED from [facility] b/c Hg 6.5.    Patient was hypothermic overnight.   - Saw pt early today- denies cp, sob, palp, no abd pain.  Dry cough.  Discussed w/ RN- no blood in stool.  Discussed w/ son, Manuel-  he called Dr. Cárdenas regarding transferring pt to [facility].  I left message w/ his service [phone] and am waiting for a call back.      ros- as per hpi, otherwise 10 point ros negative      Vital Signs Last 24 Hrs  T(C): 36.3 (15 Feb 2020 11:30), Max: 36.5 (14 Feb 2020 18:06)  T(F): 97.3 (15 Feb 2020 11:30), Max: 97.7 (14 Feb 2020 18:06)  HR: 69 (15 Feb 2020 11:30) (59 - 69)  BP: 131/56 (15 Feb 2020 11:30) (119/51 - 137/64)  BP(mean): --  RR: 18 (15 Feb 2020 11:30) (16 - 20)  SpO2: 93% (15 Feb 2020 11:30) (92% - 98%) on 2LNC      PHYSICAL EXAM:  General: ill appearing elderly male,  NAD, comfortable; flaco mikaylaer  Neuro: AAOx3, no focal deficits  HEENT: NCAT, EOMI  Neck: Soft and supple  Respiratory: coarse bibasilar sounds; no wheezing  Cardiovascular: S1 and S2, RRR , JACKELIN  Gastrointestinal: soft; non ttp   Extremities: No edema  Vascular: 2+ peripheral pulses  Musculoskeletal: moving extrem        LABS: All Labs Reviewed:                        8.2    6.56  )-----------( 151      ( 15 Feb 2020 06:59 )             26.3     02-15    138  |  98  |  109<H>  ----------------------------<  109<H>  3.6   |  32<H>  |  4.75<H>    Ca    8.7      15 Feb 2020 06:59    TPro  7.1  /  Alb  2.7<L>  /  TBili  0.3  /  DBili  x   /  AST  33  /  ALT  27  /  AlkPhos  111  02-14    PT/INR - ( 14 Feb 2020 13:27 )   PT: 11.9 sec;   INR: 1.07 ratio         PTT - ( 14 Feb 2020 13:27 )  PTT:30.9 sec  CARDIAC MARKERS ( 14 Feb 2020 22:15 )  0.231 ng/mL / x     / x     / x     / x      CARDIAC MARKERS ( 14 Feb 2020 18:55 )  0.259 ng/mL / x     / x     / x     / x      CARDIAC MARKERS ( 14 Feb 2020 16:42 )  0.257 ng/mL / x     / x     / x     / x      CARDIAC MARKERS ( 14 Feb 2020 13:27 )  0.283 ng/mL / x     / x     / x     / x        < from: CT Abdomen and Pelvis No Cont (02.14.20 @ 15:40) >  IMPRESSION:     No hydronephrosis or urinary tract obstruction.    Small hemorrhage in the right groin. Correlate for recent intervention.    < end of copied text >    < from: Xray Chest 1 View-PORTABLE IMMEDIATE (02.14.20 @ 14:52) >    Impression:  1. Left lower lobe atelectatic change/infiltrate and small left effusion. Improved right basilar infiltrate with tiny residual right effusion     < end of copied text >      MEDICATIONS  (STANDING):  allopurinol 100 milliGRAM(s) Oral daily  atorvastatin 40 milliGRAM(s) Oral at bedtime  calcium acetate 667 milliGRAM(s) Oral three times a day with meals  carbamide peroxide Otic Solution 4 Drop(s) Both Ears <User Schedule>  cefepime   IVPB 250 milliGRAM(s) IV Intermittent every 24 hours  dorzolamide 2% Ophthalmic Solution 1 Drop(s) Both EYES three times a day  furosemide    Tablet 40 milliGRAM(s) Oral two times a day  heparin  Injectable 5000 Unit(s) SubCutaneous every 12 hours  metoprolol tartrate 12.5 milliGRAM(s) Oral two times a day  pantoprazole Infusion 8 mG/Hr (10 mL/Hr) IV Continuous <Continuous>  prasugrel 5 milliGRAM(s) Oral daily    MEDICATIONS  (PRN):  ondansetron Injectable 4 milliGRAM(s) IV Push every 6 hours PRN Nausea      ASSESSMENT and PLAN:  89y male w/     1. acute blood loss anemia, possible GI bleed  - pt recently started effient for CAd/stent  - transfused 2 units prbc, h/h slightly improved, monitor  - discussed w/ Cardio, resume effient and monitor  - protonix drip  - GI f/u appreciated    2. CAD s/p stent  - continue effient, statin, BB  - Cardiology f/u appreciated  - waiting from call back from [place] regarding transfer    3. CLIFFORD on CKD4   - in setting of GI bleed  - Cr slightly improved  - continue home dose lasix and monitor  - CT abd no hydronephrosis  - Nephro f/u appreciated    4. severe AS  - dx at [facility] and family declined TAVR b/c high risk for requiring HD      5. dvt px cc: anemia  hpi: 89y male w/ pmh CAD s/p PCI, stent last week at [facility] on Effient, severe AS, CKD4, anemia of chronic diasease, htn, prostate ca s/p RT 25yr ago, COPD sent to ED from [facility] b/c Hg 6.5.    Patient was hypothermic overnight.   - Saw pt early today- denies cp, sob, palp, no abd pain.  Dry cough.  Discussed w/ RN- no blood in stool.  Discussed w/ son, Manuel-  he called Dr. Cárdenas regarding transferring pt to [facility].  I left message w/ his service [phone] and am waiting for a call back.      ros- as per hpi, otherwise 10 point ros negative      Vital Signs Last 24 Hrs  T(C): 36.3 (15 Feb 2020 11:30), Max: 36.5 (14 Feb 2020 18:06)  T(F): 97.3 (15 Feb 2020 11:30), Max: 97.7 (14 Feb 2020 18:06)  HR: 69 (15 Feb 2020 11:30) (59 - 69)  BP: 131/56 (15 Feb 2020 11:30) (119/51 - 137/64)  BP(mean): --  RR: 18 (15 Feb 2020 11:30) (16 - 20)  SpO2: 93% (15 Feb 2020 11:30) (92% - 98%) on 2LNC      PHYSICAL EXAM:  General: ill appearing elderly male,  NAD, comfortable; flaco mikaylaer  Neuro: AAOx3, no focal deficits  HEENT: NCAT, EOMI  Neck: Soft and supple  Respiratory: coarse bibasilar sounds; no wheezing  Cardiovascular: S1 and S2, RRR , JACKELIN  Gastrointestinal: soft; non ttp   Extremities: No edema  Vascular: 2+ peripheral pulses  Musculoskeletal: moving extrem        LABS: All Labs Reviewed:                        8.2    6.56  )-----------( 151      ( 15 Feb 2020 06:59 )             26.3     02-15    138  |  98  |  109<H>  ----------------------------<  109<H>  3.6   |  32<H>  |  4.75<H>    Ca    8.7      15 Feb 2020 06:59    TPro  7.1  /  Alb  2.7<L>  /  TBili  0.3  /  DBili  x   /  AST  33  /  ALT  27  /  AlkPhos  111  02-14    PT/INR - ( 14 Feb 2020 13:27 )   PT: 11.9 sec;   INR: 1.07 ratio         PTT - ( 14 Feb 2020 13:27 )  PTT:30.9 sec  CARDIAC MARKERS ( 14 Feb 2020 22:15 )  0.231 ng/mL / x     / x     / x     / x      CARDIAC MARKERS ( 14 Feb 2020 18:55 )  0.259 ng/mL / x     / x     / x     / x      CARDIAC MARKERS ( 14 Feb 2020 16:42 )  0.257 ng/mL / x     / x     / x     / x      CARDIAC MARKERS ( 14 Feb 2020 13:27 )  0.283 ng/mL / x     / x     / x     / x        < from: CT Abdomen and Pelvis No Cont (02.14.20 @ 15:40) >  IMPRESSION:     No hydronephrosis or urinary tract obstruction.    Small hemorrhage in the right groin. Correlate for recent intervention.    < end of copied text >    < from: Xray Chest 1 View-PORTABLE IMMEDIATE (02.14.20 @ 14:52) >    Impression:  1. Left lower lobe atelectatic change/infiltrate and small left effusion. Improved right basilar infiltrate with tiny residual right effusion     < end of copied text >      MEDICATIONS  (STANDING):  allopurinol 100 milliGRAM(s) Oral daily  atorvastatin 40 milliGRAM(s) Oral at bedtime  calcium acetate 667 milliGRAM(s) Oral three times a day with meals  carbamide peroxide Otic Solution 4 Drop(s) Both Ears <User Schedule>  cefepime   IVPB 250 milliGRAM(s) IV Intermittent every 24 hours  dorzolamide 2% Ophthalmic Solution 1 Drop(s) Both EYES three times a day  furosemide    Tablet 40 milliGRAM(s) Oral two times a day  heparin  Injectable 5000 Unit(s) SubCutaneous every 12 hours  metoprolol tartrate 12.5 milliGRAM(s) Oral two times a day  pantoprazole Infusion 8 mG/Hr (10 mL/Hr) IV Continuous <Continuous>  prasugrel 5 milliGRAM(s) Oral daily    MEDICATIONS  (PRN):  ondansetron Injectable 4 milliGRAM(s) IV Push every 6 hours PRN Nausea      ASSESSMENT and PLAN:  89y male w/     1. acute blood loss anemia, possible GI bleed  - pt recently started effient for CAd/stent  - transfused 2 units prbc, h/h slightly improved, monitor  - discussed w/ Cardio, resume effient and monitor  - protonix drip  - GI f/u appreciated    2. CAD s/p stent  - continue effient, statin, BB  - Cardiology f/u appreciated  - waiting from call back from [place] regarding transfer    3. CLIFFORD on CKD4   - in setting of GI bleed  - Cr slightly improved  - continue home dose lasix and monitor  - CT abd no hydronephrosis  - Nephro f/u appreciated    4. severe AS  - dx at [facility] and family declined TAVR b/c high risk for requiring HD    5. hypothermia  - flaco hugger  - start cefepime for possible LLL infiltrate seen  on imaging  - send  blood cultures  - check TSH  bp stable , normal glucose       6. dvt px

## 2020-02-15 NOTE — CONSULT NOTE ADULT - SUBJECTIVE AND OBJECTIVE BOX
HPI:  89M w/PMH CAD, COPD, HTN, Prostate CA s/p RT 25yrs ago, CKD4-5, anemia (iron def, CKD), Gout, glaucoma, severe AS (declined TAVR)  not severe enough for it , had recent coronary PCI last week  on antiplatelet agents , , pt had an episode of melena, hg 6.5 and pt sent here for eval.  Pt did not notice melena, denies dizziness, worsening sob, cp, n/v/d, abd pain.  Pt does endorse having hemorrhoids and very occasionally finds some spotting on tissue after BM, Patient does have shortness on activity     In ED, Hg 7.3 (last 9.6), BUN/cr 111/5.1 (prior cr ~3), 2 units PRBC ordered, vss,   CXR Impression: 1. Left lower lobe atelectatic change/infiltrate and small left effusion. Improved right basilar infiltrate with tiny residual right effusion   CT a/p- neg     As per pt's request, I called and s/w son, Manuel (OSWALD), updating up on pt's status and GOC.  Pt on effient, explained would have to hold in setting of possible GIB until cleared by GI/cardio.  Pt and son verbalized understanding of risks.  I also d/w pt regarding code status and he fully understands what this entails and agrees to FULL CODE.  Son also in agreement w/ this.  (GOC discussion 20 mins).       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  Anemia  Prostate cancer  Gout  HLD (hyperlipidemia)  Hypertension  History of colonoscopy  History of tonsillectomy: childhood  H/O inguinal hernia repair          Allergies    ACE inhibitors (Unknown)  aspirin (Unknown)  enalapril (Unknown)    Intolerances        SOCIAL HISTORY: non smoker     FAMILY HISTORY:  Family hx of lung cancer  Family history of CVA      MEDICATIONS:Home Medications:  acetaminophen 650 mg oral tablet: 1 tab(s) orally every 6 hours, As Needed - for fever - for mild pain (14 Feb 2020 15:35)  alendronate 70 mg oral tablet: 1 tab(s) orally once a week  ***Tuesdays*** (14 Feb 2020 15:35)  allopurinol 100 mg oral tablet: 1 tab(s) orally once a day (14 Feb 2020 15:35)  amLODIPine 5 mg oral tablet: 1 tab(s) orally once a day (14 Feb 2020 15:35)  atorvastatin 40 mg oral tablet: 1 tab(s) orally once a day (14 Feb 2020 15:35)  calcium acetate 667 mg oral tablet: 1 tab(s) orally 3 times a day (14 Feb 2020 15:35)  cholecalciferol 50,000 intl units (1250 mcg) oral capsule: 1 cap(s) orally once a month  ****12th of every month*** (14 Feb 2020 15:35)  Colace 100 mg oral capsule: 2 cap(s) orally once a day (at bedtime) (14 Feb 2020 15:35)  Debrox 6.5% otic solution: 4 drop(s) in each ear every 4 hours (14 Feb 2020 15:35)  dorzolamide 2% ophthalmic solution: 1 drop(s) in each eye 3 times a day (14 Feb 2020 15:35)  ferrous gluconate 324 mg (38 mg elemental iron) oral tablet: 1 tab(s) orally once a day (14 Feb 2020 15:35)  Lasix 40 mg oral tablet: 1 tab(s) orally 2 times a day (14 Feb 2020 15:35)  metOLazone 2.5 mg oral tablet: 1 tab(s) orally once a day (14 Feb 2020 15:35)  Milk of Magnesia 8% oral suspension: 30 milliliter(s) orally once a day (at bedtime), As Needed - for constipation (14 Feb 2020 15:35)  MiraLax oral powder for reconstitution: 17 gram(s) orally once a day (14 Feb 2020 15:35)  prasugrel 5 mg oral tablet: 1 tab(s) orally once a day (14 Feb 2020 15:35)  Retacrit 10,000 units/mL preservative-free injectable solution: 1 dose(s) injectable once a week  ***Fridays**** (14 Feb 2020 15:35)    MEDICATIONS  (STANDING):  allopurinol 100 milliGRAM(s) Oral daily  atorvastatin 40 milliGRAM(s) Oral at bedtime  calcium acetate 667 milliGRAM(s) Oral three times a day with meals  carbamide peroxide Otic Solution 4 Drop(s) Both Ears <User Schedule>  cefepime   IVPB 250 milliGRAM(s) IV Intermittent every 24 hours  dorzolamide 2% Ophthalmic Solution 1 Drop(s) Both EYES three times a day  furosemide    Tablet 40 milliGRAM(s) Oral two times a day  heparin  Injectable 5000 Unit(s) SubCutaneous every 12 hours  metoprolol tartrate 12.5 milliGRAM(s) Oral two times a day  pantoprazole Infusion 8 mG/Hr (10 mL/Hr) IV Continuous <Continuous>  prasugrel 5 milliGRAM(s) Oral daily    MEDICATIONS  (PRN):  ondansetron Injectable 4 milliGRAM(s) IV Push every 6 hours PRN Nausea      REVIEW OF SYSTEMS:    fatigue low temps  All other review of systems is negative unless indicated above    Vital Signs Last 24 Hrs  T(C): 34.8 (15 Feb 2020 06:54), Max: 36.5 (14 Feb 2020 18:06)  T(F): 94.7 (15 Feb 2020 06:54), Max: 97.7 (14 Feb 2020 18:06)  HR: 59 (15 Feb 2020 05:28) (59 - 65)  BP: 119/51 (15 Feb 2020 05:28) (119/51 - 137/64)  BP(mean): --  RR: 18 (15 Feb 2020 05:28) (16 - 20)  SpO2: 94% (15 Feb 2020 05:28) (92% - 98%)    I&O's Summary      PHYSICAL EXAM:    Constitutional: NAD, awake and alert, well-developed  HEENT: PERR, EOMI,  No oral cyananosis.  Neck:  supple,  No JVD  Respiratory: Breath sounds are clear bilaterally, No wheezing, rales or rhonchi  Cardiovascular: S1 and S2, regular rate and rhythm, ESM   Gastrointestinal: Bowel Sounds present, soft, nontender.   Extremities: No peripheral edema. No clubbing or cyanosis.  Vascular: 2+ peripheral pulses  Neurological: A/O x 3, no focal deficits  Musculoskeletal: no calf tenderness.  Skin: No rashes.      LABS: All Labs Reviewed:                        8.2    6.56  )-----------( 151      ( 15 Feb 2020 06:59 )             26.3                         7.3    7.83  )-----------( 164      ( 14 Feb 2020 13:27 )             23.5     15 Feb 2020 06:59    138    |  98     |  109    ----------------------------<  109    3.6     |  32     |  4.75   14 Feb 2020 13:27    138    |  97     |  111    ----------------------------<  155    3.9     |  32     |  5.13     Ca    8.7        15 Feb 2020 06:59  Ca    8.7        14 Feb 2020 13:27    TPro  7.1    /  Alb  2.7    /  TBili  0.3    /  DBili  x      /  AST  33     /  ALT  27     /  AlkPhos  111    14 Feb 2020 13:27    PT/INR - ( 14 Feb 2020 13:27 )   PT: 11.9 sec;   INR: 1.07 ratio         PTT - ( 14 Feb 2020 13:27 )  PTT:30.9 sec  CARDIAC MARKERS ( 14 Feb 2020 22:15 )  0.231 ng/mL / x     / x     / x     / x      CARDIAC MARKERS ( 14 Feb 2020 18:55 )  0.259 ng/mL / x     / x     / x     / x      CARDIAC MARKERS ( 14 Feb 2020 16:42 )  0.257 ng/mL / x     / x     / x     / x      CARDIAC MARKERS ( 14 Feb 2020 13:27 )  0.283 ng/mL / x     / x     / x     / x          Blood Culture:         RADIOLOGY/EKG:    < from: 12 Lead ECG (02.14.20 @ 13:30) >  Sinus rhythm [id number] degree A-V block  Possible Left atrial enlargement  Left axis deviation  Non-specific intra-ventricular conduction block  T wave abnormality, consider lateral ischemia  Abnormal ECG  No previous ECGs available  Confirmed by Andrew Herndon MD ([id number]) on 2/14/2020 9:55:00 PM    < end of copied text >

## 2020-02-15 NOTE — CONSULT NOTE ADULT - ASSESSMENT
Imp:  Melena in settign of iron but guaiac +  Anemia, CKD    Rec:  Cont protonix  Advance diet  Very high risk for endoscopic eval -- would defer if H/H remains stable on protonix  Cont effient

## 2020-02-15 NOTE — CONSULT NOTE ADULT - SUBJECTIVE AND OBJECTIVE BOX
NEPHROLOGY CONSULT  HPI:  HPI:  89M w/PMH CAD, COPD, HTN, Prostate CA s/p RT 25yrs ago, CKD4-5, anemia (iron def, CKD), Gout, glaucoma, severe AS (declined TAVR), chronic leg edema, recent admission in Dec at [facility] for sob, offered TAVR, but d/t renal failure and likely to end up on HD, family and pt had declined. Also had been found anemic, transfused 1 unit PRBC. 1 week ago, pt reportedly had LHC and 2 stents placed at [facility] and d/c'd to [facility].  Per report from [facility] today, pt had an episode of melena, hg 6.5 and pt sent here for eval.  Pt did not notice melena, denies dizziness, worsening sob, cp, n/v/d, abd pain.  Pt does endorse having hemorrhoids and very occasionally finds some spotting on tissue after BM, but he did not see melena today.      In ED, Hg 7.3 (last 9.6), BUN/cr 111/5.1 (prior cr ~3), 2 units PRBC ordered, vss,   CXR Impression: 1. Left lower lobe atelectatic change/infiltrate and small left effusion. Improved right basilar infiltrate with tiny residual right effusion   CT a/p- neg     As per pt's request, I called and s/w son, Manuel (POA), updating up on pt's status and GOC.  Pt on effient, explained would have to hold in setting of possible GIB until cleared by GI/cardio.  Pt and son verbalized understanding of risks.  I also d/w pt regarding code status and he fully understands what this entails and agrees to FULL CODE.  Son also in agreement w/ this.  (GOC discussion 20 mins).     Above obtained from chart:  case d/w Dr Aguayo, the patient and Dr Hernandez  Pt with h/o CKD, severe AS, declined TAVR due to comorbidities and post op complications.  Pt now transferred from [facility] with anemia and GI bleed, deemed not to require colonoscopies after the age 85.  Pt evaluated by Nephrology in the past but did not follow up he states as an outpt.  Admitted with creat 5.1 , now improving with diuretics, po  Pt is w/o dyspnea during exam, supine with heating blanket.          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  Anemia  Prostate cancer  Gout  HLD (hyperlipidemia)  Hypertension  History of colonoscopy  History of tonsillectomy: childhood  H/O inguinal hernia repair      Review of Systems:       Allergies    ACE inhibitors (Unknown)  aspirin (Unknown)  enalapril (Unknown)      Social History:   requires assistance  uses FWW  denies smoking/etoh    FAMILY HISTORY:  Family hx of lung cancer  Family history of CVA      Home Medications:  acetaminophen 650 mg oral tablet: 1 tab(s) orally every 6 hours, As Needed - for fever - for mild pain (14 Feb 2020 15:35)  alendronate 70 mg oral tablet: 1 tab(s) orally once a week  ***Tuesdays*** (14 Feb 2020 15:35)  allopurinol 100 mg oral tablet: 1 tab(s) orally once a day (14 Feb 2020 15:35)  amLODIPine 5 mg oral tablet: 1 tab(s) orally once a day (14 Feb 2020 15:35)  atorvastatin 40 mg oral tablet: 1 tab(s) orally once a day (14 Feb 2020 15:35)  calcium acetate 667 mg oral tablet: 1 tab(s) orally 3 times a day (14 Feb 2020 15:35)  cholecalciferol 50,000 intl units (1250 mcg) oral capsule: 1 cap(s) orally once a month  ****12th of every month*** (14 Feb 2020 15:35)  Colace 100 mg oral capsule: 2 cap(s) orally once a day (at bedtime) (14 Feb 2020 15:35)  Debrox 6.5% otic solution: 4 drop(s) in each ear every 4 hours (14 Feb 2020 15:35)  dorzolamide 2% ophthalmic solution: 1 drop(s) in each eye 3 times a day (14 Feb 2020 15:35)  ferrous gluconate 324 mg (38 mg elemental iron) oral tablet: 1 tab(s) orally once a day (14 Feb 2020 15:35)  Lasix 40 mg oral tablet: 1 tab(s) orally 2 times a day (14 Feb 2020 15:35)  metOLazone 2.5 mg oral tablet: 1 tab(s) orally once a day (14 Feb 2020 15:35)  Milk of Magnesia 8% oral suspension: 30 milliliter(s) orally once a day (at bedtime), As Needed - for constipation (14 Feb 2020 15:35)  MiraLax oral powder for reconstitution: 17 gram(s) orally once a day (14 Feb 2020 15:35)  prasugrel 5 mg oral tablet: 1 tab(s) orally once a day (14 Feb 2020 15:35)  Retacrit 10,000 units/mL preservative-free injectable solution: 1 dose(s) injectable once a week  ***Fridays**** (14 Feb 2020 15:35)      MEDICATIONS  (STANDING):  allopurinol 100 milliGRAM(s) Oral daily  atorvastatin 40 milliGRAM(s) Oral at bedtime  calcium acetate 667 milliGRAM(s) Oral three times a day with meals  carbamide peroxide Otic Solution 4 Drop(s) Both Ears <User Schedule>  dorzolamide 2% Ophthalmic Solution 1 Drop(s) Both EYES three times a day  pantoprazole Infusion 8 mG/Hr (10 mL/Hr) IV Continuous <Continuous>    MEDICATIONS  (PRN):  ondansetron Injectable 4 milliGRAM(s) IV Push every 6 hours PRN Nausea        PHYSICAL EXAM:  Vital Signs Last 24 Hrs  T(C): 36.4 (14 Feb 2020 15:15), Max: 36.4 (14 Feb 2020 15:15)  T(F): 97.5 (14 Feb 2020 15:15), Max: 97.5 (14 Feb 2020 15:15)  HR: 60 (14 Feb 2020 15:15) (60 - 65)  BP: 120/52 (14 Feb 2020 15:15) (120/52 - 137/64)  BP(mean): --  RR: 16 (14 Feb 2020 15:15) (16 - 16)  SpO2: 96% (14 Feb 2020 15:15) (96% - 98%)      GENERAL: NAD, well-developed for age  HEAD:  Atraumatic, Normocephalic  EYES: EOMI, PERRLA, conjunctiva and sclera clear  ENT: normal hearing, no nasal discharge, throat clear, dentition normal for age  NECK: Supple, No JVD, no LAD, no thyromegaly   CHEST/LUNG: Clear to auscultation bilaterally; No wheeze, respirations unlabored  HEART: Regular rate and rhythm; No murmurs, rubs, or gallops  ABDOMEN: Soft, Nontender, Nondistended; Bowel sounds present, no HSM  EXTREMITIES:  2+ Peripheral Pulses, No clubbing, cyanosis, b/l LE pitting edema, R>L  PSYCH: AAOx3, normal behavior  NEUROLOGY: non-focal, sensory and cn 2-12 intact, speech/language intact  SKIN: No visible rashes or lesions      LABS:                        8.2    6.56  )-----------( 151      ( 15 Feb 2020 06:59 )             26.3     02-15    138  |  98  |  109<H>  ----------------------------<  109<H>  3.6   |  32<H>  |  4.75<H>    Ca    8.7      15 Feb 2020 06:59    TPro  7.1  /  Alb  2.7<L>  /  TBili  0.3  /  DBili  x   /  AST  33  /  ALT  27  /  AlkPhos  111  02-14    PT/INR - ( 14 Feb 2020 13:27 )   PT: 11.9 sec;   INR: 1.07 ratio         PTT - ( 14 Feb 2020 13:27 )  PTT:30.9 sec

## 2020-02-15 NOTE — CONSULT NOTE ADULT - ASSESSMENT
89M w/PMH CAD, COPD, HTN, Prostate CA s/p RT 25yrs ago, CKD4-5, anemia (iron def, CKD), Gout, glaucoma, severe AS (declined TAVR), chronic leg edema, recent admission in Dec at [facility] for sob, offered TAVR, but d/t renal failure and likely to end up on HD, family and pt had declined. Also had been found anemic, transfused 1 unit PRBC. 1 week ago, pt reportedly had LHC and 2 stents placed at [facility] and d/c'd to [facility].  Per report from [facility] today, pt had an episode of melena, hg 6.5 and pt sent here for eval.  Pt did not notice melena, denies dizziness, worsening sob, cp, n/v/d, abd pain.  Pt does endorse having hemorrhoids and very occasionally finds some spotting on tissue after BM, but he did not see melena today.      In ED, Hg 7.3 (last 9.6), BUN/cr 111/5.1 (prior cr ~3), 2 units PRBC ordered, vss,   CXR Impression: 1. Left lower lobe atelectatic change/infiltrate and small left effusion. Improved right basilar infiltrate with tiny residual right effusion   CT a/p- neg     As per pt's request, I called and s/w son, Manuel (POA), updating up on pt's status and GOC.  Pt on effient, explained would have to hold in setting of possible GIB until cleared by GI/cardio.  Pt and son verbalized understanding of risks.  I also d/w pt regarding code status and he fully understands what this entails and agrees to FULL CODE.  Son also in agreement w/ this.  (GOC discussion 20 mins).     Above obtained from chart:  case d/w Dr Aguayo, the patient and Dr Hernandez  Pt with h/o CKD, severe AS, declined TAVR due to comorbidities and post op complications.  Pt now transferred from [facility] with anemia and GI bleed, deemed not to require colonoscopies after the age 85.  Pt evaluated by Nephrology in the past but did not follow up he states as an outpt.  Admitted with creat 5.1 , now improving with diuretics, po  Pt is w/o dyspnea during exam, supine with heating blanket.  Will cont diuretics at this time  Monitor renal function which may start to show azotemia with cont diuresis and will need to cut back on diuretics

## 2020-02-15 NOTE — CONSULT NOTE ADULT - REASON FOR ADMISSION
melena and anemia at [facility]
melena and anemia at [facility]
melena and anemia at [facility]

## 2020-02-15 NOTE — CONSULT NOTE ADULT - SUBJECTIVE AND OBJECTIVE BOX
HPI:  HPI:  89M w/PMH CAD, COPD, HTN, Prostate CA s/p RT 25yrs ago, CKD4-5, anemia (iron def, CKD), Gout, glaucoma, severe AS (declined TAVR), chronic leg edema, recent admission in Dec at [facility] for sob, offered TAVR, but d/t renal failure and likely to end up on HD, family and pt had declined. Also had been found anemic, transfused 1 unit PRBC. 1 week ago, pt reportedly had LHC and 2 stents placed at [facility] and d/c'd to [facility].  Per report from [facility] today, pt had an episode of melena, hg 6.5 and pt sent here for eval.  Pt did not notice melena, denies dizziness, worsening sob, cp, n/v/d, abd pain.  Pt does endorse having hemorrhoids and very occasionally finds some spotting on tissue after BM, but he did not see melena today.      In ED, Hg 7.3 (last 9.6), BUN/cr 111/5.1 (prior cr ~3), 2 units PRBC ordered, vss,   CXR Impression: 1. Left lower lobe atelectatic change/infiltrate and small left effusion. Improved right basilar infiltrate with tiny residual right effusion   CT a/p- neg     As per pt's request, I called and s/w son, Manuel (POA), updating up on pt's status and GOC.  Pt on effient, explained would have to hold in setting of possible GIB until cleared by GI/cardio.  Pt and son verbalized understanding of risks.  I also d/w pt regarding code status and he fully understands what this entails and agrees to FULL CODE.  Son also in agreement w/ this.  (GOC discussion 20 mins).   -------------------------------------  Patient's last colonoscopy about 10 years ago. No bleeding overnight.    PAST MEDICAL & SURGICAL HISTORY:  Leg edema  Osteoporosis  Aortic stenosis  Glaucoma  DM type 2 (diabetes mellitus, type 2)  CAD (coronary artery disease)  Chronic kidney disease (CKD)  Anemia  Prostate cancer  Gout  HLD (hyperlipidemia)  Hypertension  History of colonoscopy  History of tonsillectomy: childhood  H/O inguinal hernia repair      Home Medications:  acetaminophen 650 mg oral tablet: 1 tab(s) orally every 6 hours, As Needed - for fever - for mild pain (14 Feb 2020 15:35)  alendronate 70 mg oral tablet: 1 tab(s) orally once a week  ***Tuesdays*** (14 Feb 2020 15:35)  allopurinol 100 mg oral tablet: 1 tab(s) orally once a day (14 Feb 2020 15:35)  amLODIPine 5 mg oral tablet: 1 tab(s) orally once a day (14 Feb 2020 15:35)  atorvastatin 40 mg oral tablet: 1 tab(s) orally once a day (14 Feb 2020 15:35)  calcium acetate 667 mg oral tablet: 1 tab(s) orally 3 times a day (14 Feb 2020 15:35)  cholecalciferol 50,000 intl units (1250 mcg) oral capsule: 1 cap(s) orally once a month  ****12th of every month*** (14 Feb 2020 15:35)  Colace 100 mg oral capsule: 2 cap(s) orally once a day (at bedtime) (14 Feb 2020 15:35)  Debrox 6.5% otic solution: 4 drop(s) in each ear every 4 hours (14 Feb 2020 15:35)  dorzolamide 2% ophthalmic solution: 1 drop(s) in each eye 3 times a day (14 Feb 2020 15:35)  ferrous gluconate 324 mg (38 mg elemental iron) oral tablet: 1 tab(s) orally once a day (14 Feb 2020 15:35)  Lasix 40 mg oral tablet: 1 tab(s) orally 2 times a day (14 Feb 2020 15:35)  metOLazone 2.5 mg oral tablet: 1 tab(s) orally once a day (14 Feb 2020 15:35)  Milk of Magnesia 8% oral suspension: 30 milliliter(s) orally once a day (at bedtime), As Needed - for constipation (14 Feb 2020 15:35)  MiraLax oral powder for reconstitution: 17 gram(s) orally once a day (14 Feb 2020 15:35)  prasugrel 5 mg oral tablet: 1 tab(s) orally once a day (14 Feb 2020 15:35)  Retacrit 10,000 units/mL preservative-free injectable solution: 1 dose(s) injectable once a week  ***Fridays**** (14 Feb 2020 15:35)      MEDICATIONS  (STANDING):  allopurinol 100 milliGRAM(s) Oral daily  atorvastatin 40 milliGRAM(s) Oral at bedtime  calcium acetate 667 milliGRAM(s) Oral three times a day with meals  carbamide peroxide Otic Solution 4 Drop(s) Both Ears <User Schedule>  cefepime   IVPB 250 milliGRAM(s) IV Intermittent every 24 hours  dorzolamide 2% Ophthalmic Solution 1 Drop(s) Both EYES three times a day  furosemide    Tablet 40 milliGRAM(s) Oral two times a day  heparin  Injectable 5000 Unit(s) SubCutaneous every 12 hours  metoprolol tartrate 12.5 milliGRAM(s) Oral two times a day  pantoprazole Infusion 8 mG/Hr (10 mL/Hr) IV Continuous <Continuous>  prasugrel 5 milliGRAM(s) Oral daily    MEDICATIONS  (PRN):  ondansetron Injectable 4 milliGRAM(s) IV Push every 6 hours PRN Nausea      Allergies    ACE inhibitors (Unknown)  aspirin (Unknown)  enalapril (Unknown)    Intolerances        SOCIAL HISTORY:    FAMILY HISTORY:  Family hx of lung cancer  Family history of CVA      ROS  As above  Otherwise unremarkable    Vital Signs Last 24 Hrs  T(C): 34.8 (15 Feb 2020 06:54), Max: 36.5 (14 Feb 2020 18:06)  T(F): 94.7 (15 Feb 2020 06:54), Max: 97.7 (14 Feb 2020 18:06)  HR: 59 (15 Feb 2020 05:28) (59 - 65)  BP: 119/51 (15 Feb 2020 05:28) (119/51 - 137/64)  BP(mean): --  RR: 18 (15 Feb 2020 05:28) (16 - 20)  SpO2: 94% (15 Feb 2020 05:28) (92% - 98%)    Constitutional: NAD, well-developed  Respiratory: CTAB  Cardiovascular: S1 and S2, RRR  Gastrointestinal: BS+, soft, NT/ND  Extremities: No peripheral edema  Psychiatric: Normal mood, normal affect  Skin: No rashes    LABS:                        8.2    6.56  )-----------( 151      ( 15 Feb 2020 06:59 )             26.3     02-15    138  |  98  |  109<H>  ----------------------------<  109<H>  3.6   |  32<H>  |  4.75<H>    Ca    8.7      15 Feb 2020 06:59    TPro  7.1  /  Alb  2.7<L>  /  TBili  0.3  /  DBili  x   /  AST  33  /  ALT  27  /  AlkPhos  111  02-14    PT/INR - ( 14 Feb 2020 13:27 )   PT: 11.9 sec;   INR: 1.07 ratio         PTT - ( 14 Feb 2020 13:27 )  PTT:30.9 sec  LIVER FUNCTIONS - ( 14 Feb 2020 13:27 )  Alb: 2.7 g/dL / Pro: 7.1 gm/dL / ALK PHOS: 111 U/L / ALT: 27 U/L / AST: 33 U/L / GGT: x             RADIOLOGY & ADDITIONAL STUDIES:

## 2020-02-15 NOTE — CHART NOTE - NSCHARTNOTEFT_GEN_A_CORE
Called by RN to assess patient found hypothermic, patient seen at bedside, in no apparent distress, has no complaints to offer.     Vital Signs Last 24 Hrs  T(C): 34.8 (15 Feb 2020 06:54), Max: 36.5 (14 Feb 2020 18:06)  T(F): 94.7 (15 Feb 2020 06:54), Max: 97.7 (14 Feb 2020 18:06)  HR: 59 (15 Feb 2020 05:28) (59 - 65)  BP: 119/51 (15 Feb 2020 05:28) (119/51 - 137/64)  RR: 18 (15 Feb 2020 05:28) (16 - 20)  SpO2: 94% (15 Feb 2020 05:28) (92% - 98%)    PHYSICAL EXAM:  CHEST/LUNG: Clear to auscultation bilaterally; No rales, rhonchi, wheezing  HEART: Regular rate and rhythm  ABDOMEN: Bowel sounds present; Soft, Nontender  EXTREMITIES:  2+ Peripheral Pulses, No edema  NERVOUS SYSTEM:  Alert & Oriented X3, speech clear      Plan:  -f/u lactate level  -f/u AM blood work  -Bear aleah applied    Above discussed with Dr. Gimenez PGY-3    Patient placed on hospitalist sign out list

## 2020-02-15 NOTE — CHART NOTE - NSCHARTNOTEFT_GEN_A_CORE
Left message w/ Dr. Cross's (patient's Cardiologist at [facility]) answering service regarding patient's transfer.  I still have not received call back.  Therefore unlikely patient will be transferred this evening.

## 2020-02-15 NOTE — CONSULT NOTE ADULT - PROBLEM SELECTOR RECOMMENDATION 2
Patient had recent stents at [facility] , patient is on Effient  which is on hold ,  will need resume if hemoglobin remain stable , PPI  ( discussed with dr dial )    patient will be high risk     patient had minimal elevated troponin in setting severe anemia and renal failure without chest pain possible demand ischemia

## 2020-02-16 ENCOUNTER — TRANSCRIPTION ENCOUNTER (OUTPATIENT)
Age: 85
End: 2020-02-16

## 2020-02-16 VITALS — TEMPERATURE: 97 F

## 2020-02-16 DIAGNOSIS — T68.XXXA HYPOTHERMIA, INITIAL ENCOUNTER: ICD-10-CM

## 2020-02-16 LAB
ALBUMIN SERPL ELPH-MCNC: 2.5 G/DL — LOW (ref 3.3–5)
ALP SERPL-CCNC: 106 U/L — SIGNIFICANT CHANGE UP (ref 40–120)
ALT FLD-CCNC: 20 U/L — SIGNIFICANT CHANGE UP (ref 12–78)
ANION GAP SERPL CALC-SCNC: 9 MMOL/L — SIGNIFICANT CHANGE UP (ref 5–17)
AST SERPL-CCNC: 22 U/L — SIGNIFICANT CHANGE UP (ref 15–37)
BILIRUB SERPL-MCNC: 0.5 MG/DL — SIGNIFICANT CHANGE UP (ref 0.2–1.2)
BUN SERPL-MCNC: 112 MG/DL — HIGH (ref 7–23)
CALCIUM SERPL-MCNC: 8.5 MG/DL — SIGNIFICANT CHANGE UP (ref 8.5–10.1)
CHLORIDE SERPL-SCNC: 100 MMOL/L — SIGNIFICANT CHANGE UP (ref 96–108)
CO2 SERPL-SCNC: 32 MMOL/L — HIGH (ref 22–31)
CREAT SERPL-MCNC: 4.32 MG/DL — HIGH (ref 0.5–1.3)
GLUCOSE SERPL-MCNC: 128 MG/DL — HIGH (ref 70–99)
HCT VFR BLD CALC: 27.7 % — LOW (ref 39–50)
HGB BLD-MCNC: 8.7 G/DL — LOW (ref 13–17)
MCHC RBC-ENTMCNC: 29.6 PG — SIGNIFICANT CHANGE UP (ref 27–34)
MCHC RBC-ENTMCNC: 31.4 GM/DL — LOW (ref 32–36)
MCV RBC AUTO: 94.2 FL — SIGNIFICANT CHANGE UP (ref 80–100)
PLATELET # BLD AUTO: 154 K/UL — SIGNIFICANT CHANGE UP (ref 150–400)
POTASSIUM SERPL-MCNC: 3.8 MMOL/L — SIGNIFICANT CHANGE UP (ref 3.5–5.3)
POTASSIUM SERPL-SCNC: 3.8 MMOL/L — SIGNIFICANT CHANGE UP (ref 3.5–5.3)
PROT SERPL-MCNC: 6.5 GM/DL — SIGNIFICANT CHANGE UP (ref 6–8.3)
RBC # BLD: 2.94 M/UL — LOW (ref 4.2–5.8)
RBC # FLD: 17.8 % — HIGH (ref 10.3–14.5)
SODIUM SERPL-SCNC: 141 MMOL/L — SIGNIFICANT CHANGE UP (ref 135–145)
WBC # BLD: 8.06 K/UL — SIGNIFICANT CHANGE UP (ref 3.8–10.5)
WBC # FLD AUTO: 8.06 K/UL — SIGNIFICANT CHANGE UP (ref 3.8–10.5)

## 2020-02-16 PROCEDURE — 99233 SBSQ HOSP IP/OBS HIGH 50: CPT

## 2020-02-16 PROCEDURE — 99239 HOSP IP/OBS DSCHRG MGMT >30: CPT

## 2020-02-16 RX ADMIN — Medication 12.5 MILLIGRAM(S): at 06:23

## 2020-02-16 RX ADMIN — DORZOLAMIDE HYDROCHLORIDE 1 DROP(S): 20 SOLUTION/ DROPS OPHTHALMIC at 06:23

## 2020-02-16 RX ADMIN — Medication 667 MILLIGRAM(S): at 09:33

## 2020-02-16 RX ADMIN — HEPARIN SODIUM 5000 UNIT(S): 5000 INJECTION INTRAVENOUS; SUBCUTANEOUS at 06:23

## 2020-02-16 RX ADMIN — CEFEPIME 100 MILLIGRAM(S): 1 INJECTION, POWDER, FOR SOLUTION INTRAMUSCULAR; INTRAVENOUS at 09:33

## 2020-02-16 RX ADMIN — Medication 40 MILLIGRAM(S): at 06:24

## 2020-02-16 NOTE — PROGRESS NOTE ADULT - SUBJECTIVE AND OBJECTIVE BOX
cc: anemia  hpi: 89y male w/ pmh CAD s/p PCI, stent last week at [facility] on Effient, severe AS, CKD4, anemia of chronic diasease, htn, prostate ca s/p RT 25yr ago, COPD sent to ED from [facility] b/c Hg 6.5.    Patient was hypothermic overnight.   - Saw pt early today- denies cp, sob, palp, no abd pain.  Dry cough.  Discussed w/ RN- no blood in stool.  Discussed w/ son, Manuel-  he called Dr. Cárdenas regarding transferring pt to [facility].  I left message w/ his service [phone] and am waiting for a call back.      2/16- feeling better today- no complaints.  Denies cough but discussed small infiltrate, hypothermic at times and iv abx.  Stable for transfer.     ros- as per hpi, otherwise 10 point ros negative      Vital Signs Last 24 Hrs  T(C): 36.6 (16 Feb 2020 06:22), Max: 36.6 (15 Feb 2020 16:42)  T(F): 97.8 (16 Feb 2020 06:22), Max: 97.8 (15 Feb 2020 16:42)  HR: 61 (16 Feb 2020 06:22) (61 - 75)  BP: 124/52 (16 Feb 2020 06:22) (120/45 - 135/60)  BP(mean): --  RR: 18 (16 Feb 2020 06:22) (18 - 20)  SpO2: 96% (16 Feb 2020 06:22) (92% - 98%)      PHYSICAL EXAM:  General: ill appearing elderly male,  NAD, comfortable; flaco bullard  Neuro: AAOx3, no focal deficits  HEENT: NCAT, EOMI  Neck: Soft and supple  Respiratory: coarse bibasilar sounds; no wheezing  Cardiovascular: S1 and S2, RRR , JACKELIN  Gastrointestinal: soft; non ttp   Extremities: No edema  Vascular: 2+ peripheral pulses  Musculoskeletal: moving extrem        LABS: All Labs Reviewed:                        8.7    8.06  )-----------( 154      ( 16 Feb 2020 07:42 )             27.7     02-16    141  |  100  |  112<H>  ----------------------------<  128<H>  3.8   |  32<H>  |  4.32<H>    Ca    8.5      16 Feb 2020 07:42    TPro  6.5  /  Alb  2.5<L>  /  TBili  0.5  /  DBili  x   /  AST  22  /  ALT  20  /  AlkPhos  106  02-16    PT/INR - ( 14 Feb 2020 13:27 )   PT: 11.9 sec;   INR: 1.07 ratio         PTT - ( 14 Feb 2020 13:27 )  PTT:30.9 sec  CARDIAC MARKERS ( 14 Feb 2020 22:15 )  0.231 ng/mL / x     / x     / x     / x      CARDIAC MARKERS ( 14 Feb 2020 18:55 )  0.259 ng/mL / x     / x     / x     / x      CARDIAC MARKERS ( 14 Feb 2020 16:42 )  0.257 ng/mL / x     / x     / x     / x      CARDIAC MARKERS ( 14 Feb 2020 13:27 )  0.283 ng/mL / x     / x     / x     / x               < from: CT Abdomen and Pelvis No Cont (02.14.20 @ 15:40) >  IMPRESSION:     No hydronephrosis or urinary tract obstruction.    Small hemorrhage in the right groin. Correlate for recent intervention.    < end of copied text >    < from: Xray Chest 1 View-PORTABLE IMMEDIATE (02.14.20 @ 14:52) >    Impression:  1. Left lower lobe atelectatic change/infiltrate and small left effusion. Improved right basilar infiltrate with tiny residual right effusion     < end of copied text >      MEDICATIONS  (STANDING):  allopurinol 100 milliGRAM(s) Oral daily  atorvastatin 40 milliGRAM(s) Oral at bedtime  calcium acetate 667 milliGRAM(s) Oral three times a day with meals  carbamide peroxide Otic Solution 4 Drop(s) Both Ears <User Schedule>  cefepime   IVPB 250 milliGRAM(s) IV Intermittent every 24 hours  dorzolamide 2% Ophthalmic Solution 1 Drop(s) Both EYES three times a day  furosemide    Tablet 40 milliGRAM(s) Oral two times a day  heparin  Injectable 5000 Unit(s) SubCutaneous every 12 hours  metoprolol tartrate 12.5 milliGRAM(s) Oral two times a day  pantoprazole Infusion 8 mG/Hr (10 mL/Hr) IV Continuous <Continuous>  prasugrel 5 milliGRAM(s) Oral daily    MEDICATIONS  (PRN):  ondansetron Injectable 4 milliGRAM(s) IV Push every 6 hours PRN Nausea      ASSESSMENT and PLAN:  89y male w/     1. acute blood loss anemia, possible GI bleed  - pt recently started effient for CAd/stent  - transfused 2 units prbc, h/h slightly improved, monitor  - discussed w/ Cardio, resume effient and monitor  - protonix drip  - GI f/u appreciated    2. CAD s/p stent  - continue effient, statin, BB  - Cardiology f/u appreciated  - waiting from call back from [place] regarding transfer  2/16- transfer today    3. CLIFFORD on CKD4   - in setting of GI bleed  - Cr slightly improved  - continue home dose lasix and monitor  - CT abd no hydronephrosis  - Nephro f/u appreciated  2/16- Cr improving     4. severe AS  - dx at [facility] and family declined TAVR b/c high risk for requiring HD    5. hypothermia  - flaco huanmoler  - 2/15- start cefepime for possible LLL infiltrate seen  on imaging  CT chest- Left lower lobe atelectatic change/infiltrate and small left effusion. Improved right basilar infiltrate with tiny residual right effusion   - send  blood cultures- pending  - check TSH- normal   bp stable , normal glucose   - 2/16- normal temps overnight and just informed by RN pt had low temp now      6. dvt px    Stable for transfer. time 75min.

## 2020-02-16 NOTE — DISCHARGE NOTE PROVIDER - NSDCCPCAREPLAN_GEN_ALL_CORE_FT
PRINCIPAL DISCHARGE DIAGNOSIS  Diagnosis: Rectal bleeding  Assessment and Plan of Treatment: admitted w/   1. acute blood loss anemia, possible GI bleed  -  small amount of bright red blood this morning  - pt recently started effient for CAd/stent  - transfused 3 units prbc total, h/h slightly improved, monitor  - discussed w/ Cardio, resume effient and monitor  - protonix drip  - GI f/u appreciated  2. CAD s/p stent  - continue effient, statin, BB  2/16- transfer today  3. CLIFFORD on CKD4   - in setting of GI bleed  - continue home dose lasix and monitor  - CT abd no hydronephrosis  2/16- Cr improving   4. severe AS  - dx at [facility] and family declined TAVR b/c high risk for requiring HD  5. hypothermia  - 2/15- start cefepime for possible LLL infiltrate seen  on imaging  CT chest- Left lower lobe atelectatic change/infiltrate and small left effusion. Improved right basilar infiltrate with tiny residual right effusion   - no cough, no leukocytosis, cultures still pending  - normal TSH, bp stable , normal glucose   - 2/16- normal temps overnight and just informed by RN pt had low temp now.  apply flaco bullard

## 2020-02-16 NOTE — DISCHARGE NOTE NURSING/CASE MANAGEMENT/SOCIAL WORK - PATIENT PORTAL LINK FT
You can access the FollowMyHealth Patient Portal offered by [facility] by registering at the following website: [email or web address]. By joining [facility]’s FollowMyHealth portal, you will also be able to view your health information using other applications (apps) compatible with our system.

## 2020-02-16 NOTE — PROGRESS NOTE ADULT - SUBJECTIVE AND OBJECTIVE BOX
HPI:  89M w/PMH CAD, COPD, HTN, Prostate CA s/p RT 25yrs ago, CKD4-5, anemia (iron def, CKD), Gout, glaucoma, severe AS (declined TAVR)  not severe enough for it , had recent coronary PCI last week  on antiplatelet agents , , pt had an episode of melena, hg 6.5 and pt sent here for eval.  Pt did not notice melena, denies dizziness, worsening sob, cp, n/v/d, abd pain.  Pt does endorse having hemorrhoids and very occasionally finds some spotting on tissue after BM, Patient does have shortness on activity     In ED, Hg 7.3 (last 9.6), BUN/cr 111/5.1 (prior cr ~3), 2 units PRBC ordered, vss,   CXR Impression: 1. Left lower lobe atelectatic change/infiltrate and small left effusion. Improved right basilar infiltrate with tiny residual right effusion   CT a/p- neg     As per pt's request, I called and s/w son, Manuel ([email or web address]), updating up on pt's status and GOC.  Pt on effient, explained would have to hold in setting of possible GIB until cleared by GI/cardio.  Pt and son verbalized understanding of risks.  I also d/w pt regarding code status and he fully understands what this entails and agrees to FULL CODE.  Son also in agreement w/ this.  (GOC discussion 20 mins).     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  Anemia  Prostate cancer  Gout  HLD (hyperlipidemia)  Hypertension  History of colonoscopy  History of tonsillectomy: childhood  H/O inguinal hernia repair    MEDICATIONS  (STANDING):  allopurinol 100 milliGRAM(s) Oral daily  atorvastatin 40 milliGRAM(s) Oral at bedtime  calcium acetate 667 milliGRAM(s) Oral three times a day with meals  carbamide peroxide Otic Solution 4 Drop(s) Both Ears <User Schedule>  cefepime   IVPB 250 milliGRAM(s) IV Intermittent every 24 hours  dorzolamide 2% Ophthalmic Solution 1 Drop(s) Both EYES three times a day  furosemide    Tablet 40 milliGRAM(s) Oral two times a day  heparin  Injectable 5000 Unit(s) SubCutaneous every 12 hours  metoprolol tartrate 12.5 milliGRAM(s) Oral two times a day  pantoprazole Infusion 8 mG/Hr (10 mL/Hr) IV Continuous <Continuous>  prasugrel 5 milliGRAM(s) Oral daily    MEDICATIONS  (PRN):  ondansetron Injectable 4 milliGRAM(s) IV Push every 6 hours PRN Nausea      REVIEW OF SYSTEMS:    fatigue low temps  All other review of systems is negative unless indicated above    Vital Signs Last 24 Hrs  T(C): 36.6 (2020 06:22), Max: 36.6 (15 Feb 2020 16:42)  T(F): 97.8 (2020 06:22), Max: 97.8 (15 Feb 2020 16:42)  HR: 61 (2020 06:22) (61 - 75)  BP: 124/52 (2020 06:22) (120/45 - 135/60)  BP(mean): --  RR: 18 (2020 06:22) (18 - 20)  SpO2: 96% (2020 06:22) (92% - 98%)    I&O's Summary    15 Feb 2020 07:01  -  2020 07:00  --------------------------------------------------------  IN: 287 mL / OUT: 0 mL / NET: 287 mL        PHYSICAL EXAM:    Constitutional: NAD, awake and alert, well-developed  HEENT: PERR, EOMI,  No oral cyananosis.  Neck:  supple,  No JVD  Respiratory: Breath sounds are clear bilaterally, mild prolonged expiration   Cardiovascular: S1 and S2, regular rate and rhythm, ESM   Gastrointestinal: Bowel Sounds present, soft, nontender.   Extremities: No peripheral edema. No clubbing or cyanosis.  Vascular: 2+ peripheral pulses  Neurological: A/O x 3, no focal deficits  Musculoskeletal: no calf tenderness.  Skin: No rashes.      LABS: All Labs Reviewed:                          8.7    8.06  )-----------( 154      ( 2020 07:42 )             27.7     02-16    141  |  100  |  112<H>  ----------------------------<  128<H>  3.8   |  32<H>  |  4.32<H>    Ca    8.5      2020 07:42    TPro  6.5  /  Alb  2.5<L>  /  TBili  0.5  /  DBili  x   /  AST  22  /  ALT  20  /  AlkPhos  106  02-16    CARDIAC MARKERS ( 2020 22:15 )  0.231 ng/mL / x     / x     / x     / x      CARDIAC MARKERS ( 2020 18:55 )  0.259 ng/mL / x     / x     / x     / x      CARDIAC MARKERS ( 2020 16:42 )  0.257 ng/mL / x     / x     / x     / x      CARDIAC MARKERS ( 2020 13:27 )  0.283 ng/mL / x     / x     / x     / x          LIVER FUNCTIONS - ( 2020 07:42 )  Alb: 2.5 g/dL / Pro: 6.5 gm/dL / ALK PHOS: 106 U/L / ALT: 20 U/L / AST: 22 U/L / GGT: x           PT/INR - ( 2020 13:27 )   PT: 11.9 sec;   INR: 1.07 ratio         PTT - ( 2020 13:27 )  PTT:30.9 sec  Urinalysis Basic - ( 15 Feb 2020 17:03 )    Color: Yellow / Appearance: Clear / S.005 / pH: x  Gluc: x / Ketone: Negative  / Bili: Negative / Urobili: Negative mg/dL   Blood: x / Protein: 15 mg/dL / Nitrite: Negative   Leuk Esterase: Negative / RBC: 0-2 /HPF / WBC 0-2   Sq Epi: x / Non Sq Epi: Occasional / Bacteria: Negative            RADIOLOGY/EKG:    < from: 12 Lead ECG (20 @ 13:30) >  Sinus rhythm [id number] degree A-V block  Possible Left atrial enlargement  Left axis deviation  Non-specific intra-ventricular conduction block  T wave abnormality, consider lateral ischemia  Abnormal ECG  No previous ECGs available  Confirmed by Andrew Herndon MD ([id number]) on 2020 9:55:00 PM    < end of copied text >      < from: TTE with Doppler (w/Cont) (12.20.19 @ 16:51) >  ------------------------------------------------------------------------  CONCLUSIONS:  1. Mitral annular calcification, otherwise normal mitral  valve. Mild mitral regurgitation.  2. Aortic valve leaflet morphology not well visualized.  The valve is calcified. Peak transaortic valve gradient  equals 32 mm Hg, mean transaortic valve gradient equals 18  mm Hg.  In the setting of severe LV systolic dysfunction,  these gradients may reflect the presence of significant  aortic stenosis.  However, unable to accurately estimate  the aortic valve area. Mild aortic regurgitation.  3. Severe global left ventricular systolic dysfunction.  Endocardial visualization enhanced with intravenous  injection of echo contrast (Definity).  No LV thrombus  seen.  4. The right ventricle is not well visualized; grossly  normal right ventricular systolic function.  *** No previous Echo exam.    < end of copied text >

## 2020-02-16 NOTE — PROGRESS NOTE ADULT - PROBLEM SELECTOR PLAN 1
severe anemia , s/p PRBC  Patient will  be transferred to [facility]

## 2020-02-16 NOTE — DISCHARGE NOTE PROVIDER - NSDCMRMEDTOKEN_GEN_ALL_CORE_FT
atorvastatin 40 mg oral tablet: 1 tab(s) orally once a day  calcium acetate 667 mg oral tablet: 1 tab(s) orally 3 times a day  cefepime:   Colace 100 mg oral capsule: 2 cap(s) orally once a day (at bedtime)  Debrox 6.5% otic solution: 4 drop(s) in each ear every 4 hours  dorzolamide 2% ophthalmic solution: 1 drop(s) in each eye 3 times a day  ferrous gluconate 324 mg (38 mg elemental iron) oral tablet: 1 tab(s) orally once a day  Lasix 40 mg oral tablet: 1 tab(s) orally 2 times a day  metoprolol tartrate 25 mg oral tablet: 0.5 tab(s) orally 2 times a day   pantoprazole 40 mg intravenous injection:  intravenous   prasugrel 5 mg oral tablet: 1 tab(s) orally once a day  Retacrit 10,000 units/mL preservative-free injectable solution: 1 dose(s) injectable once a week  ***Fridays****

## 2020-02-16 NOTE — PROGRESS NOTE ADULT - PROBLEM SELECTOR PLAN 2
Patient had recent stents at [facility] , patient is on Effient  which is on hold ,  will need resume if hemoglobin remain stable , PPI  ( discussed with dr dial )    patient is  high risk     patient had minimal elevated troponin in setting severe anemia and renal failure without chest pain possible demand ischemia.

## 2020-02-16 NOTE — PROGRESS NOTE ADULT - REASON FOR ADMISSION
GI bleed
melena and anemia at [facility]
melena and anemia at [facility]

## 2020-02-16 NOTE — DISCHARGE NOTE PROVIDER - HOSPITAL COURSE
hpi: 89y male w/ pmh CAD s/p PCI, stent last week at [facility] on Effient, severe AS, CKD4, anemia of chronic diasease, htn, prostate ca s/p RT 25yr ago, COPD sent to ED from [facility] b/c Hg 6.5.    Patient was hypothermic overnight.     - Saw pt early today- denies cp, sob, palp, no abd pain.  Dry cough.  Discussed w/ RN- no blood in stool.  Discussed w/ son, Manuel-  he called Dr. Cárdenas regarding transferring pt to [facility].  I left message w/ his service [phone] and am waiting for a call back.          2/16- feeling better today- no complaints.  Denies cough but discussed small infiltrate, hypothermic at times and iv abx.  Stable for transfer.         Vital Signs Last 24 Hrs    T(C): 36.6 (16 Feb 2020 06:22), Max: 36.6 (15 Feb 2020 16:42)    T(F): 97.8 (16 Feb 2020 06:22), Max: 97.8 (15 Feb 2020 16:42)    HR: 61 (16 Feb 2020 06:22) (61 - 75)    BP: 124/52 (16 Feb 2020 06:22) (120/45 - 135/60)    BP(mean): --    RR: 18 (16 Feb 2020 06:22) (18 - 20)    SpO2: 96% (16 Feb 2020 06:22) (92% - 98%)                LABS: All Labs Reviewed:                            8.7      8.06  )-----------( 154      ( 16 Feb 2020 07:42 )               27.7         02-16        141  |  100  |  112<H>    ----------------------------<  128<H>    3.8   |  32<H>  |  4.32<H>        Ca    8.5      16 Feb 2020 07:42        TPro  6.5  /  Alb  2.5<L>  /  TBili  0.5  /  DBili  x   /  AST  22  /  ALT  20  /  AlkPhos  106  02-16        PT/INR - ( 14 Feb 2020 13:27 )   PT: 11.9 sec;   INR: 1.07 ratio               PTT - ( 14 Feb 2020 13:27 )  PTT:30.9 sec    CARDIAC MARKERS ( 14 Feb 2020 22:15 )    0.231 ng/mL / x     / x     / x     / x        CARDIAC MARKERS ( 14 Feb 2020 18:55 )    0.259 ng/mL / x     / x     / x     / x        CARDIAC MARKERS ( 14 Feb 2020 16:42 )    0.257 ng/mL / x     / x     / x     / x        CARDIAC MARKERS ( 14 Feb 2020 13:27 )    0.283 ng/mL / x     / x     / x     / x                    ASSESSMENT and PLAN:    89y male w/         1. acute blood loss anemia, possible GI bleed    - pt recently started effient for CAd/stent    - transfused 2 units prbc, h/h slightly improved, monitor    - discussed w/ Cardio, resume effient and monitor    - protonix drip    - GI f/u appreciated        2. CAD s/p stent    - continue effient, statin, BB    - Cardiology f/u appreciated    - waiting from call back from [place] regarding transfer    2/16- transfer today        3. CLIFFORD on CKD4     - in setting of GI bleed    - Cr slightly improved    - continue home dose lasix and monitor    - CT abd no hydronephrosis    - Nephro f/u appreciated    2/16- Cr improving         4. severe AS    - dx at [facility] and family declined TAVR b/c high risk for requiring HD        5. hypothermia    - flaco hugger    - 2/15- start cefepime for possible LLL infiltrate seen  on imaging    CT chest- Left lower lobe atelectatic change/infiltrate and small left effusion. Improved right basilar infiltrate with tiny residual right effusion     - send  blood cultures- pending    - check TSH- normal     bp stable , normal glucose     - 2/16- normal temps overnight and just informed by RN pt had low temp now            6. dvt px        Stable for transfer. time 75min.

## 2020-02-20 DIAGNOSIS — K64.9 UNSPECIFIED HEMORRHOIDS: ICD-10-CM

## 2020-02-20 DIAGNOSIS — I35.0 NONRHEUMATIC AORTIC (VALVE) STENOSIS: ICD-10-CM

## 2020-02-20 DIAGNOSIS — R68.0 HYPOTHERMIA, NOT ASSOCIATED WITH LOW ENVIRONMENTAL TEMPERATURE: ICD-10-CM

## 2020-02-20 DIAGNOSIS — E11.22 TYPE 2 DIABETES MELLITUS WITH DIABETIC CHRONIC KIDNEY DISEASE: ICD-10-CM

## 2020-02-20 DIAGNOSIS — M10.9 GOUT, UNSPECIFIED: ICD-10-CM

## 2020-02-20 DIAGNOSIS — I25.10 ATHEROSCLEROTIC HEART DISEASE OF NATIVE CORONARY ARTERY WITHOUT ANGINA PECTORIS: ICD-10-CM

## 2020-02-20 DIAGNOSIS — H40.9 UNSPECIFIED GLAUCOMA: ICD-10-CM

## 2020-02-20 DIAGNOSIS — D63.1 ANEMIA IN CHRONIC KIDNEY DISEASE: ICD-10-CM

## 2020-02-20 DIAGNOSIS — N17.0 ACUTE KIDNEY FAILURE WITH TUBULAR NECROSIS: ICD-10-CM

## 2020-02-20 DIAGNOSIS — I50.22 CHRONIC SYSTOLIC (CONGESTIVE) HEART FAILURE: ICD-10-CM

## 2020-02-20 DIAGNOSIS — N18.4 CHRONIC KIDNEY DISEASE, STAGE 4 (SEVERE): ICD-10-CM

## 2020-02-20 DIAGNOSIS — D62 ACUTE POSTHEMORRHAGIC ANEMIA: ICD-10-CM

## 2020-02-20 DIAGNOSIS — D50.9 IRON DEFICIENCY ANEMIA, UNSPECIFIED: ICD-10-CM

## 2020-02-20 DIAGNOSIS — I12.9 HYPERTENSIVE CHRONIC KIDNEY DISEASE WITH STAGE 1 THROUGH STAGE 4 CHRONIC KIDNEY DISEASE, OR UNSPECIFIED CHRONIC KIDNEY DISEASE: ICD-10-CM

## 2020-02-20 DIAGNOSIS — K92.2 GASTROINTESTINAL HEMORRHAGE, UNSPECIFIED: ICD-10-CM

## 2020-02-20 DIAGNOSIS — C61 MALIGNANT NEOPLASM OF PROSTATE: ICD-10-CM

## 2020-02-20 DIAGNOSIS — M81.0 AGE-RELATED OSTEOPOROSIS WITHOUT CURRENT PATHOLOGICAL FRACTURE: ICD-10-CM

## 2020-02-20 LAB
CULTURE RESULTS: SIGNIFICANT CHANGE UP
CULTURE RESULTS: SIGNIFICANT CHANGE UP
SPECIMEN SOURCE: SIGNIFICANT CHANGE UP
SPECIMEN SOURCE: SIGNIFICANT CHANGE UP

## 2020-07-24 ENCOUNTER — INPATIENT (INPATIENT)
Facility: HOSPITAL | Age: 85
LOS: 2 days | Discharge: HOME | DRG: 605 | End: 2020-07-27
Attending: INTERNAL MEDICINE | Admitting: INTERNAL MEDICINE
Payer: MEDICARE

## 2020-07-24 VITALS
HEART RATE: 78 BPM | SYSTOLIC BLOOD PRESSURE: 136 MMHG | TEMPERATURE: 98 F | OXYGEN SATURATION: 95 % | HEIGHT: 67 IN | RESPIRATION RATE: 18 BRPM | WEIGHT: 145.06 LBS | DIASTOLIC BLOOD PRESSURE: 63 MMHG

## 2020-07-24 DIAGNOSIS — Z98.890 OTHER SPECIFIED POSTPROCEDURAL STATES: Chronic | ICD-10-CM

## 2020-07-24 DIAGNOSIS — Z90.89 ACQUIRED ABSENCE OF OTHER ORGANS: Chronic | ICD-10-CM

## 2020-07-24 LAB
ALBUMIN SERPL ELPH-MCNC: 3.3 G/DL — SIGNIFICANT CHANGE UP (ref 3.3–5)
ALP SERPL-CCNC: 102 U/L — SIGNIFICANT CHANGE UP (ref 40–120)
ALT FLD-CCNC: 27 U/L — SIGNIFICANT CHANGE UP (ref 12–78)
ANION GAP SERPL CALC-SCNC: 9 MMOL/L — SIGNIFICANT CHANGE UP (ref 5–17)
APPEARANCE UR: CLEAR — SIGNIFICANT CHANGE UP
AST SERPL-CCNC: 18 U/L — SIGNIFICANT CHANGE UP (ref 15–37)
BASOPHILS # BLD AUTO: 0.05 K/UL — SIGNIFICANT CHANGE UP (ref 0–0.2)
BASOPHILS NFR BLD AUTO: 0.4 % — SIGNIFICANT CHANGE UP (ref 0–2)
BILIRUB SERPL-MCNC: 0.3 MG/DL — SIGNIFICANT CHANGE UP (ref 0.2–1.2)
BILIRUB UR-MCNC: NEGATIVE — SIGNIFICANT CHANGE UP
BUN SERPL-MCNC: 82 MG/DL — HIGH (ref 7–23)
CALCIUM SERPL-MCNC: 9.6 MG/DL — SIGNIFICANT CHANGE UP (ref 8.5–10.1)
CHLORIDE SERPL-SCNC: 106 MMOL/L — SIGNIFICANT CHANGE UP (ref 96–108)
CO2 SERPL-SCNC: 30 MMOL/L — SIGNIFICANT CHANGE UP (ref 22–31)
COLOR SPEC: YELLOW — SIGNIFICANT CHANGE UP
CREAT SERPL-MCNC: 3.05 MG/DL — HIGH (ref 0.5–1.3)
DIFF PNL FLD: ABNORMAL
EOSINOPHIL # BLD AUTO: 0.03 K/UL — SIGNIFICANT CHANGE UP (ref 0–0.5)
EOSINOPHIL NFR BLD AUTO: 0.2 % — SIGNIFICANT CHANGE UP (ref 0–6)
GLUCOSE SERPL-MCNC: 162 MG/DL — HIGH (ref 70–99)
GLUCOSE UR QL: NEGATIVE MG/DL — SIGNIFICANT CHANGE UP
HCT VFR BLD CALC: 39.3 % — SIGNIFICANT CHANGE UP (ref 39–50)
HGB BLD-MCNC: 11.9 G/DL — LOW (ref 13–17)
IMM GRANULOCYTES NFR BLD AUTO: 0.4 % — SIGNIFICANT CHANGE UP (ref 0–1.5)
KETONES UR-MCNC: NEGATIVE — SIGNIFICANT CHANGE UP
LEUKOCYTE ESTERASE UR-ACNC: NEGATIVE — SIGNIFICANT CHANGE UP
LYMPHOCYTES # BLD AUTO: 0.57 K/UL — LOW (ref 1–3.3)
LYMPHOCYTES # BLD AUTO: 4.3 % — LOW (ref 13–44)
MCHC RBC-ENTMCNC: 30.3 GM/DL — LOW (ref 32–36)
MCHC RBC-ENTMCNC: 30.6 PG — SIGNIFICANT CHANGE UP (ref 27–34)
MCV RBC AUTO: 101 FL — HIGH (ref 80–100)
MONOCYTES # BLD AUTO: 1.24 K/UL — HIGH (ref 0–0.9)
MONOCYTES NFR BLD AUTO: 9.4 % — SIGNIFICANT CHANGE UP (ref 2–14)
NEUTROPHILS # BLD AUTO: 11.28 K/UL — HIGH (ref 1.8–7.4)
NEUTROPHILS NFR BLD AUTO: 85.3 % — HIGH (ref 43–77)
NITRITE UR-MCNC: NEGATIVE — SIGNIFICANT CHANGE UP
PH UR: 5 — SIGNIFICANT CHANGE UP (ref 5–8)
PLATELET # BLD AUTO: 244 K/UL — SIGNIFICANT CHANGE UP (ref 150–400)
POTASSIUM SERPL-MCNC: 4.1 MMOL/L — SIGNIFICANT CHANGE UP (ref 3.5–5.3)
POTASSIUM SERPL-SCNC: 4.1 MMOL/L — SIGNIFICANT CHANGE UP (ref 3.5–5.3)
PROT SERPL-MCNC: 7.7 GM/DL — SIGNIFICANT CHANGE UP (ref 6–8.3)
PROT UR-MCNC: 30 MG/DL
RBC # BLD: 3.89 M/UL — LOW (ref 4.2–5.8)
RBC # FLD: 15.5 % — HIGH (ref 10.3–14.5)
SARS-COV-2 RNA SPEC QL NAA+PROBE: SIGNIFICANT CHANGE UP
SODIUM SERPL-SCNC: 145 MMOL/L — SIGNIFICANT CHANGE UP (ref 135–145)
SP GR SPEC: 1.01 — SIGNIFICANT CHANGE UP (ref 1.01–1.02)
UROBILINOGEN FLD QL: NEGATIVE MG/DL — SIGNIFICANT CHANGE UP
WBC # BLD: 13.22 K/UL — HIGH (ref 3.8–10.5)
WBC # FLD AUTO: 13.22 K/UL — HIGH (ref 3.8–10.5)

## 2020-07-24 PROCEDURE — 73700 CT LOWER EXTREMITY W/O DYE: CPT | Mod: 26,LT

## 2020-07-24 PROCEDURE — 76376 3D RENDER W/INTRP POSTPROCES: CPT | Mod: 26

## 2020-07-24 PROCEDURE — 73562 X-RAY EXAM OF KNEE 3: CPT | Mod: 26,LT

## 2020-07-24 RX ORDER — ACETAMINOPHEN 500 MG
975 TABLET ORAL ONCE
Refills: 0 | Status: COMPLETED | OUTPATIENT
Start: 2020-07-24 | End: 2020-07-24

## 2020-07-24 RX ORDER — TETANUS TOXOID, REDUCED DIPHTHERIA TOXOID AND ACELLULAR PERTUSSIS VACCINE, ADSORBED 5; 2.5; 8; 8; 2.5 [IU]/.5ML; [IU]/.5ML; UG/.5ML; UG/.5ML; UG/.5ML
0.5 SUSPENSION INTRAMUSCULAR ONCE
Refills: 0 | Status: COMPLETED | OUTPATIENT
Start: 2020-07-24 | End: 2020-07-24

## 2020-07-24 RX ADMIN — TETANUS TOXOID, REDUCED DIPHTHERIA TOXOID AND ACELLULAR PERTUSSIS VACCINE, ADSORBED 0.5 MILLILITER(S): 5; 2.5; 8; 8; 2.5 SUSPENSION INTRAMUSCULAR at 18:05

## 2020-07-24 RX ADMIN — Medication 975 MILLIGRAM(S): at 21:30

## 2020-07-24 NOTE — ED PROVIDER NOTE - PHYSICAL EXAMINATION
GENERALIZED APPEARANCE:  Comfortable, no acute distress.  SKIN:  Warm and dry. +superficial abrasion to the left knee  HEAD:  Normocephalic.  EYES:  Conjunctiva pink, no icterus.  ENMT:  Mucus membranes moist.  CHEST AND RESPIRATORY:  Clear to auscultation with good air entry bilaterally.  HEART AND CARDIOVASCULAR:  Regular rate, no obvious murmur.  ABDOMEN AND GI:  Soft, non-tender, non-distended.  No rebound, no guarding.  EXTREMITIES:  No deformity, edema, or calf tenderness. +tenderness to palpation to the medial joint of the left knee. +BL extremities neurovascularly intact.   NEURO: AAOx3, gross motor and sensory intact.  PSYCH: Normal affect.

## 2020-07-24 NOTE — ED ADULT NURSE NOTE - OBJECTIVE STATEMENT
Pt presents to ED for left knee pain. Pt states he woke up in the middle of the night to use the bathroom. Pt states he turned too fast and had a mechanical fall. Pt denies syncope, lightheaded, dizziness, or cp prior to fall. Pt states he was able to get up after fall. Pt states this am he woke up without any complaints of pain, walked around facility with walker, then suddenly began experiencing pain to left knee. Pt states he took tylenol 3 hrs PTA. Pt denies hitting   head, denies LOC. Denies any other complaints at this time.

## 2020-07-24 NOTE — ED ADULT NURSE NOTE - NSIMPLEMENTINTERV_GEN_ALL_ED
Implemented All Fall Risk Interventions:  [place] to call system. Call bell, personal items and telephone within reach. Instruct patient to call for assistance. Room bathroom lighting operational. Non-slip footwear when patient is off stretcher. Physically safe environment: no spills, clutter or unnecessary equipment. Stretcher in lowest position, wheels locked, appropriate side rails in place. Provide visual cue, wrist band, yellow gown, etc. Monitor gait and stability. Monitor for mental status changes and reorient to person, place, and time. Review medications for side effects contributing to fall risk. Reinforce activity limits and safety measures with patient and family.

## 2020-07-24 NOTE — ED PROVIDER NOTE - NS ED ROS FT
Constitutional: No fever.  Eyes: No vision changes.    Ears, Nose, Mouth, Throat: No sore throat.  Cardiovascular: No chest pain.  Respiratory: No difficulty breathing.  Gastrointestinal: No nausea or vomiting.  Genitourinary: No dysuria.  Musculoskeletal: +joint pain.  Integumentary (skin and/or breast): No rash.  Neurological: No headache.  Psychiatric: No depression.  Endocrine:   No heat / cold intolerance.  Hematologic/Lymphatic: No easy bruising    Allergic/Immunologic:   No current allergic reactions. Constitutional: No fever.  Eyes: No vision changes.  Ears, Nose, Mouth, Throat: No sore throat.  Cardiovascular: No chest pain.  Respiratory: No difficulty breathing.  Gastrointestinal: No nausea or vomiting.  Genitourinary: No dysuria.  Musculoskeletal: +joint pain.  Integumentary (skin and/or breast): No rash.  Neurological: No headache.  Psychiatric: No depression.

## 2020-07-24 NOTE — ED ADULT TRIAGE NOTE - CHIEF COMPLAINT QUOTE
pt presents to ED with complaints of mechanical trip and fall while ambulating at assisted living. denies LOC and head trauma. denies blood thinners. pt endorsing only left knee pain.

## 2020-07-24 NOTE — ED PROVIDER NOTE - NS_ ATTENDINGSCRIBEDETAILS _ED_A_ED_FT
I, Paulino Jackman DO,  performed the initial face to face bedside interview with this patient regarding history of present illness, review of symptoms and relevant past medical, social and family history.  I completed an independent physical examination.  I was the initial provider who evaluated this patient.  The history, relevant review of systems, past medical and surgical history, medical decision making, and physical examination was documented by the scribe in my presence and I attest to the accuracy of the documentation.

## 2020-07-24 NOTE — CONSULT NOTE ADULT - SUBJECTIVE AND OBJECTIVE BOX
89 M PMH aortic stenosis, DMII, CAD, CKD, Anemia, Glaucoma, Prostate Ca, Gout, HTN, HLD s/p mechanical fall onto the knee yesterday evening. Pt states he was having pain in the left knee as the day progressed and was having trouble bearing weight on the extremity. Pt states he lives at an assisted living facility, where he ambulates with a walker. Pt denies taking anticoagulation. Denies any other orthopedic injury. Denies numbness or tingling. Denies fever, chills, N/V.     PAST MEDICAL & SURGICAL HISTORY:  Leg edema  Osteoporosis  Aortic stenosis  Glaucoma  DM type 2 (diabetes mellitus, type 2)  CAD (coronary artery disease)  Chronic kidney disease (CKD)  Anemia  Prostate cancer  Gout  HLD (hyperlipidemia)  Hypertension  History of colonoscopy  History of tonsillectomy: childhood  H/O inguinal hernia repair    Allergies    ACE inhibitors (Unknown)  aspirin (Unknown)  enalapril (Unknown)    Vital Signs Last 24 Hrs  T(C): 36.6 (24 Jul 2020 19:57), Max: 36.6 (24 Jul 2020 16:32)  T(F): 97.9 (24 Jul 2020 19:57), Max: 97.9 (24 Jul 2020 19:57)  HR: 75 (24 Jul 2020 19:57) (75 - 78)  BP: 184/78 (24 Jul 2020 19:57) (136/63 - 184/78)  BP(mean): 108 (24 Jul 2020 19:57) (108 - 108)  RR: 18 (24 Jul 2020 19:57) (18 - 18)  SpO2: 96% (24 Jul 2020 19:57) (95% - 96%)    PE:   Gen: NAD  LLE: No ecchymosis, mild swelling   No ttp over the knee   Pain with AROM past 90   Pt is able to SLR   No pain with log roll, or axial loading  +EHL/FHL/TA/GSC  SILT L3-S1  2+ DP   Compartments soft and compressible  Calf nontender    Secondary exam:  No TTP over bony prominences  AROM of all other joints without pain or restriction   No TTP of cervical/thoracic or lumbar spine    XR L Knee: Demonstrates no obvious fractures or dislocation

## 2020-07-24 NOTE — CONSULT NOTE ADULT - ASSESSMENT
89 M s/p MF r/o L knee occult fracture vs OA flare:  -NWB LLE   -Will put in a bulky sosa or knee immobilizer  -Pain control  -DVT PPx   -Ice  -Elevate  -Ortho stable for discharge   -MRI as an outpatient and FU with Dr Metcalf following DC, call office for an appointment  -If pt stays then will order MRI in house for R/o occult fracture  -Case and images discussed with Dr Metcalf  -Plan discussed with Dr Metcalf, and she agrees with the plan

## 2020-07-24 NOTE — ED PROVIDER NOTE - CLINICAL SUMMARY MEDICAL DECISION MAKING FREE TEXT BOX
s/p mechanical fall last night with worsening knee pain and stiffness. Plan: XR, labs, update TDAP due to mild abrasion, reassess.

## 2020-07-24 NOTE — ED PROVIDER NOTE - OBJECTIVE STATEMENT
89 y/o male with pmhx of  leg edema, OA, aortic stenosis, glaucoma, DM2, CAD, CKD, anemia, prostate CA, GOUT, HLD, HTN, presents to the ED s/p mechanical fall c/o L knee pain. Pt was walking from the bathroom to the bedroom at around midnight when he turned too fast and loss his balance. Pt ambulates with a walker outside of his apartment but inside his apartment he does not use it. He noticed pain getting worse around 10am this morning. Took Tylenol for the pain. No LOC, chest pain, SOB, headache, head trauma. 89 y/o male with pmhx of  leg edema, OA, aortic stenosis, glaucoma, DM2, CAD, CKD, anemia, prostate CA, GOUT, HLD, HTN, presents to the ED s/p mechanical fall c/o L knee pain. Pt was walking from the bathroom to the bedroom at around midnight when he turned too fast and loss his balance. Pt ambulates with a walker outside of his apartment but inside his apartment he does not use it. He noticed pain getting worse around 10am this morning. Took Tylenol for the pain. No LOC, chest pain, SOB, headache, head trauma. He lives in an assisted living facility.

## 2020-07-24 NOTE — ED PROVIDER NOTE - PROGRESS NOTE DETAILS
Spoke with Ortho- can get Ct scan for occult fracture. MRI not indicated at this time. Scribe Jaclyn Casale for attending Dr Jackman: Spoke with ortho in light of occult left knee fracture pt should be non weight bearing. Pt unable to return to assisted living in his condition. social work in the AM for placement. Horacio HANSON: Received s/o from Dr. Ortega pending MARGARITA eval; MARGARITA evaluated patient and recommends inpatient admission at this time for rehab placement on Monday; will admit. Horacio HANSON: CT head added on in setting of fall; on eliquis; CT not read at this time- admitting team aware; endorsed to Dr. Mccollum for admission for rehab placement as recommended from Courtney from  team. EKG reviewed- unchanged from old; Cr mildly improved from baseline. patient with leukocytosis but no source of infection- antibiotics held.

## 2020-07-25 DIAGNOSIS — S89.90XA UNSPECIFIED INJURY OF UNSPECIFIED LOWER LEG, INITIAL ENCOUNTER: ICD-10-CM

## 2020-07-25 LAB
SARS-COV-2 IGG SERPL QL IA: POSITIVE
SARS-COV-2 IGM SERPL IA-ACNC: 122 INDEX — HIGH

## 2020-07-25 PROCEDURE — 97162 PT EVAL MOD COMPLEX 30 MIN: CPT | Mod: GP

## 2020-07-25 PROCEDURE — 71045 X-RAY EXAM CHEST 1 VIEW: CPT

## 2020-07-25 PROCEDURE — 97116 GAIT TRAINING THERAPY: CPT | Mod: GP

## 2020-07-25 PROCEDURE — 70450 CT HEAD/BRAIN W/O DYE: CPT | Mod: 26

## 2020-07-25 PROCEDURE — 85027 COMPLETE CBC AUTOMATED: CPT

## 2020-07-25 PROCEDURE — 80048 BASIC METABOLIC PNL TOTAL CA: CPT

## 2020-07-25 PROCEDURE — 70450 CT HEAD/BRAIN W/O DYE: CPT

## 2020-07-25 PROCEDURE — 36415 COLL VENOUS BLD VENIPUNCTURE: CPT

## 2020-07-25 PROCEDURE — 99223 1ST HOSP IP/OBS HIGH 75: CPT | Mod: AI

## 2020-07-25 PROCEDURE — 71045 X-RAY EXAM CHEST 1 VIEW: CPT | Mod: 26

## 2020-07-25 PROCEDURE — 86769 SARS-COV-2 COVID-19 ANTIBODY: CPT

## 2020-07-25 RX ORDER — PRASUGREL 5 MG/1
5 TABLET, FILM COATED ORAL ONCE
Refills: 0 | Status: COMPLETED | OUTPATIENT
Start: 2020-07-25 | End: 2020-07-25

## 2020-07-25 RX ORDER — ALLOPURINOL 300 MG
1 TABLET ORAL
Qty: 0 | Refills: 0 | DISCHARGE

## 2020-07-25 RX ORDER — FUROSEMIDE 40 MG
40 TABLET ORAL ONCE
Refills: 0 | Status: COMPLETED | OUTPATIENT
Start: 2020-07-25 | End: 2020-07-25

## 2020-07-25 RX ORDER — CALCIUM ACETATE 667 MG
667 TABLET ORAL
Refills: 0 | Status: DISCONTINUED | OUTPATIENT
Start: 2020-07-25 | End: 2020-07-27

## 2020-07-25 RX ORDER — ALLOPURINOL 300 MG
100 TABLET ORAL ONCE
Refills: 0 | Status: COMPLETED | OUTPATIENT
Start: 2020-07-25 | End: 2020-07-25

## 2020-07-25 RX ORDER — HEPARIN SODIUM 5000 [USP'U]/ML
5000 INJECTION INTRAVENOUS; SUBCUTANEOUS EVERY 12 HOURS
Refills: 0 | Status: DISCONTINUED | OUTPATIENT
Start: 2020-07-25 | End: 2020-07-27

## 2020-07-25 RX ORDER — PRASUGREL 5 MG/1
5 TABLET, FILM COATED ORAL DAILY
Refills: 0 | Status: DISCONTINUED | OUTPATIENT
Start: 2020-07-25 | End: 2020-07-27

## 2020-07-25 RX ORDER — PANTOPRAZOLE SODIUM 20 MG/1
40 TABLET, DELAYED RELEASE ORAL DAILY
Refills: 0 | Status: DISCONTINUED | OUTPATIENT
Start: 2020-07-25 | End: 2020-07-27

## 2020-07-25 RX ORDER — ATORVASTATIN CALCIUM 80 MG/1
40 TABLET, FILM COATED ORAL AT BEDTIME
Refills: 0 | Status: DISCONTINUED | OUTPATIENT
Start: 2020-07-25 | End: 2020-07-27

## 2020-07-25 RX ORDER — CARBAMIDE PEROXIDE 81.86 MG/ML
4 SOLUTION/ DROPS AURICULAR (OTIC)
Qty: 0 | Refills: 0 | DISCHARGE

## 2020-07-25 RX ORDER — ACETAMINOPHEN 500 MG
650 TABLET ORAL EVERY 6 HOURS
Refills: 0 | Status: DISCONTINUED | OUTPATIENT
Start: 2020-07-25 | End: 2020-07-27

## 2020-07-25 RX ORDER — OXYCODONE AND ACETAMINOPHEN 5; 325 MG/1; MG/1
1 TABLET ORAL EVERY 6 HOURS
Refills: 0 | Status: DISCONTINUED | OUTPATIENT
Start: 2020-07-25 | End: 2020-07-27

## 2020-07-25 RX ORDER — ONDANSETRON 8 MG/1
4 TABLET, FILM COATED ORAL EVERY 6 HOURS
Refills: 0 | Status: DISCONTINUED | OUTPATIENT
Start: 2020-07-25 | End: 2020-07-27

## 2020-07-25 RX ORDER — DORZOLAMIDE HYDROCHLORIDE 20 MG/ML
1 SOLUTION/ DROPS OPHTHALMIC
Qty: 0 | Refills: 0 | DISCHARGE

## 2020-07-25 RX ORDER — FUROSEMIDE 40 MG
40 TABLET ORAL DAILY
Refills: 0 | Status: DISCONTINUED | OUTPATIENT
Start: 2020-07-25 | End: 2020-07-27

## 2020-07-25 RX ADMIN — ATORVASTATIN CALCIUM 40 MILLIGRAM(S): 80 TABLET, FILM COATED ORAL at 22:04

## 2020-07-25 RX ADMIN — Medication 40 MILLIGRAM(S): at 10:08

## 2020-07-25 RX ADMIN — Medication 667 MILLIGRAM(S): at 17:22

## 2020-07-25 RX ADMIN — PANTOPRAZOLE SODIUM 40 MILLIGRAM(S): 20 TABLET, DELAYED RELEASE ORAL at 10:08

## 2020-07-25 RX ADMIN — Medication 100 MILLIGRAM(S): at 10:07

## 2020-07-25 RX ADMIN — Medication 667 MILLIGRAM(S): at 13:55

## 2020-07-25 RX ADMIN — PRASUGREL 5 MILLIGRAM(S): 5 TABLET, FILM COATED ORAL at 10:08

## 2020-07-25 RX ADMIN — HEPARIN SODIUM 5000 UNIT(S): 5000 INJECTION INTRAVENOUS; SUBCUTANEOUS at 11:27

## 2020-07-25 RX ADMIN — Medication 667 MILLIGRAM(S): at 22:04

## 2020-07-25 RX ADMIN — HEPARIN SODIUM 5000 UNIT(S): 5000 INJECTION INTRAVENOUS; SUBCUTANEOUS at 22:04

## 2020-07-25 NOTE — H&P ADULT - HISTORY OF PRESENT ILLNESS
89M w/PMH CAD, COPD, HTN, Prostate CA s/p RT 25yrs ago, CKD4-5, anemia (iron def, CKD), Gout, glaucoma, severe AS (declined TAVR),  s/p mechanical fall onto the knee yesterday evening. Pt states he was having pain in the left knee as the day progressed and was having trouble bearing weight on the extremity. Pt states he lives at an assisted living facility, where he ambulates with a walker. Pt denies taking anticoagulation. Denies any other orthopedic injury. Denies numbness or tingling.  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

## 2020-07-25 NOTE — ED ADULT NURSE REASSESSMENT NOTE - NS ED NURSE REASSESS COMMENT FT1
received pt from NAILA Mclean, pt is resting in bed comfortably at this time. VSS, awaiting bed placement at this time. pt has no complaints, comfort and safety measures maintained. Will continue to monitor pt.

## 2020-07-25 NOTE — H&P ADULT - ASSESSMENT
* Fall and left knee pain NWB per ortho  MRI knee  prn analgesia  soc wk for rehab placement  denies LOC    * CKDIV  at baseline    * CAD  resume effient  ASA is listed as an allergy left message for [facility] re: asa at [facility]; it is listed on his med list I will probably resume it    * Mild leukocytosis   repeat in am  probably after the fall    * Pt is on no BB as he was previously and one weekly injection - Epo is due on Friday    dvt px UFH q12

## 2020-07-25 NOTE — H&P ADULT - NSICDXPASTMEDICALHX_GEN_ALL_CORE_FT
PAST MEDICAL HISTORY:  Anemia     Aortic stenosis     CAD (coronary artery disease)     Chronic kidney disease (CKD)     DM type 2 (diabetes mellitus, type 2)     Glaucoma     Gout     HLD (hyperlipidemia)     Hypertension     Leg edema     Osteoporosis     Prostate cancer

## 2020-07-25 NOTE — H&P ADULT - NSHPPHYSICALEXAM_GEN_ALL_CORE
HEAD:  Atraumatic, Normocephalic  EYES: EOMI, PERRLA, conjunctiva and sclera clear  ENT: normal hearing, no nasal discharge, throat clear, dentition normal for age  NECK: Supple, No JVD, no LAD, no thyromegaly   CHEST/LUNG: Clear to auscultation bilaterally; No wheeze, respirations unlabored  HEART: Regular rate and rhythm; No murmurs, rubs, or gallops  ABDOMEN: Soft, Nontender, Nondistended; Bowel sounds present, no HSM  EXTREMITIES:  2+ Peripheral Pulses, No clubbing, cyanosis, LLE in imobilizer knee normal in size  PSYCH: AAOx3, normal behavior

## 2020-07-25 NOTE — H&P ADULT - NSHPLABSRESULTS_GEN_ALL_CORE
11.9   13.22 )-----------( 244      ( 24 Jul 2020 21:33 )             39.3   07-24    145  |  106  |  82<H>  ----------------------------<  162<H>  4.1   |  30  |  3.05<H>    Ca    9.6      24 Jul 2020 21:33    TPro  7.7  /  Alb  3.3  /  TBili  0.3  /  DBili  x   /  AST  18  /  ALT  27  /  AlkPhos  102  07-24

## 2020-07-26 LAB
ANION GAP SERPL CALC-SCNC: 7 MMOL/L — SIGNIFICANT CHANGE UP (ref 5–17)
BUN SERPL-MCNC: 80 MG/DL — HIGH (ref 7–23)
CALCIUM SERPL-MCNC: 9.5 MG/DL — SIGNIFICANT CHANGE UP (ref 8.5–10.1)
CHLORIDE SERPL-SCNC: 107 MMOL/L — SIGNIFICANT CHANGE UP (ref 96–108)
CO2 SERPL-SCNC: 30 MMOL/L — SIGNIFICANT CHANGE UP (ref 22–31)
CREAT SERPL-MCNC: 3.04 MG/DL — HIGH (ref 0.5–1.3)
GLUCOSE SERPL-MCNC: 144 MG/DL — HIGH (ref 70–99)
HCT VFR BLD CALC: 36.9 % — LOW (ref 39–50)
HGB BLD-MCNC: 11.1 G/DL — LOW (ref 13–17)
MCHC RBC-ENTMCNC: 30.1 GM/DL — LOW (ref 32–36)
MCHC RBC-ENTMCNC: 30.3 PG — SIGNIFICANT CHANGE UP (ref 27–34)
MCV RBC AUTO: 100.8 FL — HIGH (ref 80–100)
PLATELET # BLD AUTO: 231 K/UL — SIGNIFICANT CHANGE UP (ref 150–400)
POTASSIUM SERPL-MCNC: 3.6 MMOL/L — SIGNIFICANT CHANGE UP (ref 3.5–5.3)
POTASSIUM SERPL-SCNC: 3.6 MMOL/L — SIGNIFICANT CHANGE UP (ref 3.5–5.3)
RBC # BLD: 3.66 M/UL — LOW (ref 4.2–5.8)
RBC # FLD: 15.6 % — HIGH (ref 10.3–14.5)
SODIUM SERPL-SCNC: 144 MMOL/L — SIGNIFICANT CHANGE UP (ref 135–145)
WBC # BLD: 9.1 K/UL — SIGNIFICANT CHANGE UP (ref 3.8–10.5)
WBC # FLD AUTO: 9.1 K/UL — SIGNIFICANT CHANGE UP (ref 3.8–10.5)

## 2020-07-26 PROCEDURE — 99233 SBSQ HOSP IP/OBS HIGH 50: CPT

## 2020-07-26 RX ADMIN — Medication 667 MILLIGRAM(S): at 17:16

## 2020-07-26 RX ADMIN — HEPARIN SODIUM 5000 UNIT(S): 5000 INJECTION INTRAVENOUS; SUBCUTANEOUS at 10:58

## 2020-07-26 RX ADMIN — HEPARIN SODIUM 5000 UNIT(S): 5000 INJECTION INTRAVENOUS; SUBCUTANEOUS at 20:08

## 2020-07-26 RX ADMIN — PANTOPRAZOLE SODIUM 40 MILLIGRAM(S): 20 TABLET, DELAYED RELEASE ORAL at 10:58

## 2020-07-26 RX ADMIN — Medication 667 MILLIGRAM(S): at 08:14

## 2020-07-26 RX ADMIN — Medication 667 MILLIGRAM(S): at 12:16

## 2020-07-26 RX ADMIN — Medication 40 MILLIGRAM(S): at 10:58

## 2020-07-26 RX ADMIN — PRASUGREL 5 MILLIGRAM(S): 5 TABLET, FILM COATED ORAL at 10:58

## 2020-07-26 RX ADMIN — Medication 667 MILLIGRAM(S): at 20:08

## 2020-07-26 RX ADMIN — ATORVASTATIN CALCIUM 40 MILLIGRAM(S): 80 TABLET, FILM COATED ORAL at 20:08

## 2020-07-26 NOTE — CHART NOTE - NSCHARTNOTEFT_GEN_A_CORE
Pt seen and examined at bedside. Pt was doing well states that he has no more knee pain with AROM. Denies fever, chills, N/V.     PE:   Gen: NAD  LLE: Ace wrap on the knee  No swelling or bruising present   No TTP of the knee  A/PROM without pain or restriction  +EHL/FHL/GSC/TA  SILT L3-S1  2+ DP  Compartments soft and compressible  Pt was able to walk with a walker from the bed into and down the hallway, and back to the bed without knee pain    After discussing with Dr Metcalf, PE of the patient, his ability to ambulate without R knee pain and no fractures present on CT of the knee all make the risk of an occult fracture of the knee unlikely.     Plan:  -WBAT LLE, ace wrap for comfort, advise walking with walker  -Pain Control  -DVT PPx per primary  -Pt should FU with Dr Metcalf as an outpatient as needed for the knee pain if pain returns  -Ortho stable, will SO, please consult as needed  -Discussed with Dr Metcalf and she agrees with the plan Pt seen and examined at bedside. Pt was doing well states that he has no more knee pain with AROM. Denies fever, chills, N/V.     PE:   Gen: NAD  LLE: Ace wrap on the knee  No swelling or bruising present   No TTP of the knee  A/PROM without pain or restriction  +EHL/FHL/GSC/TA  SILT L3-S1  2+ DP  Compartments soft and compressible  Pt was able to walk with a walker from the bed into and down the hallway, and back to the bed without knee pain    After discussing with Dr Metcalf, PE of the patient, his ability to ambulate without R knee pain and no fractures present on CT of the knee all make the risk of an occult fracture of the knee unlikely.     Plan:  -WBAT LLE, ace wrap for comfort, advise walking with walker  -Pain Control  -DVT PPx per primary  -If pain returns or worsens and there is concern for an occult fx consider MRI  - FU with Dr Metcalf as an outpatient as needed for the knee pain if pain returns. Please call office for an appointment.  -Ortho stable, will SO, please consult as needed  -Discussed with Dr Metcalf and she agrees with the plan

## 2020-07-26 NOTE — PROGRESS NOTE ADULT - ASSESSMENT
89 M s/p MF r/o L knee occult fracture vs OA flare:  -no longer having any pain in knee left  -NWB LLE in knee immobilizer  -Pain control  -DVT PPx   -Ice  -Elevate  -Ortho stable for discharge   -MRI as an outpatient and FU with Dr Metcalf following DC, call office for an appointment  - FU MRI in house for R/o occult fracture  -Case and images discussed with Dr Metcalf  -Plan discussed with Dr Metcalf, and she agrees with the plan

## 2020-07-26 NOTE — PROGRESS NOTE ADULT - SUBJECTIVE AND OBJECTIVE BOX
c/c: fall    HPI:  89M from [facility] living w/PMH CAD, COPD, HTN, Prostate CA s/p RT 25yrs ago, CKD4-5, anemia (iron def, CKD), Gout, glaucoma, severe AS (declined TAVR),  s/p mechanical fall onto the knee yesterday evening. Pt states he was having pain in the left knee as the day progressed and was having trouble bearing weight on the extremity. Pt states he lives at an assisted living facility, where he ambulates with a walker. Pt denies taking anticoagulation. Denies any other orthopedic injury. Denies numbness or tingling.  Was seen by Ortho and will need rehab; they also recommended MRI if pt will be hospitalized which has been ordered    : pt seen and examined this am. Feeling better. Pain controlled. Awaiting mri. no sob/chest pain. tolerating po.      Review of system- All 10 systems reviewed and is as per HPI otherwise negative.       VITALS  T(C): 36.3 (20 @ 09:43), Max: 36.8 (20 @ 23:59)  HR: 92 (20 @ 09:43) (79 - 98)  BP: 147/68 (20 @ 09:43) (129/60 - 147/68)  RR: 18 (20 @ 09:43) (17 - 18)  SpO2: 97% (20 @ 09:43) (95% - 97%)      PHYSICAL EXAM:    GENERAL: Comfortable, no acute distress  HEAD:  Atraumatic, Normocephalic  EYES: EOMI, PERRLA  HEENT: Moist mucous membranes  NECK: Supple, No JVD  NERVOUS SYSTEM:  Alert & Oriented X3, non focal.  CHEST/LUNG: Clear to auscultation bilaterally  HEART: Regular rate and rhythm;  ABDOMEN: Soft, Nontender, Nondistended; Bowel sounds present  GENITOURINARY- Voiding, no palpable bladder  EXTREMITIES:  No clubbing, cyanosis, or edema  MUSCULOSKELETAL-Left knee in immobilizer  SKIN-no rash        LABS:                        11.1   9.10  )-----------( 231      ( 2020 06:27 )             36.9         144  |  107  |  80<H>  ----------------------------<  144<H>  3.6   |  30  |  3.04<H>    Ca    9.5      2020 06:27    TPro  7.7  /  Alb  3.3  /  TBili  0.3  /  DBili  x   /  AST  18  /  ALT  27  /  AlkPhos  102  07-24      Urinalysis Basic - ( 2020 21:33 )    Color: Yellow / Appearance: Clear / S.010 / pH: x  Gluc: x / Ketone: Negative  / Bili: Negative / Urobili: Negative mg/dL   Blood: x / Protein: 30 mg/dL / Nitrite: Negative   Leuk Esterase: Negative / RBC: 0-2 /HPF / WBC 0-2   Sq Epi: x / Non Sq Epi: Negative / Bacteria: Negative    MEDICATIONS  (STANDING):  atorvastatin 40 milliGRAM(s) Oral at bedtime  calcium acetate 667 milliGRAM(s) Oral four times a day with meals  furosemide    Tablet 40 milliGRAM(s) Oral daily  heparin   Injectable 5000 Unit(s) SubCutaneous every 12 hours  pantoprazole    Tablet 40 milliGRAM(s) Oral daily  prasugrel 5 milliGRAM(s) Oral daily    MEDICATIONS  (PRN):  acetaminophen   Tablet .. 650 milliGRAM(s) Oral every 6 hours PRN Temp greater or equal to 38.5C (101.3F), Mild Pain (1 - 3)  aluminum hydroxide/magnesium hydroxide/simethicone Suspension 30 milliLiter(s) Oral every 4 hours PRN Dyspepsia  ondansetron Injectable 4 milliGRAM(s) IV Push every 6 hours PRN Nausea  oxycodone    5 mG/acetaminophen 325 mG 1 Tablet(s) Oral every 6 hours PRN Severe Pain (7 - 10)    ASSESSMENT AND PLAN:  89M, PMH AS ABOVE A/W:    * Fall and left knee pain NWB per ortho  MRI knee  prn analgesia  soc wk for rehab placement  denies LOC    *Severe AS:  -refused tavr in past  -keep euvolemic    *COPD:  -stable  -no s/o exacerbation.    * CKDIV  at baseline    * CAD  -Continue effient    * Mild leukocytosis   -reactive  -resolved.    *Anemia of chronic disese:  -epo due friday.    *DVT Px:  -hep sq.    *DIspo:  f/u knee mri  PT eval  will likely need AUDRA.

## 2020-07-26 NOTE — PROGRESS NOTE ADULT - SUBJECTIVE AND OBJECTIVE BOX
Pt seen examined. Pain well controlled. States he no longer has any left knee pain. No acute events overnight. No fevers, chills, sob, cp, n/v.    Vital Signs Last 24 Hrs  T(C): 36.8 (25 Jul 2020 23:59), Max: 37 (25 Jul 2020 07:01)  T(F): 98.2 (25 Jul 2020 23:59), Max: 98.6 (25 Jul 2020 07:01)  HR: 82 (25 Jul 2020 23:59) (79 - 98)  BP: 132/54 (25 Jul 2020 23:59) (129/60 - 140/67)  BP(mean): 88 (25 Jul 2020 07:01) (88 - 88)  RR: 18 (25 Jul 2020 23:59) (17 - 19)  SpO2: 95% (25 Jul 2020 23:59) (94% - 97%)    PE:  Gen: NAD  LLE:  In knee immobilizer  Skin intact, no erythema or effusion  Compartments soft and compressible  No calf ttp  No ttp about the patella  No pain with axial load of knee joint  Able to SLR  Minimal pain with ROM  +EHL/FHL/Gsc/TA  SILT L2-S1  2+ DP

## 2020-07-27 ENCOUNTER — TRANSCRIPTION ENCOUNTER (OUTPATIENT)
Age: 85
End: 2020-07-27

## 2020-07-27 VITALS
RESPIRATION RATE: 16 BRPM | DIASTOLIC BLOOD PRESSURE: 54 MMHG | TEMPERATURE: 98 F | HEART RATE: 74 BPM | OXYGEN SATURATION: 96 % | SYSTOLIC BLOOD PRESSURE: 116 MMHG

## 2020-07-27 PROCEDURE — 99239 HOSP IP/OBS DSCHRG MGMT >30: CPT

## 2020-07-27 RX ORDER — HEPARIN SODIUM 5000 [USP'U]/ML
5000 INJECTION INTRAVENOUS; SUBCUTANEOUS
Qty: 0 | Refills: 0 | DISCHARGE
Start: 2020-07-27

## 2020-07-27 RX ADMIN — Medication 40 MILLIGRAM(S): at 10:38

## 2020-07-27 RX ADMIN — PRASUGREL 5 MILLIGRAM(S): 5 TABLET, FILM COATED ORAL at 10:38

## 2020-07-27 RX ADMIN — PANTOPRAZOLE SODIUM 40 MILLIGRAM(S): 20 TABLET, DELAYED RELEASE ORAL at 10:38

## 2020-07-27 RX ADMIN — Medication 667 MILLIGRAM(S): at 08:03

## 2020-07-27 RX ADMIN — Medication 667 MILLIGRAM(S): at 12:53

## 2020-07-27 RX ADMIN — HEPARIN SODIUM 5000 UNIT(S): 5000 INJECTION INTRAVENOUS; SUBCUTANEOUS at 10:37

## 2020-07-27 NOTE — PHYSICAL THERAPY INITIAL EVALUATION ADULT - GENERAL OBSERVATIONS, REHAB EVAL
Pt rec'd sitting up in bedside chair, pleasant and cooperative with PT, left knee wrapped in ACE bandage, pt denies pain.

## 2020-07-27 NOTE — PROGRESS NOTE ADULT - SUBJECTIVE AND OBJECTIVE BOX
c/c: fall    HPI:  89M from [facility] living w/PMH CAD, COPD, HTN, Prostate CA s/p RT 25yrs ago, CKD4-5, anemia (iron def, CKD), Gout, glaucoma, severe AS (declined TAVR),  s/p mechanical fall onto the knee yesterday evening. Pt states he was having pain in the left knee as the day progressed and was having trouble bearing weight on the extremity. Pt states he lives at an assisted living facility, where he ambulates with a walker. Pt denies taking anticoagulation. Denies any other orthopedic injury. Denies numbness or tingling.  Was seen by Ortho and will need rehab;    7/26: pt seen and examined this am. Feeling better. Pain controlled. Awaiting mri. no sob/chest pain. tolerating po.  7/27: seen OOB to chair, MRI cancelled as pt is amb with PT, denies any CP, SOB, voiding and roscoe po      Review of system- All 10 systems reviewed and is as per HPI otherwise negative.     Vital Signs Last 24 Hrs  T(C): 36.7 (07-27-20 @ 08:25), Max: 36.7 (07-27-20 @ 08:25)  T(F): 98 (07-27-20 @ 08:25), Max: 98 (07-27-20 @ 08:25)  HR: 74 (07-27-20 @ 08:25) (74 - 102)  BP: 116/54 (07-27-20 @ 08:25) (116/54 - 131/59)  BP(mean): --  RR: 16 (07-27-20 @ 08:25) (16 - 17)  SpO2: 96% (07-27-20 @ 08:25) (96% - 98%)      PHYSICAL EXAM:    GENERAL: Comfortable, no acute distress  HEAD:  Atraumatic, Normocephalic  EYES: EOMI, PERRLA  HEENT: Moist mucous membranes  NECK: Supple, No JVD  NERVOUS SYSTEM:  Alert & Oriented X3, non focal.  CHEST/LUNG: Clear to auscultation bilaterally  HEART: Regular rate and rhythm;  ABDOMEN: Soft, Nontender, Nondistended; Bowel sounds present  GENITOURINARY- Voiding, no palpable bladder  EXTREMITIES:  No clubbing, cyanosis, or edema  MUSCULOSKELETAL-Left knee in immobilizer  SKIN-no rash        LABS:                      11.1   9.10  )-----------( 231      ( 26 Jul 2020 06:27 )             36.9       07-26    144  |  107  |  80<H>  ----------------------------<  144<H>  3.6   |  30  |  3.04<H>    Ca    9.5      26 Jul 2020 06:27              MEDICATIONS  (STANDING):  atorvastatin 40 milliGRAM(s) Oral at bedtime  calcium acetate 667 milliGRAM(s) Oral four times a day with meals  furosemide    Tablet 40 milliGRAM(s) Oral daily  heparin   Injectable 5000 Unit(s) SubCutaneous every 12 hours  pantoprazole    Tablet 40 milliGRAM(s) Oral daily  prasugrel 5 milliGRAM(s) Oral daily    MEDICATIONS  (PRN):  acetaminophen   Tablet .. 650 milliGRAM(s) Oral every 6 hours PRN Temp greater or equal to 38.5C (101.3F), Mild Pain (1 - 3)  aluminum hydroxide/magnesium hydroxide/simethicone Suspension 30 milliLiter(s) Oral every 4 hours PRN Dyspepsia  ondansetron Injectable 4 milliGRAM(s) IV Push every 6 hours PRN Nausea  oxycodone    5 mG/acetaminophen 325 mG 1 Tablet(s) Oral every 6 hours PRN Severe Pain (7 - 10)    ASSESSMENT AND PLAN:  89M, PMH AS ABOVE A/W:    * Fall and left knee pain NWB per ortho  MRI knee  prn analgesia  soc wk for rehab placement  denies LOC    *Severe AS:  -refused tavr in past  -keep euvolemic    *COPD:  -stable  -no s/o exacerbation.    * CKDIV  at baseline    * CAD  -Continue effient    * Mild leukocytosis   -reactive  -resolved.    *Anemia of chronic disease:  -epo due friday.    *DVT Px:  -hep sq.    *DIspo:    PT eval  will likely need AUDRA. c/c: fall    HPI:  89M from [facility] living w/PMH CAD, COPD, HTN, Prostate CA s/p RT 25yrs ago, CKD4-5, anemia (iron def, CKD), Gout, glaucoma, severe AS (declined TAVR),  s/p mechanical fall onto the knee yesterday evening. Pt states he was having pain in the left knee as the day progressed and was having trouble bearing weight on the extremity. Pt states he lives at an assisted living facility, where he ambulates with a walker. Pt denies taking anticoagulation. Denies any other orthopedic injury. Denies numbness or tingling.  Was seen by Ortho and will need rehab;    7/26: pt seen and examined this am. Feeling better. Pain controlled. Awaiting mri. no sob/chest pain. tolerating po.  7/27: seen OOB to chair, MRI cancelled as pt is amb with PT, denies any CP, SOB, voiding and roscoe po    Review of system- All 10 systems reviewed and is as per HPI otherwise negative.     Vital Signs Last 24 Hrs  T(C): 36.7 (07-27-20 @ 08:25), Max: 36.7 (07-27-20 @ 08:25)  T(F): 98 (07-27-20 @ 08:25), Max: 98 (07-27-20 @ 08:25)  HR: 74 (07-27-20 @ 08:25) (74 - 102)  BP: 116/54 (07-27-20 @ 08:25) (116/54 - 131/59)  BP(mean): --  RR: 16 (07-27-20 @ 08:25) (16 - 17)  SpO2: 96% (07-27-20 @ 08:25) (96% - 98%)    PHYSICAL EXAM:  GENERAL: Comfortable, no acute distress  HEAD:  Atraumatic, Normocephalic  EYES: EOMI, PERRLA  HEENT: Moist mucous membranes  NECK: Supple, No JVD  NERVOUS SYSTEM:  Alert & Oriented X3, non focal.  CHEST/LUNG: Clear to auscultation bilaterally  HEART: Regular rate and rhythm;  ABDOMEN: Soft, Nontender, Nondistended; Bowel sounds present  GENITOURINARY- Voiding, no palpable bladder  EXTREMITIES:  No clubbing, cyanosis, or edema  MUSCULOSKELETAL-Left knee in immobilizer  SKIN-no rash    LABS:                      11.1   9.10  )-----------( 231      ( 26 Jul 2020 06:27 )             36.9     07-26    144  |  107  |  80<H>  ----------------------------<  144<H>  3.6   |  30  |  3.04<H>    Ca    9.5      26 Jul 2020 06:27    MEDICATIONS  (STANDING):  atorvastatin 40 milliGRAM(s) Oral at bedtime  calcium acetate 667 milliGRAM(s) Oral four times a day with meals  furosemide    Tablet 40 milliGRAM(s) Oral daily  heparin   Injectable 5000 Unit(s) SubCutaneous every 12 hours  pantoprazole    Tablet 40 milliGRAM(s) Oral daily  prasugrel 5 milliGRAM(s) Oral daily    MEDICATIONS  (PRN):  acetaminophen   Tablet .. 650 milliGRAM(s) Oral every 6 hours PRN Temp greater or equal to 38.5C (101.3F), Mild Pain (1 - 3)  aluminum hydroxide/magnesium hydroxide/simethicone Suspension 30 milliLiter(s) Oral every 4 hours PRN Dyspepsia  ondansetron Injectable 4 milliGRAM(s) IV Push every 6 hours PRN Nausea  oxycodone    5 mG/acetaminophen 325 mG 1 Tablet(s) Oral every 6 hours PRN Severe Pain (7 - 10)    ASSESSMENT AND PLAN:  89M, PMH AS ABOVE A/W:    * Fall and left knee pain NWB per ortho  Ortho eval appreciated  MRI cancelled as tolerating weight bearing and PT  Pain meds prn  PT eval- OK to go back to assisted living    *Severe AS:  -refused tavr in past  -keep euvolemic    *COPD:  -stable  -no s/o exacerbation.    * CKDIV  at baseline    * CAD  -Continue effient    * Mild leukocytosis   -reactive  -resolved.    *Anemia of chronic disease:  -epo due friday.    *DVT Px:  -hep sq.    *DIspo- stable for discharge to assisted living. DC time 45 mins.

## 2020-07-27 NOTE — PHYSICAL THERAPY INITIAL EVALUATION ADULT - PERTINENT HX OF CURRENT PROBLEM, REHAB EVAL
rec'd MD order for PT. pt in ED. appears pt is to be admitted. would defer PT eval until pt admitted to floor as well as pt pending MRI L knee to r/o fx.
89M from [facility] living w/PMH CAD, COPD, HTN, Prostate CA s/p RT 25yrs ago, CKD4-5, anemia (iron def, CKD), Gout, glaucoma, severe AS (declined TAVR),  s/p mechanical fall onto the knee yesterday evening. Pt states he was having pain in the left knee as the day progressed and was having trouble bearing weight on the extremity. Pt states he lives at an assisted living facility, where he ambulates with a walker.

## 2020-07-27 NOTE — DISCHARGE NOTE PROVIDER - NSDCCPCAREPLAN_GEN_ALL_CORE_FT
PRINCIPAL DISCHARGE DIAGNOSIS  Diagnosis: Knee injury  Assessment and Plan of Treatment: acetiminophen every 6 hours as needed for pain  ICE to affected area  Continue with Physical therapy with full weight bearing as roscoe  Continue Heparin 5000 units subcutaneously every 12 hours  follow up with Dr Metcalf if symptoms continue      SECONDARY DISCHARGE DIAGNOSES  Diagnosis: CAD (coronary atherosclerotic disease)  Assessment and Plan of Treatment: continue effient as prescribed  continue aspirin as prescribed

## 2020-07-27 NOTE — DISCHARGE NOTE PROVIDER - NSDCMRMEDTOKEN_GEN_ALL_CORE_FT
acetaminophen 325 mg oral tablet: 2 tab(s) orally every 6 hours, As Needed - for mild pain  allopurinol 100 mg oral tablet: 1 tab(s) orally once a day  aluminum hydroxide-magnesium hydroxide 200 mg-200 mg/5 mL oral suspension: 30 milliliter(s) orally every 4 hours, As needed, Dyspepsia  aspirin 81 mg oral tablet, chewable: 1 tab(s) orally once a day  atorvastatin 40 mg oral tablet: 1 tab(s) orally once a day  calcium acetate 667 mg oral tablet: 1 tab(s) orally 3 times a day  Colace 100 mg oral capsule: 2 cap(s) orally once a day (at bedtime)  ferrous gluconate 324 mg (38 mg elemental iron) oral tablet: 1 tab(s) orally once a day  heparin: 5000 unit(s) subcutaneously every 12 hours  Lasix 40 mg oral tablet: 1 tab(s) orally once a day  MiraLax oral powder for reconstitution: 17 gram(s) orally once a day  oxycodone-acetaminophen 5 mg-325 mg oral tablet: 1 tab(s) orally every 6 hours, As needed, Severe Pain (7 - 10)  pantoprazole 40 mg oral delayed release tablet: 1 tab(s) orally once a day  prasugrel 5 mg oral tablet: 1 tab(s) orally once a day  Retacrit 10,000 units/mL preservative-free injectable solution: 1 dose(s) injectable once a week    Simbrinza 1%- 0.2% ophthalmic suspension: 1 drop(s) in the left eye 2 times a day  Vitamin D3 50,000 intl units (1250 mcg) oral capsule: 1 cap(s) orally once a month on the 12th acetaminophen 325 mg oral tablet: 2 tab(s) orally every 6 hours, As Needed - for mild pain  allopurinol 100 mg oral tablet: 1 tab(s) orally once a day  aspirin 81 mg oral tablet, chewable: 1 tab(s) orally once a day  atorvastatin 40 mg oral tablet: 1 tab(s) orally once a day  calcium acetate 667 mg oral tablet: 1 tab(s) orally 3 times a day  Colace 100 mg oral capsule: 2 cap(s) orally once a day (at bedtime)  ferrous gluconate 324 mg (38 mg elemental iron) oral tablet: 1 tab(s) orally once a day  Lasix 40 mg oral tablet: 1 tab(s) orally once a day  MiraLax oral powder for reconstitution: 17 gram(s) orally once a day  pantoprazole 40 mg oral delayed release tablet: 1 tab(s) orally once a day  prasugrel 5 mg oral tablet: 1 tab(s) orally once a day  Retacrit 10,000 units/mL preservative-free injectable solution: 1 dose(s) injectable once a week    Simbrinza 1%- 0.2% ophthalmic suspension: 1 drop(s) in the left eye 2 times a day  Vitamin D3 50,000 intl units (1250 mcg) oral capsule: 1 cap(s) orally once a month on the 12th

## 2020-07-27 NOTE — PHYSICAL THERAPY INITIAL EVALUATION ADULT - RANGE OF MOTION EXAMINATION, REHAB EVAL
bilateral lower extremity ROM was WFL (within functional limits)/bilateral upper extremity ROM was WFL (within functional limits)/left knee limited ACE bandage and brace

## 2020-07-27 NOTE — DISCHARGE NOTE PROVIDER - CARE PROVIDER_API CALL
Asia Metcalf  [facility]  [address] [place] [address]  Phone: [phone]  Fax: [phone]  Follow Up Time:

## 2020-07-27 NOTE — DISCHARGE NOTE NURSING/CASE MANAGEMENT/SOCIAL WORK - PATIENT PORTAL LINK FT
You can access the FollowMyHealth Patient Portal offered by [facility] by registering at the following website: [email or web address]. By joining [employer]’s FollowMyHealth portal, you will also be able to view your health information using other applications (apps) compatible with our system.

## 2020-07-27 NOTE — PROGRESS NOTE ADULT - ATTENDING COMMENTS
Patient is seen and examined at bedside. D/w NP Leann Reid. Agree with above assessment and plan. Necessary changes made to above note.

## 2020-07-27 NOTE — DISCHARGE NOTE PROVIDER - NSDCACTIVITY_GEN_ALL_CORE
Walking - Outdoors allowed/Showering allowed/No heavy lifting/straining/Bathing allowed/Walking - Indoors allowed/Do not drive or operate machinery
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
## 2020-08-04 DIAGNOSIS — M81.0 AGE-RELATED OSTEOPOROSIS WITHOUT CURRENT PATHOLOGICAL FRACTURE: ICD-10-CM

## 2020-08-04 DIAGNOSIS — M17.12 UNILATERAL PRIMARY OSTEOARTHRITIS, LEFT KNEE: ICD-10-CM

## 2020-08-04 DIAGNOSIS — S80.212A ABRASION, LEFT KNEE, INITIAL ENCOUNTER: ICD-10-CM

## 2020-08-04 DIAGNOSIS — J44.9 CHRONIC OBSTRUCTIVE PULMONARY DISEASE, UNSPECIFIED: ICD-10-CM

## 2020-08-04 DIAGNOSIS — M10.9 GOUT, UNSPECIFIED: ICD-10-CM

## 2020-08-04 DIAGNOSIS — E11.22 TYPE 2 DIABETES MELLITUS WITH DIABETIC CHRONIC KIDNEY DISEASE: ICD-10-CM

## 2020-08-04 DIAGNOSIS — Z88.6 ALLERGY STATUS TO ANALGESIC AGENT: ICD-10-CM

## 2020-08-04 DIAGNOSIS — Z85.46 PERSONAL HISTORY OF MALIGNANT NEOPLASM OF PROSTATE: ICD-10-CM

## 2020-08-04 DIAGNOSIS — Z88.8 ALLERGY STATUS TO OTHER DRUGS, MEDICAMENTS AND BIOLOGICAL SUBSTANCES: ICD-10-CM
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
## 2020-08-04 DIAGNOSIS — I35.0 NONRHEUMATIC AORTIC (VALVE) STENOSIS: ICD-10-CM
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
## 2020-08-04 DIAGNOSIS — H40.9 UNSPECIFIED GLAUCOMA: ICD-10-CM
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
## 2020-08-04 DIAGNOSIS — I25.10 ATHEROSCLEROTIC HEART DISEASE OF NATIVE CORONARY ARTERY WITHOUT ANGINA PECTORIS: ICD-10-CM

## 2020-08-04 DIAGNOSIS — Z92.3 PERSONAL HISTORY OF IRRADIATION: ICD-10-CM

## 2020-08-04 DIAGNOSIS — E78.5 HYPERLIPIDEMIA, UNSPECIFIED: ICD-10-CM

## 2020-08-04 DIAGNOSIS — D63.1 ANEMIA IN CHRONIC KIDNEY DISEASE: ICD-10-CM

## 2020-08-04 DIAGNOSIS — N18.4 CHRONIC KIDNEY DISEASE, STAGE 4 (SEVERE): ICD-10-CM

## 2020-08-04 DIAGNOSIS — I12.9 HYPERTENSIVE CHRONIC KIDNEY DISEASE WITH STAGE 1 THROUGH STAGE 4 CHRONIC KIDNEY DISEASE, OR UNSPECIFIED CHRONIC KIDNEY DISEASE: ICD-10-CM

## 2020-10-06 NOTE — DISCHARGE NOTE PROVIDER - NSDCMRMEDTOKEN_GEN_ALL_CORE_FT
Quality 110: Preventive Care And Screening: Influenza Immunization: Influenza immunization was not ordered or administered, reason not given Quality 47: Advance Care Plan: Advance care planning not documented, reason not otherwise specified. Quality 155 (Denominator): Falls Plan Of Care: Plan of Care not Documented, Reason not Otherwise Specified Quality 154 Part A: Falls: Risk Assessment (Should Be Reported With Measure 155.): Falls risk assessment completed and documented in the past 12 months. Quality 154 Part B: Falls: Risk Screening (Should Be Reported With Measure 155.): Patient screened for future fall risk; documentation of no falls in the past year or only one fall without injury in the past year Detail Level: Detailed Quality 431: Preventive Care And Screening: Unhealthy Alcohol Use - Screening: Patient screened for unhealthy alcohol use using a single question and scores less than 2 times per year Quality 226: Preventive Care And Screening: Tobacco Use: Screening And Cessation Intervention: Patient screened for tobacco use and is an ex/non-smoker alendronate 70 mg oral tablet: 1 tab(s) orally once a week  allopurinol 100 mg oral tablet: 1 tab(s) orally once a day  Azopt 1% ophthalmic suspension: 1 drop(s) to each affected eye 2 times a day  Colace 100 mg oral capsule: 1 cap(s) orally 2 times a day  ferrous sulfate 324 mg (65 mg elemental iron) oral tablet: orally once a day  Norvasc 5 mg oral tablet: 1 tab(s) orally once a day  Vitamin D3 1000 intl units oral tablet: 1 tab(s) orally once a day  Zocor 80 mg oral tablet: 1 tab(s) orally once a day (at bedtime) alendronate 70 mg oral tablet: 1 tab(s) orally once a week  allopurinol 100 mg oral tablet: 1 tab(s) orally once a day  Azopt 1% ophthalmic suspension: 1 drop(s) to each affected eye 2 times a day  calcium acetate 667 mg oral tablet: 667 milligram(s) orally 2 times a day  Colace 100 mg oral capsule: 1 cap(s) orally 2 times a day  ferrous sulfate 324 mg (65 mg elemental iron) oral tablet: orally once a day  hydrALAZINE 25 mg oral tablet: 1 tab(s) orally 3 times a day  Lasix 40 mg oral tablet: 1 tab(s) orally once a day   metoprolol: 12.5 milligram(s) orally 2 times a day  Norvasc 5 mg oral tablet: 1 tab(s) orally once a day  Vitamin D3 1000 intl units oral tablet: 1 tab(s) orally once a day  Zocor 80 mg oral tablet: 1 tab(s) orally once a day (at bedtime) alendronate 70 mg oral tablet: 1 tab(s) orally once a week  allopurinol 100 mg oral tablet: 1 tab(s) orally once a day  Azopt 1% ophthalmic suspension: 1 drop(s) to each affected eye 2 times a day  calcium acetate 667 mg oral tablet: 667 milligram(s) orally 2 times a day  Colace 100 mg oral capsule: 1 cap(s) orally 2 times a day  ferrous sulfate 324 mg (65 mg elemental iron) oral tablet: orally once a day  hydrALAZINE 25 mg oral tablet: 1 tab(s) orally 3 times a day  Lasix 40 mg oral tablet: 1 tab(s) orally once a day   metoprolol tartrate 25 mg oral tablet: 0.5 tab(s) orally 2 times a day   Norvasc 5 mg oral tablet: 1 tab(s) orally once a day  Physical Therapy : Please evaluate and treat for physical therapy x6 weeks.   Vitamin D3 1000 intl units oral tablet: 1 tab(s) orally once a day  Zocor 80 mg oral tablet: 1 tab(s) orally once a day (at bedtime)

## 2020-11-16 ENCOUNTER — EMERGENCY (EMERGENCY)
Facility: HOSPITAL | Age: 85
LOS: 0 days | Discharge: NURSING, REHAB OR LONG-TERM CARE | End: 2020-11-16
Attending: EMERGENCY MEDICINE
Payer: MEDICARE

## 2020-11-16 VITALS
TEMPERATURE: 98 F | WEIGHT: 139.99 LBS | HEART RATE: 70 BPM | DIASTOLIC BLOOD PRESSURE: 72 MMHG | HEIGHT: 67 IN | OXYGEN SATURATION: 93 % | SYSTOLIC BLOOD PRESSURE: 176 MMHG | RESPIRATION RATE: 18 BRPM

## 2020-11-16 VITALS
SYSTOLIC BLOOD PRESSURE: 135 MMHG | RESPIRATION RATE: 18 BRPM | HEART RATE: 81 BPM | TEMPERATURE: 98 F | DIASTOLIC BLOOD PRESSURE: 80 MMHG | OXYGEN SATURATION: 96 %

## 2020-11-16 DIAGNOSIS — D63.1 ANEMIA IN CHRONIC KIDNEY DISEASE: ICD-10-CM

## 2020-11-16 DIAGNOSIS — I12.9 HYPERTENSIVE CHRONIC KIDNEY DISEASE WITH STAGE 1 THROUGH STAGE 4 CHRONIC KIDNEY DISEASE, OR UNSPECIFIED CHRONIC KIDNEY DISEASE: ICD-10-CM

## 2020-11-16 DIAGNOSIS — H40.9 UNSPECIFIED GLAUCOMA: ICD-10-CM

## 2020-11-16 DIAGNOSIS — R51.9 HEADACHE, UNSPECIFIED: ICD-10-CM

## 2020-11-16 DIAGNOSIS — Z79.82 LONG TERM (CURRENT) USE OF ASPIRIN: ICD-10-CM

## 2020-11-16 DIAGNOSIS — Z95.5 PRESENCE OF CORONARY ANGIOPLASTY IMPLANT AND GRAFT: ICD-10-CM

## 2020-11-16 DIAGNOSIS — I25.10 ATHEROSCLEROTIC HEART DISEASE OF NATIVE CORONARY ARTERY WITHOUT ANGINA PECTORIS: ICD-10-CM

## 2020-11-16 DIAGNOSIS — N18.4 CHRONIC KIDNEY DISEASE, STAGE 4 (SEVERE): ICD-10-CM

## 2020-11-16 DIAGNOSIS — Z90.89 ACQUIRED ABSENCE OF OTHER ORGANS: Chronic | ICD-10-CM

## 2020-11-16 DIAGNOSIS — Z20.828 CONTACT WITH AND (SUSPECTED) EXPOSURE TO OTHER VIRAL COMMUNICABLE DISEASES: ICD-10-CM

## 2020-11-16 DIAGNOSIS — I35.0 NONRHEUMATIC AORTIC (VALVE) STENOSIS: ICD-10-CM

## 2020-11-16 DIAGNOSIS — I10 ESSENTIAL (PRIMARY) HYPERTENSION: ICD-10-CM

## 2020-11-16 DIAGNOSIS — Z98.890 OTHER SPECIFIED POSTPROCEDURAL STATES: Chronic | ICD-10-CM

## 2020-11-16 DIAGNOSIS — Z88.8 ALLERGY STATUS TO OTHER DRUGS, MEDICAMENTS AND BIOLOGICAL SUBSTANCES: ICD-10-CM

## 2020-11-16 DIAGNOSIS — Z88.6 ALLERGY STATUS TO ANALGESIC AGENT: ICD-10-CM

## 2020-11-16 DIAGNOSIS — E11.22 TYPE 2 DIABETES MELLITUS WITH DIABETIC CHRONIC KIDNEY DISEASE: ICD-10-CM

## 2020-11-16 DIAGNOSIS — M81.0 AGE-RELATED OSTEOPOROSIS WITHOUT CURRENT PATHOLOGICAL FRACTURE: ICD-10-CM

## 2020-11-16 DIAGNOSIS — D50.9 IRON DEFICIENCY ANEMIA, UNSPECIFIED: ICD-10-CM

## 2020-11-16 DIAGNOSIS — M10.9 GOUT, UNSPECIFIED: ICD-10-CM

## 2020-11-16 DIAGNOSIS — Z85.46 PERSONAL HISTORY OF MALIGNANT NEOPLASM OF PROSTATE: ICD-10-CM

## 2020-11-16 LAB
ALBUMIN SERPL ELPH-MCNC: 3.2 G/DL — LOW (ref 3.3–5)
ALP SERPL-CCNC: 105 U/L — SIGNIFICANT CHANGE UP (ref 40–120)
ALT FLD-CCNC: 29 U/L — SIGNIFICANT CHANGE UP (ref 12–78)
ANION GAP SERPL CALC-SCNC: 7 MMOL/L — SIGNIFICANT CHANGE UP (ref 5–17)
AST SERPL-CCNC: 21 U/L — SIGNIFICANT CHANGE UP (ref 15–37)
BASOPHILS # BLD AUTO: 0.04 K/UL — SIGNIFICANT CHANGE UP (ref 0–0.2)
BASOPHILS NFR BLD AUTO: 0.4 % — SIGNIFICANT CHANGE UP (ref 0–2)
BILIRUB SERPL-MCNC: 0.3 MG/DL — SIGNIFICANT CHANGE UP (ref 0.2–1.2)
BUN SERPL-MCNC: 77 MG/DL — HIGH (ref 7–23)
CALCIUM SERPL-MCNC: 9.2 MG/DL — SIGNIFICANT CHANGE UP (ref 8.5–10.1)
CHLORIDE SERPL-SCNC: 104 MMOL/L — SIGNIFICANT CHANGE UP (ref 96–108)
CO2 SERPL-SCNC: 28 MMOL/L — SIGNIFICANT CHANGE UP (ref 22–31)
CREAT SERPL-MCNC: 3.29 MG/DL — HIGH (ref 0.5–1.3)
EOSINOPHIL # BLD AUTO: 0.02 K/UL — SIGNIFICANT CHANGE UP (ref 0–0.5)
EOSINOPHIL NFR BLD AUTO: 0.2 % — SIGNIFICANT CHANGE UP (ref 0–6)
GLUCOSE SERPL-MCNC: 224 MG/DL — HIGH (ref 70–99)
HCT VFR BLD CALC: 45.3 % — SIGNIFICANT CHANGE UP (ref 39–50)
HGB BLD-MCNC: 13.8 G/DL — SIGNIFICANT CHANGE UP (ref 13–17)
IMM GRANULOCYTES NFR BLD AUTO: 0.2 % — SIGNIFICANT CHANGE UP (ref 0–1.5)
LYMPHOCYTES # BLD AUTO: 0.59 K/UL — LOW (ref 1–3.3)
LYMPHOCYTES # BLD AUTO: 5.4 % — LOW (ref 13–44)
MCHC RBC-ENTMCNC: 29.1 PG — SIGNIFICANT CHANGE UP (ref 27–34)
MCHC RBC-ENTMCNC: 30.5 GM/DL — LOW (ref 32–36)
MCV RBC AUTO: 95.6 FL — SIGNIFICANT CHANGE UP (ref 80–100)
MONOCYTES # BLD AUTO: 0.65 K/UL — SIGNIFICANT CHANGE UP (ref 0–0.9)
MONOCYTES NFR BLD AUTO: 6 % — SIGNIFICANT CHANGE UP (ref 2–14)
NEUTROPHILS # BLD AUTO: 9.53 K/UL — HIGH (ref 1.8–7.4)
NEUTROPHILS NFR BLD AUTO: 87.8 % — HIGH (ref 43–77)
PLATELET # BLD AUTO: 212 K/UL — SIGNIFICANT CHANGE UP (ref 150–400)
POTASSIUM SERPL-MCNC: 4.2 MMOL/L — SIGNIFICANT CHANGE UP (ref 3.5–5.3)
POTASSIUM SERPL-SCNC: 4.2 MMOL/L — SIGNIFICANT CHANGE UP (ref 3.5–5.3)
PROT SERPL-MCNC: 7.4 GM/DL — SIGNIFICANT CHANGE UP (ref 6–8.3)
RBC # BLD: 4.74 M/UL — SIGNIFICANT CHANGE UP (ref 4.2–5.8)
RBC # FLD: 17.5 % — HIGH (ref 10.3–14.5)
SARS-COV-2 RNA SPEC QL NAA+PROBE: SIGNIFICANT CHANGE UP
SODIUM SERPL-SCNC: 139 MMOL/L — SIGNIFICANT CHANGE UP (ref 135–145)
TROPONIN I SERPL-MCNC: 0.02 NG/ML — SIGNIFICANT CHANGE UP (ref 0.01–0.04)
WBC # BLD: 10.85 K/UL — HIGH (ref 3.8–10.5)
WBC # FLD AUTO: 10.85 K/UL — HIGH (ref 3.8–10.5)

## 2020-11-16 PROCEDURE — 93005 ELECTROCARDIOGRAM TRACING: CPT

## 2020-11-16 PROCEDURE — 93010 ELECTROCARDIOGRAM REPORT: CPT

## 2020-11-16 PROCEDURE — U0003: CPT

## 2020-11-16 PROCEDURE — 80053 COMPREHEN METABOLIC PANEL: CPT

## 2020-11-16 PROCEDURE — 70450 CT HEAD/BRAIN W/O DYE: CPT | Mod: 26

## 2020-11-16 PROCEDURE — 36415 COLL VENOUS BLD VENIPUNCTURE: CPT

## 2020-11-16 PROCEDURE — 99284 EMERGENCY DEPT VISIT MOD MDM: CPT | Mod: 25

## 2020-11-16 PROCEDURE — 99284 EMERGENCY DEPT VISIT MOD MDM: CPT

## 2020-11-16 PROCEDURE — 84484 ASSAY OF TROPONIN QUANT: CPT

## 2020-11-16 PROCEDURE — 70450 CT HEAD/BRAIN W/O DYE: CPT

## 2020-11-16 PROCEDURE — 96374 THER/PROPH/DIAG INJ IV PUSH: CPT

## 2020-11-16 PROCEDURE — 85025 COMPLETE CBC W/AUTO DIFF WBC: CPT

## 2020-11-16 RX ORDER — AMLODIPINE BESYLATE 2.5 MG/1
5 TABLET ORAL ONCE
Refills: 0 | Status: COMPLETED | OUTPATIENT
Start: 2020-11-16 | End: 2020-11-16

## 2020-11-16 RX ORDER — METOCLOPRAMIDE HCL 10 MG
10 TABLET ORAL ONCE
Refills: 0 | Status: COMPLETED | OUTPATIENT
Start: 2020-11-16 | End: 2020-11-16

## 2020-11-16 RX ORDER — HYDRALAZINE HCL 50 MG
25 TABLET ORAL ONCE
Refills: 0 | Status: COMPLETED | OUTPATIENT
Start: 2020-11-16 | End: 2020-11-16

## 2020-11-16 RX ORDER — ACETAMINOPHEN 500 MG
1000 TABLET ORAL ONCE
Refills: 0 | Status: COMPLETED | OUTPATIENT
Start: 2020-11-16 | End: 2020-11-16

## 2020-11-16 RX ADMIN — Medication 25 MILLIGRAM(S): at 20:19

## 2020-11-16 RX ADMIN — AMLODIPINE BESYLATE 5 MILLIGRAM(S): 2.5 TABLET ORAL at 20:20

## 2020-11-16 RX ADMIN — Medication 10 MILLIGRAM(S): at 20:20

## 2020-11-16 RX ADMIN — Medication 1000 MILLIGRAM(S): at 18:34

## 2020-11-16 RX ADMIN — Medication 1000 MILLIGRAM(S): at 19:30

## 2020-11-16 NOTE — ED PROVIDER NOTE - CLINICAL SUMMARY MEDICAL DECISION MAKING FREE TEXT BOX
Headache most c/w pt's prior headache w/o new features  SAH or dissection unlikely as slow onset, no LOC and not most severe.  Afebrile w/o meningeal signs make infectious etiology unlikely.    Will check, basic labs, give tylenol reassess

## 2020-11-16 NOTE — ED PROVIDER NOTE - PATIENT PORTAL LINK FT
You can access the FollowMyHealth Patient Portal offered by [facility] by registering at the following website: [email or web address]. By joining [employer]’s FollowMyHealth portal, you will also be able to view your health information using other applications (apps) compatible with our system. You can access the FollowMyHealth Patient Portal offered by [facility] by registering at the following website: [email or web address]. By joining [employer]’s FollowMyHealth portal, you will also be able to view your health information using other applications (apps) compatible with our system.

## 2020-11-16 NOTE — ED ADULT NURSE NOTE - OBJECTIVE STATEMENT
Pt complains of frontal headache, had Pt complains of frontal headache, reportedly had high BP at AL facility.  No acute distress.  Pt says HA is resolving.  EKG done on arrival.   Cardiac and VS monitoring initiated.   20g PIV placed in LAC.

## 2020-11-16 NOTE — ED PROVIDER NOTE - ATTENDING CONTRIBUTION TO CARE
I, Darrell Stacy MD,  performed the initial face to face bedside interview with this patient regarding history of present illness, review of symptoms and relevant past medical, social and family history.  I completed an independent physical examination.  I was the initial provider who evaluated this patient. I have signed out the follow up of any pending tests (i.e. labs, radiological studies) to the ACP.  The ACP will complete the work up, interpret tests, administer medications if necessary and reassess the patient for an appropriate disposition.  If questions arise the ACP is expected to contact me for consultation. In the current work-flow I usually don't get to speak to nor reassess patients for final disposition once I sign the case out to the ACP (Unless otherwise documented).

## 2020-11-16 NOTE — ED ADULT NURSE REASSESSMENT NOTE - NS ED NURSE REASSESS COMMENT FT1
Awaiting COVID result to discharge patient back to facility. So updated on plan. Will call for ambulance for transport when  COVID results.

## 2020-11-16 NOTE — ED PROVIDER NOTE - NSFOLLOWUPINSTRUCTIONS_ED_ALL_ED_FT
Please follow up with Primary MD in 1-2 days. Return to ED immediately for any new or concerning symptoms or worsening symptoms     Acute Headache    WHAT YOU NEED TO KNOW:    An acute headache is pain or discomfort that starts suddenly and gets worse quickly. You may have an acute headache only when you feel stress or eat certain foods. Other acute headache pain can happen every day, and sometimes several times a day.     DISCHARGE INSTRUCTIONS:    Return to the emergency department if:     You have severe pain.      You have numbness or weakness on one side of your face or body.      You have a headache that occurs after a blow to the head, a fall, or other trauma.       You have a headache, are forgetful or confused, or have trouble speaking.      You have a headache, stiff neck, and a fever.    Contact your healthcare provider if:     You have a constant headache and are vomiting.      You have a headache each day that does not get better, even after treatment.      You have changes in your headaches, or new symptoms that occur when you have a headache.      You have questions or concerns about your condition or care.    Medicines: You may need any of the following:     Prescription pain medicine may be given. The medicine your healthcare provider recommends will depend on the kind of headaches you have. You will need to take prescription headache medicines as directed to prevent a problem called rebound headache. These headaches happen with regular use of pain relievers for headache disorders.      NSAIDs, such as ibuprofen, help decrease swelling, pain, and fever. This medicine is available with or without a doctor's order. NSAIDs can cause stomach bleeding or kidney problems in certain people. If you take blood thinner medicine, always ask your healthcare provider if NSAIDs are safe for you. Always read the medicine label and follow directions.      Acetaminophen decreases pain and fever. It is available without a doctor's order. Ask how much to take and how often to take it. Follow directions. Read the labels of all other medicines you are using to see if they also contain acetaminophen, or ask your doctor or pharmacist. Acetaminophen can cause liver damage if not taken correctly. Do not use more than 3 grams (3,000 milligrams) total of acetaminophen in one day.       Antidepressants may be given for some kinds of headaches.       Take your medicine as directed. Contact your healthcare provider if you think your medicine is not helping or if you have side effects. Tell him or her if you are allergic to any medicine. Keep a list of the medicines, vitamins, and herbs you take. Include the amounts, and when and why you take them. Bring the list or the pill bottles to follow-up visits. Carry your medicine list with you in case of an emergency.    Manage your symptoms:     Apply heat or ice on the headache area. Use a heat or ice pack. For an ice pack, you can also put crushed ice in a plastic bag. Cover the pack or bag with a towel before you apply it to your skin. Ice and heat both help decrease pain, and heat also helps decrease muscle spasms. Apply heat for 20 to 30 minutes every 2 hours. Apply ice for 15 to 20 minutes every hour. Apply heat or ice for as long and for as many days as directed. You may alternate heat and ice.      Relax your muscles. Lie down in a comfortable position and close your eyes. Relax your muscles slowly. Start at your toes and work your way up your body.      Keep a record of your headaches. Write down when your headaches start and stop. Include your symptoms and what you were doing when the headache began. Record what you ate or drank for 24 hours before the headache started. Describe the pain and where it hurts. Keep track of what you did to treat your headache and if it worked.     Prevent an acute headache:     Avoid anything that triggers an acute headache. Examples include exposure to chemicals, going to high altitude, or not getting enough sleep. Create a regular sleep routine. Go to sleep at the same time and wake up at the same time each day. Do not use electronic devices before bedtime. These may trigger a headache or prevent you from sleeping well.      Do not smoke. Nicotine and other chemicals in cigarettes and cigars can trigger an acute headache or make it worse. Ask your healthcare provider for information if you currently smoke and need help to quit. E-cigarettes or smokeless tobacco still contain nicotine. Talk to your healthcare provider before you use these products.       Limit alcohol as directed. Alcohol can trigger an acute headache or make it worse. If you have cluster headaches, do not drink alcohol during an episode. For other types of headaches, ask your healthcare provider if it is safe for you to drink alcohol. Ask how much is safe for you to drink, and how often.      Exercise as directed. Exercise can reduce tension and help with headache pain. Aim for 30 minutes of physical activity on most days of the week. Your healthcare provider can help you create an exercise plan.      Eat a variety of healthy foods. Healthy foods include fruits, vegetables, low-fat dairy products, lean meats, fish, whole grains, and cooked beans. Your healthcare provider or dietitian can help you create meals plans if you need to avoid foods that trigger headaches.    Follow up with your healthcare provider as directed: Bring your headache record with you when you see your healthcare provider. Write down your questions so you remember to ask them during your visits. Please follow up with Primary MD in 1-2 days. Return to ED immediately for any new or concerning symptoms or worsening symptoms. Pt was previously on 25 mg of hydralazine, 5 mg of amlodipine, will recommend nursing home to restart these medication.    Acute Headache    WHAT YOU NEED TO KNOW:    An acute headache is pain or discomfort that starts suddenly and gets worse quickly. You may have an acute headache only when you feel stress or eat certain foods. Other acute headache pain can happen every day, and sometimes several times a day.     DISCHARGE INSTRUCTIONS:    Return to the emergency department if:     You have severe pain.      You have numbness or weakness on one side of your face or body.      You have a headache that occurs after a blow to the head, a fall, or other trauma.       You have a headache, are forgetful or confused, or have trouble speaking.      You have a headache, stiff neck, and a fever.    Contact your healthcare provider if:     You have a constant headache and are vomiting.      You have a headache each day that does not get better, even after treatment.      You have changes in your headaches, or new symptoms that occur when you have a headache.      You have questions or concerns about your condition or care.    Medicines: You may need any of the following:     Prescription pain medicine may be given. The medicine your healthcare provider recommends will depend on the kind of headaches you have. You will need to take prescription headache medicines as directed to prevent a problem called rebound headache. These headaches happen with regular use of pain relievers for headache disorders.      NSAIDs, such as ibuprofen, help decrease swelling, pain, and fever. This medicine is available with or without a doctor's order. NSAIDs can cause stomach bleeding or kidney problems in certain people. If you take blood thinner medicine, always ask your healthcare provider if NSAIDs are safe for you. Always read the medicine label and follow directions.      Acetaminophen decreases pain and fever. It is available without a doctor's order. Ask how much to take and how often to take it. Follow directions. Read the labels of all other medicines you are using to see if they also contain acetaminophen, or ask your doctor or pharmacist. Acetaminophen can cause liver damage if not taken correctly. Do not use more than 3 grams (3,000 milligrams) total of acetaminophen in one day.       Antidepressants may be given for some kinds of headaches.       Take your medicine as directed. Contact your healthcare provider if you think your medicine is not helping or if you have side effects. Tell him or her if you are allergic to any medicine. Keep a list of the medicines, vitamins, and herbs you take. Include the amounts, and when and why you take them. Bring the list or the pill bottles to follow-up visits. Carry your medicine list with you in case of an emergency.    Manage your symptoms:     Apply heat or ice on the headache area. Use a heat or ice pack. For an ice pack, you can also put crushed ice in a plastic bag. Cover the pack or bag with a towel before you apply it to your skin. Ice and heat both help decrease pain, and heat also helps decrease muscle spasms. Apply heat for 20 to 30 minutes every 2 hours. Apply ice for 15 to 20 minutes every hour. Apply heat or ice for as long and for as many days as directed. You may alternate heat and ice.      Relax your muscles. Lie down in a comfortable position and close your eyes. Relax your muscles slowly. Start at your toes and work your way up your body.      Keep a record of your headaches. Write down when your headaches start and stop. Include your symptoms and what you were doing when the headache began. Record what you ate or drank for 24 hours before the headache started. Describe the pain and where it hurts. Keep track of what you did to treat your headache and if it worked.     Prevent an acute headache:     Avoid anything that triggers an acute headache. Examples include exposure to chemicals, going to high altitude, or not getting enough sleep. Create a regular sleep routine. Go to sleep at the same time and wake up at the same time each day. Do not use electronic devices before bedtime. These may trigger a headache or prevent you from sleeping well.      Do not smoke. Nicotine and other chemicals in cigarettes and cigars can trigger an acute headache or make it worse. Ask your healthcare provider for information if you currently smoke and need help to quit. E-cigarettes or smokeless tobacco still contain nicotine. Talk to your healthcare provider before you use these products.       Limit alcohol as directed. Alcohol can trigger an acute headache or make it worse. If you have cluster headaches, do not drink alcohol during an episode. For other types of headaches, ask your healthcare provider if it is safe for you to drink alcohol. Ask how much is safe for you to drink, and how often.      Exercise as directed. Exercise can reduce tension and help with headache pain. Aim for 30 minutes of physical activity on most days of the week. Your healthcare provider can help you create an exercise plan.      Eat a variety of healthy foods. Healthy foods include fruits, vegetables, low-fat dairy products, lean meats, fish, whole grains, and cooked beans. Your healthcare provider or dietitian can help you create meals plans if you need to avoid foods that trigger headaches.    Follow up with your healthcare provider as directed: Bring your headache record with you when you see your healthcare provider. Write down your questions so you remember to ask them during your visits. Please follow up with Primary MD in 1-2 days. Return to ED immediately for any new or concerning symptoms or worsening symptoms.   Pt was previously on 25 mg of hydralazine, 5 mg of amlodipine, will recommend nursing home to restart these medication.    Hypertension    WHAT YOU NEED TO KNOW:    Hypertension is high blood pressure. Your blood pressure is the force of your blood moving against the walls of your arteries. Hypertension causes your blood pressure to get so high that your heart has to work much harder than normal. This can damage your heart. The cause of hypertension may not be known. This is called essential or primary hypertension. Hypertension caused by another medical condition, such as kidney disease, is called secondary hypertension.    DISCHARGE INSTRUCTIONS:    Call 911 for any of the following:     You have chest pain.      You have any of the following signs of a heart attack:   Squeezing, pressure, or pain in your chest       and any of the following:   Discomfort or pain in your back, neck, jaw, stomach, or arm       Shortness of breath      Nausea or vomiting      Lightheadedness or a sudden cold sweat      You become confused or have difficulty speaking.      You suddenly feel lightheaded or have trouble breathing.    Return to the emergency department if:     You have a severe headache or vision loss.      You have weakness in an arm or leg.     Contact your healthcare provider if:     You feel faint, dizzy, confused, or drowsy.       You have been taking your blood pressure medicine but your pressure is higher than your provider says it should be.      You have questions or concerns about your condition or care.    Medicines: You may need any of the following:     Antihypertensives may be used to help lower your blood pressure. Several kinds of medicines are available. Your healthcare provider will choose medicines based on the kind of hypertension you have. You may need more than one type of medicine. Take the medicine exactly as directed.       Diuretics help decrease extra fluid that collects in your body. This will help lower your blood pressure. You may urinate more often while you take this medicine.      Cholesterol medicine helps lower your cholesterol level. A low cholesterol level helps prevent heart disease and makes it easier to control your blood pressure.       Take your medicine as directed. Contact your healthcare provider if you think your medicine is not helping or if you have side effects. Tell him or her if you are allergic to any medicine. Keep a list of the medicines, vitamins, and herbs you take. Include the amounts, and when and why you take them. Bring the list or the pill bottles to follow-up visits. Carry your medicine list with you in case of an emergency.    Follow up with your healthcare provider as directed: You will need to return to have your blood pressure checked and to have other lab tests done. Write down your questions so you remember to ask them during your visits.     Stages of hypertension: Blood Pressure Readings         Normal blood pressure is 119/79 or lower. Your healthcare provider may only check your blood pressure each year if it stays at a normal level.      Elevated blood pressure is 120/79 to 129/79. This is sometimes called prehypertension. Your healthcare provider may suggest lifestyle changes to help lower your blood pressure to a normal level. He or she may then check it again in 3 to 6 months.      Stage 1 hypertension is 130/80 to 139/89. Your provider may recommend lifestyle changes, medication, and checks every 3 to 6 months until your blood pressure is controlled.      Stage 2 hypertension is 140/90 or higher. Your provider will recommend lifestyle changes and have you take 2 kinds of hypertension medicines. You will also need to have your blood pressure checked monthly until it is controlled.    Manage hypertension:     Check your blood pressure at home. Avoid smoking, caffeine, and exercise at least 30 minutes before checking your blood pressure. Sit and rest for 5 minutes before you take your blood pressure. Extend your arm and support it on a flat surface. Your arm should be at the same level as your heart. Follow the directions that came with your blood pressure monitor. Check your blood pressure 2 times, 1 minute apart, before you take your medicine in the morning. Also check your blood pressure before your evening meal. Keep a record of your readings and bring it to your follow-up visits. Ask your healthcare provider what your blood pressure should be. How to take a Blood Pressure           Manage any other health conditions you have. Health conditions such as diabetes can increase your risk for hypertension. Follow your healthcare provider's instructions and take all your medicines as directed.       Ask about all medicines. Certain medicines can increase your blood pressure. Examples include oral birth control pills, decongestants, herbal supplements, and NSAIDs, such as ibuprofen. Your healthcare provider can tell you which medicines are safe for you to take. This includes prescription and over-the-counter medicines.    Lifestyle changes you can make to manage hypertension:     Limit sodium (salt) as directed. Too much sodium can affect your fluid balance. Check labels to find low-sodium or no-salt-added foods. Some low-sodium foods use potassium salts for flavor. Too much potassium can also cause health problems. Your healthcare provider will tell you how much sodium and potassium are safe for you to have in a day. He or she may recommend that you limit sodium to 2,300 mg a day.           Follow the meal plan recommended by your healthcare provider. A dietitian or your provider can give you more information on low-sodium plans or the DASH (Dietary Approaches to Stop Hypertension) eating plan. The DASH plan is low in sodium, unhealthy fats, and total fat. It is high in potassium, calcium, and fiber.            Exercise to maintain a healthy weight. Exercise at least 30 minutes per day, on most days of the week. This will help decrease your blood pressure. Ask your healthcare provider about the best exercise plan for you. Walking for Exercise           Decrease stress. This may help lower your blood pressure. Learn ways to relax, such as deep breathing or listening to music.      Limit alcohol as directed. Alcohol can increase your blood pressure. A drink of alcohol is 12 ounces of beer, 5 ounces of wine, or 1½ ounces of liquor.      Do not smoke. Nicotine and other chemicals in cigarettes and cigars can increase your blood pressure and also cause lung damage. Ask your healthcare provider for information if you currently smoke and need help to quit. E-cigarettes or smokeless tobacco still contain nicotine. Talk to your healthcare provider before you use these products.

## 2020-11-16 NOTE — ED PROVIDER NOTE - NS ED ROS FT
Review of Systems:  	•	CONSTITUTIONAL: no fever  	•	SKIN: no rash  	•	RESPIRATORY: no shortness of breath  	•	CARDIAC: no chest pain +elevated BP  	•	GI:  no abd pain, no nausea, no vomiting, no diarrhea  	•	GENITO-URINARY:  no dysuria  	•	MUSCULOSKELETAL:  no back pain  	•	NEUROLOGIC: no weakness +HA  	•	ALLERGY: no rhinorrhea  	•	PSYSCHIATRIC: appropriate concern about symptoms

## 2020-11-16 NOTE — ED PROVIDER NOTE - CARE PLAN
Principal Discharge DX:	Headache   Principal Discharge DX:	Headache  Secondary Diagnosis:	HTN (hypertension)

## 2020-11-16 NOTE — ED ADULT NURSE REASSESSMENT NOTE - NS ED NURSE REASSESS COMMENT FT1
Patient received from [place] RN, patient sleeping in bed upon assessment. Respirations equal and unlabored. VSS. No acute distress noted at this time.

## 2020-11-16 NOTE — ED ADULT TRIAGE NOTE - CHIEF COMPLAINT QUOTE
pt p/w c/o generalized headache associated with HTN (172 systolic) from [facility].  Pt denies blurry vision, chest pain, cob, fever, cough.

## 2020-11-16 NOTE — ED ADULT NURSE NOTE - CHIEF COMPLAINT QUOTE
pt p/w c/o generalized headache associated with HTN (172 systolic) from [facility].  Pt denies blurry vision, chest pain, cob, fever, cough.

## 2020-11-16 NOTE — ED ADULT NURSE NOTE - NSIMPLEMENTINTERV_GEN_ALL_ED
Implemented All Fall with Harm Risk Interventions:  [place] to call system. Call bell, personal items and telephone within reach. Instruct patient to call for assistance. Room bathroom lighting operational. Non-slip footwear when patient is off stretcher. Physically safe environment: no spills, clutter or unnecessary equipment. Stretcher in lowest position, wheels locked, appropriate side rails in place. Provide visual cue, wrist band, yellow gown, etc. Monitor gait and stability. Monitor for mental status changes and reorient to person, place, and time. Review medications for side effects contributing to fall risk. Reinforce activity limits and safety measures with patient and family. Provide visual clues: red socks.

## 2020-11-16 NOTE — ED PROVIDER NOTE - OBJECTIVE STATEMENT
Pertinent HPI/PMH/PSH/FHx/SHx and Review of Systems contained within  HPI:  Patient BIBEMS from [facility] for elevated blood pressure. Associated HA onset this morning. BP recorded at 191/98 at [facility]. Baseline ,130 systolic. States he gets HA very rarely. Pt was on amlodipine in the past, but is no longer taking it. A&O x4. NKDA.   PMH/PSH relevant CAD on pragurel s/p 2 stents, COPD, HTN on Lasix, Prostate CA s/p RT 25yrs ago, CKD4-5, anemia (iron def, CKD), Gout, glaucoma, severe AS  ROS negative for: fever, Chest pain, SOB, Nausea, vomiting, diarrhea, abdominal pain, dysuria    FamilyHx and SocialHx not otherwise contributory Pertinent HPI/PMH/PSH/FHx/SHx and Review of Systems contained within  HPI:  Patient BIBEMS from [facility] for elevated blood pressure. BP recorded at 191/98 at [facility]. Baseline -130 systolic. Associated gradual onset HA onset this morning, acute on chronic .Pt was on amlodipine in the past, but is no longer taking it. A&O x4. NKDA.   PMH/PSH relevant CAD on pragurel s/p 2 stents, COPD, HTN on Lasix, Prostate CA s/p RT 25yrs ago, CKD4-5, anemia (iron def, CKD), Gout, glaucoma, severe AS  ROS negative for: fever, Chest pain, SOB, Nausea, vomiting, diarrhea, abdominal pain, dysuria    FamilyHx and SocialHx not otherwise contributory Pertinent HPI/PMH/PSH/FHx/SHx and Review of Systems contained within  HPI:  Patient BIBEMS from [facility] for elevated blood pressure. BP recorded at 191/98 at [facility]. Baseline -130 systolic. Associated gradual onset HA onset this morning, acute on chronic .Pt was on amlodipine, hydralazine & metoprolol in the past, but is no longer on it per [facility] chart review. A&O x4. NKDA.   PMH/PSH relevant CAD on pragurel s/p 2 stents, COPD, HTN on Lasix, Prostate CA s/p RT 25yrs ago, CKD4-5, anemia (iron def, CKD), Gout, glaucoma, severe AS  ROS negative for: fever, Chest pain, SOB, Nausea, vomiting, diarrhea, abdominal pain, dysuria    FamilyHx and SocialHx not otherwise contributory

## 2020-11-16 NOTE — ED PROVIDER NOTE - PROGRESS NOTE DETAILS
89 y/o F presents with HAND since this morning. pt reports gradual on sent of mild frontal HA that feels like headaches in the past. Denies fever/chills, n/v/d, CP, SOB, abd pain, slurred speech, weakness, numbness or tingling or other complaints at this time  Neuro: CN2-12 grossly intact. UE and LE muscle str equal B/L. No pronator drift. -Ambrocio Estrada PA-C Pt feeling better with tylenol, pt with baseline CKD, pt is otherwise well appearing and stable for dc will dc home. -Ambrocio Estrada PA-C Albert ABRAHAM for ED attending, Dr. Stacy: Pt was previously on 25 mg of hydralazine, 5 mg of amlodipine, will recommend nursing home to restart these medication.

## 2021-01-01 ENCOUNTER — INPATIENT (INPATIENT)
Facility: HOSPITAL | Age: 86
LOS: 5 days | Discharge: NURSING, REHAB OR LONG-TERM CARE | DRG: 378 | End: 2021-04-15
Attending: FAMILY MEDICINE | Admitting: INTERNAL MEDICINE
Payer: MEDICARE

## 2021-01-01 ENCOUNTER — TRANSCRIPTION ENCOUNTER (OUTPATIENT)
Age: 86
End: 2021-01-01

## 2021-01-01 ENCOUNTER — APPOINTMENT (OUTPATIENT)
Dept: ELECTROPHYSIOLOGY | Facility: CLINIC | Age: 86
End: 2021-01-01
Payer: MEDICARE

## 2021-01-01 ENCOUNTER — APPOINTMENT (OUTPATIENT)
Dept: ELECTROPHYSIOLOGY | Facility: CLINIC | Age: 86
End: 2021-01-01

## 2021-01-01 ENCOUNTER — INPATIENT (INPATIENT)
Facility: HOSPITAL | Age: 86
LOS: 8 days | Discharge: NURSING, REHAB OR LONG-TERM CARE | DRG: 228 | End: 2021-09-29
Attending: HOSPITALIST | Admitting: HOSPITALIST
Payer: MEDICARE

## 2021-01-01 VITALS
RESPIRATION RATE: 18 BRPM | HEART RATE: 67 BPM | SYSTOLIC BLOOD PRESSURE: 148 MMHG | TEMPERATURE: 97 F | DIASTOLIC BLOOD PRESSURE: 56 MMHG | OXYGEN SATURATION: 97 %

## 2021-01-01 VITALS
DIASTOLIC BLOOD PRESSURE: 79 MMHG | OXYGEN SATURATION: 94 % | HEART RATE: 38 BPM | HEIGHT: 67 IN | WEIGHT: 184.97 LBS | TEMPERATURE: 98 F | RESPIRATION RATE: 20 BRPM | SYSTOLIC BLOOD PRESSURE: 157 MMHG

## 2021-01-01 VITALS
DIASTOLIC BLOOD PRESSURE: 63 MMHG | TEMPERATURE: 98 F | SYSTOLIC BLOOD PRESSURE: 115 MMHG | RESPIRATION RATE: 18 BRPM | OXYGEN SATURATION: 99 % | HEART RATE: 87 BPM

## 2021-01-01 VITALS
DIASTOLIC BLOOD PRESSURE: 66 MMHG | RESPIRATION RATE: 18 BRPM | TEMPERATURE: 98 F | HEART RATE: 52 BPM | HEIGHT: 67 IN | SYSTOLIC BLOOD PRESSURE: 128 MMHG | WEIGHT: 179.9 LBS | OXYGEN SATURATION: 97 %

## 2021-01-01 VITALS
DIASTOLIC BLOOD PRESSURE: 62 MMHG | OXYGEN SATURATION: 96 % | HEART RATE: 60 BPM | BODY MASS INDEX: 22.44 KG/M2 | WEIGHT: 143 LBS | SYSTOLIC BLOOD PRESSURE: 150 MMHG | HEIGHT: 67 IN

## 2021-01-01 DIAGNOSIS — I44.1 ATRIOVENTRICULAR BLOCK, SECOND DEGREE: ICD-10-CM

## 2021-01-01 DIAGNOSIS — J47.9 BRONCHIECTASIS, UNCOMPLICATED: ICD-10-CM

## 2021-01-01 DIAGNOSIS — Z66 DO NOT RESUSCITATE: ICD-10-CM

## 2021-01-01 DIAGNOSIS — M10.9 GOUT, UNSPECIFIED: ICD-10-CM

## 2021-01-01 DIAGNOSIS — Z79.84 LONG TERM (CURRENT) USE OF ORAL HYPOGLYCEMIC DRUGS: ICD-10-CM

## 2021-01-01 DIAGNOSIS — I33.0 ACUTE AND SUBACUTE INFECTIVE ENDOCARDITIS: ICD-10-CM

## 2021-01-01 DIAGNOSIS — I25.10 ATHEROSCLEROTIC HEART DISEASE OF NATIVE CORONARY ARTERY WITHOUT ANGINA PECTORIS: ICD-10-CM

## 2021-01-01 DIAGNOSIS — E11.22 TYPE 2 DIABETES MELLITUS WITH DIABETIC CHRONIC KIDNEY DISEASE: ICD-10-CM

## 2021-01-01 DIAGNOSIS — I44.0 ATRIOVENTRICULAR BLOCK, FIRST DEGREE: ICD-10-CM

## 2021-01-01 DIAGNOSIS — I35.0 NONRHEUMATIC AORTIC (VALVE) STENOSIS: ICD-10-CM

## 2021-01-01 DIAGNOSIS — E83.51 HYPOCALCEMIA: ICD-10-CM

## 2021-01-01 DIAGNOSIS — Z98.890 OTHER SPECIFIED POSTPROCEDURAL STATES: Chronic | ICD-10-CM

## 2021-01-01 DIAGNOSIS — M1A.9XX0 CHRONIC GOUT, UNSPECIFIED, WITHOUT TOPHUS (TOPHI): ICD-10-CM

## 2021-01-01 DIAGNOSIS — Z95.5 PRESENCE OF CORONARY ANGIOPLASTY IMPLANT AND GRAFT: ICD-10-CM

## 2021-01-01 DIAGNOSIS — I13.0 HYPERTENSIVE HEART AND CHRONIC KIDNEY DISEASE WITH HEART FAILURE AND STAGE 1 THROUGH STAGE 4 CHRONIC KIDNEY DISEASE, OR UNSPECIFIED CHRONIC KIDNEY DISEASE: ICD-10-CM

## 2021-01-01 DIAGNOSIS — E78.5 HYPERLIPIDEMIA, UNSPECIFIED: ICD-10-CM

## 2021-01-01 DIAGNOSIS — I48.92 UNSPECIFIED ATRIAL FLUTTER: ICD-10-CM
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
## 2021-01-01 DIAGNOSIS — Z98.61 CORONARY ANGIOPLASTY STATUS: ICD-10-CM

## 2021-01-01 DIAGNOSIS — E11.65 TYPE 2 DIABETES MELLITUS WITH HYPERGLYCEMIA: ICD-10-CM

## 2021-01-01 DIAGNOSIS — Z53.20 PROCEDURE AND TREATMENT NOT CARRIED OUT BECAUSE OF PATIENT'S DECISION FOR UNSPECIFIED REASONS: ICD-10-CM

## 2021-01-01 DIAGNOSIS — Z90.89 ACQUIRED ABSENCE OF OTHER ORGANS: Chronic | ICD-10-CM

## 2021-01-01 DIAGNOSIS — I25.5 ISCHEMIC CARDIOMYOPATHY: ICD-10-CM

## 2021-01-01 DIAGNOSIS — I50.22 CHRONIC SYSTOLIC (CONGESTIVE) HEART FAILURE: ICD-10-CM

## 2021-01-01 DIAGNOSIS — H40.9 UNSPECIFIED GLAUCOMA: ICD-10-CM

## 2021-01-01 DIAGNOSIS — I47.1 SUPRAVENTRICULAR TACHYCARDIA: ICD-10-CM

## 2021-01-01 DIAGNOSIS — I45.10 UNSPECIFIED RIGHT BUNDLE-BRANCH BLOCK: ICD-10-CM
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
## 2021-01-01 DIAGNOSIS — I34.0 NONRHEUMATIC MITRAL (VALVE) INSUFFICIENCY: ICD-10-CM

## 2021-01-01 DIAGNOSIS — K92.2 GASTROINTESTINAL HEMORRHAGE, UNSPECIFIED: ICD-10-CM

## 2021-01-01 DIAGNOSIS — I10 ESSENTIAL (PRIMARY) HYPERTENSION: ICD-10-CM

## 2021-01-01 DIAGNOSIS — N18.4 CHRONIC KIDNEY DISEASE, STAGE 4 (SEVERE): ICD-10-CM

## 2021-01-01 DIAGNOSIS — S00.81XA ABRASION OF OTHER PART OF HEAD, INITIAL ENCOUNTER: ICD-10-CM

## 2021-01-01 DIAGNOSIS — R00.1 BRADYCARDIA, UNSPECIFIED: ICD-10-CM

## 2021-01-01 DIAGNOSIS — I44.2 ATRIOVENTRICULAR BLOCK, COMPLETE: ICD-10-CM

## 2021-01-01 DIAGNOSIS — N17.9 ACUTE KIDNEY FAILURE, UNSPECIFIED: ICD-10-CM

## 2021-01-01 DIAGNOSIS — D62 ACUTE POSTHEMORRHAGIC ANEMIA: ICD-10-CM

## 2021-01-01 DIAGNOSIS — J44.9 CHRONIC OBSTRUCTIVE PULMONARY DISEASE, UNSPECIFIED: ICD-10-CM

## 2021-01-01 DIAGNOSIS — I50.33 ACUTE ON CHRONIC DIASTOLIC (CONGESTIVE) HEART FAILURE: ICD-10-CM

## 2021-01-01 DIAGNOSIS — D63.1 ANEMIA IN CHRONIC KIDNEY DISEASE: ICD-10-CM

## 2021-01-01 DIAGNOSIS — Z85.46 PERSONAL HISTORY OF MALIGNANT NEOPLASM OF PROSTATE: ICD-10-CM

## 2021-01-01 DIAGNOSIS — D50.9 IRON DEFICIENCY ANEMIA, UNSPECIFIED: ICD-10-CM

## 2021-01-01 DIAGNOSIS — C61 MALIGNANT NEOPLASM OF PROSTATE: ICD-10-CM
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
## 2021-01-01 DIAGNOSIS — M81.0 AGE-RELATED OSTEOPOROSIS WITHOUT CURRENT PATHOLOGICAL FRACTURE: ICD-10-CM

## 2021-01-01 DIAGNOSIS — Z87.19 PERSONAL HISTORY OF OTHER DISEASES OF THE DIGESTIVE SYSTEM: ICD-10-CM
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
## 2021-01-01 LAB
% ALBUMIN: 50.1 % — SIGNIFICANT CHANGE UP
% ALPHA 1: 8.1 % — SIGNIFICANT CHANGE UP
% ALPHA 2: 15.6 % — SIGNIFICANT CHANGE UP
% BETA: 11.3 % — SIGNIFICANT CHANGE UP
% GAMMA: 14.9 % — SIGNIFICANT CHANGE UP
% M SPIKE: 1.6 % — SIGNIFICANT CHANGE UP
24R-OH-CALCIDIOL SERPL-MCNC: 51.5 NG/ML — SIGNIFICANT CHANGE UP (ref 30–80)
A PHAGOCYTOPH DNA BLD QL NAA+PROBE: NEGATIVE — SIGNIFICANT CHANGE UP
A1C WITH ESTIMATED AVERAGE GLUCOSE RESULT: 7.5 % — HIGH (ref 4–5.6)
ADD ON TEST-SPECIMEN IN LAB: SIGNIFICANT CHANGE UP
ALBUMIN SERPL ELPH-MCNC: 2.5 G/DL — LOW (ref 3.3–5)
ALBUMIN SERPL ELPH-MCNC: 2.6 G/DL — LOW (ref 3.3–5)
ALBUMIN SERPL ELPH-MCNC: 2.7 G/DL — LOW (ref 3.3–5)
ALBUMIN SERPL ELPH-MCNC: 2.8 G/DL — LOW (ref 3.3–5)
ALBUMIN SERPL ELPH-MCNC: 3 G/DL — LOW (ref 3.3–5)
ALBUMIN SERPL ELPH-MCNC: 3.1 G/DL — LOW (ref 3.6–5.5)
ALBUMIN/GLOB SERPL ELPH: 1 RATIO — SIGNIFICANT CHANGE UP
ALP SERPL-CCNC: 102 U/L — SIGNIFICANT CHANGE UP (ref 40–120)
ALP SERPL-CCNC: 108 U/L — SIGNIFICANT CHANGE UP (ref 40–120)
ALP SERPL-CCNC: 115 U/L — SIGNIFICANT CHANGE UP (ref 40–120)
ALP SERPL-CCNC: 120 U/L — SIGNIFICANT CHANGE UP (ref 40–120)
ALP SERPL-CCNC: 69 U/L — SIGNIFICANT CHANGE UP (ref 40–120)
ALPHA1 GLOB SERPL ELPH-MCNC: 0.5 G/DL — HIGH (ref 0.1–0.4)
ALPHA2 GLOB SERPL ELPH-MCNC: 1 G/DL — SIGNIFICANT CHANGE UP (ref 0.5–1)
ALT FLD-CCNC: 14 U/L — SIGNIFICANT CHANGE UP (ref 12–78)
ALT FLD-CCNC: 17 U/L — SIGNIFICANT CHANGE UP (ref 12–78)
ALT FLD-CCNC: 18 U/L — SIGNIFICANT CHANGE UP (ref 12–78)
ALT FLD-CCNC: 22 U/L — SIGNIFICANT CHANGE UP (ref 12–78)
ALT FLD-CCNC: 9 U/L — LOW (ref 12–78)
ANION GAP SERPL CALC-SCNC: 10 MMOL/L — SIGNIFICANT CHANGE UP (ref 5–17)
ANION GAP SERPL CALC-SCNC: 11 MMOL/L — SIGNIFICANT CHANGE UP (ref 5–17)
ANION GAP SERPL CALC-SCNC: 4 MMOL/L — LOW (ref 5–17)
ANION GAP SERPL CALC-SCNC: 5 MMOL/L — SIGNIFICANT CHANGE UP (ref 5–17)
ANION GAP SERPL CALC-SCNC: 6 MMOL/L — SIGNIFICANT CHANGE UP (ref 5–17)
ANION GAP SERPL CALC-SCNC: 7 MMOL/L — SIGNIFICANT CHANGE UP (ref 5–17)
ANION GAP SERPL CALC-SCNC: 8 MMOL/L — SIGNIFICANT CHANGE UP (ref 5–17)
ANION GAP SERPL CALC-SCNC: 8 MMOL/L — SIGNIFICANT CHANGE UP (ref 5–17)
ANION GAP SERPL CALC-SCNC: 9 MMOL/L — SIGNIFICANT CHANGE UP (ref 5–17)
APPEARANCE UR: CLEAR — SIGNIFICANT CHANGE UP
APTT BLD: 25.4 SEC — LOW (ref 27.5–35.5)
AST SERPL-CCNC: 10 U/L — LOW (ref 15–37)
AST SERPL-CCNC: 10 U/L — LOW (ref 15–37)
AST SERPL-CCNC: 13 U/L — LOW (ref 15–37)
AST SERPL-CCNC: 13 U/L — LOW (ref 15–37)
AST SERPL-CCNC: 9 U/L — LOW (ref 15–37)
B BURGDOR C6 AB SER-ACNC: NEGATIVE — SIGNIFICANT CHANGE UP
B BURGDOR IGG+IGM SER QL IB: SIGNIFICANT CHANGE UP
B BURGDOR IGG+IGM SER-ACNC: 0.04 INDEX — SIGNIFICANT CHANGE UP (ref 0.01–0.89)
B-GLOBULIN SERPL ELPH-MCNC: 0.7 G/DL — SIGNIFICANT CHANGE UP (ref 0.5–1)
BASE EXCESS BLDV CALC-SCNC: -2.4 MMOL/L — SIGNIFICANT CHANGE UP
BASOPHILS # BLD AUTO: 0 K/UL — SIGNIFICANT CHANGE UP (ref 0–0.2)
BASOPHILS # BLD AUTO: 0.06 K/UL — SIGNIFICANT CHANGE UP (ref 0–0.2)
BASOPHILS # BLD AUTO: 0.06 K/UL — SIGNIFICANT CHANGE UP (ref 0–0.2)
BASOPHILS # BLD AUTO: 0.07 K/UL — SIGNIFICANT CHANGE UP (ref 0–0.2)
BASOPHILS # BLD AUTO: 0.07 K/UL — SIGNIFICANT CHANGE UP (ref 0–0.2)
BASOPHILS NFR BLD AUTO: 0 % — SIGNIFICANT CHANGE UP (ref 0–2)
BASOPHILS NFR BLD AUTO: 0.5 % — SIGNIFICANT CHANGE UP (ref 0–2)
BASOPHILS NFR BLD AUTO: 0.6 % — SIGNIFICANT CHANGE UP (ref 0–2)
BILIRUB SERPL-MCNC: 0.2 MG/DL — SIGNIFICANT CHANGE UP (ref 0.2–1.2)
BILIRUB SERPL-MCNC: 0.3 MG/DL — SIGNIFICANT CHANGE UP (ref 0.2–1.2)
BILIRUB SERPL-MCNC: 0.3 MG/DL — SIGNIFICANT CHANGE UP (ref 0.2–1.2)
BILIRUB SERPL-MCNC: 0.4 MG/DL — SIGNIFICANT CHANGE UP (ref 0.2–1.2)
BILIRUB SERPL-MCNC: 0.6 MG/DL — SIGNIFICANT CHANGE UP (ref 0.2–1.2)
BILIRUB UR-MCNC: NEGATIVE — SIGNIFICANT CHANGE UP
BUN SERPL-MCNC: 104 MG/DL — HIGH (ref 7–23)
BUN SERPL-MCNC: 105 MG/DL — HIGH (ref 7–23)
BUN SERPL-MCNC: 106 MG/DL — HIGH (ref 7–23)
BUN SERPL-MCNC: 107 MG/DL — HIGH (ref 7–23)
BUN SERPL-MCNC: 111 MG/DL — HIGH (ref 7–23)
BUN SERPL-MCNC: 115 MG/DL — HIGH (ref 7–23)
BUN SERPL-MCNC: 118 MG/DL — HIGH (ref 7–23)
BUN SERPL-MCNC: 119 MG/DL — HIGH (ref 7–23)
BUN SERPL-MCNC: 121 MG/DL — HIGH (ref 7–23)
BUN SERPL-MCNC: 87 MG/DL — HIGH (ref 7–23)
BUN SERPL-MCNC: 88 MG/DL — HIGH (ref 7–23)
BUN SERPL-MCNC: 92 MG/DL — HIGH (ref 7–23)
BUN SERPL-MCNC: 94 MG/DL — HIGH (ref 7–23)
BUN SERPL-MCNC: 96 MG/DL — HIGH (ref 7–23)
BUN SERPL-MCNC: 97 MG/DL — HIGH (ref 7–23)
BUN SERPL-MCNC: 99 MG/DL — HIGH (ref 7–23)
CALCIUM SERPL-MCNC: 8 MG/DL — LOW (ref 8.5–10.1)
CALCIUM SERPL-MCNC: 8.1 MG/DL — LOW (ref 8.4–10.5)
CALCIUM SERPL-MCNC: 8.1 MG/DL — LOW (ref 8.5–10.1)
CALCIUM SERPL-MCNC: 8.1 MG/DL — LOW (ref 8.5–10.1)
CALCIUM SERPL-MCNC: 8.2 MG/DL — LOW (ref 8.5–10.1)
CALCIUM SERPL-MCNC: 8.2 MG/DL — LOW (ref 8.5–10.1)
CALCIUM SERPL-MCNC: 8.3 MG/DL — LOW (ref 8.5–10.1)
CALCIUM SERPL-MCNC: 8.3 MG/DL — LOW (ref 8.5–10.1)
CALCIUM SERPL-MCNC: 8.4 MG/DL — LOW (ref 8.5–10.1)
CALCIUM SERPL-MCNC: 8.5 MG/DL — SIGNIFICANT CHANGE UP (ref 8.5–10.1)
CALCIUM SERPL-MCNC: 8.5 MG/DL — SIGNIFICANT CHANGE UP (ref 8.5–10.1)
CALCIUM SERPL-MCNC: 8.7 MG/DL — SIGNIFICANT CHANGE UP (ref 8.5–10.1)
CALCIUM SERPL-MCNC: 8.8 MG/DL — SIGNIFICANT CHANGE UP (ref 8.5–10.1)
CALCIUM SERPL-MCNC: 8.8 MG/DL — SIGNIFICANT CHANGE UP (ref 8.5–10.1)
CALCIUM SERPL-MCNC: 8.9 MG/DL — SIGNIFICANT CHANGE UP (ref 8.5–10.1)
CALCIUM SERPL-MCNC: 9 MG/DL — SIGNIFICANT CHANGE UP (ref 8.5–10.1)
CALCIUM SERPL-MCNC: 9.2 MG/DL — SIGNIFICANT CHANGE UP (ref 8.5–10.1)
CHLORIDE SERPL-SCNC: 103 MMOL/L — SIGNIFICANT CHANGE UP (ref 96–108)
CHLORIDE SERPL-SCNC: 104 MMOL/L — SIGNIFICANT CHANGE UP (ref 96–108)
CHLORIDE SERPL-SCNC: 105 MMOL/L — SIGNIFICANT CHANGE UP (ref 96–108)
CHLORIDE SERPL-SCNC: 107 MMOL/L — SIGNIFICANT CHANGE UP (ref 96–108)
CHLORIDE SERPL-SCNC: 108 MMOL/L — SIGNIFICANT CHANGE UP (ref 96–108)
CHLORIDE SERPL-SCNC: 109 MMOL/L — HIGH (ref 96–108)
CHLORIDE SERPL-SCNC: 110 MMOL/L — HIGH (ref 96–108)
CHLORIDE SERPL-SCNC: 110 MMOL/L — HIGH (ref 96–108)
CHLORIDE SERPL-SCNC: 111 MMOL/L — HIGH (ref 96–108)
CHLORIDE SERPL-SCNC: 112 MMOL/L — HIGH (ref 96–108)
CHLORIDE SERPL-SCNC: 113 MMOL/L — HIGH (ref 96–108)
CHLORIDE SERPL-SCNC: 114 MMOL/L — HIGH (ref 96–108)
CHOLEST SERPL-MCNC: 101 MG/DL — SIGNIFICANT CHANGE UP
CK SERPL-CCNC: 102 U/L — SIGNIFICANT CHANGE UP (ref 26–308)
CO2 BLDV-SCNC: 26 MMOL/L — SIGNIFICANT CHANGE UP (ref 22–26)
CO2 SERPL-SCNC: 23 MMOL/L — SIGNIFICANT CHANGE UP (ref 22–31)
CO2 SERPL-SCNC: 23 MMOL/L — SIGNIFICANT CHANGE UP (ref 22–31)
CO2 SERPL-SCNC: 24 MMOL/L — SIGNIFICANT CHANGE UP (ref 22–31)
CO2 SERPL-SCNC: 24 MMOL/L — SIGNIFICANT CHANGE UP (ref 22–31)
CO2 SERPL-SCNC: 25 MMOL/L — SIGNIFICANT CHANGE UP (ref 22–31)
CO2 SERPL-SCNC: 25 MMOL/L — SIGNIFICANT CHANGE UP (ref 22–31)
CO2 SERPL-SCNC: 26 MMOL/L — SIGNIFICANT CHANGE UP (ref 22–31)
CO2 SERPL-SCNC: 27 MMOL/L — SIGNIFICANT CHANGE UP (ref 22–31)
CO2 SERPL-SCNC: 27 MMOL/L — SIGNIFICANT CHANGE UP (ref 22–31)
CO2 SERPL-SCNC: 29 MMOL/L — SIGNIFICANT CHANGE UP (ref 22–31)
CO2 SERPL-SCNC: 30 MMOL/L — SIGNIFICANT CHANGE UP (ref 22–31)
CO2 SERPL-SCNC: 31 MMOL/L — SIGNIFICANT CHANGE UP (ref 22–31)
CO2 SERPL-SCNC: 33 MMOL/L — HIGH (ref 22–31)
CO2 SERPL-SCNC: 34 MMOL/L — HIGH (ref 22–31)
COLOR SPEC: YELLOW — SIGNIFICANT CHANGE UP
COVID-19 SPIKE DOMAIN AB INTERP: POSITIVE
COVID-19 SPIKE DOMAIN AB INTERP: POSITIVE
COVID-19 SPIKE DOMAIN ANTIBODY RESULT: >250 U/ML — HIGH
COVID-19 SPIKE DOMAIN ANTIBODY RESULT: >250 U/ML — HIGH
CREAT SERPL-MCNC: 3.25 MG/DL — HIGH (ref 0.5–1.3)
CREAT SERPL-MCNC: 3.29 MG/DL — HIGH (ref 0.5–1.3)
CREAT SERPL-MCNC: 3.3 MG/DL — HIGH (ref 0.5–1.3)
CREAT SERPL-MCNC: 3.37 MG/DL — HIGH (ref 0.5–1.3)
CREAT SERPL-MCNC: 3.41 MG/DL — HIGH (ref 0.5–1.3)
CREAT SERPL-MCNC: 3.43 MG/DL — HIGH (ref 0.5–1.3)
CREAT SERPL-MCNC: 3.44 MG/DL — HIGH (ref 0.5–1.3)
CREAT SERPL-MCNC: 3.48 MG/DL — HIGH (ref 0.5–1.3)
CREAT SERPL-MCNC: 3.51 MG/DL — HIGH (ref 0.5–1.3)
CREAT SERPL-MCNC: 3.53 MG/DL — HIGH (ref 0.5–1.3)
CREAT SERPL-MCNC: 3.91 MG/DL — HIGH (ref 0.5–1.3)
CREAT SERPL-MCNC: 3.95 MG/DL — HIGH (ref 0.5–1.3)
CREAT SERPL-MCNC: 4.6 MG/DL — HIGH (ref 0.5–1.3)
CREAT SERPL-MCNC: 4.89 MG/DL — HIGH (ref 0.5–1.3)
CREAT SERPL-MCNC: 4.95 MG/DL — HIGH (ref 0.5–1.3)
CREAT SERPL-MCNC: 5.12 MG/DL — HIGH (ref 0.5–1.3)
CRP SERPL-MCNC: 116 MG/L — HIGH
CRP SERPL-MCNC: 137 MG/L — HIGH
CULTURE RESULTS: SIGNIFICANT CHANGE UP
DIFF PNL FLD: ABNORMAL
E CHAFFEENSIS DNA BLD QL NAA+PROBE: NEGATIVE — SIGNIFICANT CHANGE UP
E EWINGII DNA SPEC QL NAA+PROBE: NEGATIVE — SIGNIFICANT CHANGE UP
EHRLICHIA DNA SPEC QL NAA+PROBE: NEGATIVE — SIGNIFICANT CHANGE UP
EOSINOPHIL # BLD AUTO: 0 K/UL — SIGNIFICANT CHANGE UP (ref 0–0.5)
EOSINOPHIL # BLD AUTO: 0.18 K/UL — SIGNIFICANT CHANGE UP (ref 0–0.5)
EOSINOPHIL # BLD AUTO: 0.2 K/UL — SIGNIFICANT CHANGE UP (ref 0–0.5)
EOSINOPHIL # BLD AUTO: 0.24 K/UL — SIGNIFICANT CHANGE UP (ref 0–0.5)
EOSINOPHIL # BLD AUTO: 0.33 K/UL — SIGNIFICANT CHANGE UP (ref 0–0.5)
EOSINOPHIL NFR BLD AUTO: 0 % — SIGNIFICANT CHANGE UP (ref 0–6)
EOSINOPHIL NFR BLD AUTO: 1.5 % — SIGNIFICANT CHANGE UP (ref 0–6)
EOSINOPHIL NFR BLD AUTO: 1.8 % — SIGNIFICANT CHANGE UP (ref 0–6)
EOSINOPHIL NFR BLD AUTO: 2.3 % — SIGNIFICANT CHANGE UP (ref 0–6)
EOSINOPHIL NFR BLD AUTO: 3 % — SIGNIFICANT CHANGE UP (ref 0–6)
ESTIMATED AVERAGE GLUCOSE: 169 MG/DL — HIGH (ref 68–114)
FERRITIN SERPL-MCNC: 149 NG/ML — SIGNIFICANT CHANGE UP (ref 30–400)
FERRITIN SERPL-MCNC: 150 NG/ML — SIGNIFICANT CHANGE UP (ref 30–400)
FOLATE SERPL-MCNC: 16.1 NG/ML — SIGNIFICANT CHANGE UP
GAMMA GLOBULIN: 0.9 G/DL — SIGNIFICANT CHANGE UP (ref 0.6–1.6)
GLUCOSE BLDC GLUCOMTR-MCNC: 145 MG/DL — HIGH (ref 70–99)
GLUCOSE BLDC GLUCOMTR-MCNC: 159 MG/DL — HIGH (ref 70–99)
GLUCOSE SERPL-MCNC: 119 MG/DL — HIGH (ref 70–99)
GLUCOSE SERPL-MCNC: 122 MG/DL — HIGH (ref 70–99)
GLUCOSE SERPL-MCNC: 130 MG/DL — HIGH (ref 70–99)
GLUCOSE SERPL-MCNC: 132 MG/DL — HIGH (ref 70–99)
GLUCOSE SERPL-MCNC: 140 MG/DL — HIGH (ref 70–99)
GLUCOSE SERPL-MCNC: 140 MG/DL — HIGH (ref 70–99)
GLUCOSE SERPL-MCNC: 143 MG/DL — HIGH (ref 70–99)
GLUCOSE SERPL-MCNC: 147 MG/DL — HIGH (ref 70–99)
GLUCOSE SERPL-MCNC: 154 MG/DL — HIGH (ref 70–99)
GLUCOSE SERPL-MCNC: 159 MG/DL — HIGH (ref 70–99)
GLUCOSE SERPL-MCNC: 167 MG/DL — HIGH (ref 70–99)
GLUCOSE SERPL-MCNC: 187 MG/DL — HIGH (ref 70–99)
GLUCOSE SERPL-MCNC: 218 MG/DL — HIGH (ref 70–99)
GLUCOSE SERPL-MCNC: 234 MG/DL — HIGH (ref 70–99)
GLUCOSE UR QL: NEGATIVE MG/DL — SIGNIFICANT CHANGE UP
HAPTOGLOB SERPL-MCNC: 345 MG/DL — HIGH (ref 34–200)
HCO3 BLDV-SCNC: 25 MMOL/L — SIGNIFICANT CHANGE UP (ref 22–29)
HCT VFR BLD CALC: 16.9 % — CRITICAL LOW (ref 39–50)
HCT VFR BLD CALC: 25.9 % — LOW (ref 39–50)
HCT VFR BLD CALC: 27.3 % — LOW (ref 39–50)
HCT VFR BLD CALC: 27.6 % — LOW (ref 39–50)
HCT VFR BLD CALC: 27.7 % — LOW (ref 39–50)
HCT VFR BLD CALC: 28.5 % — LOW (ref 39–50)
HCT VFR BLD CALC: 29.3 % — LOW (ref 39–50)
HCT VFR BLD CALC: 29.6 % — LOW (ref 39–50)
HCT VFR BLD CALC: 29.8 % — LOW (ref 39–50)
HCT VFR BLD CALC: 30 % — LOW (ref 39–50)
HCT VFR BLD CALC: 30.4 % — LOW (ref 39–50)
HCT VFR BLD CALC: 30.5 % — LOW (ref 39–50)
HCT VFR BLD CALC: 30.8 % — LOW (ref 39–50)
HCT VFR BLD CALC: 31 % — LOW (ref 39–50)
HCT VFR BLD CALC: 31.1 % — LOW (ref 39–50)
HDLC SERPL-MCNC: 33 MG/DL — LOW
HGB BLD-MCNC: 5.3 G/DL — CRITICAL LOW (ref 13–17)
HGB BLD-MCNC: 8.4 G/DL — LOW (ref 13–17)
HGB BLD-MCNC: 8.5 G/DL — LOW (ref 13–17)
HGB BLD-MCNC: 8.6 G/DL — LOW (ref 13–17)
HGB BLD-MCNC: 8.7 G/DL — LOW (ref 13–17)
HGB BLD-MCNC: 8.8 G/DL — LOW (ref 13–17)
HGB BLD-MCNC: 8.8 G/DL — LOW (ref 13–17)
HGB BLD-MCNC: 9 G/DL — LOW (ref 13–17)
HGB BLD-MCNC: 9.2 G/DL — LOW (ref 13–17)
HGB BLD-MCNC: 9.2 G/DL — LOW (ref 13–17)
HGB BLD-MCNC: 9.3 G/DL — LOW (ref 13–17)
HGB BLD-MCNC: 9.3 G/DL — LOW (ref 13–17)
HGB BLD-MCNC: 9.5 G/DL — LOW (ref 13–17)
HGB BLD-MCNC: 9.6 G/DL — LOW (ref 13–17)
HGB BLD-MCNC: 9.6 G/DL — LOW (ref 13–17)
IMM GRANULOCYTES NFR BLD AUTO: 0.3 % — SIGNIFICANT CHANGE UP (ref 0–1.5)
IMM GRANULOCYTES NFR BLD AUTO: 0.4 % — SIGNIFICANT CHANGE UP (ref 0–1.5)
IMM GRANULOCYTES NFR BLD AUTO: 0.5 % — SIGNIFICANT CHANGE UP (ref 0–1.5)
IMM GRANULOCYTES NFR BLD AUTO: 0.6 % — SIGNIFICANT CHANGE UP (ref 0–1.5)
INR BLD: 1 RATIO — SIGNIFICANT CHANGE UP (ref 0.88–1.16)
INTERPRETATION SERPL IFE-IMP: SIGNIFICANT CHANGE UP
IRON SATN MFR SERPL: 17 UG/DL — LOW (ref 45–165)
IRON SATN MFR SERPL: 17 UG/DL — LOW (ref 45–165)
IRON SATN MFR SERPL: 7 % — LOW (ref 16–55)
IRON SATN MFR SERPL: 8 % — LOW (ref 16–55)
KETONES UR-MCNC: NEGATIVE — SIGNIFICANT CHANGE UP
LACTATE SERPL-SCNC: 1.9 MMOL/L — SIGNIFICANT CHANGE UP (ref 0.7–2)
LEUKOCYTE ESTERASE UR-ACNC: NEGATIVE — SIGNIFICANT CHANGE UP
LIPID PNL WITH DIRECT LDL SERPL: 49 MG/DL — SIGNIFICANT CHANGE UP
LYMPHOCYTES # BLD AUTO: 0.33 K/UL — LOW (ref 1–3.3)
LYMPHOCYTES # BLD AUTO: 0.44 K/UL — LOW (ref 1–3.3)
LYMPHOCYTES # BLD AUTO: 0.52 K/UL — LOW (ref 1–3.3)
LYMPHOCYTES # BLD AUTO: 0.7 K/UL — LOW (ref 1–3.3)
LYMPHOCYTES # BLD AUTO: 1.06 K/UL — SIGNIFICANT CHANGE UP (ref 1–3.3)
LYMPHOCYTES # BLD AUTO: 3.2 % — LOW (ref 13–44)
LYMPHOCYTES # BLD AUTO: 3.6 % — LOW (ref 13–44)
LYMPHOCYTES # BLD AUTO: 4 % — LOW (ref 13–44)
LYMPHOCYTES # BLD AUTO: 6.5 % — LOW (ref 13–44)
LYMPHOCYTES # BLD AUTO: 9.5 % — LOW (ref 13–44)
M-SPIKE: 0.1 G/DL — HIGH (ref 0–0)
MAGNESIUM SERPL-MCNC: 2.5 MG/DL — SIGNIFICANT CHANGE UP (ref 1.6–2.6)
MAGNESIUM SERPL-MCNC: 2.5 MG/DL — SIGNIFICANT CHANGE UP (ref 1.6–2.6)
MAGNESIUM SERPL-MCNC: 2.6 MG/DL — SIGNIFICANT CHANGE UP (ref 1.6–2.6)
MAGNESIUM SERPL-MCNC: 2.6 MG/DL — SIGNIFICANT CHANGE UP (ref 1.6–2.6)
MAGNESIUM SERPL-MCNC: 2.9 MG/DL — HIGH (ref 1.6–2.6)
MAGNESIUM SERPL-MCNC: 3 MG/DL — HIGH (ref 1.6–2.6)
MCHC RBC-ENTMCNC: 29.7 GM/DL — LOW (ref 32–36)
MCHC RBC-ENTMCNC: 30 PG — SIGNIFICANT CHANGE UP (ref 27–34)
MCHC RBC-ENTMCNC: 30.2 GM/DL — LOW (ref 32–36)
MCHC RBC-ENTMCNC: 30.2 GM/DL — LOW (ref 32–36)
MCHC RBC-ENTMCNC: 30.2 PG — SIGNIFICANT CHANGE UP (ref 27–34)
MCHC RBC-ENTMCNC: 30.3 GM/DL — LOW (ref 32–36)
MCHC RBC-ENTMCNC: 30.3 PG — SIGNIFICANT CHANGE UP (ref 27–34)
MCHC RBC-ENTMCNC: 30.4 PG — SIGNIFICANT CHANGE UP (ref 27–34)
MCHC RBC-ENTMCNC: 30.5 GM/DL — LOW (ref 32–36)
MCHC RBC-ENTMCNC: 30.6 PG — SIGNIFICANT CHANGE UP (ref 27–34)
MCHC RBC-ENTMCNC: 30.8 GM/DL — LOW (ref 32–36)
MCHC RBC-ENTMCNC: 30.8 PG — SIGNIFICANT CHANGE UP (ref 27–34)
MCHC RBC-ENTMCNC: 31 GM/DL — LOW (ref 32–36)
MCHC RBC-ENTMCNC: 31 GM/DL — LOW (ref 32–36)
MCHC RBC-ENTMCNC: 31.4 GM/DL — LOW (ref 32–36)
MCHC RBC-ENTMCNC: 31.4 GM/DL — LOW (ref 32–36)
MCHC RBC-ENTMCNC: 31.4 PG — SIGNIFICANT CHANGE UP (ref 27–34)
MCHC RBC-ENTMCNC: 31.5 GM/DL — LOW (ref 32–36)
MCHC RBC-ENTMCNC: 31.5 PG — SIGNIFICANT CHANGE UP (ref 27–34)
MCHC RBC-ENTMCNC: 31.6 PG — SIGNIFICANT CHANGE UP (ref 27–34)
MCHC RBC-ENTMCNC: 31.7 PG — SIGNIFICANT CHANGE UP (ref 27–34)
MCHC RBC-ENTMCNC: 31.8 GM/DL — LOW (ref 32–36)
MCHC RBC-ENTMCNC: 31.8 PG — SIGNIFICANT CHANGE UP (ref 27–34)
MCHC RBC-ENTMCNC: 32 PG — SIGNIFICANT CHANGE UP (ref 27–34)
MCHC RBC-ENTMCNC: 32.1 PG — SIGNIFICANT CHANGE UP (ref 27–34)
MCHC RBC-ENTMCNC: 32.4 GM/DL — SIGNIFICANT CHANGE UP (ref 32–36)
MCHC RBC-ENTMCNC: 32.6 GM/DL — SIGNIFICANT CHANGE UP (ref 32–36)
MCV RBC AUTO: 100 FL — SIGNIFICANT CHANGE UP (ref 80–100)
MCV RBC AUTO: 100 FL — SIGNIFICANT CHANGE UP (ref 80–100)
MCV RBC AUTO: 100.3 FL — HIGH (ref 80–100)
MCV RBC AUTO: 100.3 FL — HIGH (ref 80–100)
MCV RBC AUTO: 100.4 FL — HIGH (ref 80–100)
MCV RBC AUTO: 100.7 FL — HIGH (ref 80–100)
MCV RBC AUTO: 101.2 FL — HIGH (ref 80–100)
MCV RBC AUTO: 101.4 FL — HIGH (ref 80–100)
MCV RBC AUTO: 102.1 FL — HIGH (ref 80–100)
MCV RBC AUTO: 95.9 FL — SIGNIFICANT CHANGE UP (ref 80–100)
MCV RBC AUTO: 96.9 FL — SIGNIFICANT CHANGE UP (ref 80–100)
MCV RBC AUTO: 98 FL — SIGNIFICANT CHANGE UP (ref 80–100)
MCV RBC AUTO: 98.2 FL — SIGNIFICANT CHANGE UP (ref 80–100)
MCV RBC AUTO: 98.9 FL — SIGNIFICANT CHANGE UP (ref 80–100)
MCV RBC AUTO: 99.6 FL — SIGNIFICANT CHANGE UP (ref 80–100)
MCV RBC AUTO: 99.7 FL — SIGNIFICANT CHANGE UP (ref 80–100)
MONOCYTES # BLD AUTO: 0.78 K/UL — SIGNIFICANT CHANGE UP (ref 0–0.9)
MONOCYTES # BLD AUTO: 0.83 K/UL — SIGNIFICANT CHANGE UP (ref 0–0.9)
MONOCYTES # BLD AUTO: 0.84 K/UL — SIGNIFICANT CHANGE UP (ref 0–0.9)
MONOCYTES # BLD AUTO: 0.85 K/UL — SIGNIFICANT CHANGE UP (ref 0–0.9)
MONOCYTES # BLD AUTO: 1.12 K/UL — HIGH (ref 0–0.9)
MONOCYTES NFR BLD AUTO: 6 % — SIGNIFICANT CHANGE UP (ref 2–14)
MONOCYTES NFR BLD AUTO: 7.7 % — SIGNIFICANT CHANGE UP (ref 2–14)
MONOCYTES NFR BLD AUTO: 7.8 % — SIGNIFICANT CHANGE UP (ref 2–14)
MONOCYTES NFR BLD AUTO: 7.9 % — SIGNIFICANT CHANGE UP (ref 2–14)
MONOCYTES NFR BLD AUTO: 9.2 % — SIGNIFICANT CHANGE UP (ref 2–14)
NEUTROPHILS # BLD AUTO: 10.35 K/UL — HIGH (ref 1.8–7.4)
NEUTROPHILS # BLD AUTO: 11.71 K/UL — HIGH (ref 1.8–7.4)
NEUTROPHILS # BLD AUTO: 8.85 K/UL — HIGH (ref 1.8–7.4)
NEUTROPHILS # BLD AUTO: 8.86 K/UL — HIGH (ref 1.8–7.4)
NEUTROPHILS # BLD AUTO: 8.95 K/UL — HIGH (ref 1.8–7.4)
NEUTROPHILS NFR BLD AUTO: 79.9 % — HIGH (ref 43–77)
NEUTROPHILS NFR BLD AUTO: 81.7 % — HIGH (ref 43–77)
NEUTROPHILS NFR BLD AUTO: 84.8 % — HIGH (ref 43–77)
NEUTROPHILS NFR BLD AUTO: 85.5 % — HIGH (ref 43–77)
NEUTROPHILS NFR BLD AUTO: 90 % — HIGH (ref 43–77)
NITRITE UR-MCNC: NEGATIVE — SIGNIFICANT CHANGE UP
NON HDL CHOLESTEROL: 69 MG/DL — SIGNIFICANT CHANGE UP
NRBC # BLD: SIGNIFICANT CHANGE UP /100 WBCS (ref 0–0)
NT-PROBNP SERPL-SCNC: HIGH PG/ML (ref 0–450)
NT-PROBNP SERPL-SCNC: HIGH PG/ML (ref 0–450)
PCO2 BLDV: 50 MMHG — SIGNIFICANT CHANGE UP (ref 42–55)
PH BLDV: 7.3 — LOW (ref 7.32–7.43)
PH UR: 6.5 — SIGNIFICANT CHANGE UP (ref 5–8)
PHOSPHATE SERPL-MCNC: 5.3 MG/DL — HIGH (ref 2.5–4.5)
PHOSPHATE SERPL-MCNC: 5.9 MG/DL — HIGH (ref 2.5–4.5)
PLATELET # BLD AUTO: 157 K/UL — SIGNIFICANT CHANGE UP (ref 150–400)
PLATELET # BLD AUTO: 171 K/UL — SIGNIFICANT CHANGE UP (ref 150–400)
PLATELET # BLD AUTO: 187 K/UL — SIGNIFICANT CHANGE UP (ref 150–400)
PLATELET # BLD AUTO: 198 K/UL — SIGNIFICANT CHANGE UP (ref 150–400)
PLATELET # BLD AUTO: 200 K/UL — SIGNIFICANT CHANGE UP (ref 150–400)
PLATELET # BLD AUTO: 205 K/UL — SIGNIFICANT CHANGE UP (ref 150–400)
PLATELET # BLD AUTO: 208 K/UL — SIGNIFICANT CHANGE UP (ref 150–400)
PLATELET # BLD AUTO: 211 K/UL — SIGNIFICANT CHANGE UP (ref 150–400)
PLATELET # BLD AUTO: 214 K/UL — SIGNIFICANT CHANGE UP (ref 150–400)
PLATELET # BLD AUTO: 217 K/UL — SIGNIFICANT CHANGE UP (ref 150–400)
PLATELET # BLD AUTO: 220 K/UL — SIGNIFICANT CHANGE UP (ref 150–400)
PLATELET # BLD AUTO: 239 K/UL — SIGNIFICANT CHANGE UP (ref 150–400)
PLATELET # BLD AUTO: 244 K/UL — SIGNIFICANT CHANGE UP (ref 150–400)
PLATELET # BLD AUTO: 246 K/UL — SIGNIFICANT CHANGE UP (ref 150–400)
PLATELET # BLD AUTO: 255 K/UL — SIGNIFICANT CHANGE UP (ref 150–400)
PLATELET # BLD AUTO: 270 K/UL — SIGNIFICANT CHANGE UP (ref 150–400)
PO2 BLDV: 137 MMHG — SIGNIFICANT CHANGE UP
POTASSIUM SERPL-MCNC: 3.7 MMOL/L — SIGNIFICANT CHANGE UP (ref 3.5–5.3)
POTASSIUM SERPL-MCNC: 3.7 MMOL/L — SIGNIFICANT CHANGE UP (ref 3.5–5.3)
POTASSIUM SERPL-MCNC: 3.8 MMOL/L — SIGNIFICANT CHANGE UP (ref 3.5–5.3)
POTASSIUM SERPL-MCNC: 3.9 MMOL/L — SIGNIFICANT CHANGE UP (ref 3.5–5.3)
POTASSIUM SERPL-MCNC: 4 MMOL/L — SIGNIFICANT CHANGE UP (ref 3.5–5.3)
POTASSIUM SERPL-MCNC: 4 MMOL/L — SIGNIFICANT CHANGE UP (ref 3.5–5.3)
POTASSIUM SERPL-MCNC: 4.1 MMOL/L — SIGNIFICANT CHANGE UP (ref 3.5–5.3)
POTASSIUM SERPL-MCNC: 4.2 MMOL/L — SIGNIFICANT CHANGE UP (ref 3.5–5.3)
POTASSIUM SERPL-MCNC: 4.9 MMOL/L — SIGNIFICANT CHANGE UP (ref 3.5–5.3)
POTASSIUM SERPL-MCNC: 5.2 MMOL/L — SIGNIFICANT CHANGE UP (ref 3.5–5.3)
POTASSIUM SERPL-MCNC: 5.2 MMOL/L — SIGNIFICANT CHANGE UP (ref 3.5–5.3)
POTASSIUM SERPL-SCNC: 3.7 MMOL/L — SIGNIFICANT CHANGE UP (ref 3.5–5.3)
POTASSIUM SERPL-SCNC: 3.7 MMOL/L — SIGNIFICANT CHANGE UP (ref 3.5–5.3)
POTASSIUM SERPL-SCNC: 3.8 MMOL/L — SIGNIFICANT CHANGE UP (ref 3.5–5.3)
POTASSIUM SERPL-SCNC: 3.9 MMOL/L — SIGNIFICANT CHANGE UP (ref 3.5–5.3)
POTASSIUM SERPL-SCNC: 4 MMOL/L — SIGNIFICANT CHANGE UP (ref 3.5–5.3)
POTASSIUM SERPL-SCNC: 4 MMOL/L — SIGNIFICANT CHANGE UP (ref 3.5–5.3)
POTASSIUM SERPL-SCNC: 4.1 MMOL/L — SIGNIFICANT CHANGE UP (ref 3.5–5.3)
POTASSIUM SERPL-SCNC: 4.2 MMOL/L — SIGNIFICANT CHANGE UP (ref 3.5–5.3)
POTASSIUM SERPL-SCNC: 4.9 MMOL/L — SIGNIFICANT CHANGE UP (ref 3.5–5.3)
POTASSIUM SERPL-SCNC: 5.2 MMOL/L — SIGNIFICANT CHANGE UP (ref 3.5–5.3)
POTASSIUM SERPL-SCNC: 5.2 MMOL/L — SIGNIFICANT CHANGE UP (ref 3.5–5.3)
PROCALCITONIN SERPL-MCNC: 0.37 NG/ML — HIGH (ref 0.02–0.1)
PROT PATTERN SERPL ELPH-IMP: SIGNIFICANT CHANGE UP
PROT SERPL-MCNC: 5.8 GM/DL — LOW (ref 6–8.3)
PROT SERPL-MCNC: 6.1 G/DL — SIGNIFICANT CHANGE UP (ref 6–8.3)
PROT SERPL-MCNC: 6.1 G/DL — SIGNIFICANT CHANGE UP (ref 6–8.3)
PROT SERPL-MCNC: 6.7 GM/DL — SIGNIFICANT CHANGE UP (ref 6–8.3)
PROT SERPL-MCNC: 6.8 GM/DL — SIGNIFICANT CHANGE UP (ref 6–8.3)
PROT SERPL-MCNC: 6.9 GM/DL — SIGNIFICANT CHANGE UP (ref 6–8.3)
PROT SERPL-MCNC: 7.1 GM/DL — SIGNIFICANT CHANGE UP (ref 6–8.3)
PROT UR-MCNC: 15 MG/DL
PROTHROM AB SERPL-ACNC: 11.6 SEC — SIGNIFICANT CHANGE UP (ref 10.6–13.6)
PTH-INTACT FLD-MCNC: 116 PG/ML — HIGH (ref 15–65)
RBC # BLD: 1.67 M/UL — LOW (ref 4.2–5.8)
RBC # BLD: 2.62 M/UL — LOW (ref 4.2–5.8)
RBC # BLD: 2.74 M/UL — LOW (ref 4.2–5.8)
RBC # BLD: 2.75 M/UL — LOW (ref 4.2–5.8)
RBC # BLD: 2.76 M/UL — LOW (ref 4.2–5.8)
RBC # BLD: 2.77 M/UL — LOW (ref 4.2–5.8)
RBC # BLD: 2.81 M/UL — LOW (ref 4.2–5.8)
RBC # BLD: 2.9 M/UL — LOW (ref 4.2–5.8)
RBC # BLD: 2.92 M/UL — LOW (ref 4.2–5.8)
RBC # BLD: 2.94 M/UL — LOW (ref 4.2–5.8)
RBC # BLD: 3.03 M/UL — LOW (ref 4.2–5.8)
RBC # BLD: 3.06 M/UL — LOW (ref 4.2–5.8)
RBC # BLD: 3.06 M/UL — LOW (ref 4.2–5.8)
RBC # BLD: 3.12 M/UL — LOW (ref 4.2–5.8)
RBC # BLD: 3.18 M/UL — LOW (ref 4.2–5.8)
RBC # BLD: 3.2 M/UL — LOW (ref 4.2–5.8)
RBC # FLD: 14.7 % — HIGH (ref 10.3–14.5)
RBC # FLD: 14.9 % — HIGH (ref 10.3–14.5)
RBC # FLD: 15 % — HIGH (ref 10.3–14.5)
RBC # FLD: 15.2 % — HIGH (ref 10.3–14.5)
RBC # FLD: 15.3 % — HIGH (ref 10.3–14.5)
RBC # FLD: 16.1 % — HIGH (ref 10.3–14.5)
RBC # FLD: 16.3 % — HIGH (ref 10.3–14.5)
RBC # FLD: 16.4 % — HIGH (ref 10.3–14.5)
RBC # FLD: 16.4 % — HIGH (ref 10.3–14.5)
RBC # FLD: 16.5 % — HIGH (ref 10.3–14.5)
RBC # FLD: 16.8 % — HIGH (ref 10.3–14.5)
RBC # FLD: 17 % — HIGH (ref 10.3–14.5)
RBC # FLD: 17.1 % — HIGH (ref 10.3–14.5)
RBC # FLD: 17.4 % — HIGH (ref 10.3–14.5)
SAO2 % BLDV: 96.6 % — SIGNIFICANT CHANGE UP
SARS-COV-2 IGG+IGM SERPL QL IA: >250 U/ML — HIGH
SARS-COV-2 IGG+IGM SERPL QL IA: >250 U/ML — HIGH
SARS-COV-2 IGG+IGM SERPL QL IA: POSITIVE
SARS-COV-2 IGG+IGM SERPL QL IA: POSITIVE
SARS-COV-2 RNA SPEC QL NAA+PROBE: SIGNIFICANT CHANGE UP
SODIUM SERPL-SCNC: 141 MMOL/L — SIGNIFICANT CHANGE UP (ref 135–145)
SODIUM SERPL-SCNC: 142 MMOL/L — SIGNIFICANT CHANGE UP (ref 135–145)
SODIUM SERPL-SCNC: 143 MMOL/L — SIGNIFICANT CHANGE UP (ref 135–145)
SODIUM SERPL-SCNC: 144 MMOL/L — SIGNIFICANT CHANGE UP (ref 135–145)
SODIUM SERPL-SCNC: 145 MMOL/L — SIGNIFICANT CHANGE UP (ref 135–145)
SODIUM SERPL-SCNC: 146 MMOL/L — HIGH (ref 135–145)
SP GR SPEC: 1.01 — SIGNIFICANT CHANGE UP (ref 1.01–1.02)
SPECIMEN SOURCE: SIGNIFICANT CHANGE UP
T4 AB SER-ACNC: 6.1 UG/DL — SIGNIFICANT CHANGE UP (ref 4.6–12)
TIBC SERPL-MCNC: 220 UG/DL — SIGNIFICANT CHANGE UP (ref 220–430)
TIBC SERPL-MCNC: 246 UG/DL — SIGNIFICANT CHANGE UP (ref 220–430)
TRIGL SERPL-MCNC: 96 MG/DL — SIGNIFICANT CHANGE UP
TROPONIN I SERPL-MCNC: 0.03 NG/ML — SIGNIFICANT CHANGE UP (ref 0.01–0.04)
TSH SERPL-MCNC: 2.38 UU/ML — SIGNIFICANT CHANGE UP (ref 0.34–4.82)
UIBC SERPL-MCNC: 203 UG/DL — SIGNIFICANT CHANGE UP (ref 110–370)
UIBC SERPL-MCNC: 229 UG/DL — SIGNIFICANT CHANGE UP (ref 110–370)
UROBILINOGEN FLD QL: NEGATIVE MG/DL — SIGNIFICANT CHANGE UP
VIT B12 SERPL-MCNC: 876 PG/ML — SIGNIFICANT CHANGE UP (ref 232–1245)
WBC # BLD: 10.12 K/UL — SIGNIFICANT CHANGE UP (ref 3.8–10.5)
WBC # BLD: 10.12 K/UL — SIGNIFICANT CHANGE UP (ref 3.8–10.5)
WBC # BLD: 10.46 K/UL — SIGNIFICANT CHANGE UP (ref 3.8–10.5)
WBC # BLD: 10.83 K/UL — HIGH (ref 3.8–10.5)
WBC # BLD: 11.07 K/UL — HIGH (ref 3.8–10.5)
WBC # BLD: 11.1 K/UL — HIGH (ref 3.8–10.5)
WBC # BLD: 11.87 K/UL — HIGH (ref 3.8–10.5)
WBC # BLD: 12.2 K/UL — HIGH (ref 3.8–10.5)
WBC # BLD: 13.01 K/UL — HIGH (ref 3.8–10.5)
WBC # BLD: 7.81 K/UL — SIGNIFICANT CHANGE UP (ref 3.8–10.5)
WBC # BLD: 8.63 K/UL — SIGNIFICANT CHANGE UP (ref 3.8–10.5)
WBC # BLD: 9.29 K/UL — SIGNIFICANT CHANGE UP (ref 3.8–10.5)
WBC # BLD: 9.51 K/UL — SIGNIFICANT CHANGE UP (ref 3.8–10.5)
WBC # BLD: 9.61 K/UL — SIGNIFICANT CHANGE UP (ref 3.8–10.5)
WBC # BLD: 9.88 K/UL — SIGNIFICANT CHANGE UP (ref 3.8–10.5)
WBC # BLD: 9.93 K/UL — SIGNIFICANT CHANGE UP (ref 3.8–10.5)
WBC # FLD AUTO: 10.12 K/UL — SIGNIFICANT CHANGE UP (ref 3.8–10.5)
WBC # FLD AUTO: 10.12 K/UL — SIGNIFICANT CHANGE UP (ref 3.8–10.5)
WBC # FLD AUTO: 10.46 K/UL — SIGNIFICANT CHANGE UP (ref 3.8–10.5)
WBC # FLD AUTO: 10.83 K/UL — HIGH (ref 3.8–10.5)
WBC # FLD AUTO: 11.07 K/UL — HIGH (ref 3.8–10.5)
WBC # FLD AUTO: 11.1 K/UL — HIGH (ref 3.8–10.5)
WBC # FLD AUTO: 11.87 K/UL — HIGH (ref 3.8–10.5)
WBC # FLD AUTO: 12.2 K/UL — HIGH (ref 3.8–10.5)
WBC # FLD AUTO: 13.01 K/UL — HIGH (ref 3.8–10.5)
WBC # FLD AUTO: 7.81 K/UL — SIGNIFICANT CHANGE UP (ref 3.8–10.5)
WBC # FLD AUTO: 8.63 K/UL — SIGNIFICANT CHANGE UP (ref 3.8–10.5)
WBC # FLD AUTO: 9.29 K/UL — SIGNIFICANT CHANGE UP (ref 3.8–10.5)
WBC # FLD AUTO: 9.51 K/UL — SIGNIFICANT CHANGE UP (ref 3.8–10.5)
WBC # FLD AUTO: 9.61 K/UL — SIGNIFICANT CHANGE UP (ref 3.8–10.5)
WBC # FLD AUTO: 9.88 K/UL — SIGNIFICANT CHANGE UP (ref 3.8–10.5)
WBC # FLD AUTO: 9.93 K/UL — SIGNIFICANT CHANGE UP (ref 3.8–10.5)

## 2021-01-01 PROCEDURE — 84436 ASSAY OF TOTAL THYROXINE: CPT

## 2021-01-01 PROCEDURE — 86140 C-REACTIVE PROTEIN: CPT

## 2021-01-01 PROCEDURE — 84145 PROCALCITONIN (PCT): CPT

## 2021-01-01 PROCEDURE — 86769 SARS-COV-2 COVID-19 ANTIBODY: CPT

## 2021-01-01 PROCEDURE — 97116 GAIT TRAINING THERAPY: CPT | Mod: GP

## 2021-01-01 PROCEDURE — U0003: CPT

## 2021-01-01 PROCEDURE — 84443 ASSAY THYROID STIM HORMONE: CPT

## 2021-01-01 PROCEDURE — 36415 COLL VENOUS BLD VENIPUNCTURE: CPT

## 2021-01-01 PROCEDURE — 72125 CT NECK SPINE W/O DYE: CPT | Mod: 26

## 2021-01-01 PROCEDURE — 83735 ASSAY OF MAGNESIUM: CPT

## 2021-01-01 PROCEDURE — C1894: CPT

## 2021-01-01 PROCEDURE — 99232 SBSQ HOSP IP/OBS MODERATE 35: CPT

## 2021-01-01 PROCEDURE — 33274 TCAT INSJ/RPL PERM LDLS PM: CPT

## 2021-01-01 PROCEDURE — 85025 COMPLETE CBC W/AUTO DIFF WBC: CPT

## 2021-01-01 PROCEDURE — 76770 US EXAM ABDO BACK WALL COMP: CPT

## 2021-01-01 PROCEDURE — 86334 IMMUNOFIX E-PHORESIS SERUM: CPT

## 2021-01-01 PROCEDURE — 99213 OFFICE O/P EST LOW 20 MIN: CPT

## 2021-01-01 PROCEDURE — 80053 COMPREHEN METABOLIC PANEL: CPT

## 2021-01-01 PROCEDURE — P9016: CPT

## 2021-01-01 PROCEDURE — 85027 COMPLETE CBC AUTOMATED: CPT

## 2021-01-01 PROCEDURE — 99233 SBSQ HOSP IP/OBS HIGH 50: CPT

## 2021-01-01 PROCEDURE — 99291 CRITICAL CARE FIRST HOUR: CPT | Mod: CS

## 2021-01-01 PROCEDURE — 93005 ELECTROCARDIOGRAM TRACING: CPT

## 2021-01-01 PROCEDURE — 99498 ADVNCD CARE PLAN ADDL 30 MIN: CPT

## 2021-01-01 PROCEDURE — 99497 ADVNCD CARE PLAN 30 MIN: CPT

## 2021-01-01 PROCEDURE — 99221 1ST HOSP IP/OBS SF/LOW 40: CPT

## 2021-01-01 PROCEDURE — C1769: CPT

## 2021-01-01 PROCEDURE — 36430 TRANSFUSION BLD/BLD COMPNT: CPT

## 2021-01-01 PROCEDURE — 70450 CT HEAD/BRAIN W/O DYE: CPT | Mod: 26

## 2021-01-01 PROCEDURE — 83880 ASSAY OF NATRIURETIC PEPTIDE: CPT

## 2021-01-01 PROCEDURE — 71045 X-RAY EXAM CHEST 1 VIEW: CPT

## 2021-01-01 PROCEDURE — 83605 ASSAY OF LACTIC ACID: CPT

## 2021-01-01 PROCEDURE — 84484 ASSAY OF TROPONIN QUANT: CPT

## 2021-01-01 PROCEDURE — C9113: CPT

## 2021-01-01 PROCEDURE — 93010 ELECTROCARDIOGRAM REPORT: CPT

## 2021-01-01 PROCEDURE — 82310 ASSAY OF CALCIUM: CPT

## 2021-01-01 PROCEDURE — 83550 IRON BINDING TEST: CPT

## 2021-01-01 PROCEDURE — C1786: CPT

## 2021-01-01 PROCEDURE — U0005: CPT

## 2021-01-01 PROCEDURE — 71250 CT THORAX DX C-: CPT

## 2021-01-01 PROCEDURE — 83036 HEMOGLOBIN GLYCOSYLATED A1C: CPT

## 2021-01-01 PROCEDURE — 93306 TTE W/DOPPLER COMPLETE: CPT

## 2021-01-01 PROCEDURE — 76770 US EXAM ABDO BACK WALL COMP: CPT | Mod: 26

## 2021-01-01 PROCEDURE — 99497 ADVNCD CARE PLAN 30 MIN: CPT | Mod: 25

## 2021-01-01 PROCEDURE — 84155 ASSAY OF PROTEIN SERUM: CPT

## 2021-01-01 PROCEDURE — 84165 PROTEIN E-PHORESIS SERUM: CPT

## 2021-01-01 PROCEDURE — 85018 HEMOGLOBIN: CPT

## 2021-01-01 PROCEDURE — 82962 GLUCOSE BLOOD TEST: CPT

## 2021-01-01 PROCEDURE — 83010 ASSAY OF HAPTOGLOBIN QUANT: CPT

## 2021-01-01 PROCEDURE — 71045 X-RAY EXAM CHEST 1 VIEW: CPT | Mod: 26

## 2021-01-01 PROCEDURE — 83540 ASSAY OF IRON: CPT

## 2021-01-01 PROCEDURE — 80048 BASIC METABOLIC PNL TOTAL CA: CPT

## 2021-01-01 PROCEDURE — 93000 ELECTROCARDIOGRAM COMPLETE: CPT | Mod: 59

## 2021-01-01 PROCEDURE — 99239 HOSP IP/OBS DSCHRG MGMT >30: CPT

## 2021-01-01 PROCEDURE — 93306 TTE W/DOPPLER COMPLETE: CPT | Mod: 26

## 2021-01-01 PROCEDURE — 86618 LYME DISEASE ANTIBODY: CPT

## 2021-01-01 PROCEDURE — 97530 THERAPEUTIC ACTIVITIES: CPT | Mod: GP

## 2021-01-01 PROCEDURE — 99223 1ST HOSP IP/OBS HIGH 75: CPT

## 2021-01-01 PROCEDURE — 80061 LIPID PANEL: CPT

## 2021-01-01 PROCEDURE — 82607 VITAMIN B-12: CPT

## 2021-01-01 PROCEDURE — 81001 URINALYSIS AUTO W/SCOPE: CPT

## 2021-01-01 PROCEDURE — 71250 CT THORAX DX C-: CPT | Mod: 26

## 2021-01-01 PROCEDURE — 82550 ASSAY OF CK (CPK): CPT

## 2021-01-01 PROCEDURE — 85014 HEMATOCRIT: CPT

## 2021-01-01 PROCEDURE — 82728 ASSAY OF FERRITIN: CPT

## 2021-01-01 PROCEDURE — 97163 PT EVAL HIGH COMPLEX 45 MIN: CPT | Mod: GP

## 2021-01-01 PROCEDURE — 87086 URINE CULTURE/COLONY COUNT: CPT

## 2021-01-01 PROCEDURE — 93010 ELECTROCARDIOGRAM REPORT: CPT | Mod: 76

## 2021-01-01 PROCEDURE — 82746 ASSAY OF FOLIC ACID SERUM: CPT

## 2021-01-01 PROCEDURE — 33274 TCAT INSJ/RPL PERM LDLS PM: CPT | Mod: Q0

## 2021-01-01 PROCEDURE — 82306 VITAMIN D 25 HYDROXY: CPT

## 2021-01-01 PROCEDURE — 83970 ASSAY OF PARATHORMONE: CPT

## 2021-01-01 PROCEDURE — 87798 DETECT AGENT NOS DNA AMP: CPT

## 2021-01-01 PROCEDURE — 87040 BLOOD CULTURE FOR BACTERIA: CPT

## 2021-01-01 PROCEDURE — 84100 ASSAY OF PHOSPHORUS: CPT

## 2021-01-01 PROCEDURE — 99291 CRITICAL CARE FIRST HOUR: CPT

## 2021-01-01 RX ORDER — ALLOPURINOL 300 MG
1 TABLET ORAL
Qty: 0 | Refills: 0 | DISCHARGE
Start: 2021-01-01

## 2021-01-01 RX ORDER — POLYETHYLENE GLYCOL 3350 17 G/17G
17 POWDER, FOR SOLUTION ORAL DAILY
Refills: 0 | Status: DISCONTINUED | OUTPATIENT
Start: 2021-01-01 | End: 2021-01-01

## 2021-01-01 RX ORDER — PANTOPRAZOLE SODIUM 20 MG/1
1 TABLET, DELAYED RELEASE ORAL
Qty: 0 | Refills: 0 | DISCHARGE
Start: 2021-01-01

## 2021-01-01 RX ORDER — PRASUGREL 5 MG/1
1 TABLET, FILM COATED ORAL
Qty: 0 | Refills: 0 | DISCHARGE

## 2021-01-01 RX ORDER — CEFTRIAXONE 500 MG/1
2000 INJECTION, POWDER, FOR SOLUTION INTRAMUSCULAR; INTRAVENOUS EVERY 24 HOURS
Refills: 0 | Status: DISCONTINUED | OUTPATIENT
Start: 2021-01-01 | End: 2021-01-01

## 2021-01-01 RX ORDER — DEXTROSE 50 % IN WATER 50 %
12.5 SYRINGE (ML) INTRAVENOUS ONCE
Refills: 0 | Status: DISCONTINUED | OUTPATIENT
Start: 2021-01-01 | End: 2021-01-01

## 2021-01-01 RX ORDER — SODIUM CHLORIDE 9 MG/ML
1000 INJECTION, SOLUTION INTRAVENOUS
Refills: 0 | Status: DISCONTINUED | OUTPATIENT
Start: 2021-01-01 | End: 2021-01-01

## 2021-01-01 RX ORDER — ACETAMINOPHEN 500 MG
1 TABLET ORAL
Qty: 0 | Refills: 0 | DISCHARGE

## 2021-01-01 RX ORDER — FUROSEMIDE 40 MG
40 TABLET ORAL DAILY
Refills: 0 | Status: DISCONTINUED | OUTPATIENT
Start: 2021-01-01 | End: 2021-01-01

## 2021-01-01 RX ORDER — PANTOPRAZOLE SODIUM 20 MG/1
8 TABLET, DELAYED RELEASE ORAL
Qty: 80 | Refills: 0 | Status: DISCONTINUED | OUTPATIENT
Start: 2021-01-01 | End: 2021-01-01

## 2021-01-01 RX ORDER — PANTOPRAZOLE SODIUM 20 MG/1
40 TABLET, DELAYED RELEASE ORAL
Refills: 0 | Status: DISCONTINUED | OUTPATIENT
Start: 2021-01-01 | End: 2021-01-01

## 2021-01-01 RX ORDER — CEFTRIAXONE 500 MG/1
INJECTION, POWDER, FOR SOLUTION INTRAMUSCULAR; INTRAVENOUS
Refills: 0 | Status: DISCONTINUED | OUTPATIENT
Start: 2021-01-01 | End: 2021-01-01

## 2021-01-01 RX ORDER — CEFTRIAXONE 500 MG/1
1000 INJECTION, POWDER, FOR SOLUTION INTRAMUSCULAR; INTRAVENOUS EVERY 24 HOURS
Refills: 0 | Status: DISCONTINUED | OUTPATIENT
Start: 2021-01-01 | End: 2021-01-01

## 2021-01-01 RX ORDER — AMLODIPINE BESYLATE 2.5 MG/1
1 TABLET ORAL
Qty: 0 | Refills: 0 | DISCHARGE

## 2021-01-01 RX ORDER — METOPROLOL TARTRATE 50 MG
5 TABLET ORAL ONCE
Refills: 0 | Status: COMPLETED | OUTPATIENT
Start: 2021-01-01 | End: 2021-01-01

## 2021-01-01 RX ORDER — ASPIRIN/CALCIUM CARB/MAGNESIUM 324 MG
1 TABLET ORAL
Qty: 0 | Refills: 0 | DISCHARGE
Start: 2021-01-01

## 2021-01-01 RX ORDER — ATROPINE SULFATE 0.1 MG/ML
0.5 SYRINGE (ML) INJECTION ONCE
Refills: 0 | Status: DISCONTINUED | OUTPATIENT
Start: 2021-01-01 | End: 2021-01-01

## 2021-01-01 RX ORDER — FUROSEMIDE 40 MG
20 TABLET ORAL DAILY
Refills: 0 | Status: DISCONTINUED | OUTPATIENT
Start: 2021-01-01 | End: 2021-01-01

## 2021-01-01 RX ORDER — FUROSEMIDE 40 MG
40 TABLET ORAL ONCE
Refills: 0 | Status: COMPLETED | OUTPATIENT
Start: 2021-01-01 | End: 2021-01-01

## 2021-01-01 RX ORDER — INSULIN LISPRO 100/ML
VIAL (ML) SUBCUTANEOUS AT BEDTIME
Refills: 0 | Status: DISCONTINUED | OUTPATIENT
Start: 2021-01-01 | End: 2021-01-01

## 2021-01-01 RX ORDER — ERYTHROPOIETIN 10000 [IU]/ML
1 INJECTION, SOLUTION INTRAVENOUS; SUBCUTANEOUS
Qty: 0 | Refills: 0 | DISCHARGE

## 2021-01-01 RX ORDER — DOCUSATE SODIUM 100 MG
2 CAPSULE ORAL
Qty: 0 | Refills: 0 | DISCHARGE

## 2021-01-01 RX ORDER — PANTOPRAZOLE SODIUM 20 MG/1
80 TABLET, DELAYED RELEASE ORAL ONCE
Refills: 0 | Status: COMPLETED | OUTPATIENT
Start: 2021-01-01 | End: 2021-01-01

## 2021-01-01 RX ORDER — INSULIN LISPRO 100/ML
VIAL (ML) SUBCUTANEOUS
Refills: 0 | Status: DISCONTINUED | OUTPATIENT
Start: 2021-01-01 | End: 2021-01-01

## 2021-01-01 RX ORDER — INSULIN GLARGINE 100 [IU]/ML
5 INJECTION, SOLUTION SUBCUTANEOUS AT BEDTIME
Refills: 0 | Status: DISCONTINUED | OUTPATIENT
Start: 2021-01-01 | End: 2021-01-01

## 2021-01-01 RX ORDER — CEFTRIAXONE 500 MG/1
1000 INJECTION, POWDER, FOR SOLUTION INTRAMUSCULAR; INTRAVENOUS ONCE
Refills: 0 | Status: DISCONTINUED | OUTPATIENT
Start: 2021-01-01 | End: 2021-01-01

## 2021-01-01 RX ORDER — CEFAZOLIN SODIUM 1 G
2000 VIAL (EA) INJECTION ONCE
Refills: 0 | Status: COMPLETED | OUTPATIENT
Start: 2021-01-01 | End: 2021-01-01

## 2021-01-01 RX ORDER — CEFTRIAXONE 500 MG/1
2 INJECTION, POWDER, FOR SOLUTION INTRAMUSCULAR; INTRAVENOUS
Qty: 42 | Refills: 0
Start: 2021-01-01 | End: 2021-01-01

## 2021-01-01 RX ORDER — AMLODIPINE BESYLATE 2.5 MG/1
5 TABLET ORAL DAILY
Refills: 0 | Status: DISCONTINUED | OUTPATIENT
Start: 2021-01-01 | End: 2021-01-01

## 2021-01-01 RX ORDER — HEPARIN SODIUM 5000 [USP'U]/ML
5000 INJECTION INTRAVENOUS; SUBCUTANEOUS EVERY 12 HOURS
Refills: 0 | Status: DISCONTINUED | OUTPATIENT
Start: 2021-01-01 | End: 2021-01-01

## 2021-01-01 RX ORDER — METOPROLOL TARTRATE 50 MG
12.5 TABLET ORAL DAILY
Refills: 0 | Status: DISCONTINUED | OUTPATIENT
Start: 2021-01-01 | End: 2021-01-01

## 2021-01-01 RX ORDER — FERROUS SULFATE 325(65) MG
325 TABLET ORAL DAILY
Refills: 0 | Status: DISCONTINUED | OUTPATIENT
Start: 2021-01-01 | End: 2021-01-01

## 2021-01-01 RX ORDER — INFLUENZA VIRUS VACCINE 15; 15; 15; 15 UG/.5ML; UG/.5ML; UG/.5ML; UG/.5ML
0.5 SUSPENSION INTRAMUSCULAR ONCE
Refills: 0 | Status: DISCONTINUED | OUTPATIENT
Start: 2021-01-01 | End: 2021-01-01

## 2021-01-01 RX ORDER — ACETAMINOPHEN 500 MG
2 TABLET ORAL
Qty: 0 | Refills: 0 | DISCHARGE

## 2021-01-01 RX ORDER — LANOLIN ALCOHOL/MO/W.PET/CERES
3 CREAM (GRAM) TOPICAL AT BEDTIME
Refills: 0 | Status: DISCONTINUED | OUTPATIENT
Start: 2021-01-01 | End: 2021-01-01

## 2021-01-01 RX ORDER — DEXTROSE 50 % IN WATER 50 %
15 SYRINGE (ML) INTRAVENOUS ONCE
Refills: 0 | Status: DISCONTINUED | OUTPATIENT
Start: 2021-01-01 | End: 2021-01-01

## 2021-01-01 RX ORDER — CHOLECALCIFEROL (VITAMIN D3) 125 MCG
1 CAPSULE ORAL
Qty: 0 | Refills: 0 | DISCHARGE

## 2021-01-01 RX ORDER — ATORVASTATIN CALCIUM 80 MG/1
40 TABLET, FILM COATED ORAL AT BEDTIME
Refills: 0 | Status: DISCONTINUED | OUTPATIENT
Start: 2021-01-01 | End: 2021-01-01

## 2021-01-01 RX ORDER — METOPROLOL TARTRATE 50 MG
12.5 TABLET ORAL
Qty: 0 | Refills: 0 | DISCHARGE
Start: 2021-01-01

## 2021-01-01 RX ORDER — PANTOPRAZOLE SODIUM 20 MG/1
1 TABLET, DELAYED RELEASE ORAL
Qty: 0 | Refills: 0 | DISCHARGE

## 2021-01-01 RX ORDER — ASPIRIN/CALCIUM CARB/MAGNESIUM 324 MG
1 TABLET ORAL
Qty: 0 | Refills: 0 | DISCHARGE

## 2021-01-01 RX ORDER — POTASSIUM CHLORIDE 20 MEQ
20 PACKET (EA) ORAL ONCE
Refills: 0 | Status: COMPLETED | OUTPATIENT
Start: 2021-01-01 | End: 2021-01-01

## 2021-01-01 RX ORDER — ATORVASTATIN CALCIUM 80 MG/1
1 TABLET, FILM COATED ORAL
Qty: 0 | Refills: 0 | DISCHARGE
Start: 2021-01-01

## 2021-01-01 RX ORDER — CEFTRIAXONE 500 MG/1
1000 INJECTION, POWDER, FOR SOLUTION INTRAMUSCULAR; INTRAVENOUS ONCE
Refills: 0 | Status: COMPLETED | OUTPATIENT
Start: 2021-01-01 | End: 2021-01-01

## 2021-01-01 RX ORDER — ATORVASTATIN CALCIUM 80 MG/1
1 TABLET, FILM COATED ORAL
Qty: 0 | Refills: 0 | DISCHARGE

## 2021-01-01 RX ORDER — FUROSEMIDE 40 MG
1 TABLET ORAL
Qty: 0 | Refills: 0 | DISCHARGE
Start: 2021-01-01

## 2021-01-01 RX ORDER — ACETAMINOPHEN 500 MG
650 TABLET ORAL EVERY 6 HOURS
Refills: 0 | Status: DISCONTINUED | OUTPATIENT
Start: 2021-01-01 | End: 2021-01-01

## 2021-01-01 RX ORDER — DEXTROSE 50 % IN WATER 50 %
25 SYRINGE (ML) INTRAVENOUS ONCE
Refills: 0 | Status: DISCONTINUED | OUTPATIENT
Start: 2021-01-01 | End: 2021-01-01

## 2021-01-01 RX ORDER — GLUCAGON INJECTION, SOLUTION 0.5 MG/.1ML
1 INJECTION, SOLUTION SUBCUTANEOUS ONCE
Refills: 0 | Status: DISCONTINUED | OUTPATIENT
Start: 2021-01-01 | End: 2021-01-01

## 2021-01-01 RX ORDER — AMLODIPINE BESYLATE 2.5 MG/1
1 TABLET ORAL
Qty: 0 | Refills: 0 | DISCHARGE
Start: 2021-01-01

## 2021-01-01 RX ORDER — FUROSEMIDE 40 MG
40 TABLET ORAL
Refills: 0 | Status: DISCONTINUED | OUTPATIENT
Start: 2021-01-01 | End: 2021-01-01

## 2021-01-01 RX ORDER — ACETAMINOPHEN 500 MG
650 TABLET ORAL EVERY 4 HOURS
Refills: 0 | Status: DISCONTINUED | OUTPATIENT
Start: 2021-01-01 | End: 2021-01-01

## 2021-01-01 RX ORDER — FUROSEMIDE 40 MG
40 TABLET ORAL ONCE
Refills: 0 | Status: DISCONTINUED | OUTPATIENT
Start: 2021-01-01 | End: 2021-01-01

## 2021-01-01 RX ORDER — ASPIRIN/CALCIUM CARB/MAGNESIUM 324 MG
81 TABLET ORAL DAILY
Refills: 0 | Status: DISCONTINUED | OUTPATIENT
Start: 2021-01-01 | End: 2021-01-01

## 2021-01-01 RX ORDER — ALLOPURINOL 300 MG
1 TABLET ORAL
Qty: 0 | Refills: 0 | DISCHARGE

## 2021-01-01 RX ORDER — ONDANSETRON 8 MG/1
4 TABLET, FILM COATED ORAL EVERY 6 HOURS
Refills: 0 | Status: DISCONTINUED | OUTPATIENT
Start: 2021-01-01 | End: 2021-01-01

## 2021-01-01 RX ORDER — ONDANSETRON 8 MG/1
4 TABLET, FILM COATED ORAL EVERY 8 HOURS
Refills: 0 | Status: DISCONTINUED | OUTPATIENT
Start: 2021-01-01 | End: 2021-01-01

## 2021-01-01 RX ORDER — ALLOPURINOL 300 MG
100 TABLET ORAL DAILY
Refills: 0 | Status: DISCONTINUED | OUTPATIENT
Start: 2021-01-01 | End: 2021-01-01

## 2021-01-01 RX ORDER — ACETAMINOPHEN 500 MG
2 TABLET ORAL
Qty: 0 | Refills: 0 | DISCHARGE
Start: 2021-01-01

## 2021-01-01 RX ADMIN — Medication 20 MILLIGRAM(S): at 09:49

## 2021-01-01 RX ADMIN — Medication 600 MILLIGRAM(S): at 21:20

## 2021-01-01 RX ADMIN — Medication 12.5 MILLIGRAM(S): at 09:15

## 2021-01-01 RX ADMIN — Medication 100 MILLIGRAM(S): at 10:05

## 2021-01-01 RX ADMIN — ATORVASTATIN CALCIUM 40 MILLIGRAM(S): 80 TABLET, FILM COATED ORAL at 22:01

## 2021-01-01 RX ADMIN — PANTOPRAZOLE SODIUM 40 MILLIGRAM(S): 20 TABLET, DELAYED RELEASE ORAL at 09:15

## 2021-01-01 RX ADMIN — ATORVASTATIN CALCIUM 40 MILLIGRAM(S): 80 TABLET, FILM COATED ORAL at 21:24

## 2021-01-01 RX ADMIN — Medication 4: at 13:08

## 2021-01-01 RX ADMIN — AMLODIPINE BESYLATE 5 MILLIGRAM(S): 2.5 TABLET ORAL at 09:15

## 2021-01-01 RX ADMIN — CEFTRIAXONE 2000 MILLIGRAM(S): 500 INJECTION, POWDER, FOR SOLUTION INTRAMUSCULAR; INTRAVENOUS at 16:29

## 2021-01-01 RX ADMIN — Medication 40 MILLIGRAM(S): at 17:48

## 2021-01-01 RX ADMIN — Medication 40 MILLIGRAM(S): at 18:17

## 2021-01-01 RX ADMIN — INSULIN GLARGINE 5 UNIT(S): 100 INJECTION, SOLUTION SUBCUTANEOUS at 21:51

## 2021-01-01 RX ADMIN — Medication 100 MILLIGRAM(S): at 13:00

## 2021-01-01 RX ADMIN — Medication 12.5 MILLIGRAM(S): at 09:57

## 2021-01-01 RX ADMIN — Medication 40 MILLIGRAM(S): at 09:57

## 2021-01-01 RX ADMIN — ATORVASTATIN CALCIUM 40 MILLIGRAM(S): 80 TABLET, FILM COATED ORAL at 21:51

## 2021-01-01 RX ADMIN — CEFTRIAXONE 2000 MILLIGRAM(S): 500 INJECTION, POWDER, FOR SOLUTION INTRAMUSCULAR; INTRAVENOUS at 16:59

## 2021-01-01 RX ADMIN — PANTOPRAZOLE SODIUM 40 MILLIGRAM(S): 20 TABLET, DELAYED RELEASE ORAL at 21:09

## 2021-01-01 RX ADMIN — Medication 40 MILLIGRAM(S): at 10:05

## 2021-01-01 RX ADMIN — Medication 12.5 MILLIGRAM(S): at 09:00

## 2021-01-01 RX ADMIN — INSULIN GLARGINE 5 UNIT(S): 100 INJECTION, SOLUTION SUBCUTANEOUS at 21:50

## 2021-01-01 RX ADMIN — Medication 100 MILLIGRAM(S): at 11:36

## 2021-01-01 RX ADMIN — AMLODIPINE BESYLATE 5 MILLIGRAM(S): 2.5 TABLET ORAL at 09:49

## 2021-01-01 RX ADMIN — Medication 1: at 08:35

## 2021-01-01 RX ADMIN — Medication 40 MILLIGRAM(S): at 18:09

## 2021-01-01 RX ADMIN — AMLODIPINE BESYLATE 5 MILLIGRAM(S): 2.5 TABLET ORAL at 09:00

## 2021-01-01 RX ADMIN — Medication 81 MILLIGRAM(S): at 10:22

## 2021-01-01 RX ADMIN — CEFTRIAXONE 2000 MILLIGRAM(S): 500 INJECTION, POWDER, FOR SOLUTION INTRAMUSCULAR; INTRAVENOUS at 18:29

## 2021-01-01 RX ADMIN — ATORVASTATIN CALCIUM 40 MILLIGRAM(S): 80 TABLET, FILM COATED ORAL at 22:10

## 2021-01-01 RX ADMIN — Medication 12.5 MILLIGRAM(S): at 10:10

## 2021-01-01 RX ADMIN — Medication 100 MILLIGRAM(S): at 09:14

## 2021-01-01 RX ADMIN — CEFTRIAXONE 1000 MILLIGRAM(S): 500 INJECTION, POWDER, FOR SOLUTION INTRAMUSCULAR; INTRAVENOUS at 17:19

## 2021-01-01 RX ADMIN — AMLODIPINE BESYLATE 5 MILLIGRAM(S): 2.5 TABLET ORAL at 10:05

## 2021-01-01 RX ADMIN — PANTOPRAZOLE SODIUM 40 MILLIGRAM(S): 20 TABLET, DELAYED RELEASE ORAL at 21:25

## 2021-01-01 RX ADMIN — PANTOPRAZOLE SODIUM 40 MILLIGRAM(S): 20 TABLET, DELAYED RELEASE ORAL at 07:50

## 2021-01-01 RX ADMIN — Medication 40 MILLIGRAM(S): at 16:28

## 2021-01-01 RX ADMIN — HEPARIN SODIUM 5000 UNIT(S): 5000 INJECTION INTRAVENOUS; SUBCUTANEOUS at 22:10

## 2021-01-01 RX ADMIN — Medication 1: at 17:42

## 2021-01-01 RX ADMIN — Medication 20 MILLIGRAM(S): at 10:22

## 2021-01-01 RX ADMIN — Medication 0: at 21:20

## 2021-01-01 RX ADMIN — INSULIN GLARGINE 5 UNIT(S): 100 INJECTION, SOLUTION SUBCUTANEOUS at 21:33

## 2021-01-01 RX ADMIN — PANTOPRAZOLE SODIUM 40 MILLIGRAM(S): 20 TABLET, DELAYED RELEASE ORAL at 09:49

## 2021-01-01 RX ADMIN — PANTOPRAZOLE SODIUM 10 MG/HR: 20 TABLET, DELAYED RELEASE ORAL at 05:03

## 2021-01-01 RX ADMIN — Medication 40 MILLIGRAM(S): at 09:17

## 2021-01-01 RX ADMIN — Medication 40 MILLIGRAM(S): at 09:40

## 2021-01-01 RX ADMIN — Medication 1: at 17:00

## 2021-01-01 RX ADMIN — Medication 20 MILLIGRAM(S): at 10:51

## 2021-01-01 RX ADMIN — ATORVASTATIN CALCIUM 40 MILLIGRAM(S): 80 TABLET, FILM COATED ORAL at 22:40

## 2021-01-01 RX ADMIN — Medication 3: at 12:51

## 2021-01-01 RX ADMIN — Medication 2: at 12:12

## 2021-01-01 RX ADMIN — INSULIN GLARGINE 5 UNIT(S): 100 INJECTION, SOLUTION SUBCUTANEOUS at 22:37

## 2021-01-01 RX ADMIN — Medication 325 MILLIGRAM(S): at 10:14

## 2021-01-01 RX ADMIN — Medication 12.5 MILLIGRAM(S): at 09:26

## 2021-01-01 RX ADMIN — Medication 1: at 11:35

## 2021-01-01 RX ADMIN — Medication 325 MILLIGRAM(S): at 11:41

## 2021-01-01 RX ADMIN — ATORVASTATIN CALCIUM 40 MILLIGRAM(S): 80 TABLET, FILM COATED ORAL at 22:02

## 2021-01-01 RX ADMIN — Medication 12.5 MILLIGRAM(S): at 18:20

## 2021-01-01 RX ADMIN — Medication 100 MILLIGRAM(S): at 10:22

## 2021-01-01 RX ADMIN — Medication 100 MILLIGRAM(S): at 09:39

## 2021-01-01 RX ADMIN — Medication 325 MILLIGRAM(S): at 09:26

## 2021-01-01 RX ADMIN — ATORVASTATIN CALCIUM 40 MILLIGRAM(S): 80 TABLET, FILM COATED ORAL at 21:20

## 2021-01-01 RX ADMIN — Medication 40 MILLIGRAM(S): at 09:26

## 2021-01-01 RX ADMIN — Medication 40 MILLIGRAM(S): at 11:35

## 2021-01-01 RX ADMIN — PANTOPRAZOLE SODIUM 40 MILLIGRAM(S): 20 TABLET, DELAYED RELEASE ORAL at 06:29

## 2021-01-01 RX ADMIN — Medication 0: at 21:40

## 2021-01-01 RX ADMIN — ATORVASTATIN CALCIUM 40 MILLIGRAM(S): 80 TABLET, FILM COATED ORAL at 21:11

## 2021-01-01 RX ADMIN — PANTOPRAZOLE SODIUM 80 MILLIGRAM(S): 20 TABLET, DELAYED RELEASE ORAL at 06:53

## 2021-01-01 RX ADMIN — ATORVASTATIN CALCIUM 40 MILLIGRAM(S): 80 TABLET, FILM COATED ORAL at 21:48

## 2021-01-01 RX ADMIN — CEFTRIAXONE 2000 MILLIGRAM(S): 500 INJECTION, POWDER, FOR SOLUTION INTRAMUSCULAR; INTRAVENOUS at 18:09

## 2021-01-01 RX ADMIN — Medication 1: at 18:25

## 2021-01-01 RX ADMIN — PANTOPRAZOLE SODIUM 40 MILLIGRAM(S): 20 TABLET, DELAYED RELEASE ORAL at 21:06

## 2021-01-01 RX ADMIN — Medication 40 MILLIGRAM(S): at 18:29

## 2021-01-01 RX ADMIN — AMLODIPINE BESYLATE 5 MILLIGRAM(S): 2.5 TABLET ORAL at 10:51

## 2021-01-01 RX ADMIN — Medication 81 MILLIGRAM(S): at 09:14

## 2021-01-01 RX ADMIN — Medication 100 MILLIGRAM(S): at 09:57

## 2021-01-01 RX ADMIN — Medication 40 MILLIGRAM(S): at 10:15

## 2021-01-01 RX ADMIN — Medication 325 MILLIGRAM(S): at 09:39

## 2021-01-01 RX ADMIN — ATORVASTATIN CALCIUM 40 MILLIGRAM(S): 80 TABLET, FILM COATED ORAL at 21:09

## 2021-01-01 RX ADMIN — PANTOPRAZOLE SODIUM 40 MILLIGRAM(S): 20 TABLET, DELAYED RELEASE ORAL at 09:14

## 2021-01-01 RX ADMIN — Medication 81 MILLIGRAM(S): at 09:15

## 2021-01-01 RX ADMIN — Medication 40 MILLIGRAM(S): at 10:19

## 2021-01-01 RX ADMIN — Medication 1: at 16:33

## 2021-01-01 RX ADMIN — Medication 325 MILLIGRAM(S): at 10:05

## 2021-01-01 RX ADMIN — AMLODIPINE BESYLATE 5 MILLIGRAM(S): 2.5 TABLET ORAL at 09:14

## 2021-01-01 RX ADMIN — Medication 81 MILLIGRAM(S): at 09:49

## 2021-01-01 RX ADMIN — Medication 40 MILLIGRAM(S): at 11:28

## 2021-01-01 RX ADMIN — CEFTRIAXONE 1000 MILLIGRAM(S): 500 INJECTION, POWDER, FOR SOLUTION INTRAMUSCULAR; INTRAVENOUS at 22:10

## 2021-01-01 RX ADMIN — Medication 100 MILLIGRAM(S): at 09:00

## 2021-01-01 RX ADMIN — Medication 20 MILLIGRAM(S): at 11:36

## 2021-01-01 RX ADMIN — PANTOPRAZOLE SODIUM 40 MILLIGRAM(S): 20 TABLET, DELAYED RELEASE ORAL at 22:40

## 2021-01-01 RX ADMIN — Medication 100 MILLIGRAM(S): at 09:15

## 2021-01-01 RX ADMIN — ATORVASTATIN CALCIUM 40 MILLIGRAM(S): 80 TABLET, FILM COATED ORAL at 21:33

## 2021-01-01 RX ADMIN — Medication 600 MILLIGRAM(S): at 10:05

## 2021-01-01 RX ADMIN — Medication 20 MILLIGRAM(S): at 09:15

## 2021-01-01 RX ADMIN — HEPARIN SODIUM 5000 UNIT(S): 5000 INJECTION INTRAVENOUS; SUBCUTANEOUS at 22:01

## 2021-01-01 RX ADMIN — Medication 12.5 MILLIGRAM(S): at 09:49

## 2021-01-01 RX ADMIN — Medication 40 MILLIGRAM(S): at 21:51

## 2021-01-01 RX ADMIN — Medication 100 MILLIGRAM(S): at 10:51

## 2021-01-01 RX ADMIN — Medication 325 MILLIGRAM(S): at 10:10

## 2021-01-01 RX ADMIN — ATORVASTATIN CALCIUM 40 MILLIGRAM(S): 80 TABLET, FILM COATED ORAL at 21:49

## 2021-01-01 RX ADMIN — Medication 600 MILLIGRAM(S): at 09:57

## 2021-01-01 RX ADMIN — PANTOPRAZOLE SODIUM 40 MILLIGRAM(S): 20 TABLET, DELAYED RELEASE ORAL at 21:11

## 2021-01-01 RX ADMIN — Medication 20 MILLIEQUIVALENT(S): at 18:20

## 2021-01-01 RX ADMIN — PANTOPRAZOLE SODIUM 40 MILLIGRAM(S): 20 TABLET, DELAYED RELEASE ORAL at 07:36

## 2021-01-01 RX ADMIN — Medication 5 MILLIGRAM(S): at 22:43

## 2021-01-01 RX ADMIN — Medication 600 MILLIGRAM(S): at 21:51

## 2021-01-01 RX ADMIN — Medication 600 MILLIGRAM(S): at 21:33

## 2021-01-01 RX ADMIN — Medication 12.5 MILLIGRAM(S): at 10:05

## 2021-01-01 RX ADMIN — Medication 100 MILLIGRAM(S): at 10:14

## 2021-01-01 RX ADMIN — PANTOPRAZOLE SODIUM 40 MILLIGRAM(S): 20 TABLET, DELAYED RELEASE ORAL at 10:51

## 2021-01-01 RX ADMIN — Medication 100 MILLIGRAM(S): at 11:28

## 2021-01-01 RX ADMIN — PANTOPRAZOLE SODIUM 10 MG/HR: 20 TABLET, DELAYED RELEASE ORAL at 18:05

## 2021-01-01 RX ADMIN — Medication 12.5 MILLIGRAM(S): at 10:22

## 2021-01-01 RX ADMIN — CEFTRIAXONE 2000 MILLIGRAM(S): 500 INJECTION, POWDER, FOR SOLUTION INTRAMUSCULAR; INTRAVENOUS at 17:49

## 2021-01-01 RX ADMIN — INSULIN GLARGINE 5 UNIT(S): 100 INJECTION, SOLUTION SUBCUTANEOUS at 21:25

## 2021-01-01 RX ADMIN — Medication 12.5 MILLIGRAM(S): at 11:36

## 2021-01-01 RX ADMIN — Medication 12.5 MILLIGRAM(S): at 09:39

## 2021-01-01 RX ADMIN — Medication 2: at 12:11

## 2021-01-01 RX ADMIN — PANTOPRAZOLE SODIUM 40 MILLIGRAM(S): 20 TABLET, DELAYED RELEASE ORAL at 10:22

## 2021-01-01 RX ADMIN — INSULIN GLARGINE 5 UNIT(S): 100 INJECTION, SOLUTION SUBCUTANEOUS at 21:48

## 2021-01-01 RX ADMIN — Medication 2: at 11:38

## 2021-01-01 RX ADMIN — PANTOPRAZOLE SODIUM 10 MG/HR: 20 TABLET, DELAYED RELEASE ORAL at 07:36

## 2021-01-01 RX ADMIN — Medication 100 MILLIGRAM(S): at 09:26

## 2021-01-01 RX ADMIN — Medication 100 MILLIGRAM(S): at 10:10

## 2021-01-01 RX ADMIN — Medication 100 MILLIGRAM(S): at 15:05

## 2021-01-01 RX ADMIN — Medication 40 MILLIGRAM(S): at 09:02

## 2021-01-01 RX ADMIN — Medication 600 MILLIGRAM(S): at 09:00

## 2021-01-01 RX ADMIN — CEFTRIAXONE 2000 MILLIGRAM(S): 500 INJECTION, POWDER, FOR SOLUTION INTRAMUSCULAR; INTRAVENOUS at 18:25

## 2021-01-01 RX ADMIN — Medication 100 MILLIGRAM(S): at 11:37

## 2021-01-01 RX ADMIN — AMLODIPINE BESYLATE 5 MILLIGRAM(S): 2.5 TABLET ORAL at 11:37

## 2021-01-01 RX ADMIN — AMLODIPINE BESYLATE 5 MILLIGRAM(S): 2.5 TABLET ORAL at 10:22

## 2021-01-01 RX ADMIN — PANTOPRAZOLE SODIUM 40 MILLIGRAM(S): 20 TABLET, DELAYED RELEASE ORAL at 06:31

## 2021-01-01 RX ADMIN — AMLODIPINE BESYLATE 5 MILLIGRAM(S): 2.5 TABLET ORAL at 18:05

## 2021-01-01 RX ADMIN — PANTOPRAZOLE SODIUM 40 MILLIGRAM(S): 20 TABLET, DELAYED RELEASE ORAL at 06:22

## 2021-01-01 RX ADMIN — AMLODIPINE BESYLATE 5 MILLIGRAM(S): 2.5 TABLET ORAL at 13:25

## 2021-01-01 RX ADMIN — Medication 20 MILLIGRAM(S): at 09:14

## 2021-01-01 RX ADMIN — PANTOPRAZOLE SODIUM 40 MILLIGRAM(S): 20 TABLET, DELAYED RELEASE ORAL at 08:46

## 2021-01-01 RX ADMIN — Medication 325 MILLIGRAM(S): at 09:00

## 2021-01-01 RX ADMIN — ATORVASTATIN CALCIUM 40 MILLIGRAM(S): 80 TABLET, FILM COATED ORAL at 21:07

## 2021-01-01 RX ADMIN — PANTOPRAZOLE SODIUM 40 MILLIGRAM(S): 20 TABLET, DELAYED RELEASE ORAL at 09:39

## 2021-01-01 RX ADMIN — Medication 81 MILLIGRAM(S): at 18:19

## 2021-01-01 RX ADMIN — Medication 600 MILLIGRAM(S): at 11:41

## 2021-01-01 RX ADMIN — AMLODIPINE BESYLATE 5 MILLIGRAM(S): 2.5 TABLET ORAL at 09:57

## 2021-01-01 RX ADMIN — INSULIN GLARGINE 5 UNIT(S): 100 INJECTION, SOLUTION SUBCUTANEOUS at 22:20

## 2021-01-01 RX ADMIN — HEPARIN SODIUM 5000 UNIT(S): 5000 INJECTION INTRAVENOUS; SUBCUTANEOUS at 10:14

## 2021-01-01 RX ADMIN — AMLODIPINE BESYLATE 5 MILLIGRAM(S): 2.5 TABLET ORAL at 11:36

## 2021-01-01 RX ADMIN — Medication 2: at 12:50

## 2021-01-01 RX ADMIN — PANTOPRAZOLE SODIUM 40 MILLIGRAM(S): 20 TABLET, DELAYED RELEASE ORAL at 09:04

## 2021-01-01 RX ADMIN — Medication 325 MILLIGRAM(S): at 09:57

## 2021-01-01 RX ADMIN — INSULIN GLARGINE 5 UNIT(S): 100 INJECTION, SOLUTION SUBCUTANEOUS at 21:20

## 2021-02-12 NOTE — PHYSICAL THERAPY INITIAL EVALUATION ADULT - GENERAL OBSERVATIONS, REHAB EVAL
Patient received supine on a stretcher in the ED this AM non-verbal indicators of pain/discomfort absent

## 2021-04-09 NOTE — ED ADULT TRIAGE NOTE - TEMPERATURE IN CELSIUS (DEGREES C)
36.8 Reports occasional contraction every few days. Denies vag bldg & LOF. Declines genetic screening.

## 2021-04-09 NOTE — ED PROVIDER NOTE - CRITICAL CARE ATTENDING CONTRIBUTION TO CARE
direct patient care (not related to procedure), additional history taking, interpretation of diagnostic studies, documentation, consultation with other physicians.

## 2021-04-09 NOTE — H&P ADULT - NSHPLABSRESULTS_GEN_ALL_CORE
5.3    11.10 )-----------( 157      ( 2021 06:01 )             16.9         141  |  112<H>  |  118<H>  ----------------------------<  187<H>  4.2   |  25  |  3.95<H>    Ca    8.8      2021 06:01    TPro  5.8<L>  /  Alb  2.7<L>  /  TBili  0.2  /  DBili  x   /  AST  13<L>  /  ALT  18  /  AlkPhos  69  04-09    CAPILLARY BLOOD GLUCOSE    PT/INR - ( 2021 06:01 )   PT: 11.6 sec;   INR: 1.00 ratio         PTT - ( 2021 06:01 )  PTT:25.4 sec  Urinalysis Basic - ( 2021 07:25 )    Color: Yellow / Appearance: Clear / S.010 / pH: x  Gluc: x / Ketone: Negative  / Bili: Negative / Urobili: Negative mg/dL   Blood: x / Protein: 15 mg/dL / Nitrite: Negative   Leuk Esterase: Negative / RBC: 0-2 /HPF / WBC 0-2   Sq Epi: x / Non Sq Epi: Occasional / Bacteria: Occasional      < from: CT Cervical Spine No Cont (21 @ 06:27) >  IMPRESSION:  No acute cervical spine fracture or evidence of traumatic malalignment. Cervical spondylosis.  < end of copied text >  < from: CT Head No Cont (21 @ 06:27) >  IMPRESSION:  No acute intracranial hemorrhage, mass effect, or acute osseous fracture.    < end of copied text >

## 2021-04-09 NOTE — H&P ADULT - HISTORY OF PRESENT ILLNESS
89 y/o male with PMHx of CAD s/p PCI x2 Feb 2020, HTN, chronic systolic CHF, COPD, Prostate Cancer s/p radiation in the past, CKD stage IV, chronic anemia, gout, glaucoma, severe AS who presented to  with CC of 1 week of progressive weakness and fatigue and fall 24 hours prior to admission.  Patient lives at [facility]  Patient notes increasing fatigue over the last 1 week.  Patient notes on the day prior to admission, he was walking back from the bathroom and fell.  Patient had lab work drawn on 4/8 at [facility] which showed significant anemia with Hgb of 5.8 and patient was transferred to  for evaluation.  Of note, patient does admit to bloody stools at [facility].  Case also d/w patient's son Manuel.  No additional information provided.  In the ER, patient with Hgb of 5.3.  Ordered for 2 units pRBC.        PAST MEDICAL & SURGICAL HISTORY:  Hypertension  HLD (hyperlipidemia)  Gout  Prostate cancer  Anemia  Chronic kidney disease (CKD)  CAD (coronary artery disease)  DM type 2 (diabetes mellitus, type 2)  Glaucoma  Aortic stenosis  Osteoporosis  Leg edema  H/O inguinal hernia repair  History of tonsillectomy  childhood      FAMILY HISTORY:  Family history of CVA  Family hx of lung cancer    Social History:    No present Tob/ etoh/ drug use.    Lives in [facility]      Allergies:  ACE inhibitors (Unknown)  aspirin (Unknown)  enalapril (Unknown)

## 2021-04-09 NOTE — ED PROVIDER NOTE - SKIN, MLM
Skin normal color for race, warm, + 1 cm abrasion to forehead, pale appearing Skin normal color for race, warm, + 1 cm abrasion to forehead, pale appearing, Bruise right chest wal, bruise right forearm

## 2021-04-09 NOTE — ED ADULT NURSE NOTE - CHIEF COMPLAINT QUOTE
Pt. BIBEMS from [facility] s/p fall 3 days ago. Pt. states he was woken up this morning by [facility] staff and told he had to come to ER. Pt. denies LOC when he did fall. Pt. has small healing scab on forehead. Pt. denies pain/blood thinner use

## 2021-04-09 NOTE — ED PROVIDER NOTE - CARE PLAN
Principal Discharge DX:	Gastrointestinal hemorrhage, unspecified gastrointestinal hemorrhage type  Secondary Diagnosis:	Anemia, unspecified type   Principal Discharge DX:	Gastrointestinal hemorrhage, unspecified gastrointestinal hemorrhage type  Secondary Diagnosis:	Anemia, unspecified type  Secondary Diagnosis:	Fall (on)(from) incline, initial encounter  Secondary Diagnosis:	Abrasion

## 2021-04-09 NOTE — ED ADULT NURSE REASSESSMENT NOTE - NS ED NURSE REASSESS COMMENT FT1
Critical value notification hemoglobin 5.3/hematocrit 16.9, see provider contact note. MD Ogden aware.

## 2021-04-09 NOTE — ED ADULT NURSE NOTE - NSIMPLEMENTINTERV_GEN_ALL_ED
Implemented All Fall with Harm Risk Interventions:  [place] to call system. Call bell, personal items and telephone within reach. Instruct patient to call for assistance. Room bathroom lighting operational. Non-slip footwear when patient is off stretcher. Physically safe environment: no spills, clutter or unnecessary equipment. Stretcher in lowest position, wheels locked, appropriate side rails in place. Provide visual cue, wrist band, yellow gown, etc. Monitor gait and stability. Monitor for mental status changes and reorient to person, place, and time. Review medications for side effects contributing to fall risk. Reinforce activity limits and safety measures with patient and family. Provide visual clues: red socks.

## 2021-04-09 NOTE — ED ADULT TRIAGE NOTE - CHIEF COMPLAINT QUOTE
Pt. BIBEMS from [facility] s/p fall 3 days ago. Pt. states he was woken up this morning by [facility] staff and told he had to come to ER. Pt. denies LOC when he did fall. Pt. has small healing scab on forehead. Pt. denies pain/blood thinner use

## 2021-04-09 NOTE — H&P ADULT - NSHPPHYSICALEXAM_GEN_ALL_CORE
PEx  T(C): 36.4 (04-09-21 @ 08:01), Max: 36.8 (04-09-21 @ 05:44)  HR: 65 (04-09-21 @ 08:01) (52 - 68)  BP: 145/59 (04-09-21 @ 08:01) (124/62 - 154/57)  RR: 18 (04-09-21 @ 08:01) (18 - 20)  SpO2: 95% (04-09-21 @ 08:01) (95% - 98%)    General: elderly man.  NAD.  [place].    Skin: no rash or prominent lesions  Head: normocephalic, atraumatic     Sinuses: non-tender  Nose: no external lesions, mucosa non-inflamed, septum and turbinates normal  Throat: no erythema, exudates or lesions.  Neck: Supple without lymphadenopathy. Thyroid no thyromegaly, no palpable thyroid nodules, no palpable nodules or masses, carotid arteries no bruits.   Breasts: No palpable masses or lesions.  Heart: RRR, no murmur or gallop.  Normal S1, S2.  No S3, S4.   Lungs: CTA bilaterally, no wheezes, rhonchi, rales.  Breathing unlabored.   Chest wall: Normal insp   Abdomen:  Soft, NT/ND, normal bowel sounds, no HSM, no masses.  No peritoneal signs.   Back: spine normal without deformity or tenderness.  Normal ROM   : Exam normal.  no inguinal hernias.  Extremities: 1+ edema  Musculoskeletal: Normal gait and station. No decreased range of motion, instability, atrophy or abnormal strength or tone in the head, neck, spine, ribs, pelvis or extremities.   Neurologic: CN 2-12 normal. Sensation to pain, touch and proprioception normal. DTRs normal in upper and lower extremities. No pathologic reflexes.  Motor normal.  Psychiatric: Oriented X3, intact recent and remote memory, judgement and insight, normal mood and affect.

## 2021-04-09 NOTE — ED PROVIDER NOTE - OBJECTIVE STATEMENT
89 yo pt with PMH for CAD, COPD, HTN, Prostate CA s/p RT 25yrs ago, CKD4-5, anemia (iron def, CKD), Gout, glaucoma, severe AS (declined TAVR), chronic leg edema presents to ED for weakness.  Pt states that he has been feeling week all week.  Denies chest pain, no sob, no headache, no n/v/d.  Pt had a fall on 4/8/2021 - states walking back from bathroom and fell.  Denies LOC. No headache.  Report from Jacob has labs with h/h 5.8/18.8 from 4/8/2021. 89 yo pt with PMH for CAD, COPD, HTN, Prostate CA s/p RT 25yrs ago, CKD4-5, anemia (iron def, CKD), Gout, glaucoma, severe AS (declined TAVR), chronic leg edema presents to ED for weakness.  Pt states that he has been feeling week all week.  Denies chest pain, no sob, no headache, no n/v/d.  Pt had a fall on 4/8/2021 - states walking back from bathroom and fell.  Pt states that he has had bloody stools for a while now. Denies LOC. No headache.  Report from [facility] has labs with h/h 5.8/18.8 from 4/8/2021.

## 2021-04-09 NOTE — ED PROVIDER NOTE - PROGRESS NOTE DETAILS
Stool guaiac - lot [id number], melanotic stool, heme positive, q/c reactive. Attending Ogden, Pt updated on results of tests.  Agree to blood transfusion and plan for admission. Attending Ogden, Hospitalist called and message left for admission.  Dr. Hernandez paged. Attending DANDY Ogden/sarmad Hernandez and well see pt. Attending Ogden, Son called and ED nursing updated son on pt status.  Dr. Cody at bedside for admission

## 2021-04-09 NOTE — H&P ADULT - ASSESSMENT
89 y/o male with PMHx of CAD s/p PCI x2 Feb 2020, HTN, chronic systolic CHF, COPD, Prostate Cancer s/p radiation in the past, CKD stage IV, chronic anemia, gout, glaucoma, severe AS who presented to  with CC of 1 week of progressive weakness and fatigue and fall found to have symptomatic anemia with Hgb of 5.      #Presumed upper GI Bleed with symptomatic anemia:    Admit to tele.    Transfuse 2 units pRBC.  Monitor for signs/ sx of CHF as patient with ischemic cardiomyopathy and severe AS.    Hold ASA and Prasugrel.  Last PCI was 13 months ago.  Lower risk.    GI eval-- Dr. Hernandez.    NPO for now.    Unclear if will need EGD.    Differential includes PUD/ AVMs related to severe AS/ gastritis.    Follow H&H q8 hours.    Protonix gtt.      #Anemia:    Macrocytic anemia with acute GI bleeding.    Check iron/ b12/ folate.    Transfuse 2 units as above.    Goal Hgb >8.      #Hx of Cardiomyopathy and Severe AS:    Watch for signs of CHF with transfusions.    Hold on lasix for now.    Cont home PO lasix.    Check ECHO.      #Fall:    Suspect related to symptomatic anemia.    PT eval.      #CAD s/p PCIx2 2020:    Last stenting 2/2020.    Hold asa/ prasugrel.    Cont statin.      #Gout:    cont allopurinol.      #Remote prostate Ca:    Stable.      #DVT Proph:  Venodynes.      #Code Status/ Goals of Care:    D/w patient and son.  No present existing advanced directives.  As per son Manuel, would want temporary resuccitation measures; however, if overall outcome was thought to be poor with no quality of life, would want to withdrawal care.  FULL CODE for now.    Time spent discussing goals of care:  20 min.

## 2021-04-09 NOTE — H&P ADULT - NSHPREVIEWOFSYSTEMS_GEN_ALL_CORE
ROS:  General:  weakness  Skin: No rash or bothersome skin lesions  Musculoskeletal: No arthalgias, myalgias or joint swelling  Eyes: No visual changes or eye pain  Ears: No hearing loss , otorrhea or ear pain  Nose, Mouth, Throat: No nasal congestion, rhinorrhea, oral lesions, postnasal drip or sore throat  Cardio: No chest pain or palpitations. no lower extremity edema. no syncope. no claudication.   Respiratory: No cough, shortness of breath or wheezing   GI: No diarrhea, constipation, blood in stools, abdominal pain, vomiting or heartburn  : No urinary frequency, hematuria, incontinence, or dysuria  Neurologic: fall on 4/7    Psychiatric:  No anxiety or depression  Lymphatic:  No easy bruising, easy bleeding or swollen glands  Allergic: No itching, sneezing , watery eyes, clear rhinorrhea or recurrent infections

## 2021-04-09 NOTE — ED ADULT NURSE NOTE - OBJECTIVE STATEMENT
Pt. BIBEMS from [facility] s/p fall 3 days ago. Pt. states he was woken up this morning by [facility] staff and told he had to come to ER, states [facility] staff told him his "blood counts were low". Pt. denies LOC when he did fall. Pt. has small healing scab on forehead. Pt. denies pain/blood thinner use. EKG obtained. Labs drawn and sent. Denies SOB, reports feeling tired. PMH DM type 2, anemia, CKD, prostate ca.

## 2021-04-09 NOTE — ED PROVIDER NOTE - MUSCULOSKELETAL, MLM
Spine appears normal, range of motion is not limited, no muscle or joint tenderness Spine appears normal, range of motion is not limited, no muscle or joint tenderness.  No pain to palp chest wall

## 2021-04-10 NOTE — CONSULT NOTE ADULT - SUBJECTIVE AND OBJECTIVE BOX
HPI:  89 y/o male with PMHx of CAD s/p PCI x2 Feb 2020, HTN, chronic systolic CHF, COPD, Prostate Cancer s/p radiation in the past, CKD stage IV, chronic anemia, gout, glaucoma, severe AS who presented to  with CC of 1 week of progressive weakness and fatigue and fall 24 hours prior to admission.  Patient lives at [facility]  Patient notes increasing fatigue over the last 1 week.  Patient notes on the day prior to admission, he was walking back from the bathroom and fell.  Patient had lab work drawn on 4/8 at [facility] which showed significant anemia with Hgb of 5.8 and patient was transferred to  for evaluation.  Of note, patient does admit to bloody stools at [facility].  Case also d/w patient's son Manuel.  No additional information provided.  In the ER, patient with Hgb of 5.3.  Ordered for 2 units pRBC.    ----------------  Patient reports that he's had black tarry stools. However, no further bleeding here.  Has been on asa/effiient for a stent 13 months ago    PAST MEDICAL & SURGICAL HISTORY:  Hypertension  HLD (hyperlipidemia)  Gout  Prostate cancer  Anemia  Chronic kidney disease (CKD)  CAD (coronary artery disease)  DM type 2 (diabetes mellitus, type 2)  Glaucoma  Aortic stenosis  Osteoporosis  Leg edema  H/O inguinal hernia repair  History of tonsillectomy  childhood      FAMILY HISTORY:  Family history of CVA  Family hx of lung cancer    Social History:    No present Tob/ etoh/ drug use.    Lives in [facility]      Allergies:  ACE inhibitors (Unknown)  aspirin (Unknown)  enalapril (Unknown)   (09 Apr 2021 10:17)      PAST MEDICAL & SURGICAL HISTORY:  Hypertension    HLD (hyperlipidemia)    Gout    Prostate cancer    Anemia    Chronic kidney disease (CKD)    CAD (coronary artery disease)    DM type 2 (diabetes mellitus, type 2)    Glaucoma    Aortic stenosis    Osteoporosis    Leg edema    H/O inguinal hernia repair    History of tonsillectomy  childhood    History of colonoscopy        Home Medications:  acetaminophen 325 mg oral tablet: 1 tab(s) orally every 6 hours, As Needed - for mild pain (09 Apr 2021 13:51)  allopurinol 100 mg oral tablet: 1 tab(s) orally once a day (09 Apr 2021 13:51)  amLODIPine 5 mg oral tablet: 1 tab(s) orally once a day (09 Apr 2021 13:51)  aspirin 81 mg oral tablet, chewable: 1 tab(s) orally once a day (09 Apr 2021 13:51)  atorvastatin 40 mg oral tablet: 1 tab(s) orally once a day (09 Apr 2021 13:51)  calcium acetate 667 mg oral tablet: 1 tab(s) orally 3 times a day (09 Apr 2021 13:51)  Colace 100 mg oral capsule: 2 cap(s) orally once a day (at bedtime) (09 Apr 2021 13:51)  ferrous gluconate 324 mg (38 mg elemental iron) oral tablet: 1 tab(s) orally once a day (09 Apr 2021 13:51)  Lasix 20 mg oral tablet: 1 tab(s) orally once a day (09 Apr 2021 13:51)  MiraLax oral powder for reconstitution: 17 gram(s) orally once a day (09 Apr 2021 13:51)  pantoprazole 40 mg oral delayed release tablet: 1 tab(s) orally 2 times a day (09 Apr 2021 13:51)  prasugrel 5 mg oral tablet: 1 tab(s) orally once a day (09 Apr 2021 13:51)  Simbrinza 1%- 0.2% ophthalmic suspension: 1 drop(s) in the left eye 2 times a day (09 Apr 2021 13:51)  Vitamin D3 50,000 intl units (1250 mcg) oral capsule: 1 cap(s) orally once a month on the 12th (09 Apr 2021 13:51)      MEDICATIONS  (STANDING):  allopurinol 100 milliGRAM(s) Oral daily  amLODIPine   Tablet 5 milliGRAM(s) Oral daily  atorvastatin 40 milliGRAM(s) Oral at bedtime  furosemide    Tablet 20 milliGRAM(s) Oral daily  pantoprazole Infusion 8 mG/Hr (10 mL/Hr) IV Continuous <Continuous>    MEDICATIONS  (PRN):  acetaminophen   Tablet .. 650 milliGRAM(s) Oral every 4 hours PRN Mild Pain (1 - 3)  aluminum hydroxide/magnesium hydroxide/simethicone Suspension 30 milliLiter(s) Oral every 4 hours PRN Dyspepsia  ondansetron Injectable 4 milliGRAM(s) IV Push every 6 hours PRN Nausea      Allergies    ACE inhibitors (Unknown)  aspirin (Unknown)  enalapril (Unknown)    Intolerances        SOCIAL HISTORY:    FAMILY HISTORY:  Family history of CVA    Family hx of lung cancer        ROS  As above  Otherwise unremarkable    Vital Signs Last 24 Hrs  T(C): 36.4 (09 Apr 2021 15:53), Max: 36.8 (09 Apr 2021 05:44)  T(F): 97.6 (09 Apr 2021 15:53), Max: 98.2 (09 Apr 2021 05:44)  HR: 65 (09 Apr 2021 15:53) (52 - 71)  BP: 158/69 (09 Apr 2021 15:53) (124/62 - 176/67)  BP(mean): 91 (09 Apr 2021 10:52) (91 - 91)  RR: 18 (09 Apr 2021 15:53) (16 - 20)  SpO2: 99% (09 Apr 2021 15:53) (95% - 99%)    Constitutional: NAD, well-developed  Respiratory: CTAB  Cardiovascular: S1 and S2, RRR  Gastrointestinal: BS+, soft, NT/ND  Extremities: No peripheral edema  Psychiatric: Normal mood, normal affect  Skin: No rashes    LABS:                        9.4    x     )-----------( x        ( 09 Apr 2021 17:19 )             29.7     04-09    141  |  112<H>  |  118<H>  ----------------------------<  187<H>  4.2   |  25  |  3.95<H>    Ca    8.8      09 Apr 2021 06:01    TPro  5.8<L>  /  Alb  2.7<L>  /  TBili  0.2  /  DBili  x   /  AST  13<L>  /  ALT  18  /  AlkPhos  69  04-09    PT/INR - ( 09 Apr 2021 06:01 )   PT: 11.6 sec;   INR: 1.00 ratio         PTT - ( 09 Apr 2021 06:01 )  PTT:25.4 sec  LIVER FUNCTIONS - ( 09 Apr 2021 06:01 )  Alb: 2.7 g/dL / Pro: 5.8 gm/dL / ALK PHOS: 69 U/L / ALT: 18 U/L / AST: 13 U/L / GGT: x             RADIOLOGY & ADDITIONAL STUDIES:
Patient is a 89y old  Male who presents with a chief complaint of weakness, fall (10 Apr 2021 13:44)      HPI:  Asked to see this 89 y/o male with CAD s/p PCI x2 2020, HTN, chronic systolic CHF, COPD, Prostate Cancer s/p radiation in the past, CKD stage IV, chronic anemia, gout, glaucoma, severe AS who presented to  with CC of 1 week of progressive weakness and fatigue and fall 24 hours prior to admission.  Patient lives at [facility]  Patient notes increasing fatigue over the last 1 week.  Patient notes on the day prior to admission, he was walking back from the bathroom and fell.  Patient had lab work drawn on  at [facility] which showed significant anemia with Hgb of 5.8 and patient was transferred to  for evaluation.  Of note, patient does admit to bloody stools at [facility].  Case also d/w patient's son Manuel.  No additional information provided.  In the ER, patient with Hgb of 5.3.  Ordered for 2 units pRBC.   He was noted to have bradycardia with Mobitz type I second degree AV block.       PAST MEDICAL & SURGICAL HISTORY:  Hypertension  HLD (hyperlipidemia)  Gout  Prostate cancer  Anemia  Chronic kidney disease (CKD)  CAD (coronary artery disease)  DM type 2 (diabetes mellitus, type 2)  Glaucoma  Aortic stenosis  Osteoporosis  Leg edema  H/O inguinal hernia repair  History of tonsillectomy  childhood      FAMILY HISTORY:  Family history of CVA  Family hx of lung cancer    Social History:    No present Tob/ etoh/ drug use.    Lives in [facility]      Allergies:  ACE inhibitors (Unknown)  aspirin (Unknown)  enalapril (Unknown)   (2021 10:17)    MEDICATIONS  (STANDING):  allopurinol 100 milliGRAM(s) Oral daily  amLODIPine   Tablet 5 milliGRAM(s) Oral daily  atorvastatin 40 milliGRAM(s) Oral at bedtime  furosemide    Tablet 20 milliGRAM(s) Oral daily  pantoprazole    Tablet 40 milliGRAM(s) Oral two times a day    MEDICATIONS  (PRN):  acetaminophen   Tablet .. 650 milliGRAM(s) Oral every 4 hours PRN Mild Pain (1 - 3)  aluminum hydroxide/magnesium hydroxide/simethicone Suspension 30 milliLiter(s) Oral every 4 hours PRN Dyspepsia  ondansetron Injectable 4 milliGRAM(s) IV Push every 6 hours PRN Nausea      ROS:     A comprehensive review of systems was performed and pertinent items are noted in the history above.         Vital Signs Last 24 Hrs  T(C): 36.4 (10 Apr 2021 09:28), Max: 36.5 (2021 22:12)  T(F): 97.6 (10 Apr 2021 09:28), Max: 97.7 (2021 22:12)  HR: 60 (10 Apr 2021 09:) (60 - 65)  BP: 142/66 (10 Apr 2021 09:) (142/66 - 176/67)  BP(mean): --  RR: 18 (10 Apr 2021 09:28) (17 - 18)  SpO2: 97% (10 Apr 2021 09:) (95% - 99%)    I&O's Summary    2021 07:01  -  10 Apr 2021 07:00  --------------------------------------------------------  IN: 0 mL / OUT: 300 mL / NET: -300 mL          INTERPRETATION OF TELEMETRY:      sinus with second degree Mobitz type I AVBlock       ECG:    Sinus 82 1st degree AVblock LAD PRWP NS STT changes.     LABS:                          9.0    x     )-----------( x        ( 10 Apr 2021 12:01 )             28.5     04-10    144  |  112<H>  |  96<H>  ----------------------------<  154<H>  3.7   |  27  |  3.37<H>    Ca    8.9      10 Apr 2021 09:00    TPro  5.8<L>  /  Alb  2.7<L>  /  TBili  0.2  /  DBili  x   /  AST  13<L>  /  ALT  18  /  AlkPhos  69  04-09    CARDIAC MARKERS ( 2021 06:01 )  0.033 ng/mL / x     / x     / x     / x              PT/INR - ( 2021 06:01 )   PT: 11.6 sec;   INR: 1.00 ratio         PTT - ( 2021 06:01 )  PTT:25.4 sec  Urinalysis Basic - ( 2021 07:25 )    Color: Yellow / Appearance: Clear / S.010 / pH: x  Gluc: x / Ketone: Negative  / Bili: Negative / Urobili: Negative mg/dL   Blood: x / Protein: 15 mg/dL / Nitrite: Negative   Leuk Esterase: Negative / RBC: 0-2 /HPF / WBC 0-2   Sq Epi: x / Non Sq Epi: Occasional / Bacteria: Occasional            RADIOLOGY & ADDITIONAL STUDIES:    
PCP:    REQUESTING PHYSICIAN:    REASON FOR CONSULT:    CHIEF COMPLAINT:    HPI:  89 y/o male with PMHx of CAD s/p PCI x2 Feb 2020, HTN, chronic systolic CHF, COPD, Prostate Cancer s/p radiation in the past, CKD stage IV, chronic anemia, gout, glaucoma, severe AS who presented to  with CC of 1 week of progressive weakness and fatigue and fall 24 hours prior to admission.  Patient lives at [facility]  Patient notes increasing fatigue over the last 1 week.  Patient notes on the day prior to admission, he was walking back from the bathroom and fell.  Patient had lab work drawn on 4/8 at [facility] which showed significant anemia with Hgb of 5.8 and patient was transferred to  for evaluation.  Of note, patient does admit to bloody stools at [facility].  Case also d/w patient's son Manuel.  No additional information provided.  In the ER, patient with Hgb of 5.3.  Ordered for 2 units pRBC.  Cardiology requested to evaluate status and symptoms. Pt denies chest pain or shortness of breath. He was unable to elaborate details of admission. He added no further symptoms. Echo reviewed.      PAST MEDICAL & SURGICAL HISTORY:  Hypertension  HLD (hyperlipidemia)  Gout  Prostate cancer  Anemia  Chronic kidney disease (CKD)  CAD (coronary artery disease)  DM type 2 (diabetes mellitus, type 2)  Glaucoma  Aortic stenosis  Osteoporosis  Leg edema  H/O inguinal hernia repair  History of tonsillectomy  childhood      FAMILY HISTORY:  Family history of CVA  Family hx of lung cancer    Social History:    No present Tob/ etoh/ drug use.    Lives in [facility]      Allergies:  ACE inhibitors (Unknown)  aspirin (Unknown)  enalapril (Unknown)   (09 Apr 2021 10:17)      PAST MEDICAL & SURGICAL HISTORY:  Hypertension    HLD (hyperlipidemia)    Gout    Prostate cancer    Anemia    Chronic kidney disease (CKD)    CAD (coronary artery disease)    DM type 2 (diabetes mellitus, type 2)    Glaucoma    Aortic stenosis    Osteoporosis    Leg edema    H/O inguinal hernia repair    History of tonsillectomy  childhood    History of colonoscopy        Allergies    ACE inhibitors (Unknown)  aspirin (Unknown)  enalapril (Unknown)    Intolerances        SOCIAL HISTORY:    FAMILY HISTORY:  Family history of CVA    Family hx of lung cancer        MEDICATIONS:  MEDICATIONS  (STANDING):  allopurinol 100 milliGRAM(s) Oral daily  amLODIPine   Tablet 5 milliGRAM(s) Oral daily  atorvastatin 40 milliGRAM(s) Oral at bedtime  furosemide    Tablet 20 milliGRAM(s) Oral daily  pantoprazole    Tablet 40 milliGRAM(s) Oral two times a day    MEDICATIONS  (PRN):  acetaminophen   Tablet .. 650 milliGRAM(s) Oral every 4 hours PRN Mild Pain (1 - 3)  aluminum hydroxide/magnesium hydroxide/simethicone Suspension 30 milliLiter(s) Oral every 4 hours PRN Dyspepsia  ondansetron Injectable 4 milliGRAM(s) IV Push every 6 hours PRN Nausea      REVIEW OF SYSTEMS:    CONSTITUTIONAL: No weakness, fevers or chills  EYES/ENT: No visual changes;  No vertigo or throat pain   NECK: No pain or stiffness  RESPIRATORY: No cough, wheezing, hemoptysis; No shortness of breath  CARDIOVASCULAR: No chest pain or palpitations  GASTROINTESTINAL: No abdominal or epigastric pain. No nausea, vomiting, or hematemesis; No diarrhea or constipation. No melena or hematochezia.  GENITOURINARY: No dysuria, frequency or hematuria  NEUROLOGICAL: No numbness or weakness  SKIN: No itching, burning, rashes, or lesions   All other review of systems is negative unless indicated above    Vital Signs Last 24 Hrs  T(C): 36.3 (10 Apr 2021 13:55), Max: 36.5 (09 Apr 2021 22:12)  T(F): 97.4 (10 Apr 2021 13:55), Max: 97.7 (09 Apr 2021 22:12)  HR: 75 (10 Apr 2021 13:55) (60 - 75)  BP: 145/56 (10 Apr 2021 13:55) (142/66 - 152/58)  BP(mean): --  RR: 18 (10 Apr 2021 13:55) (17 - 18)  SpO2: 97% (10 Apr 2021 13:55) (95% - 97%)    I&O's Summary    09 Apr 2021 07:01  -  10 Apr 2021 07:00  --------------------------------------------------------  IN: 0 mL / OUT: 300 mL / NET: -300 mL        PHYSICAL EXAM:    Constitutional: NAD, awake and alert, well-developed  HEENT: PERR, EOMI,  No oral cyananosis.  Neck:  supple,  No JVD  Respiratory: Breath sounds are clear bilaterally, No wheezing, rales or rhonchi  Cardiovascular: S1 and S2, regular rate and rhythm, 3/6 JACKELIN  Gastrointestinal: Bowel Sounds present, soft, nontender.   Extremities: No peripheral edema. No clubbing or cyanosis.  Vascular: 2+ peripheral pulses  Neurological: A/O x 3, no focal deficits  Musculoskeletal: no calf tenderness.  Skin: No rashes.      LABS: All Labs Reviewed:                        9.0    x     )-----------( x        ( 10 Apr 2021 12:01 )             28.5                         9.0    10.83 )-----------( 171      ( 10 Apr 2021 09:00 )             27.6                         9.4    x     )-----------( x        ( 09 Apr 2021 17:19 )             29.7     10 Apr 2021 09:00    144    |  112    |  96     ----------------------------<  154    3.7     |  27     |  3.37   09 Apr 2021 06:01    141    |  112    |  118    ----------------------------<  187    4.2     |  25     |  3.95     Ca    8.9        10 Apr 2021 09:00  Ca    8.8        09 Apr 2021 06:01    TPro  5.8    /  Alb  2.7    /  TBili  0.2    /  DBili  x      /  AST  13     /  ALT  18     /  AlkPhos  69     09 Apr 2021 06:01    PT/INR - ( 09 Apr 2021 06:01 )   PT: 11.6 sec;   INR: 1.00 ratio         PTT - ( 09 Apr 2021 06:01 )  PTT:25.4 sec  CARDIAC MARKERS ( 09 Apr 2021 06:01 )  0.033 ng/mL / x     / x     / x     / x          Blood Culture: Organism --  Gram Stain Blood -- Gram Stain --  Specimen Source .Urine None  Culture-Blood --            RADIOLOGY/EKG:< from: TTE Echo Complete w/o Contrast w/ Doppler (04.09.21 @ 13:29) >     Summary     There is calcification of mitral valve . The leaflet opening is mildly   restricted.   Mean transmitral gradient is 4 mmHg; this finding is consistent with mild   mitral stenosis.   Moderate (2+) mitral regurgitation is present.   EA reversal of the mitral inflow consistent with reduced compliance of the   left ventricle.   Significant fibrocalcific changes noted to the aortic valve leaflets with   restriction in leaflet excursion. Peak transaortic gradient is 74 mmHg;   this finding is consistent with severe aortic stenosis.   Moderate (2+) aortic regurgitation is present.   The tricuspid valve leaflets are thin and pliable; valve motion is normal.   Mild (1+) tricuspid valve regurgitation is present.   Mild pulmonary hypertension.   Mild pulmonic valvular regurgitation (1+) is present.   Pulmonic valve not well seen, probably normal pulmonic valve function.   The left atrium is mildly dilated by LA volume index.   Mild concentric left ventricular hypertrophy is present.   Estimated left ventricular ejection fraction is 50-55 %.   The apex,mid,distal anteroseptal mildly hypokinetic.   Normal appearing right ventricle structure and function.     Signature     ----------------------------------------------------------------   Electronically signed by Venugopal Palla, MD(Interpreting   physician) on 04/09/2021 11:50 PM   ----------------------------------------------------------------    < end of copied text >

## 2021-04-10 NOTE — PROGRESS NOTE ADULT - ASSESSMENT
Imp:  GI bleed, probably upper, contributed to by blood thinners    Rec:  Protonix PO bid  I favor resuming Aspirin today or tomorrow but permanent d/c of effient

## 2021-04-10 NOTE — PROGRESS NOTE ADULT - SUBJECTIVE AND OBJECTIVE BOX
89 y/o male with PMHx of CAD s/p PCI x2 Feb 2020, HTN, chronic systolic CHF, COPD, Prostate Cancer s/p radiation in the past, CKD stage IV, chronic anemia, gout, glaucoma, severe AS who presented to  with CC of 1 week of progressive weakness and fatigue and fall 24 hours prior to admission.  Patient lives at [facility].  Patient notes increasing fatigue over the last 1 week.  Patient notes on the day prior to admission, he was walking back from the bathroom and fell.  Patient had lab work drawn on 4/8 at [facility] which showed significant anemia with Hgb of 5.8 and patient was transferred to  for evaluation.  Of note, patient does admit to bloody stools at [facility].  Case also d/w patient's son Manuel.  No additional information provided.  In the ER, patient with Hgb of 5.3.  Ordered for 2 units pRBC.      4/10 - feels tired: no cp palps sob abdo pain tingling or numbness anywhere    PHYSICAL EXAM:  GENERAL: NAD, able to lie flat in bed  HEAD:  Atraumatic, Normocephalic  EYES: EOMI, PERRLA, normal sclera  ENT: Moist mucous membranes  NECK: Supple, No JVD, no nuchal rigidity  CHEST/LUNG: Clear to auscultation bilaterally; No rales, rhonchi, wheezing, or rubs. Unlabored respirations  HEART: Regular rate and rhythm; No murmurs, rubs, or gallops  ABDOMEN: Bowel sounds present; Soft, Nontender, Nondistended. No hepatomegaly  EXTREMITIES:  no pitting bilaterally  NERVOUS SYSTEM:  Alert & Oriented X3, speech clear. No focal motor or sensory deficits  MSK: FROM all 4 extremities, full and equal strength  SKIN: No rashes or lesions    LABS: All Labs Reviewed:                        9.0    x     )-----------( x        ( 10 Apr 2021 12:01 )             28.5     144  |  112<H>  |  96<H>  ----------------------------<  154<H>  3.7   |  27  |  3.37<H>  Ca    8.9      10 Apr 2021 09:00  TPro  5.8<L>  /  Alb  2.7<L>  /  TBili  0.2  /  DBili  x   /  AST  13<L>  /  ALT  18  /  AlkPhos  69  04-09      RADIOLOGY/EKG:  < from: CT Head No Cont (04.09.21 @ 06:27) >  No acute intracranial hemorrhage, mass effect, or acute osseous fracture.    ECHO   Summary   There is calcification of mitral valve . The leaflet opening is mildly   restricted.   Mean transmitral gradient is 4 mmHg; this finding is consistent with mild   mitral stenosis.   Moderate (2+) mitral regurgitation is present.   EA reversal of the mitral inflow consistent with reduced compliance of the   left ventricle.   Significant fibrocalcific changes noted to the aortic valve leaflets with   restriction in leaflet excursion. Peak transaortic gradient is 74 mmHg;   this finding is consistent with severe aortic stenosis.   Moderate (2+) aortic regurgitation is present.   The tricuspid valve leaflets are thin and pliable; valve motion is normal.   Mild (1+) tricuspid valve regurgitation is present.   Mild pulmonary hypertension.   Mild pulmonic valvular regurgitation (1+) is present.   Pulmonic valve not well seen, probably normal pulmonic valve function.   The left atrium is mildly dilated by LA volume index.   Mild concentric left ventricular hypertrophy is present.   Estimated left ventricular ejection fraction is 50-55 %.   The apex,mid,distal anteroseptal mildly hypokinetic.   Normal appearing right ventricle structure and function.          acetaminophen   Tablet .. 650 milliGRAM(s) Oral every 4 hours PRN  allopurinol 100 milliGRAM(s) Oral daily  aluminum hydroxide/magnesium hydroxide/simethicone Suspension 30 milliLiter(s) Oral every 4 hours PRN  amLODIPine   Tablet 5 milliGRAM(s) Oral daily  atorvastatin 40 milliGRAM(s) Oral at bedtime  furosemide    Tablet 20 milliGRAM(s) Oral daily  ondansetron Injectable 4 milliGRAM(s) IV Push every 6 hours PRN  pantoprazole    Tablet 40 milliGRAM(s) Oral two times a day

## 2021-04-10 NOTE — PROGRESS NOTE ADULT - ASSESSMENT
89 y/o male with PMHx of CAD s/p PCI x2 Feb 2020, HTN, chronic systolic CHF, COPD, Prostate Cancer s/p radiation in the past, CKD stage IV, chronic anemia, gout, glaucoma, severe AS who presented to  with CC of 1 week of progressive weakness and fatigue and fall found to have symptomatic anemia with Hgb of 5.      #Presumed upper GI Bleed with symptomatic anemia:     Transfuse 2 units pRBC.  Monitor for signs/ sx of CHF as patient with ischemic cardiomyopathy and severe AS.    Hold ASA and Prasugrel.  Last PCI was 13 months ago.  Lower risk.    GI eval-- Dr. Hernandez.     4/11 - will resume ASA 81mg on 4/11/21 if H&H are stable. Stop Prasugrel     #Anemia:    Macrocytic anemia with acute GI bleeding.    Check iron/ b12/ folate.    Transfuse 2 units as above.    Goal Hgb >8.      #Hx of Cardiomyopathy and Severe AS:    Watch for signs of CHF with transfusions.    Hold on lasix for now.    Cont home PO lasix.    Check ECHO.      #Fall:    Suspect related to symptomatic anemia.    PT eval.      4/10 - TELE reviewed: 1st degree AVB and 2nd degree AVB, Type I  seen by Dr Reynolds, cont to monitor, consider Zio patch per EP team     #CAD s/p PCIx2 2020:    Last stenting 2/2020.    Cont statin.    4/10 - resume ASA tomorrow if H&H stable     #Gout:    cont allopurinol.      #Remote prostate Ca:    Stable.      #DVT Proph:  Venodynes.      #Code Status/ Goals of Care:    D/w patient and son. FULL CODE for now.      4/10 discussed with RN DR Reynolds; Manuel at bedside   [phone]

## 2021-04-10 NOTE — PROVIDER CONTACT NOTE (CHANGE IN STATUS NOTIFICATION) - SITUATION
Pt converted from a flutter -120 to second degree block (HR 55) on tele mid-dose administration. Received 3mg metoprolol. Pt remains asymptomatic.
Pt converted to atrial flutter on tele. Pt is asymptomatic. VSS.

## 2021-04-10 NOTE — CONSULT NOTE ADULT - PROBLEM SELECTOR RECOMMENDATION 3
Echo confirms severe aortic stenosis. Doubt patient is an optimal candidate for TAVR. Contnue to support blood loss and hemodynamics

## 2021-04-10 NOTE — CONSULT NOTE ADULT - ASSESSMENT
Imp:  Anemia/melena  Advanced age    Rec:  My inclination is to stop effeint and resume aspirin in a few days and defer EGD  However, I left msg with son to discuss options further  For now may advance diet
This is a 89 year old man admitted with an acute GI bleed and severe anemia . He was noted to have chronic 1st degree AV block with intermittent mobitz type I Wenckebach second degree AV block during sleep.     Symptoms seem to be related to anemia.  Would continue to monitor.   Will recommend a wearable monitor such as a zio patch on discharge.

## 2021-04-10 NOTE — PROVIDER CONTACT NOTE (OTHER) - SITUATION
Pt c/o Lower back back pain, Shooting pain down right leg and up into groin x one week. Pt using Ibuprofen, but not working. Pt has history of lower back pain with sciatica on right side (2016).    Advised to add Tylenol to help with pain, ice/heat, icy hot/biofreeze, and to keep stretching to help with pain.  Pt has appt tomorrow at 1030.    
Dr Hernandez is aware
Spoke with May

## 2021-04-10 NOTE — PROGRESS NOTE ADULT - SUBJECTIVE AND OBJECTIVE BOX
Patient is a 89y old  Male who presents with a chief complaint of weakness, fall (09 Apr 2021 18:27)      Subective:  Feels good  Eating  No bleeding    PAST MEDICAL & SURGICAL HISTORY:  Hypertension    HLD (hyperlipidemia)    Gout    Prostate cancer    Anemia    Chronic kidney disease (CKD)    CAD (coronary artery disease)    DM type 2 (diabetes mellitus, type 2)    Glaucoma    Aortic stenosis    Osteoporosis    Leg edema    H/O inguinal hernia repair    History of tonsillectomy  childhood    History of colonoscopy        MEDICATIONS  (STANDING):  allopurinol 100 milliGRAM(s) Oral daily  amLODIPine   Tablet 5 milliGRAM(s) Oral daily  atorvastatin 40 milliGRAM(s) Oral at bedtime  furosemide    Tablet 20 milliGRAM(s) Oral daily  pantoprazole Infusion 8 mG/Hr (10 mL/Hr) IV Continuous <Continuous>    MEDICATIONS  (PRN):  acetaminophen   Tablet .. 650 milliGRAM(s) Oral every 4 hours PRN Mild Pain (1 - 3)  aluminum hydroxide/magnesium hydroxide/simethicone Suspension 30 milliLiter(s) Oral every 4 hours PRN Dyspepsia  ondansetron Injectable 4 milliGRAM(s) IV Push every 6 hours PRN Nausea      REVIEW OF SYSTEMS:    RESPIRATORY: No shortness of breath  CARDIOVASCULAR: No chest pain  All other review of systems is negative unless indicated above.    Vital Signs Last 24 Hrs  T(C): 36.4 (10 Apr 2021 09:28), Max: 36.5 (09 Apr 2021 22:12)  T(F): 97.6 (10 Apr 2021 09:28), Max: 97.7 (09 Apr 2021 22:12)  HR: 60 (10 Apr 2021 09:28) (60 - 65)  BP: 142/66 (10 Apr 2021 09:28) (142/66 - 176/67)  BP(mean): --  RR: 18 (10 Apr 2021 09:28) (17 - 18)  SpO2: 97% (10 Apr 2021 09:28) (95% - 99%)    PHYSICAL EXAM:    Constitutional: NAD, well-developed  Respiratory: CTAB  Cardiovascular: S1 and S2, RRR  Gastrointestinal: BS+, soft, NT/ND  Extremities: No peripheral edema  Psychiatric: Normal mood, normal affect    LABS:                        9.0    x     )-----------( x        ( 10 Apr 2021 12:01 )             28.5     04-10    144  |  112<H>  |  96<H>  ----------------------------<  154<H>  3.7   |  27  |  3.37<H>    Ca    8.9      10 Apr 2021 09:00    TPro  5.8<L>  /  Alb  2.7<L>  /  TBili  0.2  /  DBili  x   /  AST  13<L>  /  ALT  18  /  AlkPhos  69  04-09    PT/INR - ( 09 Apr 2021 06:01 )   PT: 11.6 sec;   INR: 1.00 ratio         PTT - ( 09 Apr 2021 06:01 )  PTT:25.4 sec  LIVER FUNCTIONS - ( 09 Apr 2021 06:01 )  Alb: 2.7 g/dL / Pro: 5.8 gm/dL / ALK PHOS: 69 U/L / ALT: 18 U/L / AST: 13 U/L / GGT: x             RADIOLOGY & ADDITIONAL STUDIES:

## 2021-04-10 NOTE — CONSULT NOTE ADULT - PROBLEM SELECTOR RECOMMENDATION 9
GI follow up. Not uncommon in patients with severe AS. 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

## 2021-04-11 NOTE — PROGRESS NOTE ADULT - PROBLEM SELECTOR PLAN 1
GI follow up. Not uncommon in patients with severe AS. 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 GI follow up. Not uncommon in patients with severe AS. 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

## 2021-04-11 NOTE — PROGRESS NOTE ADULT - SUBJECTIVE AND OBJECTIVE BOX
REASON FOR CONSULT: fall, heart block    HPI: 89 y/o male with PMHx of CAD s/p PCI x2 Feb 2020, HTN, chronic systolic CHF, COPD, Prostate Cancer s/p radiation in the past, CKD stage IV, chronic anemia, gout, glaucoma, severe AS who presented to  with CC of 1 week of progressive weakness and fatigue and fall 24 hours prior to admission.  Patient lives at [facility]  Patient notes increasing fatigue over the last 1 week.  Patient notes on the day prior to admission, he was walking back from the bathroom and fell.  Patient had lab work drawn on 4/8 at [facility] which showed significant anemia with Hgb of 5.8 and patient was transferred to  for evaluation.  Of note, patient does admit to bloody stools at [facility].  Case also d/w patient's son Manuel.  No additional information provided.  In the ER, patient with Hgb of 5.3.  Ordered for 2 units pRBC.  Cardiology requested to evaluate status and symptoms. Pt denies chest pain or shortness of breath. He was unable to elaborate details of admission. He added no further symptoms. Echo reviewed.    4/11/21:  tele:    PAST MEDICAL & SURGICAL HISTORY:  Hypertension  HLD (hyperlipidemia)  Gout  Prostate cancer  Anemia  Chronic kidney disease (CKD)  CAD (coronary artery disease)  DM type 2 (diabetes mellitus, type 2)  Glaucoma  Aortic stenosis  Osteoporosis  Leg edema  H/O inguinal hernia repair  History of tonsillectomy  childhood    FAMILY HISTORY:  Family history of CVA  Family hx of lung cancer    Social History:    No present Tob/ etoh/ drug use.    Lives in [facility]      Allergies:  ACE inhibitors (Unknown)  aspirin (Unknown)  enalapril (Unknown)   (09 Apr 2021 10:17)    Home Medications:  acetaminophen 325 mg oral tablet: 1 tab(s) orally every 6 hours, As Needed - for mild pain (09 Apr 2021 13:51)  allopurinol 100 mg oral tablet: 1 tab(s) orally once a day (09 Apr 2021 13:51)  amLODIPine 5 mg oral tablet: 1 tab(s) orally once a day (09 Apr 2021 13:51)  aspirin 81 mg oral tablet, chewable: 1 tab(s) orally once a day (09 Apr 2021 13:51)  atorvastatin 40 mg oral tablet: 1 tab(s) orally once a day (09 Apr 2021 13:51)  calcium acetate 667 mg oral tablet: 1 tab(s) orally 3 times a day (09 Apr 2021 13:51)  Colace 100 mg oral capsule: 2 cap(s) orally once a day (at bedtime) (09 Apr 2021 13:51)  ferrous gluconate 324 mg (38 mg elemental iron) oral tablet: 1 tab(s) orally once a day (09 Apr 2021 13:51)  Lasix 20 mg oral tablet: 1 tab(s) orally once a day (09 Apr 2021 13:51)  MiraLax oral powder for reconstitution: 17 gram(s) orally once a day (09 Apr 2021 13:51)  pantoprazole 40 mg oral delayed release tablet: 1 tab(s) orally 2 times a day (09 Apr 2021 13:51)  prasugrel 5 mg oral tablet: 1 tab(s) orally once a day (09 Apr 2021 13:51)  Simbrinza 1%- 0.2% ophthalmic suspension: 1 drop(s) in the left eye 2 times a day (09 Apr 2021 13:51)  Vitamin D3 50,000 intl units (1250 mcg) oral capsule: 1 cap(s) orally once a month on the 12th (09 Apr 2021 13:51)    MEDICATIONS  (STANDING):  allopurinol 100 milliGRAM(s) Oral daily  amLODIPine   Tablet 5 milliGRAM(s) Oral daily  atorvastatin 40 milliGRAM(s) Oral at bedtime  furosemide    Tablet 20 milliGRAM(s) Oral daily  metoprolol succinate ER 12.5 milliGRAM(s) Oral daily  pantoprazole    Tablet 40 milliGRAM(s) Oral two times a day    MEDICATIONS  (PRN):  acetaminophen   Tablet .. 650 milliGRAM(s) Oral every 4 hours PRN Mild Pain (1 - 3)  aluminum hydroxide/magnesium hydroxide/simethicone Suspension 30 milliLiter(s) Oral every 4 hours PRN Dyspepsia  ondansetron Injectable 4 milliGRAM(s) IV Push every 6 hours PRN Nausea    Vital Signs Last 24 Hrs  T(C): 36.2 (11 Apr 2021 08:22), Max: 37 (10 Apr 2021 22:04)  T(F): 97.2 (11 Apr 2021 08:22), Max: 98.6 (10 Apr 2021 22:04)  HR: 69 (11 Apr 2021 08:22) (40 - 115)  BP: 141/51 (11 Apr 2021 08:22) (113/38 - 145/56)  BP(mean): --  RR: 18 (11 Apr 2021 08:22) (18 - 18)  SpO2: 96% (11 Apr 2021 08:22) (94% - 97%)    PHYSICAL EXAM:  Constitutional: NAD, awake and alert, well-developed  HEENT: PERR, EOMI,  No oral cyananosis.  Neck:  supple,  No JVD  Respiratory: Breath sounds are clear bilaterally, No wheezing, rales or rhonchi  Cardiovascular: S1 and S2, regular rate and rhythm, 3/6 JACKELIN  Gastrointestinal: Bowel Sounds present, soft, nontender.   Extremities: No peripheral edema. No clubbing or cyanosis.  Vascular: 2+ peripheral pulses  Neurological: A/O x 3, no focal deficits  Musculoskeletal: no calf tenderness.  Skin: No rashes.    RADIOLOGY:  < from: Xray Chest 1 View- PORTABLE-Urgent (04.09.21 @ 07:17) >  IMPRESSION:  No radiographic evidence for acute cardiopulmonary process.  Moderate hiatal hernia again seen.  If further evaluation of lung parenchyma is required, a Chest CT can be considered to exclude occult pathology not evident on plain film radiography.  < end of copied text >    EKG:  < from: 12 Lead ECG (04.10.21 @ 23:19) >  Diagnosis Line 2:1 Atrioventricular block   Left axis deviation  Septal infarct (cited on or before 24-JUL-2020)  ST & T wave abnormality, consider lateral ischemia  Abnormal ECG  When compared with ECG of 10-APR-2021 23:18,   2: 1 AV block  has replaced Sinus rhythm  < end of copied text >    ECHOCARDIOGRAM:  < from: TTE Echo Complete w/o Contrast w/ Doppler (04.09.21 @ 13:29) >   There is calcification of mitral valve . The leaflet opening is mildly   restricted.   Mean transmitral gradient is 4 mmHg; this finding is consistent with mild   mitral stenosis.   Moderate (2+) mitral regurgitation is present.   EA reversal of the mitral inflow consistent with reduced compliance of the   left ventricle.   Significant fibrocalcific changes noted to the aortic valve leaflets with   restriction in leaflet excursion. Peak transaortic gradient is 74 mmHg;   this finding is consistent with severe aortic stenosis.   Moderate (2+) aortic regurgitation is present.   The tricuspid valve leaflets are thin and pliable; valve motion is normal.   Mild (1+) tricuspid valve regurgitation is present.   Mild pulmonary hypertension.   Mild pulmonic valvular regurgitation (1+) is present.   Pulmonic valve not well seen, probably normal pulmonic valve function.   The left atrium is mildly dilated by LA volume index.   Mild concentric left ventricular hypertrophy is present.   Estimated left ventricular ejection fraction is 50-55 %.   The apex,mid,distal anteroseptal mildly hypokinetic.   Normal appearing right ventricle structure and function.       REASON FOR CONSULT: fall, heart block    HPI: 89 y/o male with PMHx of CAD s/p PCI x2 Feb 2020, HTN, chronic systolic CHF, COPD, Prostate Cancer s/p radiation in the past, CKD stage IV, chronic anemia, gout, glaucoma, severe AS who presented to  with CC of 1 week of progressive weakness and fatigue and fall 24 hours prior to admission.  Patient lives at [facility]  Patient notes increasing fatigue over the last 1 week.  Patient notes on the day prior to admission, he was walking back from the bathroom and fell.  Patient had lab work drawn on 4/8 at [facility] which showed significant anemia with Hgb of 5.8 and patient was transferred to  for evaluation.  Of note, patient does admit to bloody stools at [facility].  Case also d/w patient's son Manuel.  No additional information provided.  In the ER, patient with Hgb of 5.3.  Ordered for 2 units pRBC.  Cardiology requested to evaluate status and symptoms. Pt denies chest pain or shortness of breath. He was unable to elaborate details of admission. He added no further symptoms. Echo reviewed.    4/11/21: overall just feeling fatigued.   tele: Run of SVT overnight (4/10 at 11pm). Subsequently received IV lopressor which lead to Mobitz type I Wenckebach second degree AV block.     PAST MEDICAL & SURGICAL HISTORY:  Hypertension  HLD (hyperlipidemia)  Gout  Prostate cancer  Anemia  Chronic kidney disease (CKD)  CAD (coronary artery disease)  DM type 2 (diabetes mellitus, type 2)  Glaucoma  Aortic stenosis  Osteoporosis  Leg edema  H/O inguinal hernia repair  History of tonsillectomy  childhood    FAMILY HISTORY:  Family history of CVA  Family hx of lung cancer    Social History:    No present Tob/ etoh/ drug use.    Lives in [facility]      Allergies:  ACE inhibitors (Unknown)  aspirin (Unknown)  enalapril (Unknown)   (09 Apr 2021 10:17)    Home Medications:  acetaminophen 325 mg oral tablet: 1 tab(s) orally every 6 hours, As Needed - for mild pain (09 Apr 2021 13:51)  allopurinol 100 mg oral tablet: 1 tab(s) orally once a day (09 Apr 2021 13:51)  amLODIPine 5 mg oral tablet: 1 tab(s) orally once a day (09 Apr 2021 13:51)  aspirin 81 mg oral tablet, chewable: 1 tab(s) orally once a day (09 Apr 2021 13:51)  atorvastatin 40 mg oral tablet: 1 tab(s) orally once a day (09 Apr 2021 13:51)  calcium acetate 667 mg oral tablet: 1 tab(s) orally 3 times a day (09 Apr 2021 13:51)  Colace 100 mg oral capsule: 2 cap(s) orally once a day (at bedtime) (09 Apr 2021 13:51)  ferrous gluconate 324 mg (38 mg elemental iron) oral tablet: 1 tab(s) orally once a day (09 Apr 2021 13:51)  Lasix 20 mg oral tablet: 1 tab(s) orally once a day (09 Apr 2021 13:51)  MiraLax oral powder for reconstitution: 17 gram(s) orally once a day (09 Apr 2021 13:51)  pantoprazole 40 mg oral delayed release tablet: 1 tab(s) orally 2 times a day (09 Apr 2021 13:51)  prasugrel 5 mg oral tablet: 1 tab(s) orally once a day (09 Apr 2021 13:51)  Simbrinza 1%- 0.2% ophthalmic suspension: 1 drop(s) in the left eye 2 times a day (09 Apr 2021 13:51)  Vitamin D3 50,000 intl units (1250 mcg) oral capsule: 1 cap(s) orally once a month on the 12th (09 Apr 2021 13:51)    MEDICATIONS  (STANDING):  allopurinol 100 milliGRAM(s) Oral daily  amLODIPine   Tablet 5 milliGRAM(s) Oral daily  atorvastatin 40 milliGRAM(s) Oral at bedtime  furosemide    Tablet 20 milliGRAM(s) Oral daily  metoprolol succinate ER 12.5 milliGRAM(s) Oral daily  pantoprazole    Tablet 40 milliGRAM(s) Oral two times a day    MEDICATIONS  (PRN):  acetaminophen   Tablet .. 650 milliGRAM(s) Oral every 4 hours PRN Mild Pain (1 - 3)  aluminum hydroxide/magnesium hydroxide/simethicone Suspension 30 milliLiter(s) Oral every 4 hours PRN Dyspepsia  ondansetron Injectable 4 milliGRAM(s) IV Push every 6 hours PRN Nausea    Vital Signs Last 24 Hrs  T(C): 36.2 (11 Apr 2021 08:22), Max: 37 (10 Apr 2021 22:04)  T(F): 97.2 (11 Apr 2021 08:22), Max: 98.6 (10 Apr 2021 22:04)  HR: 69 (11 Apr 2021 08:22) (40 - 115)  BP: 141/51 (11 Apr 2021 08:22) (113/38 - 145/56)  BP(mean): --  RR: 18 (11 Apr 2021 08:22) (18 - 18)  SpO2: 96% (11 Apr 2021 08:22) (94% - 97%)    PHYSICAL EXAM:  Constitutional: NAD, awake and alert, well-developed  HEENT: PERR, EOMI,  No oral cyananosis.  Neck:  supple,  No JVD  Respiratory: Breath sounds are clear bilaterally, No wheezing, rales or rhonchi  Cardiovascular: S1 and S2, regular rate and rhythm, 3/6 JACKELIN  Gastrointestinal: Bowel Sounds present, soft, nontender.   Extremities: No peripheral edema. No clubbing or cyanosis.  Vascular: 2+ peripheral pulses  Neurological: A/O x 3, no focal deficits  Musculoskeletal: no calf tenderness.  Skin: No rashes.    RADIOLOGY:  < from: Xray Chest 1 View- PORTABLE-Urgent (04.09.21 @ 07:17) >  IMPRESSION:  No radiographic evidence for acute cardiopulmonary process.  Moderate hiatal hernia again seen.  If further evaluation of lung parenchyma is required, a Chest CT can be considered to exclude occult pathology not evident on plain film radiography.  < end of copied text >    EKG:  < from: 12 Lead ECG (04.10.21 @ 23:19) >  Diagnosis Line 2:1 Atrioventricular block   Left axis deviation  Septal infarct (cited on or before 24-JUL-2020)  ST & T wave abnormality, consider lateral ischemia  Abnormal ECG  When compared with ECG of 10-APR-2021 23:18,   2: 1 AV block  has replaced Sinus rhythm  < end of copied text >    ECHOCARDIOGRAM:  < from: TTE Echo Complete w/o Contrast w/ Doppler (04.09.21 @ 13:29) >   There is calcification of mitral valve . The leaflet opening is mildly   restricted.   Mean transmitral gradient is 4 mmHg; this finding is consistent with mild   mitral stenosis.   Moderate (2+) mitral regurgitation is present.   EA reversal of the mitral inflow consistent with reduced compliance of the   left ventricle.   Significant fibrocalcific changes noted to the aortic valve leaflets with   restriction in leaflet excursion. Peak transaortic gradient is 74 mmHg;   this finding is consistent with severe aortic stenosis.   Moderate (2+) aortic regurgitation is present.   The tricuspid valve leaflets are thin and pliable; valve motion is normal.   Mild (1+) tricuspid valve regurgitation is present.   Mild pulmonary hypertension.   Mild pulmonic valvular regurgitation (1+) is present.   Pulmonic valve not well seen, probably normal pulmonic valve function.   The left atrium is mildly dilated by LA volume index.   Mild concentric left ventricular hypertrophy is present.   Estimated left ventricular ejection fraction is 50-55 %.   The apex,mid,distal anteroseptal mildly hypokinetic.   Normal appearing right ventricle structure and function.

## 2021-04-11 NOTE — PROGRESS NOTE ADULT - PROBLEM SELECTOR PLAN 2
Possibly related to AS or anemia. Continue to monitor H/H. Possibly related to AS or anemia. Continue to monitor H/H.  - check orthostatics

## 2021-04-11 NOTE — PROGRESS NOTE ADULT - ASSESSMENT
89 y/o male with PMHx of CAD s/p PCI x2 Feb 2020, HTN, chronic systolic CHF, COPD, Prostate Cancer s/p radiation in the past, CKD stage IV, chronic anemia, gout, glaucoma, severe AS who presented to  with CC of 1 week of progressive weakness and fatigue and fall found to have symptomatic anemia with Hgb of 5.      #Presumed upper GI Bleed with symptomatic anemia:     Transfuse 2 units pRBC.  Monitor for signs/ sx of CHF as patient with ischemic cardiomyopathy and severe AS.    Hold ASA and Prasugrel.  Last PCI was 13 months ago.  Lower risk.    GI eval-- Dr. Hernandez.     4/10 - will resume ASA 81mg on 4/11/21 if H&H are stable. Stop Prasugrel   4/11 - resume ASA 81 and monitor H&H. Off Prasugrel     #Anemia:    Macrocytic anemia with acute GI bleeding.    Check iron/ b12/ folate.    Transfuse 2 units as above.    Goal Hgb >8.      #Hx of Cardiomyopathy and Severe AS:    Watch for signs of CHF with transfusions.    Hold on lasix for now.    Cont home PO lasix.    Check ECHO - results noted      #Fall:    Suspect related to symptomatic anemia.    PT eval.      4/10 - TELE reviewed: 1st degree AVB and 2nd degree AVB, Type I  seen by Dr Reynolds, cont to monitor, consider Zio patch per EP team   4/11 - TELE REVIEWED: SVT overnight and 1st degree AVB and 2nd degree AVB, Type I  low dose BB added, Zio patch vs PPM depending on response     #CAD s/p PCIx2 2020:    Last stenting 2/2020.    Cont statin.    4/10 - resume ASA tomorrow if H&H stable     #Gout:    cont allopurinol.      #Remote prostate Ca:    Stable.      #DVT Proph:  Venodynes.      #Code Status/ Goals of Care:    D/w patient and son. FULL CODE for now.      4/10 discussed with RN DR Reynolds; Manuel at bedside   4/11 discussed with RN and Dr Reynolds, cont cardiac monitoring   discussed with son Manuel   can be OOB to chair, will get PT eval

## 2021-04-11 NOTE — CHART NOTE - NSCHARTNOTEFT_GEN_A_CORE
Initially called as patient was in Aflutter on tele monitoring. Ordered IV Lopressor 5 mg. Patient only received 3/5 mg of Lopressor when his HR started to drop to the 60s. At the time, tele-monitoring showed second degree AV block. STAT EKG obtained and reviewed with attending, consistent with 2:1 block. Patients VS: /33, HR 49, RR 18, SpO2 96% RA. Patient asymptomatics. No more beta blockers. Re-consult EP in AM.     Vital Signs Last 24 Hrs  T(C): 36.5 (10 Apr 2021 22:58), Max: 37 (10 Apr 2021 22:04)  T(F): 97.7 (10 Apr 2021 22:58), Max: 98.6 (10 Apr 2021 22:04)  HR: 40 (10 Apr 2021 23:12) (40 - 115)  BP: 114/35 (10 Apr 2021 23:17) (113/38 - 145/56)  RR: 18 (10 Apr 2021 22:58) (18 - 18)  SpO2: 96% (10 Apr 2021 22:58) (94% - 97%)
Please note: Pt's son Manuel may be reached at

## 2021-04-11 NOTE — PROGRESS NOTE ADULT - SUBJECTIVE AND OBJECTIVE BOX
89 y/o male with PMHx of CAD s/p PCI x2 Feb 2020, HTN, chronic systolic CHF, COPD, Prostate Cancer s/p radiation in the past, CKD stage IV, chronic anemia, gout, glaucoma, severe AS who presented to  with CC of 1 week of progressive weakness and fatigue and fall 24 hours prior to admission.  Patient lives at [facility].  Patient notes increasing fatigue over the last 1 week.  Patient notes on the day prior to admission, he was walking back from the bathroom and fell.  Patient had lab work drawn on 4/8 at [facility] which showed significant anemia with Hgb of 5.8 and patient was transferred to  for evaluation.  Of note, patient does admit to bloody stools at [facility].  Case also d/w patient's son Manuel.  No additional information provided.  In the ER, patient with Hgb of 5.3.  Ordered for 2 units pRBC.      4/10 - feels tired: no cp palps sob abdo pain tingling or numbness anywhere  4/11 - no cp palps sob lightheadedenss or other compliants, tachy overnight, got 3mg/5mg BB and became imani with 2Degree AVB     PHYSICAL EXAM:  GENERAL: NAD, able to lie flat in bed  HEAD:  Atraumatic, Normocephalic  EYES: EOMI, PERRLA, normal sclera  ENT: Moist mucous membranes  NECK: Supple, No JVD, no nuchal rigidity  CHEST/LUNG: Clear to auscultation bilaterally; No rales, rhonchi, wheezing, or rubs. Unlabored respirations  HEART: Regular rate and rhythm; No murmurs, rubs, or gallops  ABDOMEN: Bowel sounds present; Soft, Nontender, Nondistended. No hepatomegaly  EXTREMITIES:  no pitting bilaterally  NERVOUS SYSTEM:  Alert & Oriented X3, speech clear. No focal motor or sensory deficits  MSK: FROM all 4 extremities, full and equal strength  SKIN: No rashes or lesions      RADIOLOGY/EKG:  < from: CT Head No Cont (04.09.21 @ 06:27) >  No acute intracranial hemorrhage, mass effect, or acute osseous fracture.    TELE 4/11 rev by me - SVT 120s overnight, then 2nd degree AVB, now in sinus 60s     ECHO   Summary   There is calcification of mitral valve . The leaflet opening is mildly   restricted.   Mean transmitral gradient is 4 mmHg; this finding is consistent with mild   mitral stenosis.   Moderate (2+) mitral regurgitation is present.   EA reversal of the mitral inflow consistent with reduced compliance of the   left ventricle.   Significant fibrocalcific changes noted to the aortic valve leaflets with   restriction in leaflet excursion. Peak transaortic gradient is 74 mmHg;   this finding is consistent with severe aortic stenosis.   Moderate (2+) aortic regurgitation is present.   The tricuspid valve leaflets are thin and pliable; valve motion is normal.   Mild (1+) tricuspid valve regurgitation is present.   Mild pulmonary hypertension.   Mild pulmonic valvular regurgitation (1+) is present.   Pulmonic valve not well seen, probably normal pulmonic valve function.   The left atrium is mildly dilated by LA volume index.   Mild concentric left ventricular hypertrophy is present.   Estimated left ventricular ejection fraction is 50-55 %.   The apex,mid,distal anteroseptal mildly hypokinetic.   Normal appearing right ventricle structure and function.      LABS: All Labs Reviewed:                        9.2    11.07 )-----------( 187      ( 11 Apr 2021 09:04 )             29.3     04-11    143  |  112<H>  |  94<H>  ----------------------------<  218<H>  3.8   |  23  |  3.43<H>    Ca    8.3<L>      11 Apr 2021 09:04  Mg     2.5     04-11    MEDS:   acetaminophen   Tablet .. 650 milliGRAM(s) Oral every 4 hours PRN  allopurinol 100 milliGRAM(s) Oral daily  aluminum hydroxide/magnesium hydroxide/simethicone Suspension 30 milliLiter(s) Oral every 4 hours PRN  amLODIPine   Tablet 5 milliGRAM(s) Oral daily  aspirin enteric coated 81 milliGRAM(s) Oral daily  atorvastatin 40 milliGRAM(s) Oral at bedtime  furosemide    Tablet 20 milliGRAM(s) Oral daily  metoprolol succinate ER 12.5 milliGRAM(s) Oral daily  ondansetron Injectable 4 milliGRAM(s) IV Push every 6 hours PRN  pantoprazole    Tablet 40 milliGRAM(s) Oral two times a day  potassium chloride    Tablet ER 20 milliEquivalent(s) Oral once

## 2021-04-11 NOTE — PROGRESS NOTE ADULT - ATTENDING COMMENTS
Pt receiving a low dose BB. Monitor upon discharge to evaluate for complex arrhythmia. EP appreciated.

## 2021-04-11 NOTE — PROGRESS NOTE ADULT - PROBLEM SELECTOR PLAN 4
Run of SVT overnight (4/10 at 11pm). Subsequently received IV lopressor which lead to Mobitz type I Wenckebach second degree AV block. seen by EP. Cont. low dose Toprol 12.5 mg daily. EP to follow up. Run of SVT overnight (4/10 at 11pm). Subsequently received IV lopressor which lead to Mobitz type I Wenckebach second degree AV block. seen by EP. Cont. low dose Toprol 12.5 mg daily. EP to follow up. keep K4, Mg2.

## 2021-04-11 NOTE — PROGRESS NOTE ADULT - SUBJECTIVE AND OBJECTIVE BOX
Patient is a 89y old  Male who presents with a chief complaint of weakness, fall (10 Apr 2021 13:44)      HPI:  Asked to see this 89 y/o male with CAD s/p PCI x2 2020, HTN, chronic systolic CHF, COPD, Prostate Cancer s/p radiation in the past, CKD stage IV, chronic anemia, gout, glaucoma, severe AS who presented to  with CC of 1 week of progressive weakness and fatigue and fall 24 hours prior to admission.  Patient lives at [facility]  Patient notes increasing fatigue over the last 1 week.  Patient notes on the day prior to admission, he was walking back from the bathroom and fell.  Patient had lab work drawn on  at [facility] which showed significant anemia with Hgb of 5.8 and patient was transferred to  for evaluation.  Of note, patient does admit to bloody stools at [facility].  Case also d/w patient's son Manuel.  No additional information provided.  In the ER, patient with Hgb of 5.3.  Ordered for 2 units pRBC.   He was noted to have bradycardia with Mobitz type I second degree AV block.     21 - Overnight had a SVT that rock Funny [employer] Diemanav ortegapresor was being pushed.       PAST MEDICAL & SURGICAL HISTORY:  Hypertension  HLD (hyperlipidemia)  Gout  Prostate cancer  Anemia  Chronic kidney disease (CKD)  CAD (coronary artery disease)  DM type 2 (diabetes mellitus, type 2)  Glaucoma  Aortic stenosis  Osteoporosis  Leg edema  H/O inguinal hernia repair  History of tonsillectomy  childhood      FAMILY HISTORY:  Family history of CVA  Family hx of lung cancer    Social History:    No present Tob/ etoh/ drug use.    Lives in [facility]      Allergies:  ACE inhibitors (Unknown)  aspirin (Unknown)  enalapril (Unknown)   (2021 10:17)    MEDICATIONS  (STANDING):  allopurinol 100 milliGRAM(s) Oral daily  amLODIPine   Tablet 5 milliGRAM(s) Oral daily  atorvastatin 40 milliGRAM(s) Oral at bedtime  furosemide    Tablet 20 milliGRAM(s) Oral daily  pantoprazole    Tablet 40 milliGRAM(s) Oral two times a day    MEDICATIONS  (PRN):  acetaminophen   Tablet .. 650 milliGRAM(s) Oral every 4 hours PRN Mild Pain (1 - 3)  aluminum hydroxide/magnesium hydroxide/simethicone Suspension 30 milliLiter(s) Oral every 4 hours PRN Dyspepsia  ondansetron Injectable 4 milliGRAM(s) IV Push every 6 hours PRN Nausea        Vital Signs Last 24 Hrs  T(C): 36.2 (2021 08:22), Max: 37 (10 Apr 2021 22:04)  T(F): 97.2 (2021 08:22), Max: 98.6 (10 Apr 2021 22:04)  HR: 69 (2021 08:22) (40 - 115)  BP: 141/51 (2021 08:22) (113/38 - 145/56)  BP(mean): --  RR: 18 (2021 08:22) (18 - 18)  SpO2: 96% (2021 08:) (94% - 97%)          INTERPRETATION OF TELEMETRY:      sinus with second degree Mobitz type I AVBlock brief SVT       ECG:    Sinus 82 1st degree AVblock LAD PRWP NS STT changes.     LABS:                          9.0    x     )-----------( x        ( 10 Apr 2021 12:01 )             28.5     04-10    144  |  112<H>  |  96<H>  ----------------------------<  154<H>  3.7   |  27  |  3.37<H>    Ca    8.9      10 Apr 2021 09:00    TPro  5.8<L>  /  Alb  2.7<L>  /  TBili  0.2  /  DBili  x   /  AST  13<L>  /  ALT  18  /  AlkPhos  69  04-09    CARDIAC MARKERS ( 2021 06:01 )  0.033 ng/mL / x     / x     / x     / x              PT/INR - ( 2021 06:01 )   PT: 11.6 sec;   INR: 1.00 ratio         PTT - ( 2021 06:01 )  PTT:25.4 sec  Urinalysis Basic - ( 2021 07:25 )    Color: Yellow / Appearance: Clear / S.010 / pH: x  Gluc: x / Ketone: Negative  / Bili: Negative / Urobili: Negative mg/dL   Blood: x / Protein: 15 mg/dL / Nitrite: Negative   Leuk Esterase: Negative / RBC: 0-2 /HPF / WBC 0-2   Sq Epi: x / Non Sq Epi: Occasional / Bacteria: Occasional            RADIOLOGY & ADDITIONAL STUDIES:

## 2021-04-11 NOTE — PROGRESS NOTE ADULT - ASSESSMENT
This is a 89 year old man admitted with an acute GI bleed and severe anemia . He was noted to have chronic 1st degree AV block with intermittent mobitz type I Wenckebach second degree AV block during sleep.     Symptoms seem to be related to anemia.  Would continue to monitor.   Will start low dose Toprol 12.5 mg daily   Will recommend a wearable monitor such as a zio patch on discharge.     Attempted to call son at [phone] no answer.

## 2021-04-11 NOTE — PROGRESS NOTE ADULT - PROBLEM SELECTOR PLAN 3
Echo confirms severe aortic stenosis. Doubt patient is an optimal candidate for TAVR. Continue to support blood loss and hemodynamics. Echo confirms severe aortic stenosis. Doubt patient is an optimal candidate for TAVR. Continue to support blood loss and hemodynamics.  - Cont. PO Lasix 20mg. add FR

## 2021-04-12 NOTE — PROGRESS NOTE ADULT - ASSESSMENT
89 y/o male with PMHx of CAD s/p PCI x2 Feb 2020, HTN, chronic systolic CHF, COPD, Prostate Cancer s/p radiation in the past, CKD stage IV, chronic anemia, gout, glaucoma, severe AS who presented to  with CC of 1 week of progressive weakness and fatigue and fall found to have symptomatic anemia with Hgb of 5.      #Presumed upper GI Bleed with symptomatic anemia:     Transfuse 2 units pRBC.  Monitor for signs/ sx of CHF as patient with ischemic cardiomyopathy and severe AS.    Hold ASA and Prasugrel.  Last PCI was 13 months ago.  Lower risk.    GI eval-- Dr. Hernandez.     4/10 - will resume ASA 81mg on 4/11/21 if H&H are stable. Stop Prasugrel   4/11 - resume ASA 81 and monitor H&H. Off Prasugrel   4/12 - on ASA, Hb slightly down, pt asymptomatic, cont ASA and monitor     #Anemia:    Macrocytic anemia with acute GI bleeding.    Check iron/ b12/ folate.    Transfuse 2 units as above.    Goal Hgb >8.      #Hx of Cardiomyopathy and Severe AS:    Watch for signs of CHF with transfusions.    Hold on lasix for now.    Cont home PO lasix.    Check ECHO - results noted  - Severe AS     #Fall:    Suspect related to symptomatic anemia.    PT eval.      4/10 - TELE reviewed: 1st degree AVB and 2nd degree AVB, Type I  seen by Dr Reynolds, cont to monitor, consider Zio patch per EP team   4/11 - TELE REVIEWED: SVT overnight and 1st degree AVB and 2nd degree AVB, Type I  low dose BB added, Zio patch vs PPM depending on response   4/12 - cont BB, possible zio patch on discharge     #CAD s/p PCIx2 2020:    Last stenting 2/2020.    Cont statin.    4/10 - resume ASA tomorrow if H&H stable   4/12 - cont ASA    #Gout:    cont allopurinol.      #Remote prostate Ca:    Stable.      #DVT Proph:  Venodynes.      #Code Status/ Goals of Care:    D/w patient and son. FULL CODE for now.      4/10 discussed with RN DR Reynolds; Manuel at bedside   4/11 discussed with RN and Dr Reynolds, cont cardiac monitoring   discussed with son Manuel   can be OOB to chair, will get PT eval   4/12 - POC discussed with EP NP and GI, for PT OOB today

## 2021-04-12 NOTE — PROGRESS NOTE ADULT - PROBLEM SELECTOR PLAN 3
Echo confirms severe aortic stenosis. Doubt patient is an optimal candidate for TAVR. Continue to support blood loss and hemodynamics.  - Cont. PO Lasix 20mg. add FR

## 2021-04-12 NOTE — PHYSICAL THERAPY INITIAL EVALUATION ADULT - PLANNED THERAPY INTERVENTIONS, PT EVAL
Eval, amb, transfers, bed mobility x 20'./bed mobility training/gait training/ROM/strengthening/transfer training

## 2021-04-12 NOTE — PROGRESS NOTE ADULT - PROBLEM SELECTOR PLAN 1
GI follow up. Not uncommon in patients with severe AS. 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

## 2021-04-12 NOTE — PROGRESS NOTE ADULT - SUBJECTIVE AND OBJECTIVE BOX
89 y/o male with PMHx of CAD s/p PCI x2 Feb 2020, HTN, chronic systolic CHF, COPD, Prostate Cancer s/p radiation in the past, CKD stage IV, chronic anemia, gout, glaucoma, severe AS who presented to  with CC of 1 week of progressive weakness and fatigue and fall 24 hours prior to admission.  Patient lives at [facility].  Patient notes increasing fatigue over the last 1 week.  Patient notes on the day prior to admission, he was walking back from the bathroom and fell.  Patient had lab work drawn on 4/8 at [facility] which showed significant anemia with Hgb of 5.8 and patient was transferred to  for evaluation.  Of note, patient does admit to bloody stools at [facility].  Case also d/w patient's son Manuel.  No additional information provided.  In the ER, patient with Hgb of 5.3.  Ordered for 2 units pRBC.      4/10 - feels tired: no cp palps sob abdo pain tingling or numbness anywhere  4/11 - no cp palps sob lightheadedenss or other compliants, tachy overnight, got 3mg/5mg BB and became imani with 2Degree AVB   4/12 - pt denies any complaints, no cp palps sob abdo pain etc, PT today     PHYSICAL EXAM:  GENERAL: NAD, able to lie flat in bed  HEAD:  Atraumatic, Normocephalic  EYES: EOMI, PERRLA, normal sclera  ENT: Moist mucous membranes  NECK: Supple, No JVD, no nuchal rigidity  CHEST/LUNG: Clear to auscultation bilaterally; No rales, rhonchi, wheezing, or rubs. Unlabored respirations  HEART: Regular rate and rhythm; No murmurs, rubs, or gallops  ABDOMEN: Bowel sounds present; Soft, Nontender, Nondistended. No hepatomegaly  EXTREMITIES:  no pitting bilaterally  NERVOUS SYSTEM:  Alert & Oriented X3, speech clear. No focal motor or sensory deficits  MSK: FROM all 4 extremities, full and equal strength  SKIN: No rashes or lesions      RADIOLOGY/EKG:  < from: CT Head No Cont (04.09.21 @ 06:27) >  No acute intracranial hemorrhage, mass effect, or acute osseous fracture.    TELE 4/11 rev by me - SVT 120s overnight, then 2nd degree AVB, now in sinus 60s   Tele 4/12 - sinus 50-60s, occ 2nd degree AVB     ECHO   Summary   There is calcification of mitral valve . The leaflet opening is mildly   restricted.   Mean transmitral gradient is 4 mmHg; this finding is consistent with mild   mitral stenosis.   Moderate (2+) mitral regurgitation is present.   EA reversal of the mitral inflow consistent with reduced compliance of the   left ventricle.   Significant fibrocalcific changes noted to the aortic valve leaflets with   restriction in leaflet excursion. Peak transaortic gradient is 74 mmHg;   this finding is consistent with severe aortic stenosis.   Moderate (2+) aortic regurgitation is present.   The tricuspid valve leaflets are thin and pliable; valve motion is normal.   Mild (1+) tricuspid valve regurgitation is present.   Mild pulmonary hypertension.   Mild pulmonic valvular regurgitation (1+) is present.   Pulmonic valve not well seen, probably normal pulmonic valve function.   The left atrium is mildly dilated by LA volume index.   Mild concentric left ventricular hypertrophy is present.   Estimated left ventricular ejection fraction is 50-55 %.   The apex,mid,distal anteroseptal mildly hypokinetic.   Normal appearing right ventricle structure and function.      LABS: All Labs Reviewed:                        8.6    9.29  )-----------( 198      ( 12 Apr 2021 09:01 )             27.3     04-12    144  |  114<H>  |  92<H>  ----------------------------<  154<H>  4.0   |  26  |  3.29<H>    Ca    8.5      12 Apr 2021 09:01  Mg     2.6     04-12        MEDS:   acetaminophen   Tablet .. 650 milliGRAM(s) Oral every 4 hours PRN  allopurinol 100 milliGRAM(s) Oral daily  aluminum hydroxide/magnesium hydroxide/simethicone Suspension 30 milliLiter(s) Oral every 4 hours PRN  amLODIPine   Tablet 5 milliGRAM(s) Oral daily  aspirin enteric coated 81 milliGRAM(s) Oral daily  atorvastatin 40 milliGRAM(s) Oral at bedtime  furosemide    Tablet 20 milliGRAM(s) Oral daily  metoprolol succinate ER 12.5 milliGRAM(s) Oral daily  ondansetron Injectable 4 milliGRAM(s) IV Push every 6 hours PRN  pantoprazole    Tablet 40 milliGRAM(s) Oral two times a day

## 2021-04-12 NOTE — PHYSICAL THERAPY INITIAL EVALUATION ADULT - ACTIVE RANGE OF MOTION EXAMINATION, REHAB EVAL
Bilateral shoulder flex ~ 140 degrees. Bilateral hip flex ~ 90 degrees and bilateral DF neutral./Left UE Active ROM was WFL (within functional limits)/Right UE Active ROM was WFL (within functional limits)/Left LE Active ROM was WFL (within functional limits)/Right LE Active ROM was WFL (within functional limits)

## 2021-04-12 NOTE — PROGRESS NOTE ADULT - SUBJECTIVE AND OBJECTIVE BOX
CC:  89y old  Male who presents with weakness, and fall    89 y/o male with CAD s/p PCI x2 Feb 2020, HTN, chronic systolic CHF, COPD, Prostate Cancer s/p radiation in the past, CKD stage IV, chronic anemia, gout, glaucoma, severe AS who presented to  with CC of 1 week of progressive weakness and fatigue and fall 24 hours prior to admission.  Patient lives at [facility]  Patient notes increasing fatigue over the last 1 week.  Patient notes on the day prior to admission, he was walking back from the bathroom and fell.  Patient had lab work drawn on 4/8 at [facility] which showed significant anemia with Hgb of 5.8 and patient was transferred to  for evaluation.  Of note, patient does admit to bloody stools at [facility].  Case also d/w patient's son Manuel.  No additional information provided.  In the ER, patient with Hgb of 5.3.  Ordered for 2 units pRBC.   He was noted to have bradycardia with Mobitz type I second degree AV block.         PAST MEDICAL & SURGICAL HISTORY:  Hypertension  HLD (hyperlipidemia)  Gout  Prostate cancer  Anemia  Chronic kidney disease (CKD)  CAD (coronary artery disease)  DM type 2 (diabetes mellitus, type 2)  Glaucoma  Aortic stenosis  Osteoporosis  Leg edema  H/O inguinal hernia repair  History of tonsillectomy  childhood      FAMILY HISTORY:  Family history of CVA  Family hx of lung cancer    Social History:    No present Tob/ etoh/ drug use.    Lives in [facility]      Allergies:  ACE inhibitors (Unknown)  aspirin (Unknown)  enalapril (Unknown)   (09 Apr 2021 10:17)      TELE:  SB heart rate 50d during the last 24 horus    MEDICATIONS  (STANDING):  allopurinol 100 milliGRAM(s) Oral daily  amLODIPine   Tablet 5 milliGRAM(s) Oral daily  aspirin enteric coated 81 milliGRAM(s) Oral daily  atorvastatin 40 milliGRAM(s) Oral at bedtime  furosemide    Tablet 20 milliGRAM(s) Oral daily  metoprolol succinate ER 12.5 milliGRAM(s) Oral daily  pantoprazole    Tablet 40 milliGRAM(s) Oral two times a day    MEDICATIONS  (PRN):  acetaminophen   Tablet .. 650 milliGRAM(s) Oral every 4 hours PRN Mild Pain (1 - 3)  aluminum hydroxide/magnesium hydroxide/simethicone Suspension 30 milliLiter(s) Oral every 4 hours PRN Dyspepsia  ondansetron Injectable 4 milliGRAM(s) IV Push every 6 hours PRN Nausea      Allergies    ACE inhibitors (Unknown)  aspirin (Unknown)  enalapril (Unknown)    Intolerances        Vital Signs Last 24 Hrs  T(C): 36.4 (12 Apr 2021 09:34), Max: 36.8 (11 Apr 2021 23:10)  T(F): 97.5 (12 Apr 2021 09:34), Max: 98.3 (11 Apr 2021 23:10)  HR: 71 (12 Apr 2021 10:14) (65 - 73)  BP: 150/64 (12 Apr 2021 10:14) (127/52 - 150/64)  BP(mean): --  RR: 18 (12 Apr 2021 10:14) (18 - 19)  SpO2: 95% (12 Apr 2021 09:34) (94% - 95%)        LABS:                        8.6    9.29  )-----------( 198      ( 12 Apr 2021 09:01 )             27.3     04-12    144  |  114<H>  |  92<H>  ----------------------------<  154<H>  4.0   |  26  |  3.29<H>    Ca    8.5      12 Apr 2021 09:01  Mg     2.6     04-12                RADIOLOGY & ADDITIONAL TESTS:

## 2021-04-12 NOTE — PROGRESS NOTE ADULT - SUBJECTIVE AND OBJECTIVE BOX
REASON FOR CONSULT: fall, heart block    HPI: 89 y/o male with PMHx of CAD s/p PCI x2 2020, HTN, chronic systolic CHF, COPD, Prostate Cancer s/p radiation in the past, CKD stage IV, chronic anemia, gout, glaucoma, severe AS who presented to  with CC of 1 week of progressive weakness and fatigue and fall 24 hours prior to admission.  Patient lives at [facility].  Patient notes increasing fatigue over the last 1 week.  Patient notes on the day prior to admission, he was walking back from the bathroom and fell.  Patient had lab work drawn on  at [facility] which showed significant anemia with Hgb of 5.8 and patient was transferred to  for evaluation.  Of note, patient does admit to bloody stools at [facility].  Case also d/w patient's son Manuel.  No additional information provided.  In the ER, patient with Hgb of 5.3.  Ordered for 2 units pRBC.  Cardiology requested to evaluate status and symptoms. Pt denies chest pain or shortness of breath. He was unable to elaborate details of admission. He added no further symptoms. Echo reviewed.    21: overall just feeling fatigued.   tele: Run of SVT overnight (4/10 at 11pm). Subsequently received IV lopressor which lead to Mobitz type I Wenckebach second degree AV block.   : no complaints      MEDICATIONS:  OUTPATIENT  Home Medications:  acetaminophen 325 mg oral tablet: 1 tab(s) orally every 6 hours, As Needed - for mild pain (2021 13:51)  allopurinol 100 mg oral tablet: 1 tab(s) orally once a day (2021 13:51)  amLODIPine 5 mg oral tablet: 1 tab(s) orally once a day (2021 13:51)  aspirin 81 mg oral tablet, chewable: 1 tab(s) orally once a day (2021 13:51)  atorvastatin 40 mg oral tablet: 1 tab(s) orally once a day (2021 13:51)  calcium acetate 667 mg oral tablet: 1 tab(s) orally 3 times a day (2021 13:51)  Colace 100 mg oral capsule: 2 cap(s) orally once a day (at bedtime) (2021 13:51)  ferrous gluconate 324 mg (38 mg elemental iron) oral tablet: 1 tab(s) orally once a day (2021 13:51)  Lasix 20 mg oral tablet: 1 tab(s) orally once a day (2021 13:51)  MiraLax oral powder for reconstitution: 17 gram(s) orally once a day (2021 13:51)  pantoprazole 40 mg oral delayed release tablet: 1 tab(s) orally 2 times a day (2021 13:51)  prasugrel 5 mg oral tablet: 1 tab(s) orally once a day (2021 13:51)  Simbrinza 1%- 0.2% ophthalmic suspension: 1 drop(s) in the left eye 2 times a day (2021 13:51)  Vitamin D3 50,000 intl units (1250 mcg) oral capsule: 1 cap(s) orally once a month on the  (2021 13:51)      INPATIENT  MEDICATIONS  (STANDING):  allopurinol 100 milliGRAM(s) Oral daily  amLODIPine   Tablet 5 milliGRAM(s) Oral daily  aspirin enteric coated 81 milliGRAM(s) Oral daily  atorvastatin 40 milliGRAM(s) Oral at bedtime  furosemide    Tablet 20 milliGRAM(s) Oral daily  metoprolol succinate ER 12.5 milliGRAM(s) Oral daily  pantoprazole    Tablet 40 milliGRAM(s) Oral two times a day    MEDICATIONS  (PRN):  acetaminophen   Tablet .. 650 milliGRAM(s) Oral every 4 hours PRN Mild Pain (1 - 3)  aluminum hydroxide/magnesium hydroxide/simethicone Suspension 30 milliLiter(s) Oral every 4 hours PRN Dyspepsia  ondansetron Injectable 4 milliGRAM(s) IV Push every 6 hours PRN Nausea    Vital Signs Last 24 Hrs  T(C): 36.4 (2021 09:34), Max: 36.8 (2021 23:10)  T(F): 97.5 (2021 09:34), Max: 98.3 (2021 23:10)  HR: 71 (2021 10:14) (65 - 73)  BP: 150/64 (2021 10:14) (127/52 - 150/64)  BP(mean): --  RR: 18 (2021 10:14) (18 - 19)  SpO2: 95% (2021 09:34) (94% - 95%)Daily     Daily Weight in k.4 (2021 06:18)I&O's Summary    2021 07:01  -  2021 07:00  --------------------------------------------------------  IN: 0 mL / OUT: 1300 mL / NET: -1300 mL        I&O's Detail    2021 07:01  -  2021 07:00  --------------------------------------------------------  IN:  Total IN: 0 mL    OUT:    Voided (mL): 1300 mL  Total OUT: 1300 mL    Total NET: -1300 mL          I&O's Summary    2021 07:01  -  2021 07:00  --------------------------------------------------------  IN: 0 mL / OUT: 1300 mL / NET: -1300 mL      PHYSICAL EXAM:  Constitutional: NAD, awake and alert, well-developed  HEENT: PERR, EOMI,  No oral cyananosis.  Neck:  supple,  No JVD  Respiratory: Breath sounds are clear bilaterally, No wheezing, rales or rhonchi  Cardiovascular: S1 and S2, regular rate and rhythm, 3/6 JACKELIN  Gastrointestinal: Bowel Sounds present, soft, nontender.   Extremities: No peripheral edema. No clubbing or cyanosis.  Vascular: 2+ peripheral pulses  Neurological: A/O x 3, no focal deficits  Musculoskeletal: no calf tenderness.  Skin: No rashes.    LABS: All Labs Reviewed:                        8.6    9.29  )-----------( 198      ( 2021 09:01 )             27.3                         9.2    11.07 )-----------( 187      ( 2021 09:04 )             29.3                         9.0    x     )-----------( x        ( 10 Apr 2021 12:01 )             28.5     2021 09:    144    |  114    |  92     ----------------------------<  154    4.0     |  26     |  3.29   :    143    |  112    |  94     ----------------------------<  218    3.8     |  23     |  3.43   10 Apr 2021 09:    144    |  112    |  96     ----------------------------<  154    3.7     |  27     |  3.37     Ca    8.5        2021 09:  Ca    8.3        2021 09:  Ca    8.9        10 Apr 2021 09:00  Mg     2.6       2021 09:  Mg     2.5       2021 09:04            Blood Culture: Organism --  Gram Stain Blood -- Gram Stain --  Specimen Source .Urine None  Culture-Blood --         @ 09:01  TSH: 2.38      RADIOLOGY:  < from: Xray Chest 1 View- PORTABLE-Urgent (21 @ 07:17) >  IMPRESSION:  No radiographic evidence for acute cardiopulmonary process.  Moderate hiatal hernia again seen.  If further evaluation of lung parenchyma is required, a Chest CT can be considered to exclude occult pathology not evident on plain film radiography.  < end of copied text >    EKG:  < from: 12 Lead ECG (04.10.21 @ 23:19) >  Diagnosis Line 2:1 Atrioventricular block   Left axis deviation  Septal infarct (cited on or before 2020)  ST & T wave abnormality, consider lateral ischemia  Abnormal ECG  When compared with ECG of 10-APR-2021 23:18,   2: 1 AV block  has replaced Sinus rhythm  < end of copied text >    ECHOCARDIOGRAM:  < from: TTE Echo Complete w/o Contrast w/ Doppler (21 @ 13:29) >   There is calcification of mitral valve . The leaflet opening is mildly   restricted.   Mean transmitral gradient is 4 mmHg; this finding is consistent with mild   mitral stenosis.   Moderate (2+) mitral regurgitation is present.   EA reversal of the mitral inflow consistent with reduced compliance of the   left ventricle.   Significant fibrocalcific changes noted to the aortic valve leaflets with   restriction in leaflet excursion. Peak transaortic gradient is 74 mmHg;   this finding is consistent with severe aortic stenosis.   Moderate (2+) aortic regurgitation is present.   The tricuspid valve leaflets are thin and pliable; valve motion is normal.   Mild (1+) tricuspid valve regurgitation is present.   Mild pulmonary hypertension.   Mild pulmonic valvular regurgitation (1+) is present.   Pulmonic valve not well seen, probably normal pulmonic valve function.   The left atrium is mildly dilated by LA volume index.   Mild concentric left ventricular hypertrophy is present.   Estimated left ventricular ejection fraction is 50-55 %.   The apex,mid,distal anteroseptal mildly hypokinetic.   Normal appearing right ventricle structure and function.        Link Narvaez M.D.  Cardiology, [facility]  Cell: [phone]  Offices:    ([facility] Office)  [phone] ([facility] Office)

## 2021-04-12 NOTE — PHYSICAL THERAPY INITIAL EVALUATION ADULT - PERTINENT HX OF CURRENT PROBLEM, REHAB EVAL
Pt admitted to  secondary to weakness and fall. CT head: neg. CT c-spine: neg for acute changes, cervical spondylosis. H/H- 8.6/ 27.3. COVID 19: neg. Antibody- + > 250.00.

## 2021-04-12 NOTE — PROGRESS NOTE ADULT - PROBLEM SELECTOR PROBLEM 5
CAD (coronary artery disease)
CAD (coronary artery disease)
Interpolation Flap Text: A decision was made to reconstruct the defect utilizing an interpolation axial flap and a staged reconstruction.  A telfa template was made of the defect.  This telfa template was then used to outline the interpolation flap.  The donor area for the pedicle flap was then injected with anesthesia.  The flap was excised through the skin and subcutaneous tissue down to the layer of the underlying musculature.  The interpolation flap was carefully excised within this deep plane to maintain its blood supply.  The edges of the donor site were undermined.   The donor site was closed in a primary fashion.  The pedicle was then rotated into position and sutured.  Once the tube was sutured into place, adequate blood supply was confirmed with blanching and refill.  The pedicle was then wrapped with xeroform gauze and dressed appropriately with a telfa and gauze bandage to ensure continued blood supply and protect the attached pedicle.

## 2021-04-12 NOTE — PROGRESS NOTE ADULT - PROBLEM SELECTOR PLAN 4
Run of SVT overnight (4/10 at 11pm). Subsequently received IV lopressor which lead to Mobitz type I Wenckebach second degree AV block. seen by EP. Cont. low dose Toprol 12.5 mg daily. EP to follow up. keep K4, Mg2.

## 2021-04-12 NOTE — PROGRESS NOTE ADULT - ASSESSMENT
89 year old male admitted from [facility] after a fall, who was found to be in Second Degree HB Type 1, no pauses in the setting of severe anemia require PRBC transfixion. GII followiang pt    Continue to monitor thi pt  Monitor lytes and Mg  Keep on current dose of BB     The plan to continue with medication and monitoring pt has been discussed with son. 89 year old male admitted from [facility] after a fall, who was found to be in Second Degree HB Type 1, no pauses in the setting of severe anemia require PRBC transfixion. GII following pt.  CPVID Negative.  LVEF 55% (echo-this admission)    Continue to monitor thi pt  Monitor lytes and Mg  Keep on current dose of BB    Possible ZIO patch upon discharge    The plan to continue with medication and monitoring pt has been discussed with son.

## 2021-04-13 NOTE — PROGRESS NOTE ADULT - SUBJECTIVE AND OBJECTIVE BOX
Patient is a 89y old  Male who presents with a chief complaint of weakness, fall (12 Apr 2021 19:28)      Subective:  No bleeding or complaints    PAST MEDICAL & SURGICAL HISTORY:  Hypertension    HLD (hyperlipidemia)    Gout    Prostate cancer    Anemia    Chronic kidney disease (CKD)    CAD (coronary artery disease)    DM type 2 (diabetes mellitus, type 2)    Glaucoma    Aortic stenosis    Osteoporosis    Leg edema    H/O inguinal hernia repair    History of tonsillectomy  childhood    History of colonoscopy        MEDICATIONS  (STANDING):  allopurinol 100 milliGRAM(s) Oral daily  amLODIPine   Tablet 5 milliGRAM(s) Oral daily  aspirin enteric coated 81 milliGRAM(s) Oral daily  atorvastatin 40 milliGRAM(s) Oral at bedtime  furosemide    Tablet 20 milliGRAM(s) Oral daily  metoprolol succinate ER 12.5 milliGRAM(s) Oral daily  pantoprazole    Tablet 40 milliGRAM(s) Oral two times a day    MEDICATIONS  (PRN):  acetaminophen   Tablet .. 650 milliGRAM(s) Oral every 4 hours PRN Mild Pain (1 - 3)  aluminum hydroxide/magnesium hydroxide/simethicone Suspension 30 milliLiter(s) Oral every 4 hours PRN Dyspepsia  ondansetron Injectable 4 milliGRAM(s) IV Push every 6 hours PRN Nausea      REVIEW OF SYSTEMS:    RESPIRATORY: No shortness of breath  CARDIOVASCULAR: No chest pain  All other review of systems is negative unless indicated above.    Vital Signs Last 24 Hrs  T(C): 36.8 (13 Apr 2021 08:17), Max: 36.8 (12 Apr 2021 23:15)  T(F): 98.3 (13 Apr 2021 08:17), Max: 98.3 (13 Apr 2021 08:17)  HR: 79 (13 Apr 2021 08:17) (67 - 79)  BP: 134/56 (13 Apr 2021 08:17) (112/47 - 150/64)  BP(mean): --  RR: 18 (13 Apr 2021 08:17) (18 - 19)  SpO2: 96% (13 Apr 2021 08:17) (92% - 96%)    PHYSICAL EXAM:    Constitutional: NAD, well-developed  Respiratory: CTAB  Cardiovascular: S1 and S2, RRR  Gastrointestinal: BS+, soft, NT/ND  Extremities: No peripheral edema  Psychiatric: Normal mood, normal affect    LABS:                        8.8    9.88  )-----------( 217      ( 13 Apr 2021 06:47 )             27.7     04-13    144  |  113<H>  |  88<H>  ----------------------------<  154<H>  3.8   |  27  |  3.25<H>    Ca    8.1<L>      13 Apr 2021 06:47  Mg     2.5     04-13            RADIOLOGY & ADDITIONAL STUDIES:

## 2021-04-13 NOTE — PROGRESS NOTE ADULT - SUBJECTIVE AND OBJECTIVE BOX
HPI:  89 y/o male with PMHx of CAD s/p PCI x2 Feb 2020, HTN, chronic systolic CHF, COPD, Prostate Cancer s/p radiation in the past, CKD stage IV, chronic anemia, gout, glaucoma, severe AS who presented to  with CC of 1 week of progressive weakness and fatigue and fall 24 hours prior to admission.  Patient lives at [facility].  Patient notes increasing fatigue over the last 1 week.  Patient notes on the day prior to admission, he was walking back from the bathroom and fell.  Patient had lab work drawn on 4/8 at [facility] which showed significant anemia with Hgb of 5.8 and patient was transferred to  for evaluation.  Of note, patient does admit to bloody stools at [facility].  Case also d/w patient's son Manuel.  No additional information provided.  In the ER, patient with Hgb of 5.3.  Ordered for 2 units pRBC.   He was noted to have bradycardia with Mobitz type I second degree AV block.     4/13/21:  pt seen sitting up in bed eating breakfast, he denies any complaints.  TELE: sinus rhythm 60-70's, Mobitz Type I during sleep, HR 30-40's      MEDICATIONS  (STANDING):  allopurinol 100 milliGRAM(s) Oral daily  amLODIPine   Tablet 5 milliGRAM(s) Oral daily  aspirin enteric coated 81 milliGRAM(s) Oral daily  atorvastatin 40 milliGRAM(s) Oral at bedtime  furosemide    Tablet 20 milliGRAM(s) Oral daily  metoprolol succinate ER 12.5 milliGRAM(s) Oral daily  pantoprazole    Tablet 40 milliGRAM(s) Oral two times a day    MEDICATIONS  (PRN):  acetaminophen   Tablet .. 650 milliGRAM(s) Oral every 4 hours PRN Mild Pain (1 - 3)  aluminum hydroxide/magnesium hydroxide/simethicone Suspension 30 milliLiter(s) Oral every 4 hours PRN Dyspepsia  ondansetron Injectable 4 milliGRAM(s) IV Push every 6 hours PRN Nausea    Allergies    ACE inhibitors (Unknown)  aspirin (Unknown)  enalapril (Unknown)    Intolerances        Vital Signs Last 24 Hrs  T(C): 36.8 (13 Apr 2021 08:17), Max: 36.8 (12 Apr 2021 23:15)  T(F): 98.3 (13 Apr 2021 08:17), Max: 98.3 (13 Apr 2021 08:17)  HR: 79 (13 Apr 2021 08:17) (68 - 79)  BP: 134/56 (13 Apr 2021 08:17) (112/47 - 134/56)  BP(mean): --  RR: 18 (13 Apr 2021 08:17) (18 - 18)  SpO2: 96% (13 Apr 2021 08:17) (92% - 96%)      PHYSICAL EXAMINATION:    GENERAL APPEARANCE:  Pt. is not currently dyspneic, in no distress. Pt. is alert, oriented, and pleasant.  HEENT:  Pupils are normal and react normally. No icterus. Mucous membranes well colored.  NECK:  Supple. No lymphadenopathy. Jugular venous pressure not elevated. Carotids equal.   HEART:   The cardiac impulse has a normal quality. + systolic murmur, no rubs or gallops noted  CHEST:  Chest is clear to auscultation. Normal respiratory effort.  ABDOMEN:  Soft and nontender.   EXTREMITIES:  There is no edema.   SKIN:  No rash or significant lesions are noted.    LABS:                        8.8    9.88  )-----------( 217      ( 13 Apr 2021 06:47 )             27.7     04-13    144  |  113<H>  |  88<H>  ----------------------------<  154<H>  3.8   |  27  |  3.25<H>    Ca    8.1<L>      13 Apr 2021 06:47  Mg     2.5     04-13          < from: TTE Echo Complete w/o Contrast w/ Doppler (04.09.21 @ 13:29) >   Impression     Summary     There is calcification of mitral valve . The leaflet opening is mildly   restricted.   Mean transmitral gradient is 4 mmHg; this finding is consistent with mild   mitral stenosis.   Moderate (2+) mitral regurgitation is present.   EA reversal of the mitral inflow consistent with reduced compliance of the   left ventricle.   Significant fibrocalcific changes noted to the aortic valve leaflets with   restriction in leaflet excursion. Peak transaortic gradient is 74 mmHg;   this finding is consistent with severe aortic stenosis.   Moderate (2+) aortic regurgitation is present.   The tricuspid valve leaflets are thin and pliable; valve motion is normal.   Mild (1+) tricuspid valve regurgitation is present.   Mild pulmonary hypertension.   Mild pulmonic valvular regurgitation (1+) is present.   Pulmonic valve not well seen, probably normal pulmonic valve function.   The left atrium is mildly dilated by LA volume index.   Mild concentric left ventricular hypertrophy is present.   Estimated left ventricular ejection fraction is 50-55 %.   The apex,mid,distal anteroseptal mildly hypokinetic.   Normal appearing right ventricle structure and function.

## 2021-04-13 NOTE — PROGRESS NOTE ADULT - ASSESSMENT
89 year old male with above history admitted from [facility] after a fall, who was found to be in Second Degree HB Type 1, no pauses in the setting of severe anemia requiring PRBC transfusion. GI following pt.  COVID Negative.  LVEF 55% (echo-this admission). Pt also had run of SVT on telemetry      A/P: Bradycardia, Mobitz type 1 HB  Continue to monitor this patient  Monitor lytes and Mg  Continue low dose toprol 12.5mg daily.  Possible ZIO patch upon discharge  plan d/w Dr. Reynolds

## 2021-04-13 NOTE — PROGRESS NOTE ADULT - ASSESSMENT
89 y/o male with PMHx of CAD s/p PCI x2 Feb 2020, HTN, chronic systolic CHF, COPD, Prostate Cancer s/p radiation in the past, CKD stage IV, chronic anemia, gout, glaucoma, severe AS who presented to  with CC of 1 week of progressive weakness and fatigue and fall found to have symptomatic anemia with Hgb of 5.      #Presumed upper GI Bleed with symptomatic anemia:     Transfuse 2 units pRBC.  Monitor for signs/ sx of CHF as patient with ischemic cardiomyopathy and severe AS.    Hold ASA and Prasugrel.  Last PCI was 13 months ago.  Lower risk.    GI eval-- Dr. Hernandez.     4/10 - will resume ASA 81mg on 4/11/21 if H&H are stable. Stop Prasugrel   4/11 - resume ASA 81 and monitor H&H. Off Prasugrel   4/12 - on ASA, Hb slightly down, pt asymptomatic, cont ASA and monitor   4/13 - cont ASA adn monitor H&H   noted GI recs    #Anemia:    Macrocytic anemia with acute GI bleeding.    Check iron/ b12/ folate.    Transfuse 2 units as above.    Goal Hgb >8.      #Hx of Cardiomyopathy and Severe AS:    Watch for signs of CHF with transfusions.    Hold on lasix for now.    Cont home PO lasix.    Check ECHO - results noted  - Severe AS     #Fall:    Suspect related to symptomatic anemia.    PT eval.      4/10 - TELE reviewed: 1st degree AVB and 2nd degree AVB, Type I  seen by Dr Reynolds, cont to monitor, consider Zio patch per EP team   4/11 - TELE REVIEWED: SVT overnight and 1st degree AVB and 2nd degree AVB, Type I  low dose BB added, Zio patch vs PPM depending on response   4/12 - cont BB, possible zio patch on discharge   4/13 - stable on tele with BB, posisble zio patch on discharge     #CAD s/p PCIx2 2020:    Last stenting 2/2020.    Cont statin.    4/10 - resume ASA tomorrow if H&H stable   4/12 - cont ASA    #Gout:    cont allopurinol.      #Remote prostate Ca:    Stable.      #DVT Proph:  Venodynes.      #Code Status/ Goals of Care:    D/w patient and son. FULL CODE for now.      4/10 discussed with RN DR Reynolds; Manuel at bedside   4/11 discussed with RN and Dr Reynolds, cont cardiac monitoring   discussed with kevon Jackson   can be OOB to chair, will get PT eval   4/12 - POC discussed with EP NP and GI, for PT OOB today   4/13 - discussed with RN - dc planning - in 1 to 2 days

## 2021-04-13 NOTE — PROGRESS NOTE ADULT - SUBJECTIVE AND OBJECTIVE BOX
89 y/o male with PMHx of CAD s/p PCI x2 Feb 2020, HTN, chronic systolic CHF, COPD, Prostate Cancer s/p radiation in the past, CKD stage IV, chronic anemia, gout, glaucoma, severe AS who presented to  with CC of 1 week of progressive weakness and fatigue and fall 24 hours prior to admission.  Patient lives at [facility].  Patient notes increasing fatigue over the last 1 week.  Patient notes on the day prior to admission, he was walking back from the bathroom and fell.  Patient had lab work drawn on 4/8 at [facility] which showed significant anemia with Hgb of 5.8 and patient was transferred to  for evaluation.  Of note, patient does admit to bloody stools at [facility].  Case also d/w patient's son Manuel.  No additional information provided.  In the ER, patient with Hgb of 5.3.  Ordered for 2 units pRBC.      4/10 - feels tired: no cp palps sob abdo pain tingling or numbness anywhere  4/11 - no cp palps sob lightheadedenss or other compliants, tachy overnight, got 3mg/5mg BB and became imani with 2Degree AVB   4/12 - pt denies any complaints, no cp palps sob abdo pain etc, PT today   4/13 - no cp palps sob abdo pain lightheadedness- sitting up in chair reading an article     PHYSICAL EXAM:  Vital Signs Last 24 Hrs  T(F): 98.3 (13 Apr 2021 08:17), Max: 98.3 (13 Apr 2021 08:17)  HR: 79 (13 Apr 2021 08:17) (68 - 79)  BP: 134/56 (13 Apr 2021 08:17) (112/47 - 134/56)  RR: 18 (13 Apr 2021 08:17) (18 - 18)  SpO2: 96% (13 Apr 2021 08:17) (92% - 96%)  GENERAL: NAD, able to lie flat in bed  HEAD:  Atraumatic, Normocephalic  EYES: EOMI, PERRLA, normal sclera  ENT: Moist mucous membranes  NECK: Supple, No JVD, no nuchal rigidity  CHEST/LUNG: Clear to auscultation bilaterally; No rales, rhonchi, wheezing, or rubs. Unlabored respirations  HEART: Regular rate and rhythm; No murmurs, rubs, or gallops  ABDOMEN: Bowel sounds present; Soft, Nontender, Nondistended. No hepatomegaly  EXTREMITIES:  no pitting bilaterally  NERVOUS SYSTEM:  Alert & Oriented X3, speech clear. No focal motor or sensory deficits  MSK: FROM all 4 extremities, full and equal strength  SKIN: No rashes or lesions      RADIOLOGY/EKG:  < from: CT Head No Cont (04.09.21 @ 06:27) >  No acute intracranial hemorrhage, mass effect, or acute osseous fracture.    TELE 4/11 rev by me - SVT 120s overnight, then 2nd degree AVB, now in sinus 60s   Tele 4/12 - sinus 50-60s, occ 2nd degree AVB   TELE 4/13 - same as above on BB     ECHO   Summary   There is calcification of mitral valve . The leaflet opening is mildly   restricted.   Mean transmitral gradient is 4 mmHg; this finding is consistent with mild   mitral stenosis.   Moderate (2+) mitral regurgitation is present.   EA reversal of the mitral inflow consistent with reduced compliance of the   left ventricle.   Significant fibrocalcific changes noted to the aortic valve leaflets with   restriction in leaflet excursion. Peak transaortic gradient is 74 mmHg;   this finding is consistent with severe aortic stenosis.   Moderate (2+) aortic regurgitation is present.   The tricuspid valve leaflets are thin and pliable; valve motion is normal.   Mild (1+) tricuspid valve regurgitation is present.   Mild pulmonary hypertension.   Mild pulmonic valvular regurgitation (1+) is present.   Pulmonic valve not well seen, probably normal pulmonic valve function.   The left atrium is mildly dilated by LA volume index.   Mild concentric left ventricular hypertrophy is present.   Estimated left ventricular ejection fraction is 50-55 %.   The apex,mid,distal anteroseptal mildly hypokinetic.   Normal appearing right ventricle structure and function.    LABS: All Labs Reviewed:                        8.8    9.88  )-----------( 217      ( 13 Apr 2021 06:47 )             27.7     04-13    144  |  113<H>  |  88<H>  ----------------------------<  154<H>  3.8   |  27  |  3.25<H>    Ca    8.1<L>      13 Apr 2021 06:47  Mg     2.5     04-13        acetaminophen   Tablet .. 650 milliGRAM(s) Oral every 4 hours PRN  allopurinol 100 milliGRAM(s) Oral daily  aluminum hydroxide/magnesium hydroxide/simethicone Suspension 30 milliLiter(s) Oral every 4 hours PRN  amLODIPine   Tablet 5 milliGRAM(s) Oral daily  aspirin enteric coated 81 milliGRAM(s) Oral daily  atorvastatin 40 milliGRAM(s) Oral at bedtime  furosemide    Tablet 20 milliGRAM(s) Oral daily  metoprolol succinate ER 12.5 milliGRAM(s) Oral daily  ondansetron Injectable 4 milliGRAM(s) IV Push every 6 hours PRN  pantoprazole    Tablet 40 milliGRAM(s) Oral two times a day

## 2021-04-14 NOTE — PROGRESS NOTE ADULT - SUBJECTIVE AND OBJECTIVE BOX
CC:  Patient is a 89y old  Male who presents with a chief complaint of weakness, fall (13 Apr 2021 16:07)    SUBJECTIVE:     -no new complaints or issues at current time.    ROS:  all other review of systems are negative unless indicated above.    acetaminophen   Tablet .. 650 milliGRAM(s) Oral every 4 hours PRN  allopurinol 100 milliGRAM(s) Oral daily  aluminum hydroxide/magnesium hydroxide/simethicone Suspension 30 milliLiter(s) Oral every 4 hours PRN  amLODIPine   Tablet 5 milliGRAM(s) Oral daily  aspirin enteric coated 81 milliGRAM(s) Oral daily  atorvastatin 40 milliGRAM(s) Oral at bedtime  furosemide    Tablet 20 milliGRAM(s) Oral daily  metoprolol succinate ER 12.5 milliGRAM(s) Oral daily  ondansetron Injectable 4 milliGRAM(s) IV Push every 6 hours PRN  pantoprazole    Tablet 40 milliGRAM(s) Oral two times a day    T(C): 36.6 (04-14-21 @ 08:55), Max: 36.6 (04-13-21 @ 23:20)  HR: 64 (04-14-21 @ 08:55) (64 - 67)  BP: 156/67 (04-14-21 @ 08:55) (156/67 - 157/66)  RR: 17 (04-14-21 @ 08:55) (17 - 18)  SpO2: 95% (04-14-21 @ 08:55) (95% - 97%)    Constitutional: NAD.   HEENT: PERRL, EOMI, MMM.  Neck: Soft and supple, No carotid bruit, No JVD  Respiratory: Breath sounds are clear bilaterally, No wheezing, rales or rhonchi  Cardiovascular: S1 and S2, regular rate and rhythm, no murmur, rub or gallop.  Gastrointestinal: Bowel Sounds present, soft, nontender, nondistended, no guarding, no rebound, no mass.  Extremities: No peripheral edema  Vascular: 2+ peripheral pulses  Neurological: A/O x , no focal deficits  Musculoskeletal: 5/5 strength b/l upper and lower extremities  Skin:  no visible rashes.                         8.7    8.63  )-----------( 211      ( 14 Apr 2021 07:33 )             27.6       04-14    143  |  111<H>  |  87<H>  ----------------------------<  167<H>  4.0   |  25  |  3.30<H>    Ca    8.3<L>      14 Apr 2021 07:33  Mg     2.6     04-14    `< from: 12 Lead ECG (04.10.21 @ 23:19) >  Diagnosis Line 2:1 Atrioventricular block   Left axis deviation  Septal infarct (cited on or before 24-JUL-2020)  ST & T wave abnormality, consider lateral ischemia  Abnormal ECG  When compared with ECG of 10-APR-2021 23:18,   2: 1 AV block  has replaced Sinus rhythm    < end of copied text >    < from: CT Head No Cont (04.09.21 @ 06:27) >  IMPRESSION:    No acute intracranial hemorrhage, mass effect, or acute osseous fracture.    < end of copied text >    < from: CT Cervical Spine No Cont (04.09.21 @ 06:27) >  IMPRESSION:    No acute cervical spine fracture or evidence of traumatic malalignment. Cervical spondylosis.    < end of copied text >    < from: Xray Chest 1 View- PORTABLE-Urgent (04.09.21 @ 07:17) >  IMPRESSION:  No radiographic evidence for acute cardiopulmonary process.    Moderate hiatal hernia again seen.    If further evaluation of lung parenchyma is required, a Chest CT can be considered to exclude occult pathology not evident on plain film radiography.    < end of copied text >   CC:  Patient is a 89y old  Male who presents with a chief complaint of weakness, fall (13 Apr 2021 16:07)    SUBJECTIVE:     -no new complaints or issues at current time.    ROS:  all other review of systems are negative unless indicated above.    acetaminophen   Tablet .. 650 milliGRAM(s) Oral every 4 hours PRN  allopurinol 100 milliGRAM(s) Oral daily  aluminum hydroxide/magnesium hydroxide/simethicone Suspension 30 milliLiter(s) Oral every 4 hours PRN  amLODIPine   Tablet 5 milliGRAM(s) Oral daily  aspirin enteric coated 81 milliGRAM(s) Oral daily  atorvastatin 40 milliGRAM(s) Oral at bedtime  furosemide    Tablet 20 milliGRAM(s) Oral daily  metoprolol succinate ER 12.5 milliGRAM(s) Oral daily  ondansetron Injectable 4 milliGRAM(s) IV Push every 6 hours PRN  pantoprazole    Tablet 40 milliGRAM(s) Oral two times a day    T(C): 36.6 (04-14-21 @ 08:55), Max: 36.6 (04-13-21 @ 23:20)  HR: 64 (04-14-21 @ 08:55) (64 - 67)  BP: 156/67 (04-14-21 @ 08:55) (156/67 - 157/66)  RR: 17 (04-14-21 @ 08:55) (17 - 18)  SpO2: 95% (04-14-21 @ 08:55) (95% - 97%)    Constitutional: NAD.   HEENT: PERRL, EOMI, MMM.  Neck: Soft and supple, No carotid bruit, No JVD  Respiratory: Breath sounds are clear bilaterally, No wheezing, rales or rhonchi  Cardiovascular: (+) murmur.  S1 and S2, regular rate and rhythm, no murmur, rub or gallop.  Gastrointestinal: Bowel Sounds present, soft, nontender, nondistended, no guarding, no rebound, no mass.  Extremities: No peripheral edema  Vascular: 2+ peripheral pulses  Neurological: A/O x , no focal deficits  Musculoskeletal: 5/5 strength b/l upper and lower extremities  Skin:  no visible rashes.                         8.7    8.63  )-----------( 211      ( 14 Apr 2021 07:33 )             27.6       04-14    143  |  111<H>  |  87<H>  ----------------------------<  167<H>  4.0   |  25  |  3.30<H>    Ca    8.3<L>      14 Apr 2021 07:33  Mg     2.6     04-14    `< from: 12 Lead ECG (04.10.21 @ 23:19) >  Diagnosis Line 2:1 Atrioventricular block   Left axis deviation  Septal infarct (cited on or before 24-JUL-2020)  ST & T wave abnormality, consider lateral ischemia  Abnormal ECG  When compared with ECG of 10-APR-2021 23:18,   2: 1 AV block  has replaced Sinus rhythm    < end of copied text >    < from: CT Head No Cont (04.09.21 @ 06:27) >  IMPRESSION:    No acute intracranial hemorrhage, mass effect, or acute osseous fracture.    < end of copied text >    < from: CT Cervical Spine No Cont (04.09.21 @ 06:27) >  IMPRESSION:    No acute cervical spine fracture or evidence of traumatic malalignment. Cervical spondylosis.    < end of copied text >    < from: Xray Chest 1 View- PORTABLE-Urgent (04.09.21 @ 07:17) >  IMPRESSION:  No radiographic evidence for acute cardiopulmonary process.    Moderate hiatal hernia again seen.    If further evaluation of lung parenchyma is required, a Chest CT can be considered to exclude occult pathology not evident on plain film radiography.    < end of copied text >

## 2021-04-14 NOTE — PROGRESS NOTE ADULT - ASSESSMENT
89M.  admitted 04/09/21.  presented from [facility] to ED after a fall.  telemetry documented type 1 2nd degree HB and severe anemia require PRBC transfusion.    PMHx:  CAD-PCI (02/2020);  HFrEF;  severe AS;  HTN;  COPD;  CKD-4;  prostate CA-RT;  chronic anemia;  gout.    bradycardia-Mobitz type 1 HB.  - telemetry monitoring.  - target K >=4.0 and Mg >=2.0  - low dose Toprol XL 12.5mg po qd.  - plan for ZIO patch at [place].  - EP.    acute blood loss anemia due to GIB.  - observe.  - no plans to repeat EGD.  previously EGD performed at [facility] (02/2020) showed gastritis.  - CBC in AM.  - target Hbg >8.0.  - s/p 2 units PRBC transfusion.  - DC'd Prasugrel.  - 04/11 ASA 81 mg resumed.  - GI.    hx CM.  - stable.  - Lasix 20mg po qd.  - metoprolol.    hx CAD-PCI (02/2020).  - stable.  - ASA.  - BB.  - statin.    DVT prophylaxis.  - SCD.    advanced directive.  - no limits placed current time.    disposition  - 3N.    communication.  - RN.

## 2021-04-14 NOTE — DISCHARGE NOTE NURSING/CASE MANAGEMENT/SOCIAL WORK - PATIENT PORTAL LINK FT
You can access the FollowMyHealth Patient Portal offered by [facility] by registering at the following website: [email or web address]. By joining [employer]’s FollowMyHealth portal, you will also be able to view your health information using other applications (apps) compatible with our system.
You can access the FollowMyHealth Patient Portal offered by [facility] by registering at the following website: [email or web address]. By joining "[employer]"’s FollowMyHealth portal, you will also be able to view your health information using other applications (apps) compatible with our system.

## 2021-04-15 NOTE — PROGRESS NOTE ADULT - REASON FOR ADMISSION
weakness, fall

## 2021-04-15 NOTE — DISCHARGE NOTE PROVIDER - HOSPITAL COURSE
89M.  admitted 04/09/21.  presented from [facility] to ED after a fall.  telemetry documented type 1 2nd degree HB and severe anemia require PRBC transfusion.    PMHx:  CAD-PCI (02/2020);  HFrEF;  severe AS;  HTN;  COPD;  CKD-4;  prostate CA-RT;  chronic anemia;  gout.    bradycardia-Mobitz type 1 HB.  - telemetry monitoring.  - target K >=4.0 and Mg >=2.0  - low dose Toprol XL 12.5mg po qd.  - plan for ZIO patch at [place].  - EP.    acute blood loss anemia due to GIB.  - observe.  - no plans to repeat EGD.  previously EGD performed at [facility] (02/2020) showed gastritis.  - CBC in AM.  - target Hbg >8.0.  - s/p 2 units PRBC transfusion.  - DC'd Prasugrel.  - 04/11 ASA 81 mg resumed.  - GI.    hx CM.  - stable.  - Lasix 20mg po qd.  - metoprolol.    hx CAD-PCI (02/2020).  - stable.  - ASA.  - BB.  - statin.    DVT prophylaxis.  - SCD.    advanced directive.  - no limits placed current time.    disposition  - 3N.  - DC back to [facility] today.    communication.  - RN.  - CM.

## 2021-04-15 NOTE — DISCHARGE NOTE PROVIDER - CARE PROVIDER_API CALL
Andres Reynolds (MD)  Cardiac Electrophysiology; Cardiovascular Disease  [address] [place] [address]  Phone: [phone]  Fax: [phone]  Follow Up Time: 1 week    Soren Laguerre  NEPHROLOGY  [address], [address]  [place] [address]  Phone: [phone]  Fax: [phone]  Follow Up Time: 1 week

## 2021-04-15 NOTE — DISCHARGE NOTE PROVIDER - PROVIDER TOKENS
PROVIDER:[TOKEN:[[id number]],FOLLOWUP:[1 week]],PROVIDER:[TOKEN:[[id number]],FOLLOWUP:[1 week]]

## 2021-04-15 NOTE — DISCHARGE NOTE PROVIDER - NSDCCPCAREPLAN_GEN_ALL_CORE_FT
PRINCIPAL DISCHARGE DIAGNOSIS  Diagnosis: Gastrointestinal hemorrhage, unspecified gastrointestinal hemorrhage type  Assessment and Plan of Treatment:       SECONDARY DISCHARGE DIAGNOSES  Diagnosis: Anemia, unspecified type  Assessment and Plan of Treatment:     Diagnosis: Fall (on)(from) incline, initial encounter  Assessment and Plan of Treatment:     Diagnosis: Abrasion  Assessment and Plan of Treatment:

## 2021-04-15 NOTE — PROGRESS NOTE ADULT - SUBJECTIVE AND OBJECTIVE BOX
CC:  Patient is a 89y old  Male who presents with a chief complaint of weakness, fall (15 Apr 2021 09:14)    SUBJECTIVE:     -no new complaints or issues at current time.    ROS:  all other review of systems are negative unless indicated above.    acetaminophen   Tablet .. 650 milliGRAM(s) Oral every 4 hours PRN  allopurinol 100 milliGRAM(s) Oral daily  aluminum hydroxide/magnesium hydroxide/simethicone Suspension 30 milliLiter(s) Oral every 4 hours PRN  amLODIPine   Tablet 5 milliGRAM(s) Oral daily  aspirin enteric coated 81 milliGRAM(s) Oral daily  atorvastatin 40 milliGRAM(s) Oral at bedtime  furosemide    Tablet 20 milliGRAM(s) Oral daily  metoprolol succinate ER 12.5 milliGRAM(s) Oral daily  ondansetron Injectable 4 milliGRAM(s) IV Push every 6 hours PRN  pantoprazole    Tablet 40 milliGRAM(s) Oral two times a day    T(C): 36.8 (04-15-21 @ 08:04), Max: 36.8 (04-15-21 @ 08:04)  HR: 62 (04-15-21 @ 08:04) (62 - 80)  BP: 150/61 (04-15-21 @ 08:04) (137/64 - 152/62)  RR: 18 (04-15-21 @ 08:04) (18 - 18)  SpO2: 97% (04-15-21 @ 08:04) (96% - 97%)    Constitutional: NAD.   HEENT: PERRL, EOMI, MMM.  Neck: Soft and supple, No carotid bruit, No JVD  Respiratory: Breath sounds are clear bilaterally, No wheezing, rales or rhonchi  Cardiovascular: S1 and S2, regular rate and rhythm, no murmur, rub or gallop.  Gastrointestinal: Bowel Sounds present, soft, nontender, nondistended, no guarding, no rebound, no mass.  Extremities: No peripheral edema  Vascular: 2+ peripheral pulses  Neurological: A/O x , no focal deficits  Musculoskeletal: 5/5 strength b/l upper and lower extremities  Skin:  no visible rashes.                         8.4    7.81  )-----------( 214      ( 15 Apr 2021 06:55 )             25.9       04-15    142  |  109<H>  |  97<H>  ----------------------------<  159<H>  4.1   |  26  |  3.48<H>    Ca    8.2<L>      15 Apr 2021 06:55  Mg     2.6     04-14

## 2021-04-15 NOTE — PROGRESS NOTE ADULT - PROVIDER SPECIALTY LIST ADULT
Electrophysiology
Gastroenterology
Hospitalist
Gastroenterology
Hospitalist
Cardiology
Cardiology

## 2021-04-15 NOTE — PROGRESS NOTE ADULT - SUBJECTIVE AND OBJECTIVE BOX
HPI:  89 y/o male with PMHx of CAD s/p PCI x2 Feb 2020, HTN, chronic systolic CHF, COPD, Prostate Cancer s/p radiation in the past, CKD stage IV, chronic anemia, gout, glaucoma, severe AS who presented to  with CC of 1 week of progressive weakness and fatigue and fall 24 hours prior to admission.  Patient lives at [facility].  Patient notes increasing fatigue over the last 1 week.  Patient notes on the day prior to admission, he was walking back from the bathroom and fell.  Patient had lab work drawn on 4/8 at [facility] which showed significant anemia with Hgb of 5.8 and patient was transferred to  for evaluation.  Of note, patient does admit to bloody stools at [facility].  Case also d/w patient's son Manuel.  No additional information provided.  In the ER, patient with Hgb of 5.3.  Ordered for 2 units pRBC.   He was noted to have bradycardia with Mobitz type I second degree AV block.     4/13/21:  pt seen sitting up in bed eating breakfast, he denies any complaints.  TELE: sinus rhythm 60-70's, Mobitz Type I during sleep, HR 30-40's    4/15/21: pt resting comfortably in bed in NAD  TELE: Sinus rhythm with prolonged MI interval, episodes of Mobitz type I-Wenkebach      MEDICATIONS  (STANDING):  allopurinol 100 milliGRAM(s) Oral daily  amLODIPine   Tablet 5 milliGRAM(s) Oral daily  aspirin enteric coated 81 milliGRAM(s) Oral daily  atorvastatin 40 milliGRAM(s) Oral at bedtime  furosemide    Tablet 20 milliGRAM(s) Oral daily  metoprolol succinate ER 12.5 milliGRAM(s) Oral daily  pantoprazole    Tablet 40 milliGRAM(s) Oral two times a day    MEDICATIONS  (PRN):  acetaminophen   Tablet .. 650 milliGRAM(s) Oral every 4 hours PRN Mild Pain (1 - 3)  aluminum hydroxide/magnesium hydroxide/simethicone Suspension 30 milliLiter(s) Oral every 4 hours PRN Dyspepsia  ondansetron Injectable 4 milliGRAM(s) IV Push every 6 hours PRN Nausea      Allergies    ACE inhibitors (Unknown)  aspirin (Unknown)  enalapril (Unknown)    Intolerances      Vital Signs Last 24 Hrs  T(C): 36.8 (15 Apr 2021 08:04), Max: 36.8 (15 Apr 2021 08:04)  T(F): 98.2 (15 Apr 2021 08:04), Max: 98.2 (15 Apr 2021 08:04)  HR: 62 (15 Apr 2021 08:04) (62 - 80)  BP: 150/61 (15 Apr 2021 08:04) (137/64 - 152/62)  BP(mean): --  RR: 18 (15 Apr 2021 08:04) (18 - 18)  SpO2: 97% (15 Apr 2021 08:04) (96% - 97%)      REVIEW OF SYSTEMS:    CONSTITUTIONAL: No weakness, fevers or chills  EYES/ENT: No visual changes;  No vertigo or throat pain   NECK: No pain or stiffness  RESPIRATORY: No cough, wheezing, hemoptysis; No shortness of breath  CARDIOVASCULAR: No chest pain or palpitations  GASTROINTESTINAL: No abdominal or epigastric pain. No nausea, vomiting, or hematemesis; No diarrhea or constipation. No melena or hematochezia.  GENITOURINARY: No dysuria, frequency or hematuria  NEUROLOGICAL: No numbness or weakness  SKIN: No itching, burning, rashes, or lesions   All other review of systems is negative unless indicated above                          8.4    7.81  )-----------( 214      ( 15 Apr 2021 06:55 )             25.9     04-15    142  |  109<H>  |  97<H>  ----------------------------<  159<H>  4.1   |  26  |  3.48<H>    Ca    8.2<L>      15 Apr 2021 06:55  Mg     2.6     04-14          PHYSICAL EXAM:    General: Frail, elderly male, in NAD  Neurology: A&Ox3, nonfocal, HUNTER x 4  HEENT: NC/AT, neck supple, no JVD, EOMI, PERRL  Respiratory: CTA B/L  CV: RRR, S1S2, + systolic murmur, no rubs or gallops  Abdominal: Soft, NT, ND +BS  Extremities: No edema, + peripheral pulses  Skin: No rashes        < from: TTE Echo Complete w/o Contrast w/ Doppler (04.09.21 @ 13:29) >   Impression     Summary     There is calcification of mitral valve . The leaflet opening is mildly   restricted.   Mean transmitral gradient is 4 mmHg; this finding is consistent with mild   mitral stenosis.   Moderate (2+) mitral regurgitation is present.   EA reversal of the mitral inflow consistent with reduced compliance of the   left ventricle.   Significant fibrocalcific changes noted to the aortic valve leaflets with   restriction in leaflet excursion. Peak transaortic gradient is 74 mmHg;   this finding is consistent with severe aortic stenosis.   Moderate (2+) aortic regurgitation is present.   The tricuspid valve leaflets are thin and pliable; valve motion is normal.   Mild (1+) tricuspid valve regurgitation is present.   Mild pulmonary hypertension.   Mild pulmonic valvular regurgitation (1+) is present.   Pulmonic valve not well seen, probably normal pulmonic valve function.   The left atrium is mildly dilated by LA volume index.   Mild concentric left ventricular hypertrophy is present.   Estimated left ventricular ejection fraction is 50-55 %.   The apex,mid,distal anteroseptal mildly hypokinetic.   Normal appearing right ventricle structure and function.

## 2021-04-15 NOTE — PROGRESS NOTE ADULT - NSICDXPILOT_GEN_ALL_CORE
[place]

## 2021-04-15 NOTE — DISCHARGE NOTE PROVIDER - CARE PROVIDERS DIRECT ADDRESSES
[email or web address]_[email or web address]

## 2021-04-15 NOTE — PROGRESS NOTE ADULT - ASSESSMENT
89M.  admitted 04/09/21.  presented from [facility] to ED after a fall.  telemetry documented type 1 2nd degree HB and severe anemia require PRBC transfusion.    PMHx:  CAD-PCI (02/2020);  HFrEF;  severe AS;  HTN;  COPD;  CKD-4;  prostate CA-RT;  chronic anemia;  gout.    bradycardia-Mobitz type 1 HB.  - telemetry monitoring.  - target K >=4.0 and Mg >=2.0  - low dose Toprol XL 12.5mg po qd.  - plan for ZIO patch at [place].  - EP.    acute blood loss anemia due to GIB.  - observe.  - no plans to repeat EGD.  previously EGD performed at [facility] (02/2020) showed gastritis.  - CBC in AM.  - target Hbg >8.0.  - s/p 2 units PRBC transfusion.  - DC'd Prasugrel.  - 04/11 ASA 81 mg resumed.  - GI.    hx CM.  - stable.  - Lasix 20mg po qd.  - metoprolol.    hx CAD-PCI (02/2020).  - stable.  - ASA.  - BB.  - statin.    DVT prophylaxis.  - SCD.    advanced directive.  - no limits placed current time.    disposition  - 3N.  - DC back to [facility] today.    communication.  - RN.  - CM.

## 2021-04-15 NOTE — PROGRESS NOTE ADULT - ASSESSMENT
89 year old male with above history admitted from [facility] after a fall, who was found to be in Second Degree HB Type 1, no pauses in the setting of severe anemia requiring PRBC transfusion. GI following pt.  COVID Negative.  LVEF 55% (echo-this admission). Pt also had run of SVT on telemetry.      A/P: Bradycardia, Mobitz type 1   Continue to monitor this patient  Continue low dose toprol 12.5mg daily.  ZIO patch upon discharge to assess for bradycardia and SVT  plan d/w Dr. Reynolds

## 2021-06-04 PROBLEM — I44.0 FIRST DEGREE AV BLOCK: Status: ACTIVE | Noted: 2021-01-01

## 2021-06-04 PROBLEM — I44.1 MOBITZ TYPE I WENCKEBACH ATRIOVENTRICULAR BLOCK: Status: ACTIVE | Noted: 2021-01-01

## 2021-06-04 NOTE — ASSESSMENT
[FreeTextEntry1] : Mr. Kemp is a 89 year old man with first degree AV block and Mobitz type 1 Wenckebach. He is asymptomatic

## 2021-06-04 NOTE — CARDIOLOGY SUMMARY
[de-identified] : Zio patch sinus 1st degree AV block with Mobitz type 1 Wenckebach longest pause 2 sec.  [de-identified] : 4/9/21 : \par  There is calcification of mitral valve . The leaflet opening is mildly\par  restricted.\par  Mean transmitral gradient is 4 mmHg; this finding is consistent with mild\par  mitral stenosis.\par  Moderate (2+) mitral regurgitation is present.\par  EA reversal of the mitral inflow consistent with reduced compliance of the\par  left ventricle.\par  Significant fibrocalcific changes noted to the aortic valve leaflets with\par  restriction in leaflet excursion. Peak transaortic gradient is 74 mmHg;\par  this finding is consistent with severe aortic stenosis.\par  Moderate (2+) aortic regurgitation is present.\par  The tricuspid valve leaflets are thin and pliable; valve motion is normal.\par  Mild (1+) tricuspid valve regurgitation is present.\par  Mild pulmonary hypertension.\par  Mild pulmonic valvular regurgitation (1+) is present.\par  Pulmonic valve not well seen, probably normal pulmonic valve function.\par  The left atrium is mildly dilated by LA volume index.\par  Mild concentric left ventricular hypertrophy is present.\par  Estimated left ventricular ejection fraction is 50-55 %.\par  The apex,mid,distal anteroseptal mildly hypokinetic.\par  Normal appearing right ventricle structure and function. [de-identified] : 02/2020 PCI

## 2021-06-04 NOTE — HISTORY OF PRESENT ILLNESS
[FreeTextEntry1] : Mr. Quezada is a 89 year old man who was admitted to [facility] after a fall on April-. He was noted to have second degree AV block type 1 ( Wenckebach) He was found to have severe anemia. He presents for follow up after a Zio patch long term Holter monitor.   JAIR QUEZADA  denies chest pain, chest pressure, shortness of breath, lightheadedness, dizziness, palpitations, syncope, presyncope, orthopnea, PND, or edema.

## 2021-09-20 NOTE — ED ADULT NURSE REASSESSMENT NOTE - NS ED NURSE REASSESS COMMENT FT1
pt received. no complaints at this time. pt awaiting admission orders. will monitor for changes. pt received. no complaints at this time. pt in complete heart block on monitor. b/p stable and pt not symptomatic. pt awaiting admission orders. will monitor for changes.

## 2021-09-20 NOTE — H&P ADULT - HISTORY OF PRESENT ILLNESS
89M with PMH Aortic Stenosis CAD s/p PCI, CKD IV, DM HTN, HLD, Fe deficiency anemia presents from [facility] with Generalized weakness that he woke up with overnight. Associated GARCIA and fatigue, Denies fever, chills, near syncope, dizziness, chest pain, nausea, vomiting, abdominal pain/discomfort. Denies any recent weight gain/loss.    In th ED. EKG with Complete Heart Block. EP called and recommended admission to CCU for PPM placement. Cr 4.89 (Baseline 3.30-3.45), WBC 13.01, Hgb (8/.5

## 2021-09-20 NOTE — ED ADULT NURSE NOTE - OBJECTIVE STATEMENT
pt presents to ED from [facility] via EMS pt alert and oreintedx3, VSS afebrile pt sent to ED fro low HR in the 30s pt denies pain SOb dizziness and states he is generally weak. pt hemodynamically stable, palced on monitor and MD at bedside for assessment, safety maintained

## 2021-09-20 NOTE — ED PROVIDER NOTE - PROGRESS NOTE DETAILS
Albert Reddy for attending Dr. Ogden: Discussed with Mckenzie of EP. Will evaluate pt. Attending Ogden, EP would like pt to be admitted for CCU and will get pacemaker on wed. Attending alexis Ogden/sarmad stauffer for admisison Attending Alin d/w Son understand. Attending Alin, d/w Son understand plan and EP had already updated him on situation.

## 2021-09-20 NOTE — CONSULT NOTE ADULT - ASSESSMENT
89yo male with above medication history presented with weakness and imani, in third degree AVB , hemodynamically stable at present time.      Plan:  recommend Micra-AV leadless pacemaker  admit to CCU under hospitalist service  will plan for implant on wednesday   npo after midnight Tuesday   Discussed with patient, son and dr Ogden

## 2021-09-20 NOTE — CONSULT NOTE ADULT - SUBJECTIVE AND OBJECTIVE BOX
HPI: 89 yo male with PMH HTN, HLD, DM2, CKD, CAD s/p PCI in february 2020, severe AS, h/o Wenckebach AV block during prior admission in April'21, he wore outpatient zio patch at that time which did not reveal any advanced degree AVB.  He presented [facility] facility with weakness and bradycardia.  In ED noted with heart rate in mid 30s with third degree AVB,  remains hemodynamically stable with -140s, he is on lopressor 12.5mg onde daily and reports taking this medication this morning.  He denies any chest pain, sob, nausea or vomiting.           PAST MEDICAL & SURGICAL HISTORY:  Hypertension    HLD (hyperlipidemia)    Gout    Prostate cancer    Anemia    Chronic kidney disease (CKD)    CAD (coronary artery disease)    DM type 2 (diabetes mellitus, type 2)    Glaucoma    Aortic stenosis    Osteoporosis    Leg edema    H/O inguinal hernia repair    History of tonsillectomy  childhood    History of colonoscopy        MEDICATIONS  (STANDING):  heparin   Injectable 5000 Unit(s) SubCutaneous every 12 hours    MEDICATIONS  (PRN):  acetaminophen   Tablet .. 650 milliGRAM(s) Oral every 6 hours PRN Temp greater or equal to 38.5C (101.3F), Mild Pain (1 - 3)  aluminum hydroxide/magnesium hydroxide/simethicone Suspension 30 milliLiter(s) Oral every 4 hours PRN Dyspepsia  melatonin 3 milliGRAM(s) Oral at bedtime PRN Insomnia  ondansetron Injectable 4 milliGRAM(s) IV Push every 8 hours PRN Nausea and/or Vomiting      Allergies    ACE inhibitors (Unknown)  aspirin (Unknown)  enalapril (Unknown)    Intolerances        SOCIAL HISTORY: denies smoking drinking or illicit drug use.      FAMILY HISTORY:  Family history of CVA    Family hx of lung cancer        Vital Signs Last 24 Hrs  T(C): 36.9 (20 Sep 2021 12:01), Max: 36.9 (20 Sep 2021 12:01)  T(F): 98.4 (20 Sep 2021 12:01), Max: 98.4 (20 Sep 2021 12:01)  HR: 32 (20 Sep 2021 13:53) (31 - 39)  BP: 148/48 (20 Sep 2021 13:53) (125/50 - 157/79)  BP(mean): --  RR: 16 (20 Sep 2021 13:53) (16 - 20)  SpO2: 96% (20 Sep 2021 13:53) (94% - 96%)      CONSTITUTIONAL: generalized weakness, no fever  EYES: No eye pain, visual disturbances, or discharge  ENMT:  No difficulty hearing, tinnitus, vertigo; No sinus or throat pain  RESPIRATORY: No cough, wheezing, hemoptysis, or shortness of breath  CARDIOVASCULAR: No chest pain, dyspnea, palpitations, dizziness, syncope.   GASTROINTESTINAL: No abdominal  pain, nausea, vomiting, diarrhea, constipation, melena or bright red blood.  GENITOURINARY: No dysuria, nocturia, hematuria, or urinary incontinence  NEUROLOGICAL: No headaches, memory loss, slurred speech, limb weakness, loss of strength, numbness, or tremors  SKIN: No itching, burning, rashes, or lesions   ENDOCRINE: No heat or cold intolerance, or hair loss  MUSCULOSKELETAL: No joint pain or swelling, muscle, back, or extremity pain  PSYCHIATRIC: No depression, anxiety, or difficulty sleeping  HEME/LYMPH: No easy bruising or bleeding gums  ALLERY AND IMMUNOLOGIC: No hives or rash.    PHYSICAL EXAMINATION:    Constitutional: NAD, awake and alert, well-developed  HEENT: PERR, EOMI,  No oral cyananosis.  Neck:  supple,  No JVD  Respiratory: Breath sounds are clear bilaterally, No wheezing, rales or rhonchi  Cardiovascular: S1 and S2, regular rate and rhythm, no Murmurs, gallops or rubs  Gastrointestinal: Bowel Sounds present, soft, nontender.   Extremities: No peripheral edema. No clubbing or cyanosis.  Vascular: 2+ peripheral pulses  Neurological: A/O x 3, no focal deficits  Musculoskeletal: no calf tenderness.  Skin: No rashes.      LABS:                        8.5    13.01 )-----------( 200      ( 20 Sep 2021 12:08 )             27.6   09-20    141  |  108  |  99<H>  ----------------------------<  234<H>  5.2   |  24  |  4.89<H>    Ca    8.0<L>      20 Sep 2021 12:08  Phos  5.2     09-20  Mg     2.8     09-20    TPro  6.9  /  Alb  3.0<L>  /  TBili  0.6  /  DBili  x   /  AST  13<L>  /  ALT  22  /  AlkPhos  120  09-20  LIVER FUNCTIONS - ( 20 Sep 2021 12:08 )  Alb: 3.0 g/dL / Pro: 6.9 gm/dL / ALK PHOS: 120 U/L / ALT: 22 U/L / AST: 13 U/L / GGT: x           CARDIAC MARKERS ( 20 Sep 2021 12:08 )  0.034 ng/mL / x     / x     / x     / x            EKG: sinus with CHB     TELEMETRY: sinus with third degree AVB with heart rate 35-40pbm    CARDIAC TESTS:    ECHO 4/9/2021   Summary     There is calcification of mitral valve . The leaflet opening is mildly   restricted.   Mean transmitral gradient is 4 mmHg; this finding is consistent with mild   mitral stenosis.   Moderate (2+) mitral regurgitation is present.   EA reversal of the mitral inflow consistent with reduced compliance of the   left ventricle.   Significant fibrocalcific changes noted to the aortic valve leaflets with   restriction in leaflet excursion. Peak transaortic gradient is 74 mmHg;   this finding is consistent with severe aortic stenosis.   Moderate (2+) aortic regurgitation is present.   The tricuspid valve leaflets are thin and pliable; valve motion is normal.   Mild (1+) tricuspid valve regurgitation is present.   Mild pulmonary hypertension.   Mild pulmonic valvular regurgitation (1+) is present.   Pulmonic valve not well seen, probably normal pulmonic valve function.   The left atrium is mildly dilated by LA volume index.   Mild concentric left ventricular hypertrophy is present.   Estimated left ventricular ejection fraction is 50-55 %.   The apex,mid,distal anteroseptal mildly hypokinetic.   Normal appearing right ventricle structure and function.     Signature     ----------------------------------------------------------------   Electronically signed by Venugopal Palla, MD(Interpreting   physician) on 04/09/2021 11:50 PM   ----------------------------------------------------------------

## 2021-09-20 NOTE — ED ADULT NURSE NOTE - NSIMPLEMENTINTERV_GEN_ALL_ED
Implemented All Fall Risk Interventions:  [place] to call system. Call bell, personal items and telephone within reach. Instruct patient to call for assistance. Room bathroom lighting operational. Non-slip footwear when patient is off stretcher. Physically safe environment: no spills, clutter or unnecessary equipment. Stretcher in lowest position, wheels locked, appropriate side rails in place. Provide visual cue, wrist band, yellow gown, etc. Monitor gait and stability. Monitor for mental status changes and reorient to person, place, and time. Review medications for side effects contributing to fall risk. Reinforce activity limits and safety measures with patient and family.

## 2021-09-20 NOTE — H&P ADULT - NSHPSOCIALHISTORY_GEN_ALL_CORE
Lives at [facility] [place]  Ambulates with walker  Denies EtOH or Illicit drug use.

## 2021-09-20 NOTE — H&P ADULT - ASSESSMENT
ASSESSMENT    #Complete Heart Block  #Acute CHF (unknown if systolic vs diastolic on admission)  #Aortic Stenosis  -associated generalized weakness and GACRIA  -Admit to CCU  -Pacer Pads at  bedside  -Atropine PRN if patient becomes hemodynamically stable  -CXRAY with vascular congestion/congestion on admission  -Saturation well on RA. Diuresis if BP able to tolerated. Cardiology consulted    #CLIFFORD on CKD IV  -Baseline Cr 3.4-3.5. Cr on admission   -IVF as needed for fluid balance  -Strict I/Os  -Daily Weights  -Avoid Nephrotoxic agents and/or dose adjust medicaitons accordingly  -Nephrology consult    #DM with Hyperglycemia  -Glucose checks AC HS. Lantus. Titrate accordingly  -A1c ordered    DVT Prophylaxis with Heparin subq        Goals of Care (GOC)/Advance Care Planning (ACP)    Date of Discussion/evaluation: 9/20/2021    Purpose of Discussion: In the setting of advanced age and multiple comorbidities, discussion of patient's wished should there be any deterioration in health status.     Parties Present/Discussed with: Patient and son    Patient's Decision making capacity at the time of discussion: Patient AAOx4 and has full decision making capacity    Presentation: As above    GOC: Code Status, HCP, Alternative Feeding Methods, Blood product Transfusions    PLAN:  Code Status: Full Code  HCP:  Son, Manuel Quezada  Alternative Feeding methods: Would like to discuss or assess should the situation arise that it requires alternative feeding methods  Blood Product Transfusions: YES agreable    ACP/GOC Time Spent: 17 minutes MEDICATIONS  (STANDING):  allopurinol 100 milliGRAM(s) Oral daily  atorvastatin 40 milliGRAM(s) Oral at bedtime  dextrose 40% Gel 15 Gram(s) Oral once  dextrose 5%. 1000 milliLiter(s) (50 mL/Hr) IV Continuous <Continuous>  dextrose 5%. 1000 milliLiter(s) (100 mL/Hr) IV Continuous <Continuous>  dextrose 50% Injectable 25 Gram(s) IV Push once  dextrose 50% Injectable 12.5 Gram(s) IV Push once  dextrose 50% Injectable 25 Gram(s) IV Push once  ferrous    sulfate 325 milliGRAM(s) Oral daily  furosemide    Tablet 20 milliGRAM(s) Oral daily  glucagon  Injectable 1 milliGRAM(s) IntraMuscular once  heparin   Injectable 5000 Unit(s) SubCutaneous every 12 hours  insulin glargine Injectable (LANTUS) 5 Unit(s) SubCutaneous at bedtime  insulin lispro (ADMELOG) corrective regimen sliding scale   SubCutaneous three times a day before meals  insulin lispro (ADMELOG) corrective regimen sliding scale   SubCutaneous at bedtime  pantoprazole    Tablet 40 milliGRAM(s) Oral before breakfast    MEDICATIONS  (PRN):  acetaminophen   Tablet .. 650 milliGRAM(s) Oral every 6 hours PRN Temp greater or equal to 38.5C (101.3F), Mild Pain (1 - 3)  aluminum hydroxide/magnesium hydroxide/simethicone Suspension 30 milliLiter(s) Oral every 4 hours PRN Dyspepsia  atropine Injectable 0.5 milliGRAM(s) IntraMuscular once PRN Bradycardia HR<50 AND SBP<95  melatonin 3 milliGRAM(s) Oral at bedtime PRN Insomnia  ondansetron Injectable 4 milliGRAM(s) IV Push every 8 hours PRN Nausea and/or Vomiting  polyethylene glycol 3350 17 Gram(s) Oral daily PRN Constipation      ASSESSMENT and PLAN    #Complete Heart Block  #Acute on Diastolic Heart Failure Exacerbation  #Severe Aortic Stenosis/Moderate Mitral Regurgitation  -associated generalized weakness and GARCIA  -Admit to CCU  -Pacer Pads at  bedside  -Atropine PRN if patient becomes hemodynamically unstable  -CXRAY with vascular congestion/congestion on admission (Offical read/report pending)  -BNP ~22K which is around where it was 12/2019  -Troponin negative x 2  -Saturation well on RA. Diuresis if BP able to tolerated. Caution in the setting of significant bradycardia. Cardiology consulted  -Strict I/Os, Daily Weight  -Avoid any and all agents/Rx that can cause AV cheryl blockage. BB held  -Echo from April 2021 as above, New TTE ordered has cardiac clinical status has changed    #CLIFFORD on CKD IV  -Baseline Cr 3.4-3.5. Cr on admission   -IVF as needed for fluid balance  -Strict I/Os  -Daily Weights  -Avoid Nephrotoxic agents and/or dose adjust medicaitons accordingly  -Nephrology consult    #DM with Hyperglycemia  -Glucose checks AC HS. Lantus. Titrate accordingly  -A1c ordered    DVT Prophylaxis with Heparin subq        Goals of Care (GOC)/Advance Care Planning (ACP)    Date of Discussion/evaluation: 9/20/2021    Purpose of Discussion: In the setting of advanced age and multiple comorbidities, discussion of patient's wished should there be any deterioration in health status.     Parties Present/Discussed with: Patient and son    Patient's Decision making capacity at the time of discussion: Patient AAOx4 and has full decision making capacity    Presentation: As above    GOC: Code Status, HCP, Alternative Feeding Methods, Blood product Transfusions    PLAN:  Code Status: Full Code  HCP:  SonManuel  Alternative Feeding methods: Would like to discuss or assess should the situation arise that it requires alternative feeding methods  Blood Product Transfusions: YES agreable    ACP/GOC Time Spent: 17 minutes MEDICATIONS  (STANDING):  allopurinol 100 milliGRAM(s) Oral daily  atorvastatin 40 milliGRAM(s) Oral at bedtime  dextrose 40% Gel 15 Gram(s) Oral once  dextrose 5%. 1000 milliLiter(s) (50 mL/Hr) IV Continuous <Continuous>  dextrose 5%. 1000 milliLiter(s) (100 mL/Hr) IV Continuous <Continuous>  dextrose 50% Injectable 25 Gram(s) IV Push once  dextrose 50% Injectable 12.5 Gram(s) IV Push once  dextrose 50% Injectable 25 Gram(s) IV Push once  ferrous    sulfate 325 milliGRAM(s) Oral daily  furosemide    Tablet 20 milliGRAM(s) Oral daily  glucagon  Injectable 1 milliGRAM(s) IntraMuscular once  heparin   Injectable 5000 Unit(s) SubCutaneous every 12 hours  insulin glargine Injectable (LANTUS) 5 Unit(s) SubCutaneous at bedtime  insulin lispro (ADMELOG) corrective regimen sliding scale   SubCutaneous three times a day before meals  insulin lispro (ADMELOG) corrective regimen sliding scale   SubCutaneous at bedtime  pantoprazole    Tablet 40 milliGRAM(s) Oral before breakfast    MEDICATIONS  (PRN):  acetaminophen   Tablet .. 650 milliGRAM(s) Oral every 6 hours PRN Temp greater or equal to 38.5C (101.3F), Mild Pain (1 - 3)  aluminum hydroxide/magnesium hydroxide/simethicone Suspension 30 milliLiter(s) Oral every 4 hours PRN Dyspepsia  atropine Injectable 0.5 milliGRAM(s) IntraMuscular once PRN Bradycardia HR<50 AND SBP<95  melatonin 3 milliGRAM(s) Oral at bedtime PRN Insomnia  ondansetron Injectable 4 milliGRAM(s) IV Push every 8 hours PRN Nausea and/or Vomiting  polyethylene glycol 3350 17 Gram(s) Oral daily PRN Constipation      ASSESSMENT and PLAN    #Complete Heart Block  #Acute on Diastolic Heart Failure Exacerbation  #Severe Aortic Stenosis/Moderate Mitral Regurgitation  -associated generalized weakness and GARCIA  -Admit to CCU  -Pacer Pads at  bedside  -Atropine PRN if patient becomes hemodynamically unstable  -CXRAY with vascular congestion/congestion on admission (Offical read/report pending)  -BNP ~22K which is around where it was 12/2019  -Troponin negative x 2  -Saturation well on RA. Diuresis if BP able to tolerated. Caution in the setting of significant bradycardia. Cardiology consulted  -Strict I/Os, Daily Weight  -Avoid any and all agents/Rx that can cause AV cheryl blockage. BB held  -Echo from April 2021 as above, New TTE ordered has cardiac clinical status has changed    #Cough+Leukocytosis. CAP?  -CXRAY with vascular congestion. Unable to clincinally determine if there is underlyign pneumonia.   -Empiric Rocephin  -Procalcitonin ordered      #CLIFFORD on CKD IV  -Baseline Cr 3.4-3.5. Cr on admission   -IVF as needed for fluid balance  -Strict I/Os  -Daily Weights  -Avoid Nephrotoxic agents and/or dose adjust medicaitons accordingly  -Nephrology consult    #DM with Hyperglycemia  -Glucose checks AC HS. Lantus. Titrate accordingly  -A1c ordered    DVT Prophylaxis with Heparin subq        Goals of Care (GOC)/Advance Care Planning (ACP)    Date of Discussion/evaluation: 9/20/2021    Purpose of Discussion: In the setting of advanced age and multiple comorbidities, discussion of patient's wished should there be any deterioration in health status.     Parties Present/Discussed with: Patient and son    Patient's Decision making capacity at the time of discussion: Patient AAOx4 and has full decision making capacity    Presentation: As above    GOC: Code Status, HCP, Alternative Feeding Methods, Blood product Transfusions    PLAN:  Code Status: Full Code  HCP:  Son, Manuel Quezada  Alternative Feeding methods: Would like to discuss or assess should the situation arise that it requires alternative feeding methods  Blood Product Transfusions: YES agreable    ACP/GOC Time Spent: 17 minutes

## 2021-09-20 NOTE — H&P ADULT - NSHPLABSRESULTS_GEN_ALL_CORE
8.5    13.01 )-----------( 200      ( 20 Sep 2021 12:08 )             27.6     09-20    141  |  108  |  99<H>  ----------------------------<  234<H>  5.2   |  24  |  4.89<H>    Ca    8.0<L>      20 Sep 2021 12:08  Phos  5.2     09-20  Mg     2.8     09-20    TPro  6.9  /  Alb  3.0<L>  /  TBili  0.6  /  DBili  x   /  AST  13<L>  /  ALT  22  /  AlkPhos  120  09-20    COVID-19 PCR: NotDetec (20 Sep 2021 12:08)  COVID-19 PCR: NotDetec (15 Apr 2021 12:35)  COVID-19 PCR: NotDetec (09 Apr 2021 07:25)    CAPILLARY BLOOD GLUCOSE 8.5    13.01 )-----------( 200      ( 20 Sep 2021 12:08 )             27.6     09-20    141  |  108  |  99<H>  ----------------------------<  234<H>  5.2   |  24  |  4.89<H>    Ca    8.0<L>      20 Sep 2021 12:08  Phos  5.2     09-20  Mg     2.8     09-20    TPro  6.9  /  Alb  3.0<L>  /  TBili  0.6  /  DBili  x   /  AST  13<L>  /  ALT  22  /  AlkPhos  120  09-20    COVID-19 PCR: NotDetec (20 Sep 2021 12:08)  COVID-19 PCR: NotDetec (15 Apr 2021 12:35)  COVID-19 PCR: NotDetec (09 Apr 2021 07:25)    CAPILLARY BLOOD GLUCOSE    Troponin I, Serum: <0.015: High Sensitivity Troponin and new reference range effective 7/6/2016 ng/mL (09.20.21 @ 15:24)   Troponin I, Serum: 0.034: High Sensitivity Troponin and new reference range effective 7/6/2016 ng/mL (09.20.21 @ 12:08) pH, Venous: 7.30   pCO2, Venous: 50 mmHg   pO2, Venous: 137 mmHg Thyroid Stimulating Hormone, Serum (09.20.21 @ 12:08)   Thyroid Stimulating Hormone, Serum: 1.40 uU/mL Serum Pro-Brain Natriuretic Peptide (09.20.21 @ 12:08)   Serum Pro-Brain Natriuretic Peptide: 04031 pg/mL  EKG (9/20): Complete Heart Block    < from: TTE Echo Complete w/o Contrast w/ Doppler (04.09.21 @ 13:29) >    There is calcification of mitral valve . The leaflet opening is mildly   restricted.   Mean transmitral gradient is 4 mmHg; this finding is consistent with mild   mitral stenosis.   Moderate (2+) mitral regurgitation is present.   EA reversal of the mitral inflow consistent with reduced compliance of the   left ventricle.   Significant fibrocalcific changes noted to the aortic valve leaflets with   restriction in leaflet excursion. Peak transaortic gradient is 74 mmHg;   this finding is consistent with severe aortic stenosis.   Moderate (2+) aortic regurgitation is present.   The tricuspid valve leaflets are thin and pliable; valve motion is normal.   Mild (1+) tricuspid valve regurgitation is present.   Mild pulmonary hypertension.   Mild pulmonic valvular regurgitation (1+) is present.   Pulmonic valve not well seen, probably normal pulmonic valve function.   The left atrium is mildly dilated by LA volume index.   Mild concentric left ventricular hypertrophy is present.   Estimated left ventricular ejection fraction is 50-55 %.   The apex,mid,distal anteroseptal mildly hypokinetic.   Normal appearing right ventricle structure and function.      < end of copied text >    I reviewed labs, imaging, orders, ekg and vitals.

## 2021-09-20 NOTE — H&P ADULT - NSHPPHYSICALEXAM_GEN_ALL_CORE
PHYSICAL EXAM:    T(C): 36.1 (09-20-21 @ 18:13), Max: 36.9 (09-20-21 @ 12:01)  HR: 33 (09-20-21 @ 18:13) (31 - 39)  BP: 149/39 (09-20-21 @ 18:13) (125/50 - 157/79)  RR: 20 (09-20-21 @ 18:13) (16 - 20)  SpO2: 93% (09-20-21 @ 18:13) (93% - 99%)    General: AAOx3; NAD Lethargic  Head: AT/NC  ENT: Moist Mucous Membranes; No Injury  Neck: Non-tender; JVD +  CVS: RRR, S1&S2, No murmur, No edema  Respiratory: Decreased breath sound bilatterly.  Normal Respiratory Effort  Abdomen/GI: Soft, non-tender, non-distended, no guarding, no rebound, normal bowel sounds  : No bladder distention, No Akins  Extremites: No cyanosis, No clubbing, No edema  MSK: No CVA tenderness, Normal ROM, No injury  Neuro: AAOx3, CNII-XII grossly intact, non-focal  Psych: Appropriate, Cooperative, Flat affect  Skin: Clean, Dry and Intact PHYSICAL EXAM:    T(C): 36.1 (09-20-21 @ 18:13), Max: 36.9 (09-20-21 @ 12:01)  HR: 33 (09-20-21 @ 18:13) (31 - 39)  BP: 149/39 (09-20-21 @ 18:13) (125/50 - 157/79)  RR: 20 (09-20-21 @ 18:13) (16 - 20)  SpO2: 93% (09-20-21 @ 18:13) (93% - 99%)    General: AAOx3; NAD Lethargic  Head: AT/NC  ENT: Moist Mucous Membranes; No Injury  Neck: Non-tender; JVD +  CVS: RRR, S1&S2, Systolic Murmur; LE edema  Respiratory: Decreased breath sound bilatterly.  Normal Respiratory Effort  Abdomen/GI: Soft, non-tender, non-distended, no guarding, no rebound, normal bowel sounds  : No bladder distention, No Akins  Extremites: No cyanosis, No clubbing, LE edema  MSK: No CVA tenderness, Normal ROM, No injury  Neuro: AAOx3, CNII-XII grossly intact, non-focal  Psych: Appropriate, Cooperative, Flat affect  Skin: Clean, Dry and Intact

## 2021-09-20 NOTE — ED PROVIDER NOTE - OBJECTIVE STATEMENT
89 y/o male with a PMHx of anemia, aortic stenosis, CAD s/p stents x2, CKD, DM2, glaucoma, gout, HTN, HLD, leg edema, osteoporosis, prostate cancer presents to the ED [facility] from [facility] for bradycardia. Pt reports feeling weak over the last day. Pt was seen today at the [facility] center, found to have a HR in the 30s and was brought to the ED for evaluation. Pt given .5mg of Atropine by EMS. Denies CP, SOB, n/v/d, bleeding, fever, sore throat, cough. Vaccinated for COVID. No other complaints at this time.

## 2021-09-21 NOTE — PROVIDER CONTACT NOTE (OTHER) - SITUATION
PCP is aware/sent patient to the hospital
notified service; spoke to Rosy
notified service; spoke to Heaven

## 2021-09-21 NOTE — CONSULT NOTE ADULT - SUBJECTIVE AND OBJECTIVE BOX
Patient is a 89y old  Male who presents with a chief complaint of Complete Heart Block/CLIFFORD/Weakness (20 Sep 2021 14:37)      HPI:  89M with PMH Aortic Stenosis CAD s/p PCI, CKD IV, DM HTN, HLD, Fe deficiency anemia presents from [facility] with Generalized weakness that he woke up with overnight. Associated GARCIA and fatigue, Denies fever, chills, near syncope, dizziness, chest pain, nausea, vomiting, abdominal pain/discomfort. Denies any recent weight gain/loss.    In th ED. EKG with Complete Heart Block. EP called and recommended admission to CCU for PPM placement. Cr 4.89 (Baseline 3.30-3.45), WBC 13.01, Hgb (8/.5 (20 Sep 2021 14:37)    9/21 Cardiology. 89 year old male with history of CAD, s/p PCI, CKD and DM admitted with dyspnea for 24 hours. some element of orthopnea as well. No le edema. patietnt typically ambulates with walker at [facility]. No chest pain. On admission found to be in CHB with under;[place] [id number]  currently feels dyspneic at rest.  PAST MEDICAL & SURGICAL HISTORY:  Hypertension    HLD (hyperlipidemia)    Gout    Prostate cancer    Anemia    Chronic kidney disease (CKD)    CAD (coronary artery disease)    DM type 2 (diabetes mellitus, type 2)    Glaucoma    Aortic stenosis    Osteoporosis    Leg edema    H/O inguinal hernia repair    History of tonsillectomy  childhood    History of colonoscopy        PREVIOUS DIAGNOSTIC TESTING:      ECHO  FINDINGS:    STRESS  FINDINGS:    CATHETERIZATION  FINDINGS:    MEDICATIONS  (STANDING):  allopurinol 100 milliGRAM(s) Oral daily  atorvastatin 40 milliGRAM(s) Oral at bedtime  cefTRIAXone Injectable.      cefTRIAXone Injectable. 1000 milliGRAM(s) IV Push every 24 hours  dextrose 40% Gel 15 Gram(s) Oral once  dextrose 5%. 1000 milliLiter(s) (50 mL/Hr) IV Continuous <Continuous>  dextrose 5%. 1000 milliLiter(s) (100 mL/Hr) IV Continuous <Continuous>  dextrose 50% Injectable 25 Gram(s) IV Push once  dextrose 50% Injectable 12.5 Gram(s) IV Push once  dextrose 50% Injectable 25 Gram(s) IV Push once  ferrous    sulfate 325 milliGRAM(s) Oral daily  furosemide    Tablet 20 milliGRAM(s) Oral daily  glucagon  Injectable 1 milliGRAM(s) IntraMuscular once  heparin   Injectable 5000 Unit(s) SubCutaneous every 12 hours  influenza   Vaccine 0.5 milliLiter(s) IntraMuscular once  insulin glargine Injectable (LANTUS) 5 Unit(s) SubCutaneous at bedtime  insulin lispro (ADMELOG) corrective regimen sliding scale   SubCutaneous three times a day before meals  insulin lispro (ADMELOG) corrective regimen sliding scale   SubCutaneous at bedtime  pantoprazole    Tablet 40 milliGRAM(s) Oral before breakfast    MEDICATIONS  (PRN):  acetaminophen   Tablet .. 650 milliGRAM(s) Oral every 6 hours PRN Temp greater or equal to 38.5C (101.3F), Mild Pain (1 - 3)  aluminum hydroxide/magnesium hydroxide/simethicone Suspension 30 milliLiter(s) Oral every 4 hours PRN Dyspepsia  atropine Injectable 0.5 milliGRAM(s) IntraMuscular once PRN Bradycardia HR<50 AND SBP<95  melatonin 3 milliGRAM(s) Oral at bedtime PRN Insomnia  ondansetron Injectable 4 milliGRAM(s) IV Push every 8 hours PRN Nausea and/or Vomiting  polyethylene glycol 3350 17 Gram(s) Oral daily PRN Constipation      FAMILY HISTORY:  Family history of CVA    Family hx of lung cancer        SOCIAL HISTORY:  ***    REVIEW OF SYSTEM:  Pertinent items are noted in HPI.  Constitutional  Eyes:    Ears, nose, mouth,   throat, and face:    Neck:   Respiratory:   and wheezing  Cardiovascular:    Gastrointestinal:  Genitourinary:     Hematologic/lymphatic:   Musculoskeletal:   Neurological:   Behavioral/Psych:        Vital Signs Last 24 Hrs  T(C): 37.1 (21 Sep 2021 08:03), Max: 37.1 (21 Sep 2021 08:03)  T(F): 98.8 (21 Sep 2021 08:03), Max: 98.8 (21 Sep 2021 08:03)  HR: 34 (21 Sep 2021 06:00) (31 - 43)  BP: 137/39 (21 Sep 2021 06:00) (125/50 - 157/79)  BP(mean): 64 (21 Sep 2021 06:00) (61 - 126)  RR: 19 (21 Sep 2021 06:00) (12 - 23)  SpO2: 94% (21 Sep 2021 06:00) (81% - 99%)    I&O's Summary    20 Sep 2021 07:01  -  21 Sep 2021 07:00  --------------------------------------------------------  IN: 150 mL / OUT: 400 mL / NET: -250 mL      PHYSICAL EXAM  General Appearance: cooperative, mildly dyspneic  HEENT: PERRL, conjunctiva clear    Neck: Supple, no hjvd  Back: Symmetric, no  tenderness,no soft tissue tenderness  Lungs: bilateral rales half up, scattered wheeze    Heart: Regular rate and rhythm, S1, S2 is reduced. 3/6 clark base  Abdomen: Soft, non-tender, bowel sounds active , no hepatosplenomegaly  Extremities: 1+ edema  Skin: Skin color, texture normal, no rashes   Neurologic: Alert and oriented X3         INTERPRETATION OF TELEMETRY:    ECG:        LABS:                          9.0    12.20 )-----------( 205      ( 21 Sep 2021 07:32 )             29.8     09-20    141  |  108  |  99<H>  ----------------------------<  234<H>  5.2   |  24  |  4.89<H>    Ca    8.0<L>      20 Sep 2021 12:08  Phos  5.2     09-20  Mg     2.8     09-20    TPro  6.9  /  Alb  3.0<L>  /  TBili  0.6  /  DBili  x   /  AST  13<L>  /  ALT  22  /  AlkPhos  120  09-20    CARDIAC MARKERS ( 20 Sep 2021 15:24 )  <0.015 ng/mL / x     / x     / x     / x      CARDIAC MARKERS ( 20 Sep 2021 12:08 )  0.034 ng/mL / x     / x     / x     / x            Pro BNP  18956 09-20 @ 12:08  D Dimer  -- 09-20 @ 12:08              RADIOLOGY & ADDITIONAL STUDIES:    IMPRESSION:    PLAN:

## 2021-09-21 NOTE — CONSULT NOTE ADULT - ASSESSMENT
89M with PMH Aortic Stenosis CAD s/p PCI, CKD IV, DM HTN, HLD, Fe deficiency anemia presents from [facility] with Generalized weakness that he woke up with overnight. Associated GARCIA and fatigue, Denies fever, chills, near syncope, dizziness, chest pain, nausea, vomiting, abdominal pain/discomfort. Denies any recent weight gain/loss.  In th ED. EKG with Complete Heart Block. EP called and recommended admission to CCU for PPM placement. Cr 4.89 (Baseline 3.30-3.45), WBC 13.01, Hgb (8/.5 (20 Sep 2021 14:37)    Above from chart:  Pt seen in the past for CKD4-5  Creat as high as 5.1 on 2/20  admitted for 3rd degree block  Monitor K if rises, may use Lokelma 10 g qd   No need for HD at this time

## 2021-09-21 NOTE — PROGRESS NOTE ADULT - SUBJECTIVE AND OBJECTIVE BOX
HPI:  89M with PMH Aortic Stenosis CAD s/p PCI, CKD IV, DM HTN, HLD, Fe deficiency anemia presents from [facility] with Generalized weakness that he woke up with overnight. Associated GARCIA and fatigue, Denies fever, chills, near syncope, dizziness, chest pain, nausea, vomiting, abdominal pain/discomfort. Denies any recent weight gain/loss.  In th ED. EKG with Complete Heart Block. EP called and recommended admission to CCU for PPM placement. Cr 4.89 (Baseline 3.30-3.45), WBC 13.01, Hgb (8/.5 (20 Sep 2021 14:37)        9/21/21: pt seen in bed, A & O x 3, denies CP, dizziness, " I feel washed out", Pacer pads in place.  EKG: CHB 31 bpm, QRS 110ms  TELE: sinus with CHB, 29-35 bpm    MEDICATIONS  (STANDING):  allopurinol 100 milliGRAM(s) Oral daily  atorvastatin 40 milliGRAM(s) Oral at bedtime  cefTRIAXone Injectable.      cefTRIAXone Injectable. 1000 milliGRAM(s) IV Push every 24 hours  dextrose 40% Gel 15 Gram(s) Oral once  dextrose 5%. 1000 milliLiter(s) (50 mL/Hr) IV Continuous <Continuous>  dextrose 5%. 1000 milliLiter(s) (100 mL/Hr) IV Continuous <Continuous>  dextrose 50% Injectable 25 Gram(s) IV Push once  dextrose 50% Injectable 12.5 Gram(s) IV Push once  dextrose 50% Injectable 25 Gram(s) IV Push once  ferrous    sulfate 325 milliGRAM(s) Oral daily  furosemide    Tablet 20 milliGRAM(s) Oral daily  furosemide   Injectable 40 milliGRAM(s) IV Push once  glucagon  Injectable 1 milliGRAM(s) IntraMuscular once  heparin   Injectable 5000 Unit(s) SubCutaneous every 12 hours  influenza   Vaccine 0.5 milliLiter(s) IntraMuscular once  insulin glargine Injectable (LANTUS) 5 Unit(s) SubCutaneous at bedtime  insulin lispro (ADMELOG) corrective regimen sliding scale   SubCutaneous three times a day before meals  insulin lispro (ADMELOG) corrective regimen sliding scale   SubCutaneous at bedtime  pantoprazole    Tablet 40 milliGRAM(s) Oral before breakfast    MEDICATIONS  (PRN):  acetaminophen   Tablet .. 650 milliGRAM(s) Oral every 6 hours PRN Temp greater or equal to 38.5C (101.3F), Mild Pain (1 - 3)  aluminum hydroxide/magnesium hydroxide/simethicone Suspension 30 milliLiter(s) Oral every 4 hours PRN Dyspepsia  atropine Injectable 0.5 milliGRAM(s) IntraMuscular once PRN Bradycardia HR<50 AND SBP<95  melatonin 3 milliGRAM(s) Oral at bedtime PRN Insomnia  ondansetron Injectable 4 milliGRAM(s) IV Push every 8 hours PRN Nausea and/or Vomiting  polyethylene glycol 3350 17 Gram(s) Oral daily PRN Constipation      Allergies    ACE inhibitors (Unknown)  aspirin (Unknown)  enalapril (Unknown)    Intolerances        Vital Signs Last 24 Hrs  T(C): 37.1 (21 Sep 2021 08:03), Max: 37.1 (21 Sep 2021 08:03)  T(F): 98.8 (21 Sep 2021 08:03), Max: 98.8 (21 Sep 2021 08:03)  HR: 34 (21 Sep 2021 06:00) (31 - 43)  BP: 137/39 (21 Sep 2021 06:00) (125/50 - 157/79)  BP(mean): 64 (21 Sep 2021 06:00) (61 - 126)  RR: 19 (21 Sep 2021 06:00) (12 - 23)  SpO2: 94% (21 Sep 2021 06:00) (81% - 99%)    PHYSICAL EXAMINATION:    GENERAL APPEARANCE:  Frail elderly male. Pt. is alert, oriented, and pleasant. Mild dyspnea.  HEENT:  Pupils are normal and react normally. No icterus. Mucous membranes well colored.  NECK:  Supple. No lymphadenopathy. Jugular venous pressure not elevated. Carotids equal.   HEART:   The cardiac impulse has a normal quality. + systolic  murmur, no rubs or gallops noted  CHEST: Bilateral rales. Increased respiratory effort.  ABDOMEN:  Soft and nontender.   EXTREMITIES:  There is no edema.   SKIN:  No rash or significant lesions are noted.    LABS:                        9.0    12.20 )-----------( 205      ( 21 Sep 2021 07:32 )             29.8     09-21    143  |  110<H>  |  115<H>  ----------------------------<  130<H>  5.2   |  24  |  4.95<H>    Ca    8.2<L>      21 Sep 2021 07:32  Phos  5.3     09-21  Mg     2.9     09-21    TPro  6.8  /  Alb  2.8<L>  /  TBili  0.4  /  DBili  x   /  AST  9<L>  /  ALT  17  /  AlkPhos  108  09-21        CARDIAC MARKERS ( 20 Sep 2021 15:24 )  <0.015 ng/mL / x     / x     / x     / x      CARDIAC MARKERS ( 20 Sep 2021 12:08 )  0.034 ng/mL / x     / x     / x     / x            RADIOLOGY & ADDITIONAL TESTS:    < from: Xray Chest 1 View- PORTABLE-Urgent (09.20.21 @ 12:22) >  INTERPRETATION:  XR CHEST URGENT    Single AP view    HISTORY:  weakness    Comparison: Chest x-ray 4/9/2021    The cardiac silhouette is within normal limits. Patchy bilateral airspace disease. No pleural abnormality.    IMPRESSION: Pulmonary edema versus bilateral patchy airspace      < from: TTE Echo Complete w/o Contrast w/ Doppler (04.09.21 @ 13:29) >   Impression     Summary     There is calcification of mitral valve . The leaflet opening is mildly   restricted.   Mean transmitral gradient is 4 mmHg; this finding is consistent with mild   mitral stenosis.   Moderate (2+) mitral regurgitation is present.   EA reversal of the mitral inflow consistent with reduced compliance of the   left ventricle.   Significant fibrocalcific changes noted to the aortic valve leaflets with   restriction in leaflet excursion. Peak transaortic gradient is 74 mmHg;   this finding is consistent with severe aortic stenosis.   Moderate (2+) aortic regurgitation is present.   The tricuspid valve leaflets are thin and pliable; valve motion is normal.   Mild (1+) tricuspid valve regurgitation is present.   Mild pulmonary hypertension.   Mild pulmonic valvular regurgitation (1+) is present.   Pulmonic valve not well seen, probably normal pulmonic valve function.   The left atrium is mildly dilated by LA volume index.   Mild concentric left ventricular hypertrophy is present.   Estimated left ventricular ejection fraction is 50-55 %.   The apex,mid,distal anteroseptal mildly hypokinetic.   Normal appearing right ventricle structure and function.

## 2021-09-21 NOTE — PROGRESS NOTE ADULT - SUBJECTIVE AND OBJECTIVE BOX
HPI:  89M with PMH Aortic Stenosis CAD s/p PCI, CKD IV, DM HTN, HLD, Fe deficiency anemia presents from [facility] with Generalized weakness that he woke up with overnight. Associated GARCIA and fatigue, Denies fever, chills, near syncope, dizziness, chest pain, nausea, vomiting, abdominal pain/discomfort. Denies any recent weight gain/loss.    In th ED. EKG with Complete Heart Block. EP called and recommended admission to CCU for PPM placement. Cr 4.89 (Baseline 3.30-3.45), WBC 13.01, Hgb (8/.5 (20 Sep 2021 14:37)    9/21: pt seen and examined. HR 30s. Does have some SOB. No CP. On 2L NC for O2 supplementation.     ROS: negative unless noted above in HPI    Vital Signs Last 24 Hrs  T(C): 37.1 (21 Sep 2021 08:03), Max: 37.1 (21 Sep 2021 08:03)  T(F): 98.8 (21 Sep 2021 08:03), Max: 98.8 (21 Sep 2021 08:03)  HR: 31 (21 Sep 2021 15:00) (30 - 43)  BP: 154/41 (21 Sep 2021 15:00) (135/36 - 155/40)  BP(mean): 68 (21 Sep 2021 15:00) (61 - 126)  RR: 21 (21 Sep 2021 15:00) (12 - 26)  SpO2: 95% (21 Sep 2021 13:00) (81% - 99%)    PHYSICAL EXAM:      Constitutional: NAD, well-groomed, well-developed  HEENT: PERRLA, EOMI, Normal Hearing  Neck: No LAD, No JVD  Back: Normal spine flexure, No CVA tenderness  Cardiovascular: bradycardic, +systolic murmur  Respiratory: b/l rales  Gastrointestinal: BS+, soft, NT/ND  Extremities: + peripheral edema  Vascular: 2+ peripheral pulses  Neurological: A/O x 3, no focal deficits  Psychiatric: Normal mood, normal affect  Musculoskeletal: 5/5 strength b/l upper and lower extremities  Skin: No rashes    Labs    CARDIAC MARKERS ( 20 Sep 2021 15:24 )  <0.015 ng/mL / x     / x     / x     / x      CARDIAC MARKERS ( 20 Sep 2021 12:08 )  0.034 ng/mL / x     / x     / x     / x                                9.0    12.20 )-----------( 205      ( 21 Sep 2021 07:32 )             29.8     21 Sep 2021 07:32    143    |  110    |  115    ----------------------------<  130    5.2     |  24     |  4.95     Ca    8.2        21 Sep 2021 07:32  Phos  5.3       21 Sep 2021 07:32  Mg     2.9       21 Sep 2021 07:32    TPro  6.8    /  Alb  2.8    /  TBili  0.4    /  DBili  x      /  AST  9      /  ALT  17     /  AlkPhos  108    21 Sep 2021 07:32      CAPILLARY BLOOD GLUCOSE      POCT Blood Glucose.: 161 mg/dL (21 Sep 2021 12:13)  POCT Blood Glucose.: 145 mg/dL (20 Sep 2021 22:18)  POCT Blood Glucose.: 159 mg/dL (20 Sep 2021 18:16)    LIVER FUNCTIONS - ( 21 Sep 2021 07:32 )  Alb: 2.8 g/dL / Pro: 6.8 gm/dL / ALK PHOS: 108 U/L / ALT: 17 U/L / AST: 9 U/L / GGT: x           < from: Xray Chest 1 View- PORTABLE-Urgent (09.20.21 @ 12:22) >  IMPRESSION: Pulmonary edema versus bilateral patchy airspace    < end of copied text >    MEDICATIONS  (STANDING):  allopurinol 100 milliGRAM(s) Oral daily  atorvastatin 40 milliGRAM(s) Oral at bedtime  dextrose 40% Gel 15 Gram(s) Oral once  dextrose 5%. 1000 milliLiter(s) (50 mL/Hr) IV Continuous <Continuous>  dextrose 5%. 1000 milliLiter(s) (100 mL/Hr) IV Continuous <Continuous>  dextrose 50% Injectable 25 Gram(s) IV Push once  dextrose 50% Injectable 12.5 Gram(s) IV Push once  dextrose 50% Injectable 25 Gram(s) IV Push once  ferrous    sulfate 325 milliGRAM(s) Oral daily  glucagon  Injectable 1 milliGRAM(s) IntraMuscular once  heparin   Injectable 5000 Unit(s) SubCutaneous every 12 hours  influenza   Vaccine 0.5 milliLiter(s) IntraMuscular once  insulin glargine Injectable (LANTUS) 5 Unit(s) SubCutaneous at bedtime  insulin lispro (ADMELOG) corrective regimen sliding scale   SubCutaneous three times a day before meals  insulin lispro (ADMELOG) corrective regimen sliding scale   SubCutaneous at bedtime  pantoprazole    Tablet 40 milliGRAM(s) Oral before breakfast    MEDICATIONS  (PRN):  acetaminophen   Tablet .. 650 milliGRAM(s) Oral every 6 hours PRN Temp greater or equal to 38.5C (101.3F), Mild Pain (1 - 3)  aluminum hydroxide/magnesium hydroxide/simethicone Suspension 30 milliLiter(s) Oral every 4 hours PRN Dyspepsia  atropine Injectable 0.5 milliGRAM(s) IntraMuscular once PRN Bradycardia HR<50 AND SBP<95  melatonin 3 milliGRAM(s) Oral at bedtime PRN Insomnia  ondansetron Injectable 4 milliGRAM(s) IV Push every 8 hours PRN Nausea and/or Vomiting  polyethylene glycol 3350 17 Gram(s) Oral daily PRN Constipation

## 2021-09-21 NOTE — CONSULT NOTE ADULT - ASSESSMENT
IMP:  1. CHB  2. Aortic stenosis, likely severe  3. congestive heart failure, likely diastolic due to AS, rule out llv dysfunction  4. ckd, baseline cr unknown    Plan: EP eval will need PPM, dual chamber vs micra given patients age  Lasix 40 IV today even given renal function as exam and xray suggest sig. chf  renal eval advisee  will follow

## 2021-09-21 NOTE — PROGRESS NOTE ADULT - ASSESSMENT
89 yo male admitted with:    #Complete Heart Block  #Acute on Diastolic Heart Failure Exacerbation  #Severe Aortic Stenosis/Moderate Mitral Regurgitation  -Admit to CCU  -Pacer Pads at bedside  -Atropine PRN if patient becomes hemodynamically unstable  -CXRAY with vascular congestion on admission  -BNP ~22K  -Troponin negative x 2  -change lasix to IV 40mg daily  -Strict I/Os, Daily Weight  -Avoid all AV cheryl blockers  -cardio consult appreciated  -EP consult appreciated - plan for PPM on 9/22  -NPO at midnight  -d/w ceftriaxone, doubt PNA, procalcitonin ordered    #CLIFFORD on CKD IV  -Baseline Cr 3.4-3.5. Cr on admission   -Strict I/Os  -Daily Weights  -Avoid Nephrotoxic agents  -nephrology following  -Nephrology consult    #DM with Hyperglycemia  -Glucose checks AC HS. Lantus. Titrate accordingly    DVT Prophylaxis with Heparin subq    dispo: remain inpatient, PPm tomorrow    d/w son Manuel, updated on plan of care        Goals of Care (GOC)/Advance Care Planning (ACP)    Date of Discussion/evaluation: 9/20/2021    Purpose of Discussion: In the setting of advanced age and multiple comorbidities, discussion of patient's wished should there be any deterioration in health status.     Parties Present/Discussed with: Patient and son    Patient's Decision making capacity at the time of discussion: Patient AAOx4 and has full decision making capacity    Presentation: As above    GOC: Code Status, HCP, Alternative Feeding Methods, Blood product Transfusions    PLAN:  Code Status: Full Code  HCP:  Son, Manuel Quezada  Alternative Feeding methods: Would like to discuss or assess should the situation arise that it requires alternative feeding methods  Blood Product Transfusions: YES agreable

## 2021-09-21 NOTE — PROGRESS NOTE ADULT - ASSESSMENT
89 yo male with above medical history presented with weakness and imani, in third degree AVB , hemodynamically stable at present time.  Last known EF 55% in 4/2021.     A/P: complete HB, CHF  Continuous tele monitoring in CCU  Recommend Micra-AV leadless pacemaker  Plan for implant on Wednesday 9/22/21  Keep NPO after midnight Tuesday   Avoid any AV cheryl blocking agents  Discussed plan with patient, RN and Dr. Reynolds

## 2021-09-21 NOTE — CONSULT NOTE ADULT - SUBJECTIVE AND OBJECTIVE BOX
NEPHROLOGY CONSULT  HPI:  89M with PMH Aortic Stenosis CAD s/p PCI, CKD IV, DM HTN, HLD, Fe deficiency anemia presents from [facility] with Generalized weakness that he woke up with overnight. Associated GARCIA and fatigue, Denies fever, chills, near syncope, dizziness, chest pain, nausea, vomiting, abdominal pain/discomfort. Denies any recent weight gain/loss.    In th ED. EKG with Complete Heart Block. EP called and recommended admission to CCU for PPM placement. Cr 4.89 (Baseline 3.30-3.45), WBC 13.01, Hgb (8/.5 (20 Sep 2021 14:37)    Above from chart:  Pt seen in the past for CKD4-5  Creat as high as 5.1 on   admitted for 3rd degree block      PAST MEDICAL & SURGICAL HISTORY:  Hypertension    HLD (hyperlipidemia)    Gout    Prostate cancer    Anemia    Chronic kidney disease (CKD)    CAD (coronary artery disease)    DM type 2 (diabetes mellitus, type 2)    Glaucoma    Aortic stenosis    Osteoporosis    Leg edema    H/O inguinal hernia repair    History of tonsillectomy  childhood    History of colonoscopy        FAMILY HISTORY:  Family history of CVA    Family hx of lung cancer        MEDICATIONS  (STANDING):  allopurinol 100 milliGRAM(s) Oral daily  atorvastatin 40 milliGRAM(s) Oral at bedtime  cefTRIAXone Injectable.      cefTRIAXone Injectable. 1000 milliGRAM(s) IV Push every 24 hours  dextrose 40% Gel 15 Gram(s) Oral once  dextrose 5%. 1000 milliLiter(s) (50 mL/Hr) IV Continuous <Continuous>  dextrose 5%. 1000 milliLiter(s) (100 mL/Hr) IV Continuous <Continuous>  dextrose 50% Injectable 25 Gram(s) IV Push once  dextrose 50% Injectable 12.5 Gram(s) IV Push once  dextrose 50% Injectable 25 Gram(s) IV Push once  ferrous    sulfate 325 milliGRAM(s) Oral daily  furosemide    Tablet 20 milliGRAM(s) Oral daily  glucagon  Injectable 1 milliGRAM(s) IntraMuscular once  heparin   Injectable 5000 Unit(s) SubCutaneous every 12 hours  influenza   Vaccine 0.5 milliLiter(s) IntraMuscular once  insulin glargine Injectable (LANTUS) 5 Unit(s) SubCutaneous at bedtime  insulin lispro (ADMELOG) corrective regimen sliding scale   SubCutaneous three times a day before meals  insulin lispro (ADMELOG) corrective regimen sliding scale   SubCutaneous at bedtime  pantoprazole    Tablet 40 milliGRAM(s) Oral before breakfast    MEDICATIONS  (PRN):  acetaminophen   Tablet .. 650 milliGRAM(s) Oral every 6 hours PRN Temp greater or equal to 38.5C (101.3F), Mild Pain (1 - 3)  aluminum hydroxide/magnesium hydroxide/simethicone Suspension 30 milliLiter(s) Oral every 4 hours PRN Dyspepsia  atropine Injectable 0.5 milliGRAM(s) IntraMuscular once PRN Bradycardia HR<50 AND SBP<95  melatonin 3 milliGRAM(s) Oral at bedtime PRN Insomnia  ondansetron Injectable 4 milliGRAM(s) IV Push every 8 hours PRN Nausea and/or Vomiting  polyethylene glycol 3350 17 Gram(s) Oral daily PRN Constipation      Allergies    ACE inhibitors (Unknown)  aspirin (Unknown)  enalapril (Unknown)    Intolerances        I&O's Summary    20 Sep 2021 07:01  -  21 Sep 2021 07:00  --------------------------------------------------------  IN: 150 mL / OUT: 400 mL / NET: -250 mL          REVIEW OF SYSTEMS:        Vital Signs Last 24 Hrs  T(C): 37.1 (21 Sep 2021 08:03), Max: 37.1 (21 Sep 2021 08:03)  T(F): 98.8 (21 Sep 2021 08:03), Max: 98.8 (21 Sep 2021 08:03)  HR: 33 (21 Sep 2021 10:37) (30 - 43)  BP: 138/40 (21 Sep 2021 10:37) (125/50 - 150/117)  BP(mean): 65 (21 Sep 2021 10:37) (61 - 126)  RR: 24 (21 Sep 2021 10:37) (12 - 24)  SpO2: 94% (21 Sep 2021 10:37) (81% - 99%)  Daily     Daily Weight in k.9 (21 Sep 2021 04:51)  I&O's Summary    20 Sep 2021 07:01  -  21 Sep 2021 07:00  --------------------------------------------------------  IN: 150 mL / OUT: 400 mL / NET: -250 mL        PHYSICAL EXAM:    General:  Alert, well-developed ,No acute distress.  wants his son to answer questions, wants to rest    Neuro:  alert, nad     HEENT:  No JVD, no masses, Eyes anicteric, No carotid bruits.No lymphadenopathy,    Cardiovascular:  RR    Lungs:  clear. no rales, no wheezing, .    Abdomen:  Normoactive bowel sounds. Soft, flat, non-tender, and non-distended.  No hepatosplenomegaly, positive bowel sounds  np s/p tenderness    Skin:  Warm, dry, well-perfused. No rashes or other lesions.     Extremities:  2+ pulses in upper and lower extremities. No lower extremity pain or  edema; legs are symmetric in appearance.    LABS:                        9.0    12.20 )-----------( 205      ( 21 Sep 2021 07:32 )             29.8         143  |  110<H>  |  115<H>  ----------------------------<  130<H>  5.2   |  24  |  4.95<H>    Ca    8.2<L>      21 Sep 2021 07:32  Phos  5.3       Mg     2.9         TPro  6.8  /  Alb  2.8<L>  /  TBili  0.4  /  DBili  x   /  AST  9<L>  /  ALT  17  /  AlkPhos  108          Magnesium, Serum: 2.9 mg/dL ( @ 07:32)  Phosphorus Level, Serum: 5.3 mg/dL ( @ 07:32)

## 2021-09-21 NOTE — PATIENT PROFILE ADULT - NSPROHARDOFHEAROTHER_GEN_ALL_CORE
Addended by: PASHA HERNANDEZ on: 9/3/2021 07:01 AM     Modules accepted: Orders    
talking to patient while facing him and speaking clearly

## 2021-09-22 NOTE — PROGRESS NOTE ADULT - SUBJECTIVE AND OBJECTIVE BOX
cc - dyspnea, cough    HPI:  89M with PMH Aortic Stenosis CAD s/p PCI, CKD IV, DM HTN, HLD, Fe deficiency anemia presents from [facility] with Generalized weakness that he woke up with overnight. Associated GARCIA and fatigue, Denies fever, chills, near syncope, dizziness, chest pain, nausea, vomiting, abdominal pain/discomfort. Denies any recent weight gain/loss.    In th ED. EKG with Complete Heart Block. EP called and recommended admission to CCU for PPM placement. Cr 4.89 (Baseline 3.30-3.45), WBC 13.01, Hgb (8/.5 (20 Sep 2021 14:37)    9/21: pt seen and examined. HR 30s. Does have some SOB. No CP. On 2L NC for O2 supplementation.   9/22 - pt seen and examined , HR 30-32 , + dyspnea, + cough, denies cp, abdominal pain, afebrile , plan for PPM placements today     Review of system- Rest of the review of system are negative except mentioned in HPI      Vital Signs Last 24 Hrs  T(C): 36.5 (09-22-21 @ 11:40), Max: 36.7 (09-21-21 @ 16:00)  T(F): 97.7 (09-22-21 @ 11:40), Max: 98 (09-21-21 @ 16:00)  HR: 32 (09-22-21 @ 11:40) (31 - 71)  BP: 166/39 (09-22-21 @ 11:40) (129/45 - 166/39)  RR: 16 (09-22-21 @ 11:40) (13 - 27)  SpO2: 91% (09-22-21 @ 11:40) (90% - 95%)  Wt(kg): --    CAPILLARY BLOOD GLUCOSE    CAPILLARY BLOOD GLUCOSE      POCT Blood Glucose.: 122 mg/dL (22 Sep 2021 11:02)  POCT Blood Glucose.: 151 mg/dL (22 Sep 2021 08:17)  POCT Blood Glucose.: 160 mg/dL (21 Sep 2021 21:37)      PHYSICAL EXAM:  Constitutional: NAD, well-groomed, well-developed  HEENT: PERRLA, EOMI, Normal Hearing  Neck: No LAD, No JVD  Back: Normal spine flexure, No CVA tenderness  Cardiovascular: bradycardic, +systolic murmur  Respiratory: b/l rales   Gastrointestinal: BS+, soft, NT/ND  Extremities: + peripheral edema  Vascular: 2+ peripheral pulses  Neurological: A/O x 3, no focal deficits  Psychiatric: Normal mood, normal affect  Musculoskeletal: 5/5 strength b/l upper and lower extremities  Skin: No rashes    Labs  09-22    141  |  107  |  121<H>  ----------------------------<  140<H>  4.9   |  23  |  5.12<H>    Ca    8.1<L>      22 Sep 2021 06:33  Phos  5.9     09-22  Mg     3.0     09-22    TPro  6.8  /  Alb  2.8<L>  /  TBili  0.4  /  DBili  x   /  AST  9<L>  /  ALT  17  /  AlkPhos  108  09-21                          8.8    11.87 )-----------( 208      ( 22 Sep 2021 06:33 )             29.6       CARDIAC MARKERS ( 20 Sep 2021 15:24 )  <0.015 ng/mL / x     / x     / x     / x            LIVER FUNCTIONS - ( 21 Sep 2021 07:32 )  Alb: 2.8 g/dL / Pro: 6.8 gm/dL / ALK PHOS: 108 U/L / ALT: 17 U/L / AST: 9 U/L / GGT: x             CARDIAC MARKERS ( 20 Sep 2021 15:24 )  <0.015 ng/mL / x     / x     / x     / x      CARDIAC MARKERS ( 20 Sep 2021 12:08 )  0.034 ng/mL / x     / x     / x     / x            LIVER FUNCTIONS - ( 21 Sep 2021 07:32 )  Alb: 2.8 g/dL / Pro: 6.8 gm/dL / ALK PHOS: 108 U/L / ALT: 17 U/L / AST: 9 U/L / GGT: x           < from: Xray Chest 1 View- PORTABLE-Urgent (09.20.21 @ 12:22) >  IMPRESSION: Pulmonary edema versus bilateral patchy airspace    < end of copied text >    MEDICATIONS  (STANDING):  allopurinol 100 milliGRAM(s) Oral daily  atorvastatin 40 milliGRAM(s) Oral at bedtime  dextrose 40% Gel 15 Gram(s) Oral once  dextrose 5%. 1000 milliLiter(s) (50 mL/Hr) IV Continuous <Continuous>  dextrose 5%. 1000 milliLiter(s) (100 mL/Hr) IV Continuous <Continuous>  dextrose 50% Injectable 25 Gram(s) IV Push once  dextrose 50% Injectable 12.5 Gram(s) IV Push once  dextrose 50% Injectable 25 Gram(s) IV Push once  ferrous    sulfate 325 milliGRAM(s) Oral daily  glucagon  Injectable 1 milliGRAM(s) IntraMuscular once  heparin   Injectable 5000 Unit(s) SubCutaneous every 12 hours  influenza   Vaccine 0.5 milliLiter(s) IntraMuscular once  insulin glargine Injectable (LANTUS) 5 Unit(s) SubCutaneous at bedtime  insulin lispro (ADMELOG) corrective regimen sliding scale   SubCutaneous three times a day before meals  insulin lispro (ADMELOG) corrective regimen sliding scale   SubCutaneous at bedtime  pantoprazole    Tablet 40 milliGRAM(s) Oral before breakfast    MEDICATIONS  (PRN):  acetaminophen   Tablet .. 650 milliGRAM(s) Oral every 6 hours PRN Temp greater or equal to 38.5C (101.3F), Mild Pain (1 - 3)  aluminum hydroxide/magnesium hydroxide/simethicone Suspension 30 milliLiter(s) Oral every 4 hours PRN Dyspepsia  atropine Injectable 0.5 milliGRAM(s) IntraMuscular once PRN Bradycardia HR<50 AND SBP<95  melatonin 3 milliGRAM(s) Oral at bedtime PRN Insomnia  ondansetron Injectable 4 milliGRAM(s) IV Push every 8 hours PRN Nausea and/or Vomiting  polyethylene glycol 3350 17 Gram(s) Oral daily PRN Constipation

## 2021-09-22 NOTE — PROGRESS NOTE ADULT - SUBJECTIVE AND OBJECTIVE BOX
Patient is a 89y old  Male who presents with a chief complaint of Complete Heart Block/CLIFFORD/Weakness (21 Sep 2021 15:09)      9/21 Cardiology. 89 year old male with history of CAD, s/p PCI, CKD and DM admitted with dyspnea for 24 hours. some element of orthopnea as well. No le edema. patietnt typically ambulates with walker at [facility]. No chest pain. On admission found to be in CHB with under;[employer] [place]BB[place] [id number]  currently feels dyspneic at rest.    Followup HPI:  9/22- short of breath at rest. No chest pain.     PAST MEDICAL & SURGICAL HISTORY:  Hypertension    HLD (hyperlipidemia)    Gout    Prostate cancer    Anemia    Chronic kidney disease (CKD)    CAD (coronary artery disease)    DM type 2 (diabetes mellitus, type 2)    Glaucoma    Aortic stenosis    Osteoporosis    Leg edema    H/O inguinal hernia repair    History of tonsillectomy  childhood    History of colonoscopy        Review of symptoms:  Negative except for as noted in today's HPI.      MEDICATIONS  (STANDING):  allopurinol 100 milliGRAM(s) Oral daily  atorvastatin 40 milliGRAM(s) Oral at bedtime  dextrose 40% Gel 15 Gram(s) Oral once  dextrose 5%. 1000 milliLiter(s) (50 mL/Hr) IV Continuous <Continuous>  dextrose 5%. 1000 milliLiter(s) (100 mL/Hr) IV Continuous <Continuous>  dextrose 50% Injectable 25 Gram(s) IV Push once  dextrose 50% Injectable 12.5 Gram(s) IV Push once  dextrose 50% Injectable 25 Gram(s) IV Push once  ferrous    sulfate 325 milliGRAM(s) Oral daily  furosemide   Injectable 40 milliGRAM(s) IV Push daily  glucagon  Injectable 1 milliGRAM(s) IntraMuscular once  heparin   Injectable 5000 Unit(s) SubCutaneous every 12 hours  influenza   Vaccine 0.5 milliLiter(s) IntraMuscular once  insulin glargine Injectable (LANTUS) 5 Unit(s) SubCutaneous at bedtime  insulin lispro (ADMELOG) corrective regimen sliding scale   SubCutaneous three times a day before meals  insulin lispro (ADMELOG) corrective regimen sliding scale   SubCutaneous at bedtime  pantoprazole    Tablet 40 milliGRAM(s) Oral before breakfast    MEDICATIONS  (PRN):  acetaminophen   Tablet .. 650 milliGRAM(s) Oral every 6 hours PRN Temp greater or equal to 38.5C (101.3F), Mild Pain (1 - 3)  aluminum hydroxide/magnesium hydroxide/simethicone Suspension 30 milliLiter(s) Oral every 4 hours PRN Dyspepsia  atropine Injectable 0.5 milliGRAM(s) IntraMuscular once PRN Bradycardia HR<50 AND SBP<95  melatonin 3 milliGRAM(s) Oral at bedtime PRN Insomnia  ondansetron Injectable 4 milliGRAM(s) IV Push every 8 hours PRN Nausea and/or Vomiting  polyethylene glycol 3350 17 Gram(s) Oral daily PRN Constipation          Vital Signs Last 24 Hrs  T(C): 36.3 (22 Sep 2021 00:08), Max: 37.1 (21 Sep 2021 08:03)  T(F): 97.3 (22 Sep 2021 00:08), Max: 98.8 (21 Sep 2021 08:03)  HR: 68 (22 Sep 2021 06:00) (30 - 71)  BP: 129/45 (22 Sep 2021 06:00) (129/45 - 160/37)  BP(mean): 67 (22 Sep 2021 06:00) (60 - 83)  RR: 13 (22 Sep 2021 06:00) (13 - 27)  SpO2: 95% (22 Sep 2021 05:00) (87% - 95%)    I&O's Summary    21 Sep 2021 07:01  -  22 Sep 2021 07:00  --------------------------------------------------------  IN: 250 mL / OUT: 400 mL / NET: -150 mL        PHYSICAL EXAM  General Appearance: dyspneic  HEENT:   Neck:   Lungs: bilateral crackles  Heart: 3/6 JACKELIN base  Abdomen: soft and non tender  Extremities: no edema  Neurologic:       INTERPRETATION OF TELEMETRY: sinus rhythm with CHB, ventricular rate 30s-70s.       EXAM:  ECHO TTE WO CON COMP W DOPP         PROCEDURE DATE:  04/09/2021        INTERPRETATION:  Transthoracic Echocardiography Report (TTE)       Summary     There is calcification of mitral valve . The leaflet opening is mildly   restricted.   Mean transmitral gradient is 4 mmHg; this finding is consistent with mild   mitral stenosis.   Moderate (2+) mitral regurgitation is present.   EA reversal of the mitral inflow consistent with reduced compliance of the   left ventricle.   Significant fibrocalcific changes noted to the aortic valve leaflets with   restriction in leaflet excursion. Peak transaortic gradient is 74 mmHg;   this finding is consistent with severe aortic stenosis.   Moderate (2+) aortic regurgitation is present.   The tricuspid valve leaflets are thin and pliable; valve motion is normal.   Mild (1+) tricuspid valve regurgitation is present.   Mild pulmonary hypertension.   Mild pulmonic valvular regurgitation (1+) is present.   Pulmonic valve not well seen, probably normal pulmonic valve function.   The left atrium is mildly dilated by LA volume index.   Mild concentric left ventricular hypertrophy is present.   Estimated left ventricular ejection fraction is 50-55 %.   The apex,mid,distal anteroseptal mildly hypokinetic.   Normal appearing right ventricle structure and function.     Signature     ----------------------------------------------------------------   Electronically signed by Venugopal Palla, MD(Interpreting   physician) on 04/09/2021 11:50 PM   ----------------------------------------------------------------        ECG:    LABS:                          8.8    11.87 )-----------( 208      ( 22 Sep 2021 06:33 )             29.6     09-21    143  |  110<H>  |  115<H>  ----------------------------<  130<H>  5.2   |  24  |  4.95<H>    Ca    8.2<L>      21 Sep 2021 07:32  Phos  5.3     09-21  Mg     2.9     09-21    TPro  6.8  /  Alb  2.8<L>  /  TBili  0.4  /  DBili  x   /  AST  9<L>  /  ALT  17  /  AlkPhos  108  09-21    CARDIAC MARKERS ( 20 Sep 2021 15:24 )  <0.015 ng/mL / x     / x     / x     / x      CARDIAC MARKERS ( 20 Sep 2021 12:08 )  0.034 ng/mL / x     / x     / x     / x            Pro BNP  [id number] 09-20 @ 12:08  D Dimer  -- 09-20 @ 12:08              RADIOLOGY & ADDITIONAL STUDIES:    IMPRESSION:  1. CHB  2. Aortic stenosis,severe.  3. congestive heart failure, likely diastolic due to AS, rule out llv dysfunction  4. ckd, nephrology consult appreciated.   5. CAD, stable.  PLAN:  Micra RV PPM today. Continue furosemide 40 mg IV qd. Continue CCU care. Hopefully pacing will improve his cardiac output enough to improve the CHF. Unlikely to be a candidate for TAVR given age and renal function.

## 2021-09-22 NOTE — PACU DISCHARGE NOTE - COMMENTS
Report given to Anastasiia MARIE CCU RN. Denies pain or discomfort. No signs of bleeding. Home monitor device paired by [employer] rep. Education on home monitor to be completed tomorrow as per Kennedy RICHARDSON.  Transferred to CCU with this RN and transport staff.

## 2021-09-22 NOTE — PROGRESS NOTE ADULT - ASSESSMENT
89M with PMH Aortic Stenosis CAD s/p PCI, CKD IV, DM HTN, HLD, Fe deficiency anemia presents from [facility] with Generalized weakness that he woke up with overnight. Associated GARCIA and fatigue,    Complete Heart Block plan for PPM placement  -c/w CCU monitoring , Pacer Pads at bedside, Atropine PRN if patient becomes hemodynamically unstable  -EP consult appreciated - plan for PPM on 9/22  - TSH wnl, check lyme ab, babesia , Ehrlichia   -Avoid all AV cheryl blockers  Acute on chronic Diastolic Heart Failure   Valvular heart disease  - Severe Aortic Stenosis and Moderate Mitral Regurgitation  -CXR  with vascular congestion on admission, BNP ~22K, Troponin negative x 2  -c/w  lasix to IV 40mg daily , Strict I/Os, Daily Weight  - 2 d echo - pending, check lipid profile  -cardio consult appreciated  - CT chest - given cough and leukocytosis to r/o PNA   -s/p ceftriaxone in ED  procalcitonin ordered  CLIFFORD on CKD IV  -Baseline Cr 3.4-3.5. Cr on admission   -Strict I/Os, Daily Weights, Avoid Nephrotoxic agents  - US kidney to r/o obstruction, monitor urine output  -Nephrology consult  Macrocytic anemia - check horacio studies, B12, folate, monitor while on prophylactic heparin  DM with Hyperglycemia with A1C 7.5  -Glucose checks AC HS. Lantus 5 with correctional insulin. Titrate accordingly  Hypocalcemia - check vitamin D, replace if needed    DVT Prophylaxis with Heparin subq    dispo: remain inpatient, plan for PPM today, 2 d echo, CT chest    d/w kevon Jackson [phone], updated on plan of care  Goals of Care (GOC)/Advance Care Planning (ACP)  Date of Discussion/evaluation: 9/20/2021  Purpose of Discussion: In the setting of advanced age and multiple comorbidities, discussion of patient's wished should there be any deterioration in health status.   Parties Present/Discussed with: Patient and son  Patient's Decision making capacity at the time of discussion: Patient AAOx4 and has full decision making capacity  Presentation: As above  GOC: Code Status, HCP, Alternative Feeding Methods, Blood product Transfusions  PLAN:  Code Status: Full Code  HCP:  SonManuel  Alternative Feeding methods: Would like to discuss or assess should the situation arise that it requires alternative feeding methods  Blood Product Transfusions: YES agreable   89M with PMH Aortic Stenosis CAD s/p PCI, CKD IV, DM HTN, HLD, Fe deficiency anemia presents from [facility] with Generalized weakness that he woke up with overnight. Associated GARCIA and fatigue,    Complete Heart Block plan for PPM placement  -c/w CCU monitoring , Pacer Pads at bedside, Atropine PRN if patient becomes hemodynamically unstable  -EP consult appreciated - plan for PPM on 9/22  - TSH wnl, check lyme ab, babesia , Ehrlichia   -Avoid all AV cheryl blockers  Acute on chronic Diastolic Heart Failure   Valvular heart disease  - Severe Aortic Stenosis and Moderate Mitral Regurgitation  Vegetations on echo suspected endocarditis  -CXR  with vascular congestion on admission, BNP ~22K, Troponin negative x 2  -c/w  lasix to IV 40mg daily , Strict I/Os, Daily Weight  - 2 d echo - pending, check lipid profile  -cardio consult appreciated  - CT chest - given cough and leukocytosis to r/o PNA   - blood cx x2 , lactate, ID consult for abx management  -s/p ceftriaxone in ED  procalcitonin ordered  CLIFFORD on CKD IV  -Baseline Cr 3.4-3.5. Cr on admission   -Strict I/Os, Daily Weights, Avoid Nephrotoxic agents  - US kidney to r/o obstruction, monitor urine output  -Nephrology consult  Macrocytic anemia - check horacio studies, B12, folate, monitor while on prophylactic heparin  DM with Hyperglycemia with A1C 7.5  -Glucose checks AC HS. Lantus 5 with correctional insulin. Titrate accordingly  Hypocalcemia - check vitamin D, replace if needed    DVT Prophylaxis with Heparin subq    dispo: remain inpatient, plan for PPM today, 2 d echo, CT chest    d/w kevon Jackson [phone], updated on plan of care  Goals of Care (GOC)/Advance Care Planning (ACP)  Date of Discussion/evaluation: 9/20/2021  Purpose of Discussion: In the setting of advanced age and multiple comorbidities, discussion of patient's wished should there be any deterioration in health status.   Parties Present/Discussed with: Patient and son  Patient's Decision making capacity at the time of discussion: Patient AAOx4 and has full decision making capacity  Presentation: As above  GOC: Code Status, HCP, Alternative Feeding Methods, Blood product Transfusions  PLAN:  Code Status: Full Code  HCP:  SonManuel Pilar  Alternative Feeding methods: Would like to discuss or assess should the situation arise that it requires alternative feeding methods  Blood Product Transfusions: YES agreeable   89M with PMH Aortic Stenosis CAD s/p PCI, CKD IV, DM HTN, HLD, Fe deficiency anemia presents from [facility] with Generalized weakness that he woke up with overnight. Associated GARCIA and fatigue,    Complete Heart Block plan for PPM placement  -c/w CCU monitoring , Pacer Pads at bedside, Atropine PRN if patient becomes hemodynamically unstable  -EP consult appreciated - plan for PPM on 9/22  - TSH wnl, check lyme ab, babesia , Ehrlichia   -Avoid all AV cheryl blockers  Acute on chronic Diastolic Heart Failure   Valvular heart disease  - Severe Aortic Stenosis and Moderate Mitral Regurgitation  Vegetations on echo suspected endocarditis  Valvular heart disease  -CXR  with vascular congestion on admission, BNP ~22K, Troponin negative x 2  -c/w  lasix to IV 40mg daily , Strict I/Os, Daily Weight  - 2 d echo - pending, check lipid profile  -cardio consult appreciated  - CT chest - given cough and leukocytosis to r/o PNA   - blood cx x2 , lactate, ID consult for abx management  -s/p ceftriaxone in ED, will continue ceftriaxone   procalcitonin ordered  CLIFFORD on CKD IV  -Baseline Cr 3.4-3.5. Cr on admission   -Strict I/Os, Daily Weights, Avoid Nephrotoxic agents  - US kidney to r/o obstruction, monitor urine output  -Nephrology consult  Macrocytic anemia - check horacio studies, B12, folate, monitor while on prophylactic heparin  DM with Hyperglycemia with A1C 7.5  -Glucose checks AC HS. Lantus 5 with correctional insulin. Titrate accordingly  Hypocalcemia - check vitamin D, replace if needed    DVT Prophylaxis with Heparin subq    dispo: remain inpatient, plan for PPM today, 2 d echo, CT chest    d/w kevon Jackson [phone], updated on plan of care  Goals of Care (GOC)/Advance Care Planning (ACP)  Date of Discussion/evaluation: 9/20/2021  Purpose of Discussion: In the setting of advanced age and multiple comorbidities, discussion of patient's wished should there be any deterioration in health status.   Parties Present/Discussed with: Patient and son  Patient's Decision making capacity at the time of discussion: Patient AAOx4 and has full decision making capacity  Presentation: As above  GOC: Code Status, HCP, Alternative Feeding Methods, Blood product Transfusions  PLAN:  Code Status: Full Code  HCP:  Manuel Noriega Pilar  Alternative Feeding methods: Would like to discuss or assess should the situation arise that it requires alternative feeding methods  Blood Product Transfusions: YES agreeable

## 2021-09-22 NOTE — PROGRESS NOTE ADULT - ASSESSMENT
89M with PMH Aortic Stenosis CAD s/p PCI, CKD IV, DM HTN, HLD, Fe deficiency anemia presents from [facility] with Generalized weakness that he woke up with overnight. Associated GARCIA and fatigue, Denies fever, chills, near syncope, dizziness, chest pain, nausea, vomiting, abdominal pain/discomfort. Denies any recent weight gain/loss.  In th ED. EKG with Complete Heart Block. EP called and recommended admission to CCU for PPM placement. Cr 4.89 (Baseline 3.30-3.45), WBC 13.01, Hgb (8/.5 (20 Sep 2021 14:37)    Above from chart:  Pt seen in the past for CKD4-5  Creat as high as 5.1 on 2/20  admitted for 3rd degree block  Monitor K if rises, may use Lokelma 10 g qd   No need for HD at this time    9/22 SY  --CLIFFORD/CKD  : advanced CKD.   For now, no acute indication to consider dialysis.    If renal function remains poor, will need to clarify pt's wishes for intervention.    Await for possible stabilization of renal function post PPM.  --Cardiac : PPM today.  Hx of AS.  reassess cardiac function.  --Electrolytes acceptable.  --Anemia : check iron storage.

## 2021-09-22 NOTE — PROGRESS NOTE ADULT - SUBJECTIVE AND OBJECTIVE BOX
NEPHROLOGY INTERVAL HPI/OVERNIGHT EVENTS:    Date of Service: 09-22-21 @ 10:21    9/22--Sleeping comfortably without distress.  When woken up, c/o SOB.  Scheduled for PPM today.  HR in 30's with stable BP.    HPI:  89M with PMH Aortic Stenosis CAD s/p PCI, CKD IV, DM HTN, HLD, Fe deficiency anemia presents from [facility] with Generalized weakness that he woke up with overnight. Associated GARCIA and fatigue, Denies fever, chills, near syncope, dizziness, chest pain, nausea, vomiting, abdominal pain/discomfort. Denies any recent weight gain/loss.    In th ED. EKG with Complete Heart Block. EP called and recommended admission to CCU for PPM placement. Cr 4.89 (Baseline 3.30-3.45), WBC 13.01, Hgb (8/.5 (20 Sep 2021 14:37)    Above from chart:  Pt seen in the past for CKD4-5  Creat as high as 5.1 on 2/20  admitted for 3rd degree block    PAST MEDICAL & SURGICAL HISTORY:  Hypertension  HLD   Gout  Prostate cancer  Chronic kidney disease  CAD   DM type 2 (diabetes mellitus, type 2)  Aortic stenosis  Leg edema    MEDICATIONS  (STANDING):  allopurinol 100 milliGRAM(s) Oral daily  atorvastatin 40 milliGRAM(s) Oral at bedtime  ceFAZolin   IVPB 2000 milliGRAM(s) IV Intermittent once  dextrose 40% Gel 15 Gram(s) Oral once  dextrose 5%. 1000 milliLiter(s) (50 mL/Hr) IV Continuous <Continuous>  dextrose 5%. 1000 milliLiter(s) (100 mL/Hr) IV Continuous <Continuous>  dextrose 50% Injectable 25 Gram(s) IV Push once  dextrose 50% Injectable 12.5 Gram(s) IV Push once  dextrose 50% Injectable 25 Gram(s) IV Push once  ferrous    sulfate 325 milliGRAM(s) Oral daily  furosemide   Injectable 40 milliGRAM(s) IV Push daily  glucagon  Injectable 1 milliGRAM(s) IntraMuscular once  heparin   Injectable 5000 Unit(s) SubCutaneous every 12 hours  influenza   Vaccine 0.5 milliLiter(s) IntraMuscular once  insulin glargine Injectable (LANTUS) 5 Unit(s) SubCutaneous at bedtime  insulin lispro (ADMELOG) corrective regimen sliding scale   SubCutaneous three times a day before meals  insulin lispro (ADMELOG) corrective regimen sliding scale   SubCutaneous at bedtime  pantoprazole    Tablet 40 milliGRAM(s) Oral before breakfast    MEDICATIONS  (PRN):  acetaminophen   Tablet .. 650 milliGRAM(s) Oral every 6 hours PRN Temp greater or equal to 38.5C (101.3F), Mild Pain (1 - 3)  aluminum hydroxide/magnesium hydroxide/simethicone Suspension 30 milliLiter(s) Oral every 4 hours PRN Dyspepsia  atropine Injectable 0.5 milliGRAM(s) IntraMuscular once PRN Bradycardia HR<50 AND SBP<95  melatonin 3 milliGRAM(s) Oral at bedtime PRN Insomnia  ondansetron Injectable 4 milliGRAM(s) IV Push every 8 hours PRN Nausea and/or Vomiting  polyethylene glycol 3350 17 Gram(s) Oral daily PRN Constipation    Vital Signs Last 24 Hrs  T(C): 36 (22 Sep 2021 08:42), Max: 36.8 (21 Sep 2021 12:00)  T(F): 96.8 (22 Sep 2021 08:42), Max: 98.2 (21 Sep 2021 12:00)  HR: 32 (22 Sep 2021 09:00) (31 - 71)  BP: 163/45 (22 Sep 2021 09:00) (129/45 - 163/45)  BP(mean): 72 (22 Sep 2021 09:00) (60 - 83)  RR: 18 (22 Sep 2021 09:12) (13 - 27)  SpO2: 90% (22 Sep 2021 09:12) (87% - 95%)    09-21 @ 07:01  -  09-22 @ 07:00  --------------------------------------------------------  IN: 250 mL / OUT: 400 mL / NET: -150 mL    PHYSICAL EXAM:  GENERAL: No acute distress.  CHEST/LUNG: fair air entry  HEART: S1S2 imani  ABDOMEN: soft  EXTREMITIES: no edema  SKIN:     LABS:                        8.8    11.87 )-----------( 208      ( 22 Sep 2021 06:33 )             29.6     09-22    141  |  107  |  121<H>  ----------------------------<  140<H>  4.9   |  23  |  5.12<H>    Ca    8.1<L>      22 Sep 2021 06:33  Phos  5.9     09-22  Mg     3.0     09-22    TPro  6.8  /  Alb  2.8<L>  /  TBili  0.4  /  DBili  x   /  AST  9<L>  /  ALT  17  /  AlkPhos  108  09-21        Magnesium, Serum: 3.0 mg/dL (09-22 @ 06:33)  Phosphorus Level, Serum: 5.9 mg/dL (09-22 @ 06:33)          RADIOLOGY & ADDITIONAL TESTS:

## 2021-09-23 NOTE — PHYSICAL THERAPY INITIAL EVALUATION ADULT - GENERAL OBSERVATIONS, REHAB EVAL
O2 4L/min nc; HM; BP cuff; pulse oxym; pt rec'd in bed supine; HR 75; /58; O2 Sat 94%; RR 17; RN Anali present; pt denied pain

## 2021-09-23 NOTE — PROGRESS NOTE ADULT - ASSESSMENT
89M with PMH Aortic Stenosis CAD s/p PCI, CKD IV, DM HTN, HLD, Fe deficiency anemia presents from [facility] with Generalized weakness that he woke up with overnight. Associated GARCIA and fatigue,    Complete Heart Block s/p Micra PPM placement on 9/22   -c/w CCU monitoring , Pacer Pads at bedside, Atropine PRN if patient becomes hemodynamically unstable  -EP consult appreciated - plan for PPM on 9/22  - TSH wnl, check lyme ab - neg , babesia , Ehrlichia  - pending   -Avoid all AV cheryl blockers  Acute on chronic Diastolic Heart Failure   Valvular heart disease  - Severe Aortic Stenosis and Moderate Mitral Regurgitation  Vegetations on echo suspected endocarditis   Valvular heart disease  -CXR  with vascular congestion on admission, BNP ~22K, Troponin negative x 2  -c/w  lasix to IV 40mg daily--> 40 BID  , Strict I/Os, Daily Weight   - 2 d echo - echodense mass on mitral valve , EF 55%, + 3 MR ,  lipid profile  LDL 49   - cardio and ID  consult appreciated   - CT chest -  mild bronchiectasis, mucoid impaction, small bilateral pleural effusions, atelectasis  - blood cx x2 , lactate, ID consult for abx management  -s/p ceftriaxone in ED, will continue ceftriaxone   procalcitonin ordered  CLIFFORD on CKD IV  -Baseline Cr 3.4-3.5. Cr on admission   -Strict I/Os, Daily Weights, Avoid Nephrotoxic agents  - US kidney to r/o obstruction, monitor urine output  -Nephrology consult  Macrocytic anemia - iron studies, B12, folate noted monitor while on prophylactic heparin  DM with Hyperglycemia with A1C 7.5  -Glucose checks AC HS. Lantus 5 with correctional insulin. Titrate accordingly  Hypocalcemia -  vitamin D wnl    DVT Prophylaxis with Heparin subq    dispo: remain inpatient, plan for PPM today, 2 d echo, CT chest    d/w son Manuel [phone], updated on plan of care 9/22, 9/23   Goals of Care (GOC)/Advance Care Planning (ACP)  Date of Discussion/evaluation: 9/20/2021  Purpose of Discussion: In the setting of advanced age and multiple comorbidities, discussion of patient's wished should there be any deterioration in health status.   Parties Present/Discussed with: Patient and son  Patient's Decision making capacity at the time of discussion: Patient AAOx4 and has full decision making capacity  Presentation: As above  GOC: Code Status, HCP, Alternative Feeding Methods, Blood product Transfusions  PLAN:  Code Status: Full Code  HCP:  Son, Manuel Quezada  Alternative Feeding methods: Would like to discuss or assess should the situation arise that it requires alternative feeding methods  Blood Product Transfusions: YES agreeable    Dispo - IV abx, IV diuresis, PT evaluation - will need AUDRA

## 2021-09-23 NOTE — PHYSICAL THERAPY INITIAL EVALUATION ADULT - LEVEL OF INDEPENDENCE: GAIT, REHAB EVAL
transfer/gait training/bed mob 25'/minimum assist (75% patients effort)/moderate assist (50% patients effort)

## 2021-09-23 NOTE — PROGRESS NOTE ADULT - SUBJECTIVE AND OBJECTIVE BOX
Patient is a 89y old  Male who presents with a chief complaint of Complete Heart Block/CLIFFORD/Weakness (22 Sep 2021 12:31)    9/21 Cardiology. 89 year old male with history of CAD, s/p PCI, CKD and DM admitted with dyspnea for 24 hours. some element of orthopnea as well. No le edema. patietnt typically ambulates with walker at [facility]. No chest pain. On admission found to be in CHB with under;[place] [address]  currently feels dyspneic at rest.      Followup HPI:    9/22- short of breath at rest. No chest pain.  9/23- S/P Micra RV PPM on 9/22. Feels much less breathless. No chest pain. TTE shows vegetation on the atrial surface of the anterior mitral leaflet.     PAST MEDICAL & SURGICAL HISTORY:  Hypertension    HLD (hyperlipidemia)    Gout    Prostate cancer    Anemia    Chronic kidney disease (CKD)    CAD (coronary artery disease)    DM type 2 (diabetes mellitus, type 2)    Glaucoma    Aortic stenosis    Osteoporosis    Leg edema    H/O inguinal hernia repair    History of tonsillectomy  childhood    History of colonoscopy        Review of symptoms:  Negative except for as noted in today's HPI.      MEDICATIONS  (STANDING):  allopurinol 100 milliGRAM(s) Oral daily  atorvastatin 40 milliGRAM(s) Oral at bedtime  cefTRIAXone Injectable. 1000 milliGRAM(s) IV Push every 24 hours  dextrose 40% Gel 15 Gram(s) Oral once  dextrose 5%. 1000 milliLiter(s) (50 mL/Hr) IV Continuous <Continuous>  dextrose 5%. 1000 milliLiter(s) (100 mL/Hr) IV Continuous <Continuous>  dextrose 50% Injectable 25 Gram(s) IV Push once  dextrose 50% Injectable 12.5 Gram(s) IV Push once  dextrose 50% Injectable 25 Gram(s) IV Push once  ferrous    sulfate 325 milliGRAM(s) Oral daily  furosemide   Injectable 40 milliGRAM(s) IV Push daily  glucagon  Injectable 1 milliGRAM(s) IntraMuscular once  influenza   Vaccine 0.5 milliLiter(s) IntraMuscular once  insulin glargine Injectable (LANTUS) 5 Unit(s) SubCutaneous at bedtime  insulin lispro (ADMELOG) corrective regimen sliding scale   SubCutaneous three times a day before meals  insulin lispro (ADMELOG) corrective regimen sliding scale   SubCutaneous at bedtime  metoprolol succinate ER 12.5 milliGRAM(s) Oral daily  pantoprazole    Tablet 40 milliGRAM(s) Oral before breakfast    MEDICATIONS  (PRN):  acetaminophen   Tablet .. 650 milliGRAM(s) Oral every 6 hours PRN Temp greater or equal to 38.5C (101.3F), Mild Pain (1 - 3)  aluminum hydroxide/magnesium hydroxide/simethicone Suspension 30 milliLiter(s) Oral every 4 hours PRN Dyspepsia  atropine Injectable 0.5 milliGRAM(s) IntraMuscular once PRN Bradycardia HR<50 AND SBP<95  melatonin 3 milliGRAM(s) Oral at bedtime PRN Insomnia  ondansetron Injectable 4 milliGRAM(s) IV Push every 8 hours PRN Nausea and/or Vomiting  polyethylene glycol 3350 17 Gram(s) Oral daily PRN Constipation          Vital Signs Last 24 Hrs  T(C): 36.6 (23 Sep 2021 06:00), Max: 36.6 (23 Sep 2021 06:00)  T(F): 97.8 (23 Sep 2021 06:00), Max: 97.8 (23 Sep 2021 06:00)  HR: 65 (23 Sep 2021 05:00) (15 - 79)  BP: 153/57 (23 Sep 2021 05:00) (142/56 - 173/55)  BP(mean): 79 (23 Sep 2021 05:00) (71 - 90)  RR: 19 (23 Sep 2021 05:00) (15 - 27)  SpO2: 87% (22 Sep 2021 19:00) (87% - 95%)    I&O's Summary    22 Sep 2021 07:01  -  23 Sep 2021 07:00  --------------------------------------------------------  IN: 200 mL / OUT: 1275 mL / NET: -1075 mL        PHYSICAL EXAM  General Appearance: comfortable  HEENT:   Neck:   Lungs: bibasilar crackles  Heart: 2/6 systolic murmur LSB  Abdomen: no tenderness.  Extremities: no edema  Neurologic:       INTERPRETATION OF TELEMETRY: sinus with ventricular pacing 65 BPM.      EXAM:  ECHO TTE WO CON COMP W DOPP     PROCEDURE DATE:  09/22/2021      INTERPRETATION:  Transthoracic Echocardiography Report (TTE)     Summary     A moderate sized mobile echodense mass on the atrial aspect of the   anterior mitral leaflet is seen, Appearing more pronouncedthan prior   study.   A mitral valvular vegetation cannot be excluded.   The leaflet opening appears mildly restricted.   Moderate to severe (3+) mitral regurgitation is present.   Limited study to assess left ventricular function.   Mild concentric left ventricular hypertrophy is present.   Estimated left ventricular ejection fraction is 55 %.     Signature     ----------------------------------------------------------------   Electronically signed by Chikis Crow MD(Interpreting   physician) on 09/22/2021 03:24 PM   ----------------------------------------------------------------            ECG:    LABS:                          9.2    10.46 )-----------( 220      ( 23 Sep 2021 04:55 )             30.4     09-23    146<H>  |  110<H>  |  119<H>  ----------------------------<  122<H>  4.2   |  26  |  4.60<H>    Ca    8.4<L>      23 Sep 2021 04:55  Phos  5.9     09-23  Mg     3.0     09-22    TPro  6.7  /  Alb  2.6<L>  /  TBili  0.3  /  DBili  x   /  AST  10<L>  /  ALT  14  /  AlkPhos  102  09-23    CARDIAC MARKERS ( 22 Sep 2021 18:29 )  x     / x     / 102 U/L / x     / x            Pro BNP  [id number] 09-23 @ 04:55  D Dimer  -- 09-23 @ 04:55  Pro BNP  [id number] 09-20 @ 12:08  D Dimer  -- 09-20 @ 12:08              RADIOLOGY & ADDITIONAL STUDIES:    IMPRESSION:  1. CHB. S/P Micra RV PPM.  2. Aortic stenosis,severe.  3. Congestive heart failure due to severe bradycardia plus AS and MR  4. 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 On Rocephin. Blood cultures pending. ID consult pending. MARCY will not change therapy.   5. Hypertension.  6.CKD nephrology consult appreciated.   7. CAD, stable.  PLAN:  Increase furosemide to 40 mg bid, metoprolol. Continue ceftriaxone. ICD consult. Not a candidate for TAVR.

## 2021-09-23 NOTE — PROGRESS NOTE ADULT - ASSESSMENT
89 year old man with aortic stenosis, CAD s/p PCI, CKD, DM, HTN admitted with generalized weakness and found to be in complete heart block.     Echo cardiogram this admission confirms severe AS with calcified mitral valve and independently mobile mass attached to valve. vegetation vs chordae.   He is afebrile has an elevated white blood count.  Given the urgent pacemaker due to persistent CHB after medication discontinuation pt underwent MICRA implant.   today POD #1, tolerated procedure well, symptoms of fatigue improved.  Device interrogation revealed normal function and sensing, and pacing thresholds.  Pt is stable from EP stand point.   Post-op instructions and follow up reviewed with patient and son.    Other active issues CHF, Mitral valvular vegetation, CLIFFORD, management per cardiology and hospitalist medicine team.

## 2021-09-23 NOTE — PHYSICAL THERAPY INITIAL EVALUATION ADULT - MODALITIES TREATMENT COMMENTS
pt left in bed supine post Eval @ ALLI Pedraza's request; bed alarm on; all above lines/monitors in place; callbell in reach; pt instructed not to get up alone; call nursing for assist; roscoe well; denied pain pt left in bed supine post Eval @ EP NP MAXIM Gonzales's request; bed alarm on; all above lines/monitors in place; callbell in reach; pt instructed not to get up alone; call nursing for assist; roscoe well; denied pain

## 2021-09-23 NOTE — CONSULT NOTE ADULT - ASSESSMENT
89 y/o Male with h/o Aortic Stenosis, CAD s/p PCI, CKD IV, DM ninoska 2, HTN, HLD, iron deficiency anemia was admitted on 9/20 from [facility] with generalized weakness x one day. He developed associated GARCIA and fatigue, Denied fever, chills at home. In ER he was noted with Complete Heart Block. EP called and recommended admission to CCU for PPM placement. Noted with leukocytosis. He received ceftriaxone IV.     1. Severe bradycardia. CRF stage 4. Bronchiectasis.   -leukocytosis   -concern about tick related illness raised; but there is no h/o tick bites or rashes  -Lyme screen is negative and babesia PCR was not detected  -no evidence of Lyme disease  -no clinical signs of pneumonia  -given ceftriaxone IV so far  -leukocytosis is improved   -no infectious disease identified at this time  -may proceed with PM placement  -d/c ceftriaxone  -old chart reviewed to assess prior cultures  -monitor temps  -f/u CBC  -supportive care  2. Other issues:   -care per medicine     89 y/o Male with h/o Aortic Stenosis, CAD s/p PCI, CKD IV, DM ninoska 2, HTN, HLD, iron deficiency anemia was admitted on 9/20 from [facility] with generalized weakness x one day. He developed associated GARCIA and fatigue, Denied fever, chills at home. In ER he was noted with Complete Heart Block. EP called and recommended admission to CCU for PPM placement. Noted with leukocytosis. He received ceftriaxone IV.     1. Mitral valve vegetation. Severe AS. Possible subacute bacterial endocarditis. Severe bradycardia. CRF stage 4. Bronchiectasis.   -leukocytosis   -concern about endocarditis was raised  -concern about tick related illness raised as well; but there is no h/o tick bites or rashes  -Lyme screen is negative and babesia PCR was not detected  -no evidence of Lyme disease  -no clinical signs of pneumonia  -obtain serial BC x 2  -continue ceftriaxone 2 gm IV qd  -reason for abx use and side effects reviewed with patient; monitor BMP   -old chart reviewed to assess prior cultures  -monitor temps  -f/u CBC  -supportive care  2. Other issues:   -care per medicine     89 y/o Male with h/o Aortic Stenosis, CAD s/p PCI, CKD IV, DM ninoska 2, HTN, HLD, iron deficiency anemia was admitted on 9/20 from [facility] with generalized weakness x one day. He developed associated GARCIA and fatigue, Denied fever, chills at home. In ER he was noted with Complete Heart Block. EP called and recommended admission to CCU for PPM placement. Noted with leukocytosis. He received ceftriaxone IV.     1. Mitral valve vegetation. Severe AS. Possible subacute bacterial endocarditis. Severe bradycardia s/p micra. CRF stage 4. Bronchiectasis.   -leukocytosis   -concern about endocarditis was raised  -concern about tick related illness raised as well; but there is no h/o tick bites or rashes  -Lyme screen is negative and babesia PCR was not detected  -no evidence of Lyme disease  -no clinical signs of pneumonia  -obtain serial BC x 2  -continue ceftriaxone 2 gm IV qd  -reason for abx use and side effects reviewed with patient; monitor BMP   -old chart reviewed to assess prior cultures  -monitor temps  -f/u CBC  -supportive care  2. Other issues:   -care per medicine

## 2021-09-23 NOTE — PROGRESS NOTE ADULT - SUBJECTIVE AND OBJECTIVE BOX
HPI:  89M with PMH Aortic Stenosis CAD s/p PCI, CKD IV, DM HTN, HLD, Fe deficiency anemia presents from [facility] with Generalized weakness that he woke up with overnight. Associated GARCIA and fatigue, Denies fever, chills, near syncope, dizziness, chest pain, nausea, vomiting, abdominal pain/discomfort.   on admission in [facility] with rate in 30s, admitted to CCU for permanent pacemaker implant     Subjective: today POD #1 s/p leadless pacemaker implant.  reports feeling improved, ambulated in the room with PT today.      [id number] sinus rhythm with v-pacing rate 63bpm, Roseanne 150ms, QRS 180ms, QTc 538ms    MEDICATIONS  (STANDING):  allopurinol 100 milliGRAM(s) Oral daily  atorvastatin 40 milliGRAM(s) Oral at bedtime  cefTRIAXone Injectable. 1000 milliGRAM(s) IV Push every 24 hours  dextrose 40% Gel 15 Gram(s) Oral once  dextrose 5%. 1000 milliLiter(s) (50 mL/Hr) IV Continuous <Continuous>  dextrose 5%. 1000 milliLiter(s) (100 mL/Hr) IV Continuous <Continuous>  dextrose 50% Injectable 25 Gram(s) IV Push once  dextrose 50% Injectable 12.5 Gram(s) IV Push once  dextrose 50% Injectable 25 Gram(s) IV Push once  ferrous    sulfate 325 milliGRAM(s) Oral daily  furosemide   Injectable 40 milliGRAM(s) IV Push two times a day  glucagon  Injectable 1 milliGRAM(s) IntraMuscular once  influenza   Vaccine 0.5 milliLiter(s) IntraMuscular once  insulin glargine Injectable (LANTUS) 5 Unit(s) SubCutaneous at bedtime  insulin lispro (ADMELOG) corrective regimen sliding scale   SubCutaneous three times a day before meals  insulin lispro (ADMELOG) corrective regimen sliding scale   SubCutaneous at bedtime  metoprolol succinate ER 12.5 milliGRAM(s) Oral daily  pantoprazole    Tablet 40 milliGRAM(s) Oral before breakfast    MEDICATIONS  (PRN):  acetaminophen   Tablet .. 650 milliGRAM(s) Oral every 6 hours PRN Temp greater or equal to 38.5C (101.3F), Mild Pain (1 - 3)  aluminum hydroxide/magnesium hydroxide/simethicone Suspension 30 milliLiter(s) Oral every 4 hours PRN Dyspepsia  atropine Injectable 0.5 milliGRAM(s) IntraMuscular once PRN Bradycardia HR<50 AND SBP<95  melatonin 3 milliGRAM(s) Oral at bedtime PRN Insomnia  ondansetron Injectable 4 milliGRAM(s) IV Push every 8 hours PRN Nausea and/or Vomiting  polyethylene glycol 3350 17 Gram(s) Oral daily PRN Constipation      Allergies    ACE inhibitors (Unknown)  aspirin (Unknown)  enalapril (Unknown)      Vital Signs Last 24 Hrs  T(C): 36.6 (23 Sep 2021 06:00), Max: 36.6 (23 Sep 2021 06:00)  T(F): 97.8 (23 Sep 2021 06:00), Max: 97.8 (23 Sep 2021 06:00)  HR: 75 (23 Sep 2021 09:24) (0 - 79)  BP: 148/58 (23 Sep 2021 09:24) (142/56 - 173/55)  BP(mean): 81 (23 Sep 2021 09:24) (71 - 90)  RR: 17 (23 Sep 2021 09:24) (15 - 27)  SpO2: 94% (23 Sep 2021 09:24) (87% - 95%)    PHYSICAL EXAMINATION:  GENERAL: In no apparent distress, well nourished, and hydrated.  HEAD:  Atraumatic, Normocephalic  EYES: EOMI, PERRLA, conjunctiva and sclera clear  ENMT: No tonsillar erythema, exudates, or enlargement; Moist mucous membranes, Good dentition, No lesions  NECK: Supple and normal thyroid.  No JVD or carotid bruit.  Carotid pulse is 2+ bilaterally.  HEART: Regular rate and rhythm; 4/6 systolic murmur  PULMONARY: bilateral wheezing and rales  ABDOMEN: Soft, Nontender, Nondistended; Bowel sounds present  EXTREMITIES:  2+ Peripheral Pulses, No clubbing, cyanosis, or edema  LYMPH: No lymphadenopathy noted  NEUROLOGICAL: Grossly nonfocal  SKIN:  right groin access site soft, tender, no bruit noted, scant oozing noted from access site, +distal pulses, moderate right-sided pubis hematoma noted, stable from day before.       LABS:                        9.2    10.46 )-----------( 220      ( 23 Sep 2021 04:55 )             30.4     09-23    146<H>  |  110<H>  |  119<H>  ----------------------------<  122<H>  4.2   |  26  |  4.60<H>    Ca    8.4<L>      23 Sep 2021 04:55  Phos  5.9       Mg     3.0         TPro  6.7  /  Alb  2.6<L>  /  TBili  0.3  /  DBili  x   /  AST  10<L>  /  ALT  14  /  AlkPhos  102          CARDIAC MARKERS ( 22 Sep 2021 18:29 )  x     / x     / 102 U/L / x     / x            RADIOLOGY & ADDITIONAL TESTS:    TTE 2021     A moderate sized mobile echodense mass on the atrial aspect of the   anterior mitral leaflet is seen, Appearing more pronouncedthan prior   study.   A mitral valvular vegetation cannot be excluded.   The leaflet opening appears mildly restricted.   Moderate to severe (3+) mitral regurgitation is present.   Limited study to assess left ventricular function.   Mild concentric left ventricular hypertrophy is present.   Estimated left ventricular ejection fraction is 55 %.     Signature     ----------------------------------------------------------------   Electronically signed by Chikis Crow MD(Interpreting   physician) on 2021 03:24 PM   ----------------------------------------------------------------             TTE 2021     There is calcification of mitral valve . The leaflet opening is mildly   restricted.   Mean transmitral gradient is 4 mmHg; this finding is consistent with mild   mitral stenosis.   Moderate (2+) mitral regurgitation is present.   EA reversal of the mitral inflow consistent with reduced compliance of the   left ventricle.   Significant fibrocalcific changes noted to the aortic valve leaflets with   restriction in leaflet excursion. Peak transaortic gradient is 74 mmHg;   this finding is consistent with severe aortic stenosis.   Moderate (2+) aortic regurgitation is present.   The tricuspid valve leaflets are thin and pliable; valve motion is normal.   Mild (1+) tricuspid valve regurgitation is present.   Mild pulmonary hypertension.   Mild pulmonic valvular regurgitation (1+) is present.   Pulmonic valve not well seen, probably normal pulmonic valve function.   The left atrium is mildly dilated by LA volume index.   Mild concentric left ventricular hypertrophy is present.   Estimated left ventricular ejection fraction is 50-55 %.   The apex,mid,distal anteroseptal mildly hypokinetic.   Normal appearing right ventricle structure and function.     Signature     ----------------------------------------------------------------   Electronically signed by Venugopal Palla, MD(Interpreting   physician) on 2021 11:50 PM   ----------------------------------------------------------------

## 2021-09-23 NOTE — PROGRESS NOTE ADULT - ASSESSMENT
89M with PMH Aortic Stenosis CAD s/p PCI, CKD IV, DM HTN, HLD, Fe deficiency anemia presents from [facility] with Generalized weakness that he woke up with overnight. Associated GARCIA and fatigue, Denies fever, chills, near syncope, dizziness, chest pain, nausea, vomiting, abdominal pain/discomfort. Denies any recent weight gain/loss.  In th ED. EKG with Complete Heart Block. EP called and recommended admission to CCU for PPM placement. Cr 4.89 (Baseline 3.30-3.45), WBC 13.01, Hgb (8/.5 (20 Sep 2021 14:37)    Above from chart:  Pt seen in the past for CKD4-5  Creat as high as 5.1 on 2/20  admitted for 3rd degree block  Monitor K if rises, may use Lokelma 10 g qd   No need for HD at this time    9/22 SY  --CLIFFORD/CKD  : advanced CKD.   For now, no acute indication to consider dialysis.    If renal function remains poor, will need to clarify pt's wishes for intervention.    Await for possible stabilization of renal function post PPM.  --Cardiac : PPM today.  Hx of AS.  reassess cardiac function.  --Electrolytes acceptable.  --Anemia : check iron storage.    9/23 SY  --CLIFFORD/CKD : slightly improved today.  No immediate indication for dialysis.    Expect continued renal stabilization post improved HB post PPM.  --Diuretics per Cardiology : monitor renal function closely.  --Noted with vegetation on mitral valve : continue abtx and follow up culture results. ID evaluation pending.

## 2021-09-23 NOTE — PHYSICAL THERAPY INITIAL EVALUATION ADULT - PATIENT PROFILE REVIEW, REHAB EVAL
yes Island Pedicle Flap Text: The defect edges were debeveled with a #15 scalpel blade.  Given the location of the defect, shape of the defect and the proximity to free margins an island pedicle advancement flap was deemed most appropriate.  Using a sterile surgical marker, an appropriate advancement flap was drawn incorporating the defect, outlining the appropriate donor tissue and placing the expected incisions within the relaxed skin tension lines where possible.    The area thus outlined was incised deep to adipose tissue with a #15 scalpel blade.  The skin margins were undermined to an appropriate distance in all directions around the primary defect and laterally outward around the island pedicle utilizing iris scissors.  There was minimal undermining beneath the pedicle flap.

## 2021-09-23 NOTE — PROGRESS NOTE ADULT - SUBJECTIVE AND OBJECTIVE BOX
cc - dyspnea, cough    HPI:  89M with PMH Aortic Stenosis CAD s/p PCI, CKD IV, DM HTN, HLD, Fe deficiency anemia presents from [facility] with Generalized weakness that he woke up with overnight. Associated GARCIA and fatigue, Denies fever, chills, near syncope, dizziness, chest pain, nausea, vomiting, abdominal pain/discomfort. Denies any recent weight gain/loss.    In th ED. EKG with Complete Heart Block. EP called and recommended admission to CCU for PPM placement. Cr 4.89 (Baseline 3.30-3.45), WBC 13.01, Hgb (8/.5 (20 Sep 2021 14:37)    9/21: pt seen and examined. HR 30s. Does have some SOB. No CP. On 2L NC for O2 supplementation.   9/22 - pt seen and examined , HR 30-32 , + dyspnea, + cough, denies cp, abdominal pain, afebrile , plan for PPM placements today   9/23 - pt seen and examined earlier today, HR >60 , denies dyspnea, + occasional cough, afebrile, plan discussed     Review of system- Rest of the review of system are negative except mentioned in HPI      Vital Signs Last 24 Hrs  T(C): 36.4 (09-23-21 @ 10:00), Max: 36.6 (09-23-21 @ 06:00)  T(F): 97.6 (09-23-21 @ 10:00), Max: 97.8 (09-23-21 @ 06:00)  HR: 64 (09-23-21 @ 18:00) (0 - 75)  BP: 169/67 (09-23-21 @ 17:00) (142/56 - 173/55)  RR: 17 (09-23-21 @ 18:00) (13 - 27)  SpO2: 98% (09-23-21 @ 18:00) (83% - 99%)  Wt(kg): --  CAPILLARY BLOOD GLUCOSE  CAPILLARY BLOOD GLUCOSE  A1C with Estimated Average Glucose (09.21.21 @ 07:32)    A1C with Estimated Average Glucose Result: 7.5:     POCT Blood Glucose.: 138 mg/dL (23 Sep 2021 17:50)  POCT Blood Glucose.: 220 mg/dL (23 Sep 2021 12:46)  POCT Blood Glucose.: 131 mg/dL (23 Sep 2021 07:38)  POCT Blood Glucose.: 208 mg/dL (22 Sep 2021 21:48)      PHYSICAL EXAM:  Constitutional: NAD, well-groomed, well-developed  HEENT: PERRLA, EOMI, Normal Hearing  Neck: No LAD, No JVD  Back: Normal spine flexure, No CVA tenderness  Cardiovascular: bradycardic, +systolic murmur  Respiratory: b/l rales   Gastrointestinal: BS+, soft, NT/ND  Extremities: + peripheral edema  Vascular: 2+ peripheral pulses  Neurological: A/O x 3, no focal deficits  Psychiatric: Normal mood, normal affect  Musculoskeletal: 5/5 strength b/l upper and lower extremities  Skin: No rashes    Labs  09-23    146<H>  |  110<H>  |  119<H>  ----------------------------<  122<H>  4.2   |  26  |  4.60<H>    Ca    8.4<L>      23 Sep 2021 04:55  Phos  5.9     09-23  Mg     3.0     09-22    TPro  6.7  /  Alb  2.6<L>  /  TBili  0.3  /  DBili  x   /  AST  10<L>  /  ALT  14  /  AlkPhos  102  09-23                        9.2    10.46 )-----------( 220      ( 23 Sep 2021 04:55 )             30.4     Serum Pro-Brain Natriuretic Peptide (09.23.21 @ 04:55)    Serum Pro-Brain Natriuretic Peptide: 85555 pg/mL      Vitamin D, 25-Hydroxy (09.22.21 @ 18:29)    Vitamin D, 25-Hydroxy: 51.5:   CARDIAC MARKERS ( 22 Sep 2021 18:29 )  x     / x     / 102 U/L / x     / x        LIVER FUNCTIONS - ( 23 Sep 2021 04:55 )  Alb: 2.6 g/dL / Pro: 6.7 gm/dL / ALK PHOS: 102 U/L / ALT: 14 U/L / AST: 10 U/L / GGT: x             09-22    141  |  107  |  121<H>  ----------------------------<  140<H>  4.9   |  23  |  5.12<H>    Ca    8.1<L>      22 Sep 2021 06:33  Phos  5.9     09-22  Mg     3.0     09-22    TPro  6.8  /  Alb  2.8<L>  /  TBili  0.4  /  DBili  x   /  AST  9<L>  /  ALT  17  /  AlkPhos  108  09-21                          8.8    11.87 )-----------( 208      ( 22 Sep 2021 06:33 )             29.6       CARDIAC MARKERS ( 20 Sep 2021 15:24 )  <0.015 ng/mL / x     / x     / x     / x            LIVER FUNCTIONS - ( 21 Sep 2021 07:32 )  Alb: 2.8 g/dL / Pro: 6.8 gm/dL / ALK PHOS: 108 U/L / ALT: 17 U/L / AST: 9 U/L / GGT: x             CARDIAC MARKERS ( 20 Sep 2021 15:24 )  <0.015 ng/mL / x     / x     / x     / x      CARDIAC MARKERS ( 20 Sep 2021 12:08 )  0.034 ng/mL / x     / x     / x     / x            LIVER FUNCTIONS - ( 21 Sep 2021 07:32 )  Alb: 2.8 g/dL / Pro: 6.8 gm/dL / ALK PHOS: 108 U/L / ALT: 17 U/L / AST: 9 U/L / GGT: x         < from: US Kidney and Bladder (09.23.21 @ 11:09) >  FINDINGS:  Bilateral increased renal cortical echogenicity.    Right kidney: 8.1 cm. No hydronephrosis. Multiple cysts measuring up to 3.3 cm. There is a 2.4 cm cyst with a thin septation in the interpolar region.    Left kidney: 9.1 cm. No hydronephrosis. Multiple cysts measuring up to 6.2 cm in the lower pole.    Urinary bladder: Underdistended. Right ureteral jet is visualized.    IMPRESSION:    Bilateral increased renal cortical echogenicity compatible with medical renal disease.    Bilateral renal cysts, as described above.    < end of copied text >  < from: CT Chest No Cont (09.23.21 @ 08:07) >  FINDINGS:    LUNGS AND AIRWAYS: The central airways are patent. Again seen are stable areas of mild peripheral bronchiectasis with branching tubular opacities and tree-in-bud opacities representing chronic mucoid impaction. Bibasilar atelectasis is noted.  PLEURA: Small bilateral pleural effusions.  VESSELS: Aortic atherosclerosis without aneurysm.  HEART: Micra pacemaker. No cardiomegaly. No pericardial effusion. Coronary, mitral annular, and aortic valve calcification.  MEDIASTINUM AND STACY: No adenopathy.  CHEST WALL AND LOWER NECK: No masses.  VISUALIZED UPPER ABDOMEN: Moderate hiatal hernia.  BONES: No acute bony abnormality.    IMPRESSION:  *  Chronic lung changes of mild bronchiectasis and mucoid impaction as before.  *  Small bilateral pleural effusions and bibasilar atelectasis.    < end of copied text >    < from: Xray Chest 1 View- PORTABLE-Urgent (09.20.21 @ 12:22) >  IMPRESSION: Pulmonary edema versus bilateral patchy airspace    < end of copied text >    MEDICATIONS  (STANDING):  allopurinol 100 milliGRAM(s) Oral daily  atorvastatin 40 milliGRAM(s) Oral at bedtime  dextrose 40% Gel 15 Gram(s) Oral once  dextrose 5%. 1000 milliLiter(s) (50 mL/Hr) IV Continuous <Continuous>  dextrose 5%. 1000 milliLiter(s) (100 mL/Hr) IV Continuous <Continuous>  dextrose 50% Injectable 25 Gram(s) IV Push once  dextrose 50% Injectable 12.5 Gram(s) IV Push once  dextrose 50% Injectable 25 Gram(s) IV Push once  ferrous    sulfate 325 milliGRAM(s) Oral daily  glucagon  Injectable 1 milliGRAM(s) IntraMuscular once  heparin   Injectable 5000 Unit(s) SubCutaneous every 12 hours  influenza   Vaccine 0.5 milliLiter(s) IntraMuscular once  insulin glargine Injectable (LANTUS) 5 Unit(s) SubCutaneous at bedtime  insulin lispro (ADMELOG) corrective regimen sliding scale   SubCutaneous three times a day before meals  insulin lispro (ADMELOG) corrective regimen sliding scale   SubCutaneous at bedtime  pantoprazole    Tablet 40 milliGRAM(s) Oral before breakfast    MEDICATIONS  (PRN):  acetaminophen   Tablet .. 650 milliGRAM(s) Oral every 6 hours PRN Temp greater or equal to 38.5C (101.3F), Mild Pain (1 - 3)  aluminum hydroxide/magnesium hydroxide/simethicone Suspension 30 milliLiter(s) Oral every 4 hours PRN Dyspepsia  atropine Injectable 0.5 milliGRAM(s) IntraMuscular once PRN Bradycardia HR<50 AND SBP<95  melatonin 3 milliGRAM(s) Oral at bedtime PRN Insomnia  ondansetron Injectable 4 milliGRAM(s) IV Push every 8 hours PRN Nausea and/or Vomiting  polyethylene glycol 3350 17 Gram(s) Oral daily PRN Constipation

## 2021-09-23 NOTE — CONSULT NOTE ADULT - SUBJECTIVE AND OBJECTIVE BOX
Patient is a 89y old  Male who presents with a chief complaint of Complete Heart Block/CLIFFORD/Weakness     HPI:  89 y/o Male with h/o Aortic Stenosis, CAD s/p PCI, CKD IV, DM HTN, HLD, iron deficiency anemia was admitted on  from [facility] with generalized weakness x one day. He developed associated GARCIA and fatigue, Denied fever, chills at home. In ER he was noted with Complete Heart Block. EP called and recommended admission to CCU for PPM placement. Noted with leukocytosis. He received ceftriaxone IV.       PMH: as above  PSH: as above  Meds: per reconciliation sheet, noted below  MEDICATIONS  (STANDING):  allopurinol 100 milliGRAM(s) Oral daily  atorvastatin 40 milliGRAM(s) Oral at bedtime  cefTRIAXone Injectable. 1000 milliGRAM(s) IV Push every 24 hours  dextrose 40% Gel 15 Gram(s) Oral once  dextrose 5%. 1000 milliLiter(s) (50 mL/Hr) IV Continuous <Continuous>  dextrose 5%. 1000 milliLiter(s) (100 mL/Hr) IV Continuous <Continuous>  dextrose 50% Injectable 25 Gram(s) IV Push once  dextrose 50% Injectable 12.5 Gram(s) IV Push once  dextrose 50% Injectable 25 Gram(s) IV Push once  ferrous    sulfate 325 milliGRAM(s) Oral daily  furosemide   Injectable 40 milliGRAM(s) IV Push two times a day  glucagon  Injectable 1 milliGRAM(s) IntraMuscular once  influenza   Vaccine 0.5 milliLiter(s) IntraMuscular once  insulin glargine Injectable (LANTUS) 5 Unit(s) SubCutaneous at bedtime  insulin lispro (ADMELOG) corrective regimen sliding scale   SubCutaneous three times a day before meals  insulin lispro (ADMELOG) corrective regimen sliding scale   SubCutaneous at bedtime  metoprolol succinate ER 12.5 milliGRAM(s) Oral daily  pantoprazole    Tablet 40 milliGRAM(s) Oral before breakfast    MEDICATIONS  (PRN):  acetaminophen   Tablet .. 650 milliGRAM(s) Oral every 6 hours PRN Temp greater or equal to 38.5C (101.3F), Mild Pain (1 - 3)  aluminum hydroxide/magnesium hydroxide/simethicone Suspension 30 milliLiter(s) Oral every 4 hours PRN Dyspepsia  atropine Injectable 0.5 milliGRAM(s) IntraMuscular once PRN Bradycardia HR<50 AND SBP<95  melatonin 3 milliGRAM(s) Oral at bedtime PRN Insomnia  ondansetron Injectable 4 milliGRAM(s) IV Push every 8 hours PRN Nausea and/or Vomiting  polyethylene glycol 3350 17 Gram(s) Oral daily PRN Constipation    Allergies    ACE inhibitors (Unknown)  aspirin (Unknown)  enalapril (Unknown)    Intolerances      Social: no smoking, no alcohol, no illegal drugs; no recent travel, no exposure to TB  no tick bites reported  FAMILY HISTORY:  Family history of CVA    Family hx of lung cancer      no history of premature cardiovascular disease in first degree relatives    ROS: the patient denies fever, no chills, no HA, no seizures, no dizziness, no sore throat, no nasal congestion, no blurry vision, no CP, had palpitations, no SOB, no cough, no abdominal pain, no diarrhea, no N/V, no dysuria, no leg pain, no claudication, no rash, no joint aches, no rectal pain or bleeding, no night sweats; has increased weakness  All other systems reviewed and are negative    Vital Signs Last 24 Hrs  T(C): 36.4 (23 Sep 2021 10:00), Max: 36.6 (23 Sep 2021 06:00)  T(F): 97.6 (23 Sep 2021 10:00), Max: 97.8 (23 Sep 2021 06:00)  HR: 63 (23 Sep 2021 11:00) (0 - 79)  BP: 156/54 (23 Sep 2021 10:00) (142/56 - 173/55)  BP(mean): 79 (23 Sep 2021 10:00) (78 - 90)  RR: 13 (23 Sep 2021 11:00) (13 - 27)  SpO2: 83% (23 Sep 2021 11:00) (83% - 95%)  Daily     Daily Weight in k.6 (23 Sep 2021 06:00)    PE:    Constitutional:  No acute distress  HEENT: NC/AT, EOMI, PERRLA, conjunctivae clear; ears and nose atraumatic; pharynx benign  Neck: supple; thyroid not palpable  Back: no tenderness  Respiratory: respiratory effort normal; clear to auscultation  Cardiovascular: S1S2 regular, no murmurs  Abdomen: soft, not tender, not distended, positive BS; no liver or spleen organomegaly  Genitourinary: no suprapubic tenderness  Lymphatic: no LN palpable  Musculoskeletal: no muscle tenderness, no joint swelling or tenderness  Extremities: no pedal edema  Neurological/ Psychiatric: Alert, judgement and insight normal; moving all extremities  Skin: no rashes; no palpable lesions    Labs: all available labs reviewed                        9.2    10.46 )-----------( 220      ( 23 Sep 2021 04:55 )             30.4     -    146<H>  |  110<H>  |  119<H>  ----------------------------<  122<H>  4.2   |  26  |  4.60<H>    Ca    8.4<L>      23 Sep 2021 04:55  Phos  5.9       Mg     3.0         TPro  6.7  /  Alb  2.6<L>  /  TBili  0.3  /  DBili  x   /  AST  10<L>  /  ALT  14  /  AlkPhos  102       LIVER FUNCTIONS - ( 23 Sep 2021 04:55 )  Alb: 2.6 g/dL / Pro: 6.7 gm/dL / ALK PHOS: 102 U/L / ALT: 14 U/L / AST: 10 U/L / GGT: x             Culture Results:   Babesia microti PCR  Results: NOT detected    COVID-19 PCR: NotDetec (21 @ 12:08)    Lyme C6 Interpretation: Negative  Radiology: all available radiological tests reviewed    < from: US Kidney and Bladder (21 @ 11:09) >  Bilateral increased renal cortical echogenicity compatible with medical renal disease.  Bilateral renal cysts, as described above.  < end of copied text >    < from: CT Chest No Cont (21 @ 08:07) >  *  Chronic lung changes of mild bronchiectasis and mucoid impaction as before.  *  Small bilateral pleural effusions and bibasilar atelectasis.  < end of copied text >      Advanced directives addressed: full resuscitation Patient is a 89y old  Male who presents with a chief complaint of Complete Heart Block/CLIFFORD/Weakness     HPI:  89 y/o Male with h/o Aortic Stenosis, CAD s/p PCI, CKD IV, DM HTN, HLD, iron deficiency anemia was admitted on  from [facility] with generalized weakness x one day. He developed associated GARCIA and fatigue, Denied fever, chills at home. In ER he was noted with Complete Heart Block. EP called and recommended admission to CCU for PPM placement. Noted with leukocytosis. He received ceftriaxone IV.   Reported with vegetation on mitral valve consistent with       PMH: as above  PSH: as above  Meds: per reconciliation sheet, noted below  MEDICATIONS  (STANDING):  allopurinol 100 milliGRAM(s) Oral daily  atorvastatin 40 milliGRAM(s) Oral at bedtime  cefTRIAXone Injectable. 1000 milliGRAM(s) IV Push every 24 hours  dextrose 40% Gel 15 Gram(s) Oral once  dextrose 5%. 1000 milliLiter(s) (50 mL/Hr) IV Continuous <Continuous>  dextrose 5%. 1000 milliLiter(s) (100 mL/Hr) IV Continuous <Continuous>  dextrose 50% Injectable 25 Gram(s) IV Push once  dextrose 50% Injectable 12.5 Gram(s) IV Push once  dextrose 50% Injectable 25 Gram(s) IV Push once  ferrous    sulfate 325 milliGRAM(s) Oral daily  furosemide   Injectable 40 milliGRAM(s) IV Push two times a day  glucagon  Injectable 1 milliGRAM(s) IntraMuscular once  influenza   Vaccine 0.5 milliLiter(s) IntraMuscular once  insulin glargine Injectable (LANTUS) 5 Unit(s) SubCutaneous at bedtime  insulin lispro (ADMELOG) corrective regimen sliding scale   SubCutaneous three times a day before meals  insulin lispro (ADMELOG) corrective regimen sliding scale   SubCutaneous at bedtime  metoprolol succinate ER 12.5 milliGRAM(s) Oral daily  pantoprazole    Tablet 40 milliGRAM(s) Oral before breakfast    MEDICATIONS  (PRN):  acetaminophen   Tablet .. 650 milliGRAM(s) Oral every 6 hours PRN Temp greater or equal to 38.5C (101.3F), Mild Pain (1 - 3)  aluminum hydroxide/magnesium hydroxide/simethicone Suspension 30 milliLiter(s) Oral every 4 hours PRN Dyspepsia  atropine Injectable 0.5 milliGRAM(s) IntraMuscular once PRN Bradycardia HR<50 AND SBP<95  melatonin 3 milliGRAM(s) Oral at bedtime PRN Insomnia  ondansetron Injectable 4 milliGRAM(s) IV Push every 8 hours PRN Nausea and/or Vomiting  polyethylene glycol 3350 17 Gram(s) Oral daily PRN Constipation    Allergies    ACE inhibitors (Unknown)  aspirin (Unknown)  enalapril (Unknown)    Intolerances      Social: no smoking, no alcohol, no illegal drugs; no recent travel, no exposure to TB  no tick bites reported  FAMILY HISTORY:  Family history of CVA    Family hx of lung cancer      no history of premature cardiovascular disease in first degree relatives    ROS: the patient denies fever, no chills, no HA, no seizures, no dizziness, no sore throat, no nasal congestion, no blurry vision, no CP, had palpitations, no SOB, no cough, no abdominal pain, no diarrhea, no N/V, no dysuria, no leg pain, no claudication, no rash, no joint aches, no rectal pain or bleeding, no night sweats; has increased weakness  All other systems reviewed and are negative    Vital Signs Last 24 Hrs  T(C): 36.4 (23 Sep 2021 10:00), Max: 36.6 (23 Sep 2021 06:00)  T(F): 97.6 (23 Sep 2021 10:00), Max: 97.8 (23 Sep 2021 06:00)  HR: 63 (23 Sep 2021 11:00) (0 - 79)  BP: 156/54 (23 Sep 2021 10:00) (142/56 - 173/55)  BP(mean): 79 (23 Sep 2021 10:00) (78 - 90)  RR: 13 (23 Sep 2021 11:00) (13 - 27)  SpO2: 83% (23 Sep 2021 11:00) (83% - 95%)  Daily     Daily Weight in k.6 (23 Sep 2021 06:00)    PE:    Constitutional:  No acute distress  HEENT: NC/AT, EOMI, PERRLA, conjunctivae clear; ears and nose atraumatic; pharynx benign  Neck: supple; thyroid not palpable  Back: no tenderness  Respiratory: respiratory effort normal; clear to auscultation  Cardiovascular: S1S2 regular, no murmurs  Abdomen: soft, not tender, not distended, positive BS; no liver or spleen organomegaly  Genitourinary: no suprapubic tenderness  Lymphatic: no LN palpable  Musculoskeletal: no muscle tenderness, no joint swelling or tenderness  Extremities: no pedal edema  Neurological/ Psychiatric: Alert, judgement and insight normal; moving all extremities  Skin: no rashes; no palpable lesions    Labs: all available labs reviewed                        9.2    10.46 )-----------( 220      ( 23 Sep 2021 04:55 )             30.4         146<H>  |  110<H>  |  119<H>  ----------------------------<  122<H>  4.2   |  26  |  4.60<H>    Ca    8.4<L>      23 Sep 2021 04:55  Phos  5.9       Mg     3.0         TPro  6.7  /  Alb  2.6<L>  /  TBili  0.3  /  DBili  x   /  AST  10<L>  /  ALT  14  /  AlkPhos  102       LIVER FUNCTIONS - ( 23 Sep 2021 04:55 )  Alb: 2.6 g/dL / Pro: 6.7 gm/dL / ALK PHOS: 102 U/L / ALT: 14 U/L / AST: 10 U/L / GGT: x             Culture Results:   Babesia microti PCR  Results: NOT detected    COVID-19 PCR: NotDetec (21 @ 12:08)    Lyme C6 Interpretation: Negative  Radiology: all available radiological tests reviewed    < from: US Kidney and Bladder (21 @ 11:09) >  Bilateral increased renal cortical echogenicity compatible with medical renal disease.  Bilateral renal cysts, as described above.  < end of copied text >    < from: CT Chest No Cont (21 @ 08:07) >  *  Chronic lung changes of mild bronchiectasis and mucoid impaction as before.  *  Small bilateral pleural effusions and bibasilar atelectasis.  < end of copied text >    < from: TTE Echo Complete w/o Contrast w/ Doppler (21 @ 13:38) >   Mitral Valve   A moderate sized mobile echodense mass on the atrial aspect of the   anterior mitral leaflet is seen, Appearing more pronounced than prior   study.   A mitral valvular vegetation cannot be excluded.   The leaflet opening appears mildly restricted.   Moderate to severe (3+) mitral regurgitation is present.   Left Ventricle   Limited study to assess left ventricular function.   Mild concentric leftventricular hypertrophy is present.   Estimated left ventricular ejection fraction is 55 %.  < end of copied text >      Advanced directives addressed: full resuscitation

## 2021-09-23 NOTE — PROGRESS NOTE ADULT - SUBJECTIVE AND OBJECTIVE BOX
NEPHROLOGY INTERVAL HPI/OVERNIGHT EVENTS:    Date of Service: 21 @ 10:28    --Post Micra PPM placement.  No complications.  Wally to ambulate with PT this morning.   Feels better and today denies SOB.  --Sleeping comfortably without distress.  When woken up, c/o SOB.  Scheduled for PPM today.  HR in 30's with stable BP.    HPI:  89M with PMH Aortic Stenosis CAD s/p PCI, CKD IV, DM HTN, HLD, Fe deficiency anemia presents from [facility] with Generalized weakness that he woke up with overnight. Associated GARCIA and fatigue, Denies fever, chills, near syncope, dizziness, chest pain, nausea, vomiting, abdominal pain/discomfort. Denies any recent weight gain/loss.    In  ED. EKG with Complete Heart Block. EP called and recommended admission to CCU for PPM placement. Cr 4.89 (Baseline 3.30-3.45), WBC 13.01, Hgb (8/.5 (20 Sep 2021 14:37)    Above from chart:  Pt seen in the past for CKD4-5  Creat as high as 5.1 on   admitted for 3rd degree block    PAST MEDICAL & SURGICAL HISTORY:  Hypertension  HLD   Gout  Prostate cancer  Chronic kidney disease  CAD   DM type 2 (diabetes mellitus, type 2)  Aortic stenosis  Leg edema      MEDICATIONS  (STANDING):  allopurinol 100 milliGRAM(s) Oral daily  atorvastatin 40 milliGRAM(s) Oral at bedtime  cefTRIAXone Injectable. 1000 milliGRAM(s) IV Push every 24 hours  dextrose 40% Gel 15 Gram(s) Oral once  dextrose 5%. 1000 milliLiter(s) (50 mL/Hr) IV Continuous <Continuous>  dextrose 5%. 1000 milliLiter(s) (100 mL/Hr) IV Continuous <Continuous>  dextrose 50% Injectable 25 Gram(s) IV Push once  dextrose 50% Injectable 12.5 Gram(s) IV Push once  dextrose 50% Injectable 25 Gram(s) IV Push once  ferrous    sulfate 325 milliGRAM(s) Oral daily  furosemide   Injectable 40 milliGRAM(s) IV Push two times a day  glucagon  Injectable 1 milliGRAM(s) IntraMuscular once  influenza   Vaccine 0.5 milliLiter(s) IntraMuscular once  insulin glargine Injectable (LANTUS) 5 Unit(s) SubCutaneous at bedtime  insulin lispro (ADMELOG) corrective regimen sliding scale   SubCutaneous three times a day before meals  insulin lispro (ADMELOG) corrective regimen sliding scale   SubCutaneous at bedtime  metoprolol succinate ER 12.5 milliGRAM(s) Oral daily  pantoprazole    Tablet 40 milliGRAM(s) Oral before breakfast    MEDICATIONS  (PRN):  acetaminophen   Tablet .. 650 milliGRAM(s) Oral every 6 hours PRN Temp greater or equal to 38.5C (101.3F), Mild Pain (1 - 3)  aluminum hydroxide/magnesium hydroxide/simethicone Suspension 30 milliLiter(s) Oral every 4 hours PRN Dyspepsia  atropine Injectable 0.5 milliGRAM(s) IntraMuscular once PRN Bradycardia HR<50 AND SBP<95  melatonin 3 milliGRAM(s) Oral at bedtime PRN Insomnia  ondansetron Injectable 4 milliGRAM(s) IV Push every 8 hours PRN Nausea and/or Vomiting  polyethylene glycol 3350 17 Gram(s) Oral daily PRN Constipation    Vital Signs Last 24 Hrs  T(C): 36.6 (23 Sep 2021 06:00), Max: 36.6 (23 Sep 2021 06:00)  T(F): 97.8 (23 Sep 2021 06:00), Max: 97.8 (23 Sep 2021 06:00)  HR: 75 (23 Sep 2021 09:24) (0 - 79)  BP: 148/58 (23 Sep 2021 09:24) (142/56 - 173/55)  BP(mean): 81 (23 Sep 2021 09:24) (71 - 90)  RR: 17 (23 Sep 2021 09:24) (15 - 27)  SpO2: 94% (23 Sep 2021 09:24) (87% - 95%)  Daily     Daily Weight in k.6 (23 Sep 2021 06:00)     @ 07:01  -  - @ 07:00  --------------------------------------------------------  IN: 200 mL / OUT: 1275 mL / NET: -1075 mL    PHYSICAL EXAM:  GENERAL: comfortable  CHEST/LUNG: scattered rhonchi  HEART: S1S2 RRR, positive systolic murmur.  ABDOMEN: soft  EXTREMITIES: no edema  SKIN:     LABS:                        9.2    10.46 )-----------( 220      ( 23 Sep 2021 04:55 )             30.4         146<H>  |  110<H>  |  119<H>  ----------------------------<  122<H>  4.2   |  26  |  4.60<H>    Ca    8.4<L>      23 Sep 2021 04:55  Phos  5.9       Mg     3.0         TPro  6.7  /  Alb  2.6<L>  /  TBili  0.3  /  DBili  x   /  AST  10<L>  /  ALT  14  /  AlkPhos  102          Phosphorus Level, Serum: 5.9 mg/dL ( @ 04:55)  Vitamin D, 25-Hydroxy: 51.5 ng/mL ( @ 18:29)          RADIOLOGY & ADDITIONAL TESTS:

## 2021-09-24 NOTE — PROGRESS NOTE ADULT - SUBJECTIVE AND OBJECTIVE BOX
Patient is a 89y old  Male who presents with a chief complaint of Complete Heart Block/CLIFFORD/Weakness (23 Sep 2021 18:38)      HPI:  89M with PMH Aortic Stenosis CAD s/p PCI, CKD IV, DM HTN, HLD, Fe deficiency anemia presents from [facility] with Generalized weakness that he woke up with overnight. Associated GARCIA and fatigue, Denies fever, chills, near syncope, dizziness, chest pain, nausea, vomiting, abdominal pain/discomfort. Denies any recent weight gain/loss.    In th ED. EKG with Complete Heart Block. EP called and recommended admission to CCU for PPM placement. Cr 4.89 (Baseline 3.30-3.45), WBC 13.01, Hgb (8/.5 (20 Sep 2021 14:37)    9/21 Cardiology. 89 year old male with history of CAD, s/p PCI, CKD and DM admitted with dyspnea for 24 hours. some element of orthopnea as well. No le edema. patietnt typically ambulates with walker at [facility]. No chest pain. On admission found to be in CHB with under;[place] [id number]  currently feels dyspneic at rest.      Followup HPI:    9/22- short of breath at rest. No chest pain.  9/23- S/P Micra RV PPM on 9/22. Feels much less breathless. No chest pain. TTE shows vegetation on the atrial surface of the anterior mitral leaflet.  9/24  alert appears comfortable lying flat  PAST MEDICAL & SURGICAL HISTORY:  Hypertension    HLD (hyperlipidemia)    Gout    Prostate cancer    Anemia    Chronic kidney disease (CKD)    CAD (coronary artery disease)    DM type 2 (diabetes mellitus, type 2)    Glaucoma    Aortic stenosis    Osteoporosis    Leg edema    H/O inguinal hernia repair    History of tonsillectomy  childhood    History of colonoscopy          MEDICATIONS  (STANDING):  allopurinol 100 milliGRAM(s) Oral daily  atorvastatin 40 milliGRAM(s) Oral at bedtime  cefTRIAXone Injectable. 2000 milliGRAM(s) IV Push every 24 hours  dextrose 40% Gel 15 Gram(s) Oral once  dextrose 5%. 1000 milliLiter(s) (50 mL/Hr) IV Continuous <Continuous>  dextrose 5%. 1000 milliLiter(s) (100 mL/Hr) IV Continuous <Continuous>  dextrose 50% Injectable 25 Gram(s) IV Push once  dextrose 50% Injectable 12.5 Gram(s) IV Push once  dextrose 50% Injectable 25 Gram(s) IV Push once  ferrous    sulfate 325 milliGRAM(s) Oral daily  furosemide   Injectable 40 milliGRAM(s) IV Push two times a day  glucagon  Injectable 1 milliGRAM(s) IntraMuscular once  influenza   Vaccine 0.5 milliLiter(s) IntraMuscular once  insulin glargine Injectable (LANTUS) 5 Unit(s) SubCutaneous at bedtime  insulin lispro (ADMELOG) corrective regimen sliding scale   SubCutaneous three times a day before meals  insulin lispro (ADMELOG) corrective regimen sliding scale   SubCutaneous at bedtime  metoprolol succinate ER 12.5 milliGRAM(s) Oral daily  pantoprazole    Tablet 40 milliGRAM(s) Oral before breakfast    MEDICATIONS  (PRN):  acetaminophen   Tablet .. 650 milliGRAM(s) Oral every 6 hours PRN Temp greater or equal to 38.5C (101.3F), Mild Pain (1 - 3)  aluminum hydroxide/magnesium hydroxide/simethicone Suspension 30 milliLiter(s) Oral every 4 hours PRN Dyspepsia  atropine Injectable 0.5 milliGRAM(s) IntraMuscular once PRN Bradycardia HR<50 AND SBP<95  melatonin 3 milliGRAM(s) Oral at bedtime PRN Insomnia  ondansetron Injectable 4 milliGRAM(s) IV Push every 8 hours PRN Nausea and/or Vomiting  polyethylene glycol 3350 17 Gram(s) Oral daily PRN Constipation          Vital Signs Last 24 Hrs  T(C): 36.6 (24 Sep 2021 06:30), Max: 36.6 (24 Sep 2021 06:30)  T(F): 97.8 (24 Sep 2021 06:30), Max: 97.8 (24 Sep 2021 06:30)  HR: 59 (24 Sep 2021 08:00) (56 - 75)  BP: 154/61 (24 Sep 2021 08:00) (134/50 - 169/67)  BP(mean): 84 (24 Sep 2021 08:00) (70 - 93)  RR: 16 (24 Sep 2021 08:00) (10 - 22)  SpO2: 99% (24 Sep 2021 03:00) (83% - 99%)    I&O's Summary    23 Sep 2021 07:01  -  24 Sep 2021 07:00  --------------------------------------------------------  IN: 0 mL / OUT: 1000 mL / NET: -1000 mL        PHYSICAL EXAM  General Appearance: comfortable  HEENT:   Neck:   Lungs: bibasilar crackles  Heart: 2/6 systolic murmur LSB  Abdomen: no tenderness.  Extremities: no edema  Neurologic:         INTERPRETATION OF TELEMETRY:    ECG:        LABS:                          9.3    10.12 )-----------( 255      ( 24 Sep 2021 06:49 )             30.8     09-24    142  |  105  |  106<H>  ----------------------------<  143<H>  3.9   |  29  |  3.91<H>    Ca    8.5      24 Sep 2021 06:49  Phos  6.2     09-24    TPro  7.1  /  Alb  2.5<L>  /  TBili  0.3  /  DBili  x   /  AST  10<L>  /  ALT  9<L>  /  AlkPhos  115  09-24    CARDIAC MARKERS ( 22 Sep 2021 18:29 )  x     / x     / 102 U/L / x     / x          Lipid Panel  101  33  --  96    Pro BNP  [id number] 09-24 @ 06:49  D Dimer  -- 09-24 @ 06:49  Pro BNP  [id number] 09-23 @ 04:55  D Dimer  -- 09-23 @ 04:55  Pro BNP  [id number] 09-20 @ 12:08  D Dimer  -- 09-20 @ 12:08              RADIOLOGY & ADDITIONAL STUDIES:

## 2021-09-24 NOTE — PROGRESS NOTE ADULT - SUBJECTIVE AND OBJECTIVE BOX
NEPHROLOGY INTERVAL HPI/OVERNIGHT EVENTS:    Date of Service: 09-23-21 @ 10:28  9/24- OOB in chair, notes reviewed   9/23--Post Micra PPM placement.  No complications.  Wally to ambulate with PT this morning.   Feels better and today denies SOB.  9/22--Sleeping comfortably without distress.  When woken up, c/o SOB.  Scheduled for PPM today.  HR in 30's with stable BP.    HPI:  89M with PMH Aortic Stenosis CAD s/p PCI, CKD IV, DM HTN, HLD, Fe deficiency anemia presents from [facility] with Generalized weakness that he woke up with overnight. Associated GARCIA and fatigue, Denies fever, chills, near syncope, dizziness, chest pain, nausea, vomiting, abdominal pain/discomfort. Denies any recent weight gain/loss.    In th ED. EKG with Complete Heart Block. EP called and recommended admission to CCU for PPM placement. Cr 4.89 (Baseline 3.30-3.45), WBC 13.01, Hgb (8/.5 (20 Sep 2021 14:37)    Above from chart:  Pt seen in the past for CKD4-5  Creat as high as 5.1 on 2/20  admitted for 3rd degree block    PAST MEDICAL & SURGICAL HISTORY:  Hypertension  HLD   Gout  Prostate cancer  Chronic kidney disease  CAD   DM type 2 (diabetes mellitus, type 2)  Aortic stenosis  Leg edema      MEDICATIONS  (STANDING):  allopurinol 100 milliGRAM(s) Oral daily  atorvastatin 40 milliGRAM(s) Oral at bedtime  cefTRIAXone Injectable. 2000 milliGRAM(s) IV Push every 24 hours  dextrose 40% Gel 15 Gram(s) Oral once  dextrose 5%. 1000 milliLiter(s) (50 mL/Hr) IV Continuous <Continuous>  dextrose 5%. 1000 milliLiter(s) (100 mL/Hr) IV Continuous <Continuous>  dextrose 50% Injectable 25 Gram(s) IV Push once  dextrose 50% Injectable 12.5 Gram(s) IV Push once  dextrose 50% Injectable 25 Gram(s) IV Push once  ferrous    sulfate 325 milliGRAM(s) Oral daily  furosemide   Injectable 40 milliGRAM(s) IV Push two times a day  glucagon  Injectable 1 milliGRAM(s) IntraMuscular once  influenza   Vaccine 0.5 milliLiter(s) IntraMuscular once  insulin glargine Injectable (LANTUS) 5 Unit(s) SubCutaneous at bedtime  insulin lispro (ADMELOG) corrective regimen sliding scale   SubCutaneous three times a day before meals  insulin lispro (ADMELOG) corrective regimen sliding scale   SubCutaneous at bedtime  metoprolol succinate ER 12.5 milliGRAM(s) Oral daily  pantoprazole    Tablet 40 milliGRAM(s) Oral before breakfast    MEDICATIONS  (PRN):  acetaminophen   Tablet .. 650 milliGRAM(s) Oral every 6 hours PRN Temp greater or equal to 38.5C (101.3F), Mild Pain (1 - 3)  aluminum hydroxide/magnesium hydroxide/simethicone Suspension 30 milliLiter(s) Oral every 4 hours PRN Dyspepsia  atropine Injectable 0.5 milliGRAM(s) IntraMuscular once PRN Bradycardia HR<50 AND SBP<95  melatonin 3 milliGRAM(s) Oral at bedtime PRN Insomnia  ondansetron Injectable 4 milliGRAM(s) IV Push every 8 hours PRN Nausea and/or Vomiting  polyethylene glycol 3350 17 Gram(s) Oral daily PRN Constipation      I&O's Detail    23 Sep 2021 07:01  -  24 Sep 2021 07:00  --------------------------------------------------------  IN:  Total IN: 0 mL    OUT:    Incontinent per Condom Catheter (mL): 1000 mL  Total OUT: 1000 mL    Total NET: -1000 mL      24 Sep 2021 07:01  -  24 Sep 2021 14:12  --------------------------------------------------------  IN:    Oral Fluid: 360 mL  Total IN: 360 mL    OUT:    Voided (mL): 200 mL  Total OUT: 200 mL    Total NET: 160 mL      ICU Vital Signs Last 24 Hrs  T(C): 36.6 (24 Sep 2021 06:30), Max: 36.6 (24 Sep 2021 06:30)  T(F): 97.8 (24 Sep 2021 06:30), Max: 97.8 (24 Sep 2021 06:30)  HR: 69 (24 Sep 2021 12:00) (56 - 73)  BP: 134/54 (24 Sep 2021 12:00) (114/60 - 169/67)  BP(mean): 75 (24 Sep 2021 12:00) (70 - 143)  ABP: --  ABP(mean): --  RR: 14 (24 Sep 2021 12:00) (10 - 21)  SpO2: 98% (24 Sep 2021 10:32) (90% - 99%)      PHYSICAL EXAM:  GENERAL: comfortable  CHEST/LUNG: scattered rhonchi  HEART: S1S2 RRR, positive systolic murmur.  ABDOMEN: soft  EXTREMITIES: no edema  SKIN:     LABS:                          9.3    10.12 )-----------( 255      ( 24 Sep 2021 06:49 )             30.8                           9.2    10.46 )-----------( 220      ( 23 Sep 2021 04:55 )             30.4     09-24    142  |  105  |  106<H>  ----------------------------<  143<H>  3.9   |  29  |  3.91<H>    Ca    8.5      24 Sep 2021 06:49  Phos  6.2     09-24    TPro  7.1  /  Alb  2.5<L>  /  TBili  0.3  /  DBili  x   /  AST  10<L>  /  ALT  9<L>  /  AlkPhos  115  09-24      09-23    146<H>  |  110<H>  |  119<H>  ----------------------------<  122<H>  4.2   |  26  |  4.60<H>    Ca    8.4<L>      23 Sep 2021 04:55  Phos  5.9     09-23  Mg     3.0     09-22    TPro  6.7  /  Alb  2.6<L>  /  TBili  0.3  /  DBili  x   /  AST  10<L>  /  ALT  14  /  AlkPhos  102  09-23        Phosphorus Level, Serum: 5.9 mg/dL (09-23 @ 04:55)  Vitamin D, 25-Hydroxy: 51.5 ng/mL (09-22 @ 18:29)          RADIOLOGY & ADDITIONAL TESTS:

## 2021-09-24 NOTE — PROGRESS NOTE ADULT - ASSESSMENT
89 y/o Male with h/o Aortic Stenosis, CAD s/p PCI, CKD IV, DM ninoska 2, HTN, HLD, iron deficiency anemia was admitted on 9/20 from [facility] with generalized weakness x one day. He developed associated GARCIA and fatigue, Denied fever, chills at home. In ER he was noted with Complete Heart Block. EP called and recommended admission to CCU for PPM placement. Noted with leukocytosis. He received ceftriaxone IV.     1. Mitral valve vegetation. Severe AS. Possible subacute bacterial endocarditis. Severe bradycardia s/p micra. CRF stage 4. Bronchiectasis.   -leukocytosis   -concern about endocarditis was raised  -BC noted  -doubt Lyme disease  -serial BC x 2 collected  -on ceftriaxone 2 gm IV qd # 2  -tolerating abx well so far; no side effects noted  -f/u cultures  -continue abx coverage  -monitor temps  -f/u CBC  -supportive care  2. Other issues:   -care per medicine

## 2021-09-24 NOTE — PROGRESS NOTE ADULT - ASSESSMENT
IMPRESSION:  1. CHB. S/P Micra RV PPM.  2. Aortic stenosis,severe.  3. Congestive heart failure due to severe bradycardia plus AS and MR  4. 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 On Rocephin. Blood cultures pending. ID consult pending. MARCY will not change therapy.   5. Hypertension.  6.CKD nephrology consult appreciated.   7. CAD, stable.  PLAN:  cont  furosemide to 40 mg bid, metoprolol. Continue ceftriaxone. ICD consult. Not a candidate for TAVR.

## 2021-09-24 NOTE — PROGRESS NOTE ADULT - SUBJECTIVE AND OBJECTIVE BOX
cc - dyspnea, cough    HPI:  89M with PMH Aortic Stenosis CAD s/p PCI, CKD IV, DM HTN, HLD, Fe deficiency anemia presents from [facility] with Generalized weakness that he woke up with overnight. Associated GARCIA and fatigue, Denies fever, chills, near syncope, dizziness, chest pain, nausea, vomiting, abdominal pain/discomfort. Denies any recent weight gain/loss.    In th ED. EKG with Complete Heart Block. EP called and recommended admission to CCU for PPM placement. Cr 4.89 (Baseline 3.30-3.45), WBC 13.01, Hgb (8/.5 (20 Sep 2021 14:37)    9/21: pt seen and examined. HR 30s. Does have some SOB. No CP. On 2L NC for O2 supplementation.   9/22 - pt seen and examined , HR 30-32 , + dyspnea, + cough, denies cp, abdominal pain, afebrile , plan for PPM placements today   9/23 - pt seen and examined earlier today, HR >60 , denies dyspnea, + occasional cough, afebrile, plan discussed     Review of system  - Rest of the review of system are negative except mentioned in HPI    PHYSICAL EXAM:  Constitutional: NAD, well-groomed, well-developed  HEENT: PERRLA, EOMI, Normal Hearing  Neck: No LAD, No JVD  Back: Normal spine flexure, No CVA tenderness  Cardiovascular: bradycardic, +systolic murmur  Respiratory: b/l rales   Gastrointestinal: BS+, soft, NT/ND  Extremities: + peripheral edema  Vascular: 2+ peripheral pulses  Neurological: A/O x 3, no focal deficits  Psychiatric: Normal mood, normal affect  Musculoskeletal: 5/5 strength b/l upper and lower extremities  Skin: No rashes    Labs    CBC Full  -  ( 24 Sep 2021 06:49 )  WBC Count : 10.12 K/uL  RBC Count : 3.06 M/uL  Hemoglobin : 9.3 g/dL  Hematocrit : 30.8 %  Platelet Count - Automated : 255 K/uL    142  |  105  |  106<H>  ----------------------------<  143<H>  3.9   |  29  |  3.91<H>    Ca    8.5      24 Sep 2021 06:49  Phos  6.2     09-24    TPro  7.1  /  Alb  2.5<L>  /  TBili  0.3  /  DBili  x   /  AST  10<L>  /  ALT  9<L>  /  AlkPhos  115  09-24      89M with PMH Aortic Stenosis CAD s/p PCI, CKD IV, DM HTN, HLD, Fe deficiency anemia presents from [facility] with Generalized weakness that he woke up with overnight. Associated GARCIA and fatigue,    # Mitral valve vegetation. Severe AS. Possible subacute bacterial endocarditis. Severe bradycardia s/p micra. CRF stage 4. Bronchiectasis.   - leukocytosis   - BC noted  - doubt Lyme disease  - serial BC x 2 collected  - on ceftriaxone 2 gm IV qd # 2    # Complete Heart Block s/p Micra PPM placement on 9/22   - EP consult appreciated - plan for PPM on 9/22  - TSH wnl, check lyme ab - neg , babesia , Ehrlichia  - pending   - Avoid all AV cheryl blockers    # Acute on chronic Diastolic Heart Failure   # Valvular heart disease  - Severe Aortic Stenosis and Moderate Mitral Regurgitation  # Vegetations on echo suspected endocarditis   # Valvular heart disease  - CXR  with vascular congestion on admission, BNP ~22K, Troponin negative x 2  - c/w  lasix to IV 40mg daily--> 40 BID  , Strict I/Os, Daily Weight   - 2 d echo - echodense mass on mitral valve , EF 55%, + 3 MR ,  lipid profile  LDL 49   - cardio and ID  consult appreciated   - CT chest -  mild bronchiectasis, mucoid impaction, small bilateral pleural effusions, atelectasis  - blood cx x2 , lactate, ID consult for abx management  - s/p ceftriaxone in ED, will continue ceftriaxone   procalcitonin ordered    # CLIFFORD on CKD IV  - Baseline Cr 3.4-3.5. Cr on admission   - Strict I/Os, Daily Weights, Avoid Nephrotoxic agents  - US kidney to r/o obstruction, monitor urine output  - Nephrology consult    # Macrocytic anemia - iron studies, B12, folate noted monitor while on prophylactic heparin  # DM with Hyperglycemia with A1C 7.5  -Glucose checks AC HS. Lantus 5 with correctional insulin. Titrate accordingly    # Hypocalcemia -  vitamin D wnl    # DVT Prophylaxis with Heparin subq    dispo: remain inpatient, plan for PPM today, 2 d echo, CT chest    d/w kevon Jackson [phone], updated on plan of care 9/22, 9/23   Goals of Care (GOC)/Advance Care Planning (ACP)  Date of Discussion/evaluation: 9/20/2021  Purpose of Discussion: In the setting of advanced age and multiple comorbidities, discussion of patient's wished should there be any deterioration in health status.   Parties Present/Discussed with: Patient and son  Patient's Decision making capacity at the time of discussion: Patient AAOx4 and has full decision making capacity  Presentation: As above  GOC: Code Status, HCP, Alternative Feeding Methods, Blood product Transfusions  PLAN:  Code Status: Full Code  HCP:  SonStManuelrt Quezada  Alternative Feeding methods: Would like to discuss or assess should the situation arise that it requires alternative feeding methods                 cc - dyspnea, cough    HPI:  89M with PMH Aortic Stenosis CAD s/p PCI, CKD IV, DM HTN, HLD, Fe deficiency anemia presents from [facility] with Generalized weakness that he woke up with overnight. Associated GARCIA and fatigue, Denies fever, chills, near syncope, dizziness, chest pain, nausea, vomiting, abdominal pain/discomfort. Denies any recent weight gain/loss.    In th ED. EKG with Complete Heart Block. EP called and recommended admission to CCU for PPM placement. Cr 4.89 (Baseline 3.30-3.45), WBC 13.01, Hgb (8/.5 (20 Sep 2021 14:37)    Medical progress: Patient is very comfortable. Denies any HA, CP, SOB. comfortable. Labs and vitals   Complaints: no new complaints  State of mind: maintains slow and normal conversation    Review of system  - Rest of the review of system are negative except mentioned in HPI    PHYSICAL EXAM:  T(C): 36.6 (24 Sep 2021 06:30), Max: 36.6 (24 Sep 2021 06:30)  T(F): 97.8 (24 Sep 2021 06:30), Max: 97.8 (24 Sep 2021 06:30)  HR: 69 (24 Sep 2021 12:00) (56 - 73)  BP: 134/54 (24 Sep 2021 12:00) (114/60 - 169/67)  BP(mean): 75 (24 Sep 2021 12:00) (70 - 143)  RR: 14 (24 Sep 2021 12:00) (10 - 21)  SpO2: 98% (24 Sep 2021 10:32) (90% - 99%)  Constitutional:  frail, sick, deconditioned, frail  HEENT: PERRLA, EOMI, Normal Hearing  Neck: No LAD, No JVD  Back: Normal spine flexure, No CVA tenderness  Cardiovascular: bradycardic, +systolic murmur  Respiratory: b/l rales   Gastrointestinal: BS+, soft, NT/ND  Extremities: + peripheral edema  Vascular: 2+ peripheral pulses  Neurological: A/O x 3, no focal deficits  Psychiatric: Normal mood, normal affect  Musculoskeletal: 5/5 strength b/l upper and lower extremities  Skin: No rashes    Labs    CBC Full  -  ( 24 Sep 2021 06:49 )  WBC Count : 10.12 K/uL  RBC Count : 3.06 M/uL  Hemoglobin : 9.3 g/dL  Hematocrit : 30.8 %  Platelet Count - Automated : 255 K/uL    142  |  105  |  106<H>  ----------------------------<  143<H>  3.9   |  29  |  3.91<H>    Ca    8.5      24 Sep 2021 06:49  Phos  6.2     09-24    TPro  7.1  /  Alb  2.5<L>  /  TBili  0.3  /  DBili  x   /  AST  10<L>  /  ALT  9<L>  /  AlkPhos  115  09-24      89M with PMH Aortic Stenosis CAD s/p PCI, CKD IV, DM HTN, HLD, Fe deficiency anemia presents from [facility] with Generalized weakness that he woke up with overnight. Associated GARCIA and fatigue,    # Subacute bacterial endocarditis   - leukocytosis   - BC noted  - doubt Lyme disease  - serial BC x 2 collected  - on ceftriaxone 2 gm IV qd # 2    # Complete Heart Block   - s/p Micra PPM placement on 9/22     # Acute on chronic Diastolic Heart Failure   # Valvular heart disease  - Severe Aortic Stenosis and Moderate Mitral Regurgitation  # Vegetations on echo suspected endocarditis   # Valvular heart disease  - CXR  with vascular congestion on admission, BNP ~22K, Troponin negative x 2  - c/w  lasix to IV 40mg daily--> 40 BID  , Strict I/Os, Daily Weight   - 2 d echo - echodense mass on mitral valve , EF 55%, + 3 MR ,  lipid profile  LDL 49   - CT chest -  mild bronchiectasis, mucoid impaction, small bilateral pleural effusions, atelectasis  - blood cx x2 , lactate, ID consult for abx management  - s/p ceftriaxone in ED, will continue ceftriaxone   procalcitonin ordered    # CLIFFORD on CKD IV  - Baseline Cr 3.4-3.5. Cr on admission, now getting better  - Strict I/Os, Daily Weights  - Avoid Nephrotoxic agents  - US kidney to r/o obstruction, monitor urine output    # Macrocytic anemia   - iron studies, B12, folate noted monitor while on prophylactic heparin    # DM with Hyperglycemia with A1C 7.5  - Glucose checks AC HS. Lantus 5 with correctional insulin. Titrate accordingly    # Hypocalcemia   -  vitamin D wnl    # DVT Prophylaxis with Heparin subq    Goals of Care (GOC)/Advance Care Planning (ACP)  Date of Discussion/evaluation: 9/20/2021  Purpose of Discussion: In the setting of advanced age and multiple comorbidities, discussion of patient's wished should there be any deterioration in health status.   Parties Present/Discussed with: Patient and son  Patient's Decision making capacity at the time of discussion: Patient AAOx4 and has full decision making capacity  Presentation: As above  GOC: Code Status, HCP, Alternative Feeding Methods, Blood product Transfusions    PLAN:  Code Status: Full Code  HCP:  SonManuel  Alternative Feeding methods: Would like to discuss or assess should the situation arise that it requires alternative feeding methods

## 2021-09-24 NOTE — PROGRESS NOTE ADULT - SUBJECTIVE AND OBJECTIVE BOX
Date of service: 09-24-21 @ 10:12    Lying in bed in NAD  Alert and verbal  No fever or chills reported  Dose not seem in pain    ROS: no fever or chills; denies dizziness, no HA, no SOB or cough, no abdominal pain, no diarrhea or constipation; no dysuria, no legs pain, no rashes    MEDICATIONS  (STANDING):  allopurinol 100 milliGRAM(s) Oral daily  atorvastatin 40 milliGRAM(s) Oral at bedtime  cefTRIAXone Injectable. 2000 milliGRAM(s) IV Push every 24 hours  dextrose 40% Gel 15 Gram(s) Oral once  dextrose 5%. 1000 milliLiter(s) (50 mL/Hr) IV Continuous <Continuous>  dextrose 5%. 1000 milliLiter(s) (100 mL/Hr) IV Continuous <Continuous>  dextrose 50% Injectable 25 Gram(s) IV Push once  dextrose 50% Injectable 12.5 Gram(s) IV Push once  dextrose 50% Injectable 25 Gram(s) IV Push once  ferrous    sulfate 325 milliGRAM(s) Oral daily  furosemide   Injectable 40 milliGRAM(s) IV Push two times a day  glucagon  Injectable 1 milliGRAM(s) IntraMuscular once  influenza   Vaccine 0.5 milliLiter(s) IntraMuscular once  insulin glargine Injectable (LANTUS) 5 Unit(s) SubCutaneous at bedtime  insulin lispro (ADMELOG) corrective regimen sliding scale   SubCutaneous three times a day before meals  insulin lispro (ADMELOG) corrective regimen sliding scale   SubCutaneous at bedtime  metoprolol succinate ER 12.5 milliGRAM(s) Oral daily  pantoprazole    Tablet 40 milliGRAM(s) Oral before breakfast    Vital Signs Last 24 Hrs  T(C): 36.6 (24 Sep 2021 06:30), Max: 36.6 (24 Sep 2021 06:30)  T(F): 97.8 (24 Sep 2021 06:30), Max: 97.8 (24 Sep 2021 06:30)  HR: 73 (24 Sep 2021 09:00) (56 - 73)  BP: 114/60 (24 Sep 2021 09:00) (114/60 - 169/67)  BP(mean): 143 (24 Sep 2021 09:00) (70 - 143)  RR: 21 (24 Sep 2021 09:00) (10 - 22)  SpO2: 95% (24 Sep 2021 09:00) (83% - 99%)     Physical exam:    Constitutional:  No acute distress  HEENT: NC/AT, EOMI, PERRLA, conjunctivae clear; ears and nose atraumatic  Neck: supple; thyroid not palpable  Back: no tenderness  Respiratory: respiratory effort normal; clear to auscultation  Cardiovascular: S1S2 regular, no murmurs  Abdomen: soft, not tender, not distended, positive BS  Genitourinary: no suprapubic tenderness  Lymphatic: no LN palpable  Musculoskeletal: no muscle tenderness, no joint swelling or tenderness  Extremities: no pedal edema  Neurological/ Psychiatric: Alert, moving all extremities  Skin: no rashes; no palpable lesions    Labs: reviewed                        9.3    10.12 )-----------( 255      ( 24 Sep 2021 06:49 )             30.8     09-24    142  |  105  |  106<H>  ----------------------------<  143<H>  3.9   |  29  |  3.91<H>    Ca    8.5      24 Sep 2021 06:49  Phos  6.2     09-24    TPro  7.1  /  Alb  2.5<L>  /  TBili  0.3  /  DBili  x   /  AST  10<L>  /  ALT  9<L>  /  AlkPhos  115  09-24    Ferritin, Serum: 149 ng/mL (09-23-21 @ 04:55)  C-Reactive Protein, Serum: 137 mg/L (09-23-21 @ 04:55)  Ferritin, Serum: 150 ng/mL (09-22-21 @ 18:29)                        9.2    10.46 )-----------( 220      ( 23 Sep 2021 04:55 )             30.4     09-23    146<H>  |  110<H>  |  119<H>  ----------------------------<  122<H>  4.2   |  26  |  4.60<H>    Ca    8.4<L>      23 Sep 2021 04:55  Phos  5.9     09-23  Mg     3.0     09-22    TPro  6.7  /  Alb  2.6<L>  /  TBili  0.3  /  DBili  x   /  AST  10<L>  /  ALT  14  /  AlkPhos  102  09-23     LIVER FUNCTIONS - ( 23 Sep 2021 04:55 )  Alb: 2.6 g/dL / Pro: 6.7 gm/dL / ALK PHOS: 102 U/L / ALT: 14 U/L / AST: 10 U/L / GGT: x             Culture Results:   Babesia microti PCR  Results: NOT detected    COVID-19 PCR: NotDetec (09-20-21 @ 12:08)      Culture - Blood (collected 22 Sep 2021 18:29)  Source: .Blood None  Preliminary Report (24 Sep 2021 01:01):    No growth to date.    Culture - Blood (collected 22 Sep 2021 18:29)  Source: .Blood None  Preliminary Report (24 Sep 2021 01:01):    No growth to date.  Lyme C6 Interpretation: Negative  Radiology: all available radiological tests reviewed    < from: US Kidney and Bladder (09.23.21 @ 11:09) >  Bilateral increased renal cortical echogenicity compatible with medical renal disease.  Bilateral renal cysts, as described above.  < end of copied text >    < from: CT Chest No Cont (09.23.21 @ 08:07) >  *  Chronic lung changes of mild bronchiectasis and mucoid impaction as before.  *  Small bilateral pleural effusions and bibasilar atelectasis.  < end of copied text >    < from: TTE Echo Complete w/o Contrast w/ Doppler (09.22.21 @ 13:38) >   Mitral Valve   A moderate sized mobile echodense mass on the atrial aspect of the   anterior mitral leaflet is seen, Appearing more pronounced than prior   study.   A mitral valvular vegetation cannot be excluded.   The leaflet opening appears mildly restricted.   Moderate to severe (3+) mitral regurgitation is present.   Left Ventricle   Limited study to assess left ventricular function.   Mild concentric leftventricular hypertrophy is present.   Estimated left ventricular ejection fraction is 55 %.  < end of copied text >      Advanced directives addressed: full resuscitation

## 2021-09-24 NOTE — PROGRESS NOTE ADULT - ASSESSMENT
89M with PMH Aortic Stenosis CAD s/p PCI, CKD IV, DM HTN, HLD, Fe deficiency anemia presents from [facility] with Generalized weakness that he woke up with overnight. Associated GARCIA and fatigue, Denies fever, chills, near syncope, dizziness, chest pain, nausea, vomiting, abdominal pain/discomfort. Denies any recent weight gain/loss.  In th ED. EKG with Complete Heart Block. EP called and recommended admission to CCU for PPM placement. Cr 4.89 (Baseline 3.30-3.45), WBC 13.01, Hgb (8/.5 (20 Sep 2021 14:37)    Above from chart:  Pt seen in the past for CKD4-5  Creat as high as 5.1 on 2/20  admitted for 3rd degree block  Monitor K if rises, may use Lokelma 10 g qd   No need for HD at this time    9/22 SY  --CLIFFORD/CKD  : advanced CKD.   For now, no acute indication to consider dialysis.    If renal function remains poor, will need to clarify pt's wishes for intervention.    Await for possible stabilization of renal function post PPM.  --Cardiac : PPM today.  Hx of AS.  reassess cardiac function.  --Electrolytes acceptable.  --Anemia : check iron storage.    9/23 SY  --CLIFFORD/CKD : slightly improved today.  No immediate indication for dialysis.    Expect continued renal stabilization post improved HB post PPM.  --Diuretics per Cardiology : monitor renal function closely.  --Noted with vegetation on mitral valve : continue abtx and follow up culture results. ID evaluation pending.    9/24  s/p PPM  s/p CHB  Creat improving  MV endocarditis, ne need for MARCY  severe AS/ MR  cont to monitor renal function / UO  bladder scan PRN

## 2021-09-25 NOTE — PROGRESS NOTE ADULT - ASSESSMENT
IMPRESSION:  1. CHB. S/P Micra RV PPM. Pacing.  2. Aortic stenosis,severe.  3. Congestive heart failure due to severe bradycardia plus AS and MR   4. 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 On Rocephin. Blood cultures pending. ID consult noted.   5. Hypertension - may need restart home BP meds as tolerated  6.CKD nephrology consult noted. Serum potassium stable   7. CAD, stable.    PLAN:  On furosemide to 40 mg bid, metoprolol. On ceftriaxone.  Await Blood cultures.

## 2021-09-25 NOTE — PROGRESS NOTE ADULT - SUBJECTIVE AND OBJECTIVE BOX
Patient is a 89y old  Male who presents with a chief complaint of Complete Heart Block/CLIFFORD/Weakness (24 Sep 2021 14:11)        HPI:  8M with PMH Aortic Stenosis CAD s/p PCI, CKD IV, DM HTN, HLD, Fe deficiency anemia presents from [facility] with Generalized weakness that he woke up with overnight. Associated GARCIA and fatigue, Denies fever, chills, near syncope, dizziness, chest pain, nausea, vomiting, abdominal pain/discomfort. Denies any recent weight gain/loss.    In th ED. EKG with Complete Heart Block. EP called and recommended admission to CCU for PPM placement. Cr 4.89 (Baseline 3.30-3.45), WBC 13.01, Hgb (8/.5 (20 Sep 2021 14:37)    9/21 Cardiology. 89 year old male with history of CAD, s/p PCI, CKD and DM admitted with dyspnea for 24 hours. some element of orthopnea as well. No le edema. patietnt typically ambulates with walker at [facility]. No chest pain. On admission found to be in CHB with under;[place] [id number]    Followup HPI:    9/22- short of breath at rest. No chest pain.  9/23- S/P Micra RV PPM on 9/22. Feels much less breathless. No chest pain. TTE shows vegetation on the atrial surface of the anterior mitral leaflet.  9/24  alert appears comfortable lying flat, groin site no overt hematoma  9/25 sitting up, only complaint is phlegm    Telemetry reveals vpacing.      PAST MEDICAL & SURGICAL HISTORY:  Hypertension    HLD (hyperlipidemia)    Gout    Prostate cancer    Anemia    Chronic kidney disease (CKD)    CAD (coronary artery disease)    DM type 2 (diabetes mellitus, type 2)    Glaucoma    Aortic stenosis    Osteoporosis    Leg edema    H/O inguinal hernia repair    History of tonsillectomy  childhood    History of colonoscopy      FAMILY HISTORY:  Family history of CVA    Family hx of lung cancer          Allergies    ACE inhibitors (Unknown)  aspirin (Unknown)  enalapril (Unknown)      MEDICATIONS  (STANDING):  allopurinol 100 milliGRAM(s) Oral daily  atorvastatin 40 milliGRAM(s) Oral at bedtime  cefTRIAXone Injectable. 2000 milliGRAM(s) IV Push every 24 hours  dextrose 40% Gel 15 Gram(s) Oral once  dextrose 5%. 1000 milliLiter(s) (50 mL/Hr) IV Continuous <Continuous>  dextrose 5%. 1000 milliLiter(s) (100 mL/Hr) IV Continuous <Continuous>  dextrose 50% Injectable 25 Gram(s) IV Push once  dextrose 50% Injectable 12.5 Gram(s) IV Push once  dextrose 50% Injectable 25 Gram(s) IV Push once  ferrous    sulfate 325 milliGRAM(s) Oral daily  furosemide   Injectable 40 milliGRAM(s) IV Push two times a day  glucagon  Injectable 1 milliGRAM(s) IntraMuscular once  influenza   Vaccine 0.5 milliLiter(s) IntraMuscular once  insulin glargine Injectable (LANTUS) 5 Unit(s) SubCutaneous at bedtime  insulin lispro (ADMELOG) corrective regimen sliding scale   SubCutaneous three times a day before meals  insulin lispro (ADMELOG) corrective regimen sliding scale   SubCutaneous at bedtime  metoprolol succinate ER 12.5 milliGRAM(s) Oral daily  pantoprazole    Tablet 40 milliGRAM(s) Oral before breakfast    MEDICATIONS  (PRN):  acetaminophen   Tablet .. 650 milliGRAM(s) Oral every 6 hours PRN Temp greater or equal to 38.5C (101.3F), Mild Pain (1 - 3)  aluminum hydroxide/magnesium hydroxide/simethicone Suspension 30 milliLiter(s) Oral every 4 hours PRN Dyspepsia  atropine Injectable 0.5 milliGRAM(s) IntraMuscular once PRN Bradycardia HR<50 AND SBP<95  melatonin 3 milliGRAM(s) Oral at bedtime PRN Insomnia  ondansetron Injectable 4 milliGRAM(s) IV Push every 8 hours PRN Nausea and/or Vomiting  polyethylene glycol 3350 17 Gram(s) Oral daily PRN Constipation        REVIEW OF SYSTEMS:    CONSTITUTIONAL: No weakness, fevers or chills, lightheadedness/dizziness  EYES/ENT: No visual changes;  No vertigo or throat pain   NECK: No pain or stiffness  RESPIRATORY: +phlegm; No shortness of breath  CARDIOVASCULAR: No chest pain or palpitations  GASTROINTESTINAL: No abdominal or epigastric pain. No nausea, vomiting, or hematemesis; No diarrhea or constipation. No melena or hematochezia.  GENITOURINARY: No dysuria, frequency or hematuria  NEUROLOGICAL: No numbness or focal weakness  SKIN: No itching, rashes          Vital Signs Last 24 Hrs  T(C): 36.6 (25 Sep 2021 06:00), Max: 36.6 (25 Sep 2021 06:00)  T(F): 97.8 (25 Sep 2021 06:00), Max: 97.8 (25 Sep 2021 06:00)  HR: 61 (25 Sep 2021 08:00) (56 - 73)  BP: 179/68 (25 Sep 2021 08:00) (114/60 - 179/68)  BP(mean): 95 (25 Sep 2021 08:00) (75 - 143)  RR: 13 (25 Sep 2021 08:00) (13 - 28)  SpO2: 92% (25 Sep 2021 08:00) (92% - 99%)      I&O's Summary    24 Sep 2021 07:01  -  25 Sep 2021 07:00  --------------------------------------------------------  IN: 360 mL / OUT: 1275 mL / NET: -915 mL          PHYSICAL EXAM:  GENERAL: NAD, well-groomed,thin  HEENT - NC/AT, pupils equal and reactive to light,  ; Moist mucous membranes,   CHEST/LUNG: Clear to auscultation bilaterally; No rales, rhonchi, wheezing  HEART: Regular rate and rhythm; 2/6 systolic murmur, no, rubs, or gallops  ABDOMEN: Soft, Nontender, Nondistended; Bowel sounds present  EXTREMITIES:  2+ Peripheral Pulses, No clubbing, cyanosis, or edema  NEURO:  No Focal deficits, sensory and motor intact  Groin: no hematoma                                  9.5    9.51  )-----------( 239      ( 25 Sep 2021 06:59 )             31.1     09-24    142  |  105  |  106<H>  ----------------------------<  143<H>  3.9   |  29  |  3.91<H>    Ca    8.5      24 Sep 2021 06:49  Phos  6.2     09-24    TPro  7.1  /  Alb  2.5<L>  /  TBili  0.3  /  DBili  x   /  AST  10<L>  /  ALT  9<L>  /  AlkPhos  115  09-24    LIVER FUNCTIONS - ( 24 Sep 2021 06:49 )  Alb: 2.5 g/dL / Pro: 7.1 gm/dL / ALK PHOS: 115 U/L / ALT: 9 U/L / AST: 10 U/L / GGT: x             Culture - Blood (collected 23 Sep 2021 14:30)  Source: .Blood Blood aerobic  Preliminary Report (24 Sep 2021 19:01):    No growth to date.    Culture - Blood (collected 23 Sep 2021 14:30)  Source: .Blood None  Preliminary Report (24 Sep 2021 19:01):    No growth to date.    Culture - Blood (collected 22 Sep 2021 18:29)  Source: .Blood None  Preliminary Report (24 Sep 2021 01:01):    No growth to date.    Culture - Blood (collected 22 Sep 2021 18:29)  Source: .Blood None  Preliminary Report (24 Sep 2021 01:01):    No growth to date.    Babesia microti PCR, Blood. (collected 22 Sep 2021 13:00)  Source: .Blood None  Final Report (23 Sep 2021 00:23):    Babesia microti PCR    Results: NOT detected    ***************Result Note*************    The detection of Babesia microti by PCR has only been    validated for whole blood; this test has not been approved    by the US Food and Drug Administration (FDA). Performance    characteristics of this assay have been determined by    [employer]. The clinical significance    of results should be considered in conjunction with the    overall clinical presentation of the patient. Result is not    intended to be used as the sole means for clinical diagnosis    or patient management decisions.    One negative sample does not necessarily rule    out the presence of a parasitic infection.    Telemetry vpaced

## 2021-09-25 NOTE — PROGRESS NOTE ADULT - SUBJECTIVE AND OBJECTIVE BOX
Patient is a 89y Male who reports no complaints as new.     MEDICATIONS  (STANDING):  allopurinol 100 milliGRAM(s) Oral daily  atorvastatin 40 milliGRAM(s) Oral at bedtime  cefTRIAXone Injectable. 2000 milliGRAM(s) IV Push every 24 hours  dextrose 40% Gel 15 Gram(s) Oral once  dextrose 5%. 1000 milliLiter(s) (50 mL/Hr) IV Continuous <Continuous>  dextrose 5%. 1000 milliLiter(s) (100 mL/Hr) IV Continuous <Continuous>  dextrose 50% Injectable 25 Gram(s) IV Push once  dextrose 50% Injectable 12.5 Gram(s) IV Push once  dextrose 50% Injectable 25 Gram(s) IV Push once  ferrous    sulfate 325 milliGRAM(s) Oral daily  furosemide   Injectable 40 milliGRAM(s) IV Push two times a day  glucagon  Injectable 1 milliGRAM(s) IntraMuscular once  influenza   Vaccine 0.5 milliLiter(s) IntraMuscular once  insulin glargine Injectable (LANTUS) 5 Unit(s) SubCutaneous at bedtime  insulin lispro (ADMELOG) corrective regimen sliding scale   SubCutaneous three times a day before meals  insulin lispro (ADMELOG) corrective regimen sliding scale   SubCutaneous at bedtime  metoprolol succinate ER 12.5 milliGRAM(s) Oral daily  pantoprazole    Tablet 40 milliGRAM(s) Oral before breakfast    MEDICATIONS  (PRN):  acetaminophen   Tablet .. 650 milliGRAM(s) Oral every 6 hours PRN Temp greater or equal to 38.5C (101.3F), Mild Pain (1 - 3)  aluminum hydroxide/magnesium hydroxide/simethicone Suspension 30 milliLiter(s) Oral every 4 hours PRN Dyspepsia  atropine Injectable 0.5 milliGRAM(s) IntraMuscular once PRN Bradycardia HR<50 AND SBP<95  melatonin 3 milliGRAM(s) Oral at bedtime PRN Insomnia  ondansetron Injectable 4 milliGRAM(s) IV Push every 8 hours PRN Nausea and/or Vomiting  polyethylene glycol 3350 17 Gram(s) Oral daily PRN Constipation        T(C): , Max: 36.6 (09-25-21 @ 06:00)  T(F): , Max: 97.8 (09-25-21 @ 06:00)  HR: 65 (09-25-21 @ 09:00)  BP: 169/60 (09-25-21 @ 09:00)  BP(mean): 89 (09-25-21 @ 09:00)  RR: 12 (09-25-21 @ 09:00)  SpO2: 91% (09-25-21 @ 09:00)  Wt(kg): --    09-24 @ 07:01  -  09-25 @ 07:00  --------------------------------------------------------  IN: 360 mL / OUT: 1275 mL / NET: -915 mL          PHYSICAL EXAM:    Constitutional: NAD  HEENT: dry , frail MM  Neck: No LAD, No JVD  Respiratory:dist  Cardiovascular: S1 and S2  Neurological: Asleep but arosuable          LABS:                        9.5    9.51  )-----------( 239      ( 25 Sep 2021 06:59 )             31.1     25 Sep 2021 06:59    142    |  103    |  111    ----------------------------<  119    3.8     |  30     |  3.53   24 Sep 2021 06:49    142    |  105    |  106    ----------------------------<  143    3.9     |  29     |  3.91   23 Sep 2021 04:55    146    |  110    |  119    ----------------------------<  122    4.2     |  26     |  4.60   22 Sep 2021 06:33    141    |  107    |  121    ----------------------------<  140    4.9     |  23     |  5.12     Ca    8.7        25 Sep 2021 06:59  Ca    8.5        24 Sep 2021 06:49  Ca    8.4        23 Sep 2021 04:55  Ca    8.1        22 Sep 2021 06:33  Phos  6.2       24 Sep 2021 06:49  Phos  5.9       23 Sep 2021 04:55  Phos  5.9       22 Sep 2021 06:33  Mg     3.0       22 Sep 2021 06:33    TPro  7.1    /  Alb  2.5    /  TBili  0.3    /  DBili  x      /  AST  10     /  ALT  9      /  AlkPhos  115    24 Sep 2021 06:49  TPro  6.7    /  Alb  2.6    /  TBili  0.3    /  DBili  x      /  AST  10     /  ALT  14     /  AlkPhos  102    23 Sep 2021 04:55  TPro  6.1    /  Alb  x      /  TBili  x      /  DBili  x      /  AST  x      /  ALT  x      /  AlkPhos  x      22 Sep 2021 11:23          Urine Studies:          RADIOLOGY & ADDITIONAL STUDIES:

## 2021-09-25 NOTE — PROGRESS NOTE ADULT - ASSESSMENT
89M with PMH Aortic Stenosis CAD s/p PCI, CKD IV, DM HTN, HLD, Fe deficiency anemia presents from [facility] with Generalized weakness that he woke up with overnight. Associated GARCIA and fatigue, Denies fever, chills, near syncope, dizziness, chest pain, nausea, vomiting, abdominal pain/discomfort.     CKD  -Stable function  cont to monitor renal function / UO  bladder scan PRN  keep positive for now      CVS  s/p PPM  s/p CHB  MV endocarditis, severe AS/ MR    dc with staff

## 2021-09-25 NOTE — PROGRESS NOTE ADULT - SUBJECTIVE AND OBJECTIVE BOX
cc - dyspnea, cough    HPI:  89M with PMH Aortic Stenosis CAD s/p PCI, CKD IV, DM HTN, HLD, Fe deficiency anemia presents from [facility] with Generalized weakness that he woke up with overnight. Associated GARCIA and fatigue, Denies fever, chills, near syncope, dizziness, chest pain, nausea, vomiting, abdominal pain/discomfort. Denies any recent weight gain/loss.    In th ED. EKG with Complete Heart Block. EP called and recommended admission to CCU for PPM placement. Cr 4.89 (Baseline 3.30-3.45), WBC 13.01, Hgb (8/.5 (20 Sep 2021 14:37)    Medical progress: Denies any HA, CP, SOB. No fevers, chills or shakes. Labs and vitals reviewed.   Complaints: no new complaints  State of mind: maintains slow and normal conversation    Review of system  - Rest of the review of system are negative except mentioned in HPI    PHYSICAL EXAM:  T(C): 36.6 (25 Sep 2021 06:00), Max: 36.6 (25 Sep 2021 06:00)  T(F): 97.8 (25 Sep 2021 06:00), Max: 97.8 (25 Sep 2021 06:00)  HR: 65 (25 Sep 2021 09:00) (56 - 72)  BP: 169/60 (25 Sep 2021 09:00) (132/66 - 179/68)  BP(mean): 89 (25 Sep 2021 09:00) (75 - 100)  RR: 12 (25 Sep 2021 09:00) (12 - 28)  SpO2: 91% (25 Sep 2021 09:00) (91% - 99%)  Constitutional:  frail, sick, deconditioned, frail  HEENT: PERRLA, EOMI, Normal Hearing  Neck: No LAD, No JVD  Back: Normal spine flexure, No CVA tenderness  Cardiovascular: bradycardic, +systolic murmur  Respiratory: b/l rales   Gastrointestinal: BS+, soft, NT/ND  Extremities: + peripheral edema  Vascular: 2+ peripheral pulses  Neurological: A/O x 3, no focal deficits  Psychiatric: Normal mood, normal affect  Musculoskeletal: 5/5 strength b/l upper and lower extremities  Skin: No rashes    Labs    CBC Full  -  ( 25 Sep 2021 06:59 )  WBC Count : 9.51 K/uL  RBC Count : 3.12 M/uL  Hemoglobin : 9.5 g/dL  Hematocrit : 31.1 %  Platelet Count - Automated : 239 K/uL    142  |  105  |  106<H>  ----------------------------<  143<H>  3.9   |  29  |  3.91<H>    Ca    8.5      24 Sep 2021 06:49  Phos  6.2     09-24    TPro  7.1  /  Alb  2.5<L>  /  TBili  0.3  /  DBili  x   /  AST  10<L>  /  ALT  9<L>  /  AlkPhos  115  09-24      Pt is a 89M with PMH Aortic Stenosis CAD s/p PCI, CKD IV, DM HTN, HLD, Fe deficiency anemia presents from [facility] with Generalized weakness that he woke up with overnight. Associated GARCIA and fatigue,    # Subacute bacterial endocarditis   - leukocytosis - resolved  - blood cultures 9/23 -  no growth  - on ceftriaxone 2 gm IV qd #3    # Complete Heart Block   - s/p Micra PPM placement on 9/22     # Acute on chronic Diastolic Heart Failure   # Valvular heart disease  - Severe Aortic Stenosis and Moderate Mitral Regurgitation  # Vegetations on echo suspected endocarditis   # Valvular heart disease  - CXR  with vascular congestion on admission, BNP ~22K, Troponin negative x 2  - c/w  lasix to IV 40mg daily--> 40 BID  , Strict I/Os, Daily Weight   - 2 d echo - echodense mass on mitral valve , EF 55%, + 3 MR ,  lipid profile  LDL 49   - CT chest -  mild bronchiectasis, mucoid impaction, small bilateral pleural effusions, atelectasis  - blood cx x2 , lactate, ID consult for abx management  - s/p ceftriaxone in ED, will continue ceftriaxone   procalcitonin ordered    # CLIFFORD on CKD IV  - Baseline Cr 3.4-3.5. Cr on admission, now getting better  - Strict I/Os, Daily Weights  - Avoid Nephrotoxic agents  - US kidney to r/o obstruction, monitor urine output    # Macrocytic anemia   - iron studies, B12, folate noted monitor while on prophylactic heparin    # DM with Hyperglycemia with A1C 7.5  - Glucose checks AC HS. Lantus 5 with correctional insulin. Titrate accordingly    # Hypocalcemia   -  vitamin D wnl    # DVT Prophylaxis with Heparin subq    Goals of Care (GOC)/Advance Care Planning (ACP)  Date of Discussion/evaluation: 9/20/2021  Purpose of Discussion: In the setting of advanced age and multiple comorbidities, discussion of patient's wished should there be any deterioration in health status.   Parties Present/Discussed with: Patient and son  Patient's Decision making capacity at the time of discussion: Patient AAOx4 and has full decision making capacity  Presentation: As above  GOC: Code Status, HCP, Alternative Feeding Methods, Blood product Transfusions    PLAN:  Code Status: Full Code  HCP:  SonManuel  Alternative Feeding methods: Would like to discuss or assess should the situation arise that it requires alternative feeding methods

## 2021-09-26 NOTE — PROGRESS NOTE ADULT - SUBJECTIVE AND OBJECTIVE BOX
Patient is a 89y Male who reports no complaints as new, more alert    MEDICATIONS  (STANDING):  allopurinol 100 milliGRAM(s) Oral daily  amLODIPine   Tablet 5 milliGRAM(s) Oral daily  atorvastatin 40 milliGRAM(s) Oral at bedtime  cefTRIAXone Injectable. 2000 milliGRAM(s) IV Push every 24 hours  dextrose 40% Gel 15 Gram(s) Oral once  dextrose 5%. 1000 milliLiter(s) (50 mL/Hr) IV Continuous <Continuous>  dextrose 5%. 1000 milliLiter(s) (100 mL/Hr) IV Continuous <Continuous>  dextrose 50% Injectable 25 Gram(s) IV Push once  dextrose 50% Injectable 12.5 Gram(s) IV Push once  dextrose 50% Injectable 25 Gram(s) IV Push once  ferrous    sulfate 325 milliGRAM(s) Oral daily  furosemide    Tablet 40 milliGRAM(s) Oral two times a day  glucagon  Injectable 1 milliGRAM(s) IntraMuscular once  guaiFENesin  milliGRAM(s) Oral every 12 hours  influenza   Vaccine 0.5 milliLiter(s) IntraMuscular once  insulin glargine Injectable (LANTUS) 5 Unit(s) SubCutaneous at bedtime  insulin lispro (ADMELOG) corrective regimen sliding scale   SubCutaneous three times a day before meals  insulin lispro (ADMELOG) corrective regimen sliding scale   SubCutaneous at bedtime  metoprolol succinate ER 12.5 milliGRAM(s) Oral daily  pantoprazole    Tablet 40 milliGRAM(s) Oral before breakfast    MEDICATIONS  (PRN):  acetaminophen   Tablet .. 650 milliGRAM(s) Oral every 6 hours PRN Temp greater or equal to 38.5C (101.3F), Mild Pain (1 - 3)  aluminum hydroxide/magnesium hydroxide/simethicone Suspension 30 milliLiter(s) Oral every 4 hours PRN Dyspepsia  atropine Injectable 0.5 milliGRAM(s) IntraMuscular once PRN Bradycardia HR<50 AND SBP<95  melatonin 3 milliGRAM(s) Oral at bedtime PRN Insomnia  ondansetron Injectable 4 milliGRAM(s) IV Push every 8 hours PRN Nausea and/or Vomiting  polyethylene glycol 3350 17 Gram(s) Oral daily PRN Constipation        T(C): , Max: 36.1 (09-25-21 @ 15:42)  T(F): , Max: 97 (09-25-21 @ 15:42)  HR: 80 (09-26-21 @ 10:00)  BP: 150/57 (09-26-21 @ 10:00)  BP(mean): 80 (09-26-21 @ 10:00)  RR: 24 (09-26-21 @ 10:00)  SpO2: 91% (09-26-21 @ 10:00)  Wt(kg): --    09-25 @ 07:01  -  09-26 @ 07:00  --------------------------------------------------------  IN: 1200 mL / OUT: 2400 mL / NET: -1200 mL    09-26 @ 07:01  -  09-26 @ 11:44  --------------------------------------------------------  IN: 320 mL / OUT: 0 mL / NET: 320 mL          PHYSICAL EXAM:    Constitutional: NAD,frial  HEENT: dry MM  Neck: No LAD, No JVD  Respiratory: dist   Cardiovascular: S1 and S2  Extremities: chronic peripheral edema  Neurological: ANTIONETTE/CHELSIE liao          LABS:                        9.6    9.61  )-----------( 244      ( 26 Sep 2021 06:21 )             31.0     26 Sep 2021 06:21    143    |  105    |  107    ----------------------------<  140    3.7     |  31     |  3.44   25 Sep 2021 06:59    142    |  103    |  111    ----------------------------<  119    3.8     |  30     |  3.53   24 Sep 2021 06:49    142    |  105    |  106    ----------------------------<  143    3.9     |  29     |  3.91   23 Sep 2021 04:55    146    |  110    |  119    ----------------------------<  122    4.2     |  26     |  4.60     Ca    8.8        26 Sep 2021 06:21  Ca    8.7        25 Sep 2021 06:59  Ca    8.5        24 Sep 2021 06:49  Ca    8.4        23 Sep 2021 04:55  Phos  6.2       24 Sep 2021 06:49  Phos  5.9       23 Sep 2021 04:55    TPro  7.1    /  Alb  2.5    /  TBili  0.3    /  DBili  x      /  AST  10     /  ALT  9      /  AlkPhos  115    24 Sep 2021 06:49  TPro  6.7    /  Alb  2.6    /  TBili  0.3    /  DBili  x      /  AST  10     /  ALT  14     /  AlkPhos  102    23 Sep 2021 04:55          Urine Studies:          RADIOLOGY & ADDITIONAL STUDIES:

## 2021-09-26 NOTE — PROGRESS NOTE ADULT - SUBJECTIVE AND OBJECTIVE BOX
Patient is a 89y old  Male who presents with a chief complaint of Complete Heart Block/CLIFFORD/Weakness (25 Sep 2021 11:24)    Creatinine improving. No complaints this AM. Telemetry pacing.  Blood cultures no growth yet.      MEDICATIONS  (STANDING):  allopurinol 100 milliGRAM(s) Oral daily  amLODIPine   Tablet 5 milliGRAM(s) Oral daily  atorvastatin 40 milliGRAM(s) Oral at bedtime  cefTRIAXone Injectable. 2000 milliGRAM(s) IV Push every 24 hours  dextrose 40% Gel 15 Gram(s) Oral once  dextrose 5%. 1000 milliLiter(s) (50 mL/Hr) IV Continuous <Continuous>  dextrose 5%. 1000 milliLiter(s) (100 mL/Hr) IV Continuous <Continuous>  dextrose 50% Injectable 25 Gram(s) IV Push once  dextrose 50% Injectable 12.5 Gram(s) IV Push once  dextrose 50% Injectable 25 Gram(s) IV Push once  ferrous    sulfate 325 milliGRAM(s) Oral daily  furosemide   Injectable 40 milliGRAM(s) IV Push two times a day  glucagon  Injectable 1 milliGRAM(s) IntraMuscular once  guaiFENesin  milliGRAM(s) Oral every 12 hours  influenza   Vaccine 0.5 milliLiter(s) IntraMuscular once  insulin glargine Injectable (LANTUS) 5 Unit(s) SubCutaneous at bedtime  insulin lispro (ADMELOG) corrective regimen sliding scale   SubCutaneous three times a day before meals  insulin lispro (ADMELOG) corrective regimen sliding scale   SubCutaneous at bedtime  metoprolol succinate ER 12.5 milliGRAM(s) Oral daily  pantoprazole    Tablet 40 milliGRAM(s) Oral before breakfast    MEDICATIONS  (PRN):  acetaminophen   Tablet .. 650 milliGRAM(s) Oral every 6 hours PRN Temp greater or equal to 38.5C (101.3F), Mild Pain (1 - 3)  aluminum hydroxide/magnesium hydroxide/simethicone Suspension 30 milliLiter(s) Oral every 4 hours PRN Dyspepsia  atropine Injectable 0.5 milliGRAM(s) IntraMuscular once PRN Bradycardia HR<50 AND SBP<95  melatonin 3 milliGRAM(s) Oral at bedtime PRN Insomnia  ondansetron Injectable 4 milliGRAM(s) IV Push every 8 hours PRN Nausea and/or Vomiting  polyethylene glycol 3350 17 Gram(s) Oral daily PRN Constipation        REVIEW OF SYSTEMS:    CONSTITUTIONAL: No  fevers or chills, lightheadedness/dizziness  EYES/ENT: No visual changes;   NECK: No pain or stiffness  RESPIRATORY: No cough, wheezing, hemoptysis; No shortness of breath  CARDIOVASCULAR: No chest pain or palpitations  GASTROINTESTINAL: No abdominal or epigastric pain. No nausea, vomiting,   GENITOURINARY: No dysuria, frequency or hematuria  NEUROLOGICAL: No numbness or weakness  SKIN: No itching, rashes    Vital Signs Last 24 Hrs  T(C): 36.1 (26 Sep 2021 06:00), Max: 36.7 (25 Sep 2021 09:00)  T(F): 97 (26 Sep 2021 06:00), Max: 98 (25 Sep 2021 09:00)  HR: 68 (26 Sep 2021 08:00) (60 - 77)  BP: 173/56 (26 Sep 2021 08:00) (128/52 - 173/56)  BP(mean): 87 (26 Sep 2021 08:00) (72 - 109)  RR: 23 (26 Sep 2021 08:00) (12 - 26)  SpO2: 91% (26 Sep 2021 08:00) (91% - 98%)      I&O's Summary    25 Sep 2021 07:01  -  26 Sep 2021 07:00  --------------------------------------------------------  IN: 1200 mL / OUT: 2400 mL / NET: -1200 mL          PHYSICAL EXAM:  GENERAL: NAD,thin  HEENT - NC/AT, ; Moist mucous membranes,NECK: Supple, No JVD  CHEST/LUNG: Clear to auscultation bilaterally; No rales, rhonchi, wheezing  HEART: Regular rate and rhythm; 2/6 JACKELIN, rubs, or gallops  ABDOMEN: Soft, Nontender, Nondistended; Bowel sounds present  EXTREMITIES:  2+ Peripheral Pulses, No clubbing, cyanosis, or edema, groin no significant hematoma, +hernia  NEURO:  No Focal deficits, sensory and motor intact                                  9.6    9.61  )-----------( 244      ( 26 Sep 2021 06:21 )             31.0     09-26    143  |  105  |  107<H>  ----------------------------<  140<H>  3.7   |  31  |  3.44<H>    Ca    8.8      26 Sep 2021 06:21          Culture - Blood (collected 24 Sep 2021 06:49)  Source: .Blood None  Preliminary Report (25 Sep 2021 13:02):    No growth to date.    Culture - Blood (collected 24 Sep 2021 06:44)  Source: .Blood None  Preliminary Report (25 Sep 2021 13:02):    No growth to date.    Culture - Blood (collected 23 Sep 2021 14:30)  Source: .Blood Blood aerobic  Preliminary Report (24 Sep 2021 19:01):    No growth to date.    Culture - Blood (collected 23 Sep 2021 14:30)  Source: .Blood None  Preliminary Report (24 Sep 2021 19:01):    No growth to date.

## 2021-09-26 NOTE — PROGRESS NOTE ADULT - ASSESSMENT
89M with PMH Aortic Stenosis CAD s/p PCI, CKD IV, DM HTN, HLD, Fe deficiency anemia presents from [facility] with Generalized weakness that he woke up with overnight. Associated GARCIA and fatigue, Denies fever, chills, near syncope, dizziness, chest pain, nausea, vomiting, abdominal pain/discomfort.     CKD IV  -Stable function  cont to monitor renal function / UO, stable on bid lasix  bladder scan PRN      CVS  s/p PPM  s/p CHB  MV endocarditis, severe AS/ MR    ANemia  -Was on outpt jono, may need resumption    dc with RN staff bedside

## 2021-09-26 NOTE — PROGRESS NOTE ADULT - ASSESSMENT
IMPRESSION:  1. CHB. S/P Micra RV PPM. Pacing.  2. Aortic stenosis,severe.  3. Congestive heart failure due to severe bradycardia plus AS and MR   4. 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 On Rocephin. Blood cultures no growth. ID consult noted.   5. Hypertension - on amlodipine  6. CKD nephrology consult noted. Serum potassium stable. Creatinine improving.  7. CAD, stable.    PLAN:  On furosemide to 40 mg bid, metoprolol. On ceftriaxone.  Await Blood cultures.

## 2021-09-26 NOTE — PROGRESS NOTE ADULT - SUBJECTIVE AND OBJECTIVE BOX
cc - dyspnea, cough    HPI:  89M with PMH Aortic Stenosis CAD s/p PCI, CKD IV, DM HTN, HLD, Fe deficiency anemia presents from [facility] with Generalized weakness that he woke up with overnight. Associated GARCIA and fatigue, Denies fever, chills, near syncope, dizziness, chest pain, nausea, vomiting, abdominal pain/discomfort. Denies any recent weight gain/loss.    In th ED. EKG with Complete Heart Block. EP called and recommended admission to CCU for PPM placement. Cr 4.89 (Baseline 3.30-3.45), WBC 13.01, Hgb (8/.5 (20 Sep 2021 14:37)    Medical progress: Patient denies any HA, CP, SOB. No overnight events.   Complaints: no new complaints  State of mind: maintains slow and normal conversation  call amde out to patient's son -  no answer. Plan is in the am - to discuss care with ID  - for the length of antibiotics.     Review of system  - Rest of the review of system are negative except mentioned in HPI    PHYSICAL EXAM:  ICU Vital Signs Last 24 Hrs  T(C): 36.1 (26 Sep 2021 06:00), Max: 36.1 (25 Sep 2021 15:42)  T(F): 97 (26 Sep 2021 06:00), Max: 97 (25 Sep 2021 15:42)  HR: 80 (26 Sep 2021 10:00) (60 - 80)  BP: 150/57 (26 Sep 2021 10:00) (128/52 - 173/56)  BP(mean): 80 (26 Sep 2021 10:00) (72 - 109)  RR: 24 (26 Sep 2021 10:00) (13 - 26)  SpO2: 91% (26 Sep 2021 10:00) (91% - 98%)  Constitutional:  frail, sick, deconditioned, frail  HEENT: PERRLA, EOMI, Normal Hearing  Neck: No LAD, No JVD  Back: Normal spine flexure, No CVA tenderness  Cardiovascular: bradycardic, +systolic murmur  Respiratory: b/l rales   Gastrointestinal: BS+, soft, NT/ND  Extremities: + peripheral edema  Vascular: 2+ peripheral pulses  Neurological: A/O x 3, no focal deficits  Psychiatric: Normal mood, normal affect  Musculoskeletal: 5/5 strength b/l upper and lower extremities  Skin: No rashes    Labs    CBC Full  -  ( 26 Sep 2021 06:21 )  WBC Count : 9.61 K/uL  RBC Count : 3.20 M/uL  Hemoglobin : 9.6 g/dL  Hematocrit : 31.0 %  Platelet Count - Automated : 244 K/uL  Mean Cell Volume : 96.9 fl  Mean Cell Hemoglobin : 30.0 pg  Mean Cell Hemoglobin Concentration : 31.0 gm/dL  Auto Neutrophil # : x  Auto Lymphocyte # : x  Auto Monocyte # : x  Auto Eosinophil # : x  Auto Basophil # : x  Auto Neutrophil % : x  Auto Lymphocyte % : x  Auto Monocyte % : x  Auto Eosinophil % : x  Auto Basophil % : x        09-26    143  |  105  |  107<H>  ----------------------------<  140<H>  3.7   |  31  |  3.44<H>    Ca    8.8      26 Sep 2021 06:21        Pt is a 89M with PMH Aortic Stenosis CAD s/p PCI, CKD IV, DM HTN, HLD, Fe deficiency anemia presents from [facility] with Generalized weakness that he woke up with overnight. Associated GARCIA and fatigue,    # Subacute bacterial endocarditis   - leukocytosis - resolved  - blood cultures 9/23 -  no growth  - on ceftriaxone 2 gm IV qd #4    # Complete Heart Block   - s/p Micra PPM placement on 9/22     # Acute on chronic Diastolic Heart Failure   # Valvular heart disease  - Severe Aortic Stenosis and Moderate Mitral Regurgitation  # Vegetations on echo suspected endocarditis   # Valvular heart disease  - CXR  with vascular congestion on admission, BNP ~22K, Troponin negative x 2  - c/w  lasix to IV 40mg daily--> 40 BID  , Strict I/Os, Daily Weight   - 2 d echo - echodense mass on mitral valve , EF 55%, + 3 MR ,  lipid profile  LDL 49   - CT chest -  mild bronchiectasis, mucoid impaction, small bilateral pleural effusions, atelectasis  - blood cx x2 , lactate, ID consult for abx management  - s/p ceftriaxone in ED, will continue ceftriaxone   procalcitonin ordered    # CLIFFORD on CKD IV  - Baseline Cr 3.4-3.5. Cr on admission, now getting better  - Strict I/Os, Daily Weights  - Avoid Nephrotoxic agents  - US kidney to r/o obstruction, monitor urine output    # Macrocytic anemia   - iron studies, B12, folate noted monitor while on prophylactic heparin    # DM with Hyperglycemia with A1C 7.5  - Glucose checks AC HS. Lantus 5 with correctional insulin. Titrate accordingly    # Hypocalcemia   -  vitamin D wnl    # DVT Prophylaxis with Heparin subq    Goals of Care (GOC)/Advance Care Planning (ACP)  Date of Discussion/evaluation: 9/20/2021  Purpose of Discussion: In the setting of advanced age and multiple comorbidities, discussion of patient's wished should there be any deterioration in health status.   Parties Present/Discussed with: Patient and son  Patient's Decision making capacity at the time of discussion: Patient AAOx4 and has full decision making capacity  Presentation: As above  GOC: Code Status, HCP, Alternative Feeding Methods, Blood product Transfusions    PLAN:  Code Status: Full Code  HCP:  SonManuel  Alternative Feeding methods: Would like to discuss or assess should the situation arise that it requires alternative feeding methods

## 2021-09-27 NOTE — CONSULT NOTE ADULT - SUBJECTIVE AND OBJECTIVE BOX
HPI: Pt is a 89y old Male with hx of Aortic Stenosis CAD s/p PCI, CKD IV, DM HTN, HLD, Fe deficiency anemia presents from [facility] with Generalized weakness that he woke up with overnight. EKG with Complete Heart Block. EP called and recommended admission to CCU for PPM placement. Palliative medicine consulted to help establish GOC and advance care planning     9/27/2021 patient seen and examined with no family at bedside, patient denies any complaints at this time. Patient alert and oriented and stating that he wants to go to home. was agreeable to me reaching out to his son to schedule a GOC meeting    PAIN: ( )Yes   (x )No  DYSPNEA: ( ) Yes  ( x) No    PAST MEDICAL & SURGICAL HISTORY:  Hypertension  HLD (hyperlipidemia)  Gout  Prostate cancer  Anemia  Chronic kidney disease (CKD)  CAD (coronary artery disease)  DM type 2 (diabetes mellitus, type 2)  Glaucoma  Aortic stenosis  Osteoporosis  Leg edema  H/O inguinal hernia repair  History of tonsillectomy childhood  History of colonoscopy    SOCIAL HX: lives at [facility] living     Hx opiate tolerance ( )YES  (x )NO    Baseline ADLs  (Prior to Admission)  (x) Independent   ( )Dependent    FAMILY HISTORY:  Family history of CVA  Family hx of lung cancer    Review of Systems:    Anxiety- no   Depression- no   Physical Discomfort- no   Dyspnea- no   Constipation- no   Diarrhea- no   Nausea- no   Vomiting- no   Anorexia- will eat it if the food is good   Weight Loss- maybe   Cough- no   Secretions- no   Fatigue- mild   Weakness- mild  Delirium- no     All other systems reviewed and negative    PHYSICAL EXAM:    Vital Signs Last 24 Hrs  T(C): 35.8 (27 Sep 2021 06:00), Max: 36.1 (26 Sep 2021 16:58)  T(F): 96.5 (27 Sep 2021 06:00), Max: 97 (26 Sep 2021 16:58)  HR: 75 (27 Sep 2021 13:27) (61 - 83)  BP: 144/60 (27 Sep 2021 13:27) (140/76 - 169/65)  BP(mean): 79 (27 Sep 2021 12:00) (79 - 110)  RR: 24 (27 Sep 2021 13:27) (11 - 27)  SpO2: 95% (27 Sep 2021 13:27) (91% - 95%)    PPSV2:  50 %    General: elderly pleasant gentleman in chair, NAD   Mental Status: alert and oriented   HEENT: Solomon at times   Lungs: diminished breath sounds   Cardiac: S1S2 +  GI: nontender, nondistended, +BS   : non tender, +voiding   Ext: no edema   Neuro: non focal     LABS:                        9.6    9.93  )-----------( 270      ( 27 Sep 2021 07:16 )             30.5     09-27    145  |  105  |  104<H>  ----------------------------<  147<H>  4.2   |  34<H>  |  3.51<H>    Ca    9.0      27 Sep 2021 07:16    Albumin: Albumin, Serum: 2.5 g/dL (09-24 @ 06:49)    Allergies    ACE inhibitors (Unknown)  aspirin (Unknown)  enalapril (Unknown)    Intolerances    MEDICATIONS  (STANDING):  allopurinol 100 milliGRAM(s) Oral daily  amLODIPine   Tablet 5 milliGRAM(s) Oral daily  atorvastatin 40 milliGRAM(s) Oral at bedtime  cefTRIAXone Injectable. 2000 milliGRAM(s) IV Push every 24 hours  dextrose 40% Gel 15 Gram(s) Oral once  dextrose 5%. 1000 milliLiter(s) (50 mL/Hr) IV Continuous <Continuous>  dextrose 5%. 1000 milliLiter(s) (100 mL/Hr) IV Continuous <Continuous>  dextrose 50% Injectable 25 Gram(s) IV Push once  dextrose 50% Injectable 12.5 Gram(s) IV Push once  dextrose 50% Injectable 25 Gram(s) IV Push once  ferrous    sulfate 325 milliGRAM(s) Oral daily  furosemide    Tablet 40 milliGRAM(s) Oral two times a day  glucagon  Injectable 1 milliGRAM(s) IntraMuscular once  guaiFENesin  milliGRAM(s) Oral every 12 hours  influenza   Vaccine 0.5 milliLiter(s) IntraMuscular once  insulin glargine Injectable (LANTUS) 5 Unit(s) SubCutaneous at bedtime  insulin lispro (ADMELOG) corrective regimen sliding scale   SubCutaneous three times a day before meals  insulin lispro (ADMELOG) corrective regimen sliding scale   SubCutaneous at bedtime  metoprolol succinate ER 12.5 milliGRAM(s) Oral daily  pantoprazole    Tablet 40 milliGRAM(s) Oral before breakfast    MEDICATIONS  (PRN):  acetaminophen   Tablet .. 650 milliGRAM(s) Oral every 6 hours PRN Temp greater or equal to 38.5C (101.3F), Mild Pain (1 - 3)  aluminum hydroxide/magnesium hydroxide/simethicone Suspension 30 milliLiter(s) Oral every 4 hours PRN Dyspepsia  atropine Injectable 0.5 milliGRAM(s) IntraMuscular once PRN Bradycardia HR<50 AND SBP<95  melatonin 3 milliGRAM(s) Oral at bedtime PRN Insomnia  ondansetron Injectable 4 milliGRAM(s) IV Push every 8 hours PRN Nausea and/or Vomiting  polyethylene glycol 3350 17 Gram(s) Oral daily PRN Constipation      RADIOLOGY/ADDITIONAL STUDIES:    EXAM:  US KIDNEYS AND BLADDER                        PROCEDURE DATE:  09/23/2021    INTERPRETATION:  CLINICAL INFORMATION: Acute kidney injury, rule out obstruction  COMPARISON: CT abdomen dated 12/18/2019.  TECHNIQUE: Sonography of the kidneys and bladder.  FINDINGS:  Bilateral increased renal cortical echogenicity.  Right kidney: 8.1 cm. No hydronephrosis. Multiple cysts measuring up to 3.3 cm. There is a 2.4 cm cyst with a thin septation in the interpolar  region.  Left kidney: 9.1 cm. No hydronephrosis. Multiple cysts measuring up to 6.2 cm in the lower pole.  Urinary bladder: Underdistended. Right ureteral jet is visualized.    IMPRESSION:  Bilateral increased renal cortical echogenicity compatible with medical renal disease.  Bilateral renal cysts, as described above.    EXAM:  CT CHEST                        PROCEDURE DATE:  09/23/2021    INTERPRETATION:  CLINICAL INFORMATION: Cough and leukocytosis  COMPARISON: Chest x-ray one day prior, chest CT 12/18/2019  CONTRAST/COMPLICATIONS:  IV Contrast: NONE  Oral Contrast: NONE  Complications: None reported at time of study completion  PROCEDURE:  CT of the Chest was performed.  Sagittal and coronal reformats were performed.    FINDINGS:  LUNGS AND AIRWAYS: The central airways are patent. Again seen are stable areas of mild peripheral bronchiectasis with branching tubular opacities and tree-in-bud opacities representing chronic mucoid impaction. Bibasilar atelectasis is noted.  PLEURA: Small bilateral pleural effusions.  VESSELS: Aortic atherosclerosis without aneurysm.  HEART: Micra pacemaker. No cardiomegaly. No pericardial effusion. Coronary, mitral annular, and aortic valve calcification.  MEDIASTINUM AND STACY: No adenopathy.  CHEST WALL AND LOWER NECK: No masses.  VISUALIZED UPPER ABDOMEN: Moderate hiatal hernia.  BONES: No acute bony abnormality.    IMPRESSION:  *  Chronic lung changes of mild bronchiectasis and mucoid impaction as before.  *  Small bilateral pleural effusions and bibasilar atelectasis.    EXAM:  XR CHEST PORTABLE URGENT 1V                        PROCEDURE DATE:  09/22/2021    INTERPRETATION:  Portable chest radiograph  CLINICAL INFORMATION: Line placement  TECHNIQUE:  Portable  AP view of the chest.  COMPARISON: 9/20/2021 chest available for review.  FINDINGS:  The lungs show pulmonary vascular congestion and mild perihilar diffuse airspace disease.. No pneumothorax.  The  heart is mildly enlarged in transverse diameter. No hilar mass.  Micra Transcatheter Pacing System cardiac  device within RIGHT ventricle.  Visualized osseous structures are intact.    IMPRESSION:   Mild coronary megaly.  Micra Transcatheter Pacing System cardiac  device within RIGHT ventricle. .  Perihilar mild diffuse airspace disease..      EXAM:  XR CHEST PORTABLE URGENT 1V                        PROCEDURE DATE:  09/20/2021    INTERPRETATION:  XR CHEST URGENT  Single AP view  HISTORY:  weakness  Comparison: Chest x-ray 4/9/2021  The cardiac silhouette is within normal limits. Patchy bilateral airspace disease. No pleural abnormality.  IMPRESSION: Pulmonary edema versus bilateral patchy airspace

## 2021-09-27 NOTE — CONSULT NOTE ADULT - REASON FOR ADMISSION
Complete Heart Block/CLIFFORD/Weakness

## 2021-09-27 NOTE — PROGRESS NOTE ADULT - SUBJECTIVE AND OBJECTIVE BOX
Date of service: 09-27-21 @ 15:22    Lying in bed in NAD  Weak looking  No fever reported  Denies CP    ROS: no fever or chills; denies dizziness, no HA, no SOB or cough, no abdominal pain, no diarrhea or constipation; no dysuria, no legs pain, no rashes    MEDICATIONS  (STANDING):  allopurinol 100 milliGRAM(s) Oral daily  amLODIPine   Tablet 5 milliGRAM(s) Oral daily  atorvastatin 40 milliGRAM(s) Oral at bedtime  cefTRIAXone Injectable. 2000 milliGRAM(s) IV Push every 24 hours  dextrose 40% Gel 15 Gram(s) Oral once  dextrose 5%. 1000 milliLiter(s) (50 mL/Hr) IV Continuous <Continuous>  dextrose 5%. 1000 milliLiter(s) (100 mL/Hr) IV Continuous <Continuous>  dextrose 50% Injectable 25 Gram(s) IV Push once  dextrose 50% Injectable 12.5 Gram(s) IV Push once  dextrose 50% Injectable 25 Gram(s) IV Push once  ferrous    sulfate 325 milliGRAM(s) Oral daily  furosemide    Tablet 40 milliGRAM(s) Oral two times a day  glucagon  Injectable 1 milliGRAM(s) IntraMuscular once  guaiFENesin  milliGRAM(s) Oral every 12 hours  influenza   Vaccine 0.5 milliLiter(s) IntraMuscular once  insulin glargine Injectable (LANTUS) 5 Unit(s) SubCutaneous at bedtime  insulin lispro (ADMELOG) corrective regimen sliding scale   SubCutaneous three times a day before meals  insulin lispro (ADMELOG) corrective regimen sliding scale   SubCutaneous at bedtime  metoprolol succinate ER 12.5 milliGRAM(s) Oral daily  pantoprazole    Tablet 40 milliGRAM(s) Oral before breakfast    Vital Signs Last 24 Hrs  T(C): 35.8 (27 Sep 2021 06:00), Max: 36.1 (26 Sep 2021 16:58)  T(F): 96.5 (27 Sep 2021 06:00), Max: 97 (26 Sep 2021 16:58)  HR: 75 (27 Sep 2021 13:27) (61 - 83)  BP: 144/60 (27 Sep 2021 13:27) (140/76 - 169/65)  BP(mean): 79 (27 Sep 2021 12:00) (79 - 110)  RR: 24 (27 Sep 2021 13:27) (11 - 27)  SpO2: 95% (27 Sep 2021 13:27) (91% - 95%)     Physical exam:    Constitutional:  No acute distress  HEENT: NC/AT, EOMI, PERRLA, conjunctivae clear; ears and nose atraumatic  Neck: supple; thyroid not palpable  Back: no tenderness  Respiratory: respiratory effort normal; clear to auscultation  Cardiovascular: S1S2 regular, no murmurs  Abdomen: soft, not tender, not distended, positive BS  Genitourinary: no suprapubic tenderness  Lymphatic: no LN palpable  Musculoskeletal: no muscle tenderness, no joint swelling or tenderness  Extremities: no pedal edema  Neurological/ Psychiatric: Alert, moving all extremities  Skin: no rashes; no palpable lesions    Labs: reviewed                        9.6    9.93  )-----------( 270      ( 27 Sep 2021 07:16 )             30.5     09-27    145  |  105  |  104<H>  ----------------------------<  147<H>  4.2   |  34<H>  |  3.51<H>    Ca    9.0      27 Sep 2021 07:16    C-Reactive Protein, Serum: 116 mg/L (09-24-21 @ 06:49)  Ferritin, Serum: 149 ng/mL (09-23-21 @ 04:55)  C-Reactive Protein, Serum: 137 mg/L (09-23-21 @ 04:55)  Ferritin, Serum: 150 ng/mL (09-22-21 @ 18:29)                                  9.2    10.46 )-----------( 220      ( 23 Sep 2021 04:55 )             30.4     09-23    146<H>  |  110<H>  |  119<H>  ----------------------------<  122<H>  4.2   |  26  |  4.60<H>    Ca    8.4<L>      23 Sep 2021 04:55  Phos  5.9     09-23  Mg     3.0     09-22    TPro  6.7  /  Alb  2.6<L>  /  TBili  0.3  /  DBili  x   /  AST  10<L>  /  ALT  14  /  AlkPhos  102  09-23     LIVER FUNCTIONS - ( 23 Sep 2021 04:55 )  Alb: 2.6 g/dL / Pro: 6.7 gm/dL / ALK PHOS: 102 U/L / ALT: 14 U/L / AST: 10 U/L / GGT: x             Culture Results:   Babesia microti PCR  Results: NOT detected    COVID-19 PCR: NotDetec (09-20-21 @ 12:08)      Culture - Blood (collected 24 Sep 2021 06:49)  Source: .Blood None  Preliminary Report (25 Sep 2021 13:02):    No growth to date.    Culture - Blood (collected 24 Sep 2021 06:44)  Source: .Blood None  Preliminary Report (25 Sep 2021 13:02):    No growth to date.    Culture - Blood (collected 23 Sep 2021 14:30)  Source: .Blood Blood aerobic  Preliminary Report (24 Sep 2021 19:01):    No growth to date.    Culture - Blood (collected 23 Sep 2021 14:30)  Source: .Blood None  Preliminary Report (24 Sep 2021 19:01):    No growth to date.    Culture - Blood (collected 22 Sep 2021 18:29)  Source: .Blood None  Preliminary Report (24 Sep 2021 01:01):    No growth to date.    Culture - Blood (collected 22 Sep 2021 18:29)  Source: .Blood None  Preliminary Report (24 Sep 2021 01:01):    No growth to date.    Babesia microti PCR, Blood. (collected 22 Sep 2021 13:00)  Source: .Blood None  Final Report (23 Sep 2021 00:23):    Babesia microti PCR    Results: NOT detected  Lyme C6 Interpretation: Negative  Radiology: all available radiological tests reviewed    < from: US Kidney and Bladder (09.23.21 @ 11:09) >  Bilateral increased renal cortical echogenicity compatible with medical renal disease.  Bilateral renal cysts, as described above.  < end of copied text >    < from: CT Chest No Cont (09.23.21 @ 08:07) >  *  Chronic lung changes of mild bronchiectasis and mucoid impaction as before.  *  Small bilateral pleural effusions and bibasilar atelectasis.  < end of copied text >    < from: TTE Echo Complete w/o Contrast w/ Doppler (09.22.21 @ 13:38) >   Mitral Valve   A moderate sized mobile echodense mass on the atrial aspect of the   anterior mitral leaflet is seen, Appearing more pronounced than prior   study.   A mitral valvular vegetation cannot be excluded.   The leaflet opening appears mildly restricted.   Moderate to severe (3+) mitral regurgitation is present.   Left Ventricle   Limited study to assess left ventricular function.   Mild concentric leftventricular hypertrophy is present.   Estimated left ventricular ejection fraction is 55 %.  < end of copied text >      Advanced directives addressed: full resuscitation

## 2021-09-27 NOTE — PROGRESS NOTE ADULT - ASSESSMENT
1. CHB. S/P Micra RV PPM. Pacing.  2. Aortic stenosis,severe.  3. Congestive heart failure due to severe bradycardia plus AS and MR   4. 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 On Rocephin. Blood cultures no growth. ID consult noted.   5. Hypertension - on amlodipine  6. CKD nephrology consult noted. Serum potassium stable. Creatinine improving.  7. CAD, stable.    PLAN:  On furosemide to 40 mg bid, metoprolol. On ceftriaxone.  Await Blood cultures.

## 2021-09-27 NOTE — CONSULT NOTE ADULT - ASSESSMENT
Pt is a 89y old Male with hx of Aortic Stenosis CAD s/p PCI, CKD IV, DM HTN, HLD, Fe deficiency anemia presents from [facility] with Generalized weakness that he woke up with overnight. EKG with Complete Heart Block. EP called and recommended admission to CCU for PPM placement. Palliative medicine consulted to help establish GOC and advance care planning.      Pt is a 89y old Male with hx of Aortic Stenosis CAD s/p PCI, CKD IV, DM HTN, HLD, Fe deficiency anemia presents from [facility] with Generalized weakness that he woke up with overnight. EKG with Complete Heart Block. EP called and recommended admission to CCU for PPM placement. Palliative medicine consulted to help establish GOC and advance care planning.     1) Subacute bacterial endocarditis   - monitor labs   - blood cultures 9/23 -  no growth  - c/w ceftriaxone   - plan for midline for long term antibiotic as per ID - will need 4 weeks of antibiotics    2) Complete Heart Block   - s/p Micra PPM placement on 9/22   - EP consult appreciated   - Vegetations on echo suspected endocarditis     3) Acute on chronic Diastolic Heart Failure   - Valvular heart disease  - Severe Aortic Stenosis and Moderate Mitral Regurgitation  - c/w  Lasix   - Strict I/Os  - Daily Weight   - cardiology consult appreciated   - CT chest - Chronic lung changes of mild bronchiectasis and mucoid impaction as before. Small bilateral pleural effusions and bibasilar atelectasis     4)  CLIFFORD on CKD IV  - Baseline Cr 3.4-3.5. Cr on admission  - Strict I/Os  - Avoid Nephrotoxic agents  - US kidney to r/o obstruction, monitor urine output  - continue to monitor labs   -  nephrology consult appreciated     5) Advance care planning   - patient appears to have capacity, unable to fully describe medical history at this time, states that I should speak with his son Manuel   - Full code will address in GO   - GOC meeting scheduled for tomorrow 9/28/2021 at 12:15 with patients son

## 2021-09-27 NOTE — PROGRESS NOTE ADULT - ASSESSMENT
89M with PMH Aortic Stenosis CAD s/p PCI, CKD IV, DM HTN, HLD, Fe deficiency anemia presents from [facility] with Generalized weakness that he woke up with overnight. Associated GARCIA and fatigue, Denies fever, chills, near syncope, dizziness, chest pain, nausea, vomiting, abdominal pain/discomfort.     CKD IV  -Stable function  cont to monitor renal function / UO, stable on bid lasix  if rise in function/azotemia then lower to qday. Maintain bid for now  bladder scan PRN      CVS  s/p PPM  s/p CHB  MV endocarditis, severe AS/ MR    ANemia  -Was on outpt jono, may need resumption    dc with RN staff

## 2021-09-27 NOTE — PROGRESS NOTE ADULT - SUBJECTIVE AND OBJECTIVE BOX
cc - dyspnea, cough    HPI:  89M with PMH Aortic Stenosis CAD s/p PCI, CKD IV, DM HTN, HLD, Fe deficiency anemia presents from [facility] with Generalized weakness that he woke up with overnight. Associated GARCIA and fatigue, Denies fever, chills, near syncope, dizziness, chest pain, nausea, vomiting, abdominal pain/discomfort. Denies any recent weight gain/loss.    In th ED. EKG with Complete Heart Block. EP called and recommended admission to CCU for PPM placement. Cr 4.89 (Baseline 3.30-3.45), WBC 13.01, Hgb (8/.5 (20 Sep 2021 14:37)    Medical progress: Patient denies any HA, CP, SOB. very comfortable. feels tired. Patient's son updated -  we discussed palliative care as patient has multiple comorbidities.   Complaints: no new complaints  State of mind: maintains slow and normal conversation  Care discussed with ID: patient will require a Midline for 4 weeks of abx therapy    Review of system  - Rest of the review of system are negative except mentioned in HPI    PHYSICAL EXAM:  T(C): 35.8 (27 Sep 2021 06:00), Max: 36.1 (26 Sep 2021 16:58)  T(F): 96.5 (27 Sep 2021 06:00), Max: 97 (26 Sep 2021 16:58)  HR: 68 (27 Sep 2021 06:00) (66 - 83)  BP: 142/64 (27 Sep 2021 06:00) (129/62 - 166/65)  BP(mean): 82 (27 Sep 2021 06:00) (78 - 110)  RR: 25 (27 Sep 2021 06:00) (16 - 25)  SpO2: 93% (27 Sep 2021 00:00) (91% - 95%)  Constitutional:  frail, sick, deconditioned, frail  HEENT: PERRLA, EOMI, Normal Hearing  Neck: No LAD, No JVD  Back: Normal spine flexure, No CVA tenderness  Cardiovascular: bradycardic, +systolic murmur  Respiratory: b/l rales   Gastrointestinal: BS+, soft, NT/ND  Extremities: + peripheral edema  Vascular: 2+ peripheral pulses  Neurological: A/O x 3, no focal deficits  Psychiatric: Normal mood, normal affect  Musculoskeletal: 5/5 strength b/l upper and lower extremities  Skin: No rashes    Labs    CBC Full  -  ( 27 Sep 2021 07:16 )  WBC Count : 9.93 K/uL  RBC Count : 3.18 M/uL  Hemoglobin : 9.6 g/dL  Hematocrit : 30.5 %  Platelet Count - Automated : 270 K/uL  Mean Cell Volume : 95.9 fl  Mean Cell Hemoglobin : 30.2 pg  Mean Cell Hemoglobin Concentration : 31.5 gm/dL  Auto Neutrophil # : x  Auto Lymphocyte # : x  Auto Monocyte # : x  Auto Eosinophil # : x  Auto Basophil # : x  Auto Neutrophil % : x  Auto Lymphocyte % : x  Auto Monocyte % : x  Auto Eosinophil % : x  Auto Basophil % : x        09-27    145  |  105  |  104<H>  ----------------------------<  147<H>  4.2   |  34<H>  |  3.51<H>    Ca    9.0      27 Sep 2021 07:16          Pt is a 89M with PMH Aortic Stenosis CAD s/p PCI, CKD IV, DM HTN, HLD, Fe deficiency anemia presents from [facility] with Generalized weakness that he woke up with overnight. Associated GARCIA and fatigue,    # Subacute bacterial endocarditis   - leukocytosis - resolved  - blood cultures 9/23 -  no growth  - on ceftriaxone 2 gm IV qd #5  - plan is for midline /  will need 4 weeks of antibiotics.     # Complete Heart Block   - s/p Micra PPM placement on 9/22     # Acute on chronic Diastolic Heart Failure   # Valvular heart disease  - Severe Aortic Stenosis and Moderate Mitral Regurgitation  # Vegetations on echo suspected endocarditis   # Valvular heart disease  - CXR  with vascular congestion on admission, BNP ~22K, Troponin negative x 2  - c/w  lasix to IV 40mg daily--> 40 BID  , Strict I/Os, Daily Weight   - 2 d echo - echodense mass on mitral valve , EF 55%, + 3 MR ,  lipid profile  LDL 49   - CT chest -  mild bronchiectasis, mucoid impaction, small bilateral pleural effusions, atelectasis  - blood cx x2 , lactate, ID consult for abx management  - s/p ceftriaxone in ED, will continue ceftriaxone   procalcitonin ordered    # CLIFFORD on CKD IV  - Baseline Cr 3.4-3.5. Cr on admission, now getting better  - Strict I/Os, Daily Weights  - Avoid Nephrotoxic agents  - US kidney to r/o obstruction, monitor urine output    # Macrocytic anemia   - iron studies, B12, folate noted monitor while on prophylactic heparin    # DM with Hyperglycemia with A1C 7.5  - Glucose checks AC HS. Lantus 5 with correctional insulin. Titrate accordingly    # Hypocalcemia   -  vitamin D wnl    # DVT Prophylaxis with Heparin subq    Goals of Care (GOC)/Advance Care Planning (ACP)  Date of Discussion/evaluation: 9/20/2021  Purpose of Discussion: In the setting of advanced age and multiple comorbidities, discussion of patient's wished should there be any deterioration in health status.   Parties Present/Discussed with: Patient and son  Patient's Decision making capacity at the time of discussion: Patient AAOx4 and has full decision making capacity  Presentation: As above  GOC: Code Status, HCP, Alternative Feeding Methods, Blood product Transfusions    PLAN:  Code Status: Full Code  HCP:  SonManuel  Alternative Feeding methods: Would like to discuss or assess should the situation arise that it requires alternative feeding methods

## 2021-09-27 NOTE — PROGRESS NOTE ADULT - SUBJECTIVE AND OBJECTIVE BOX
Patient is a 89y Male who reports no complaints as new. Asleep but arousable    MEDICATIONS  (STANDING):  allopurinol 100 milliGRAM(s) Oral daily  amLODIPine   Tablet 5 milliGRAM(s) Oral daily  atorvastatin 40 milliGRAM(s) Oral at bedtime  cefTRIAXone Injectable. 2000 milliGRAM(s) IV Push every 24 hours  dextrose 40% Gel 15 Gram(s) Oral once  dextrose 5%. 1000 milliLiter(s) (50 mL/Hr) IV Continuous <Continuous>  dextrose 5%. 1000 milliLiter(s) (100 mL/Hr) IV Continuous <Continuous>  dextrose 50% Injectable 25 Gram(s) IV Push once  dextrose 50% Injectable 12.5 Gram(s) IV Push once  dextrose 50% Injectable 25 Gram(s) IV Push once  ferrous    sulfate 325 milliGRAM(s) Oral daily  furosemide    Tablet 40 milliGRAM(s) Oral two times a day  glucagon  Injectable 1 milliGRAM(s) IntraMuscular once  guaiFENesin  milliGRAM(s) Oral every 12 hours  influenza   Vaccine 0.5 milliLiter(s) IntraMuscular once  insulin glargine Injectable (LANTUS) 5 Unit(s) SubCutaneous at bedtime  insulin lispro (ADMELOG) corrective regimen sliding scale   SubCutaneous three times a day before meals  insulin lispro (ADMELOG) corrective regimen sliding scale   SubCutaneous at bedtime  metoprolol succinate ER 12.5 milliGRAM(s) Oral daily  pantoprazole    Tablet 40 milliGRAM(s) Oral before breakfast    MEDICATIONS  (PRN):  acetaminophen   Tablet .. 650 milliGRAM(s) Oral every 6 hours PRN Temp greater or equal to 38.5C (101.3F), Mild Pain (1 - 3)  aluminum hydroxide/magnesium hydroxide/simethicone Suspension 30 milliLiter(s) Oral every 4 hours PRN Dyspepsia  atropine Injectable 0.5 milliGRAM(s) IntraMuscular once PRN Bradycardia HR<50 AND SBP<95  melatonin 3 milliGRAM(s) Oral at bedtime PRN Insomnia  ondansetron Injectable 4 milliGRAM(s) IV Push every 8 hours PRN Nausea and/or Vomiting  polyethylene glycol 3350 17 Gram(s) Oral daily PRN Constipation        T(C): , Max: 36.1 (09-26-21 @ 16:58)  T(F): , Max: 97 (09-26-21 @ 16:58)  HR: 68 (09-27-21 @ 06:00)  BP: 142/64 (09-27-21 @ 06:00)  BP(mean): 82 (09-27-21 @ 06:00)  RR: 25 (09-27-21 @ 06:00)  SpO2: 93% (09-27-21 @ 00:00)  Wt(kg): --    09-26 @ 07:01  -  09-27 @ 07:00  --------------------------------------------------------  IN: 560 mL / OUT: 1800 mL / NET: -1240 mL          PHYSICAL EXAM:    Constitutional: NAD  HEENT: dry MM  dist bs  Cardiovascular: S1 and S2, RRR  Gastrointestinal: BS+, soft, NT/ND  Extremities: chronic peripheral edema  Neurological: Alert  : No Akins  Skin: No rashes  Access: Not applicable        LABS:                        9.6    9.93  )-----------( 270      ( 27 Sep 2021 07:16 )             30.5     27 Sep 2021 07:16    145    |  105    |  104    ----------------------------<  147    4.2     |  34     |  3.51   26 Sep 2021 06:21    143    |  105    |  107    ----------------------------<  140    3.7     |  31     |  3.44   25 Sep 2021 06:59    142    |  103    |  111    ----------------------------<  119    3.8     |  30     |  3.53   24 Sep 2021 06:49    142    |  105    |  106    ----------------------------<  143    3.9     |  29     |  3.91     Ca    9.0        27 Sep 2021 07:16  Ca    8.8        26 Sep 2021 06:21  Ca    8.7        25 Sep 2021 06:59  Ca    8.5        24 Sep 2021 06:49  Phos  6.2       24 Sep 2021 06:49    TPro  7.1    /  Alb  2.5    /  TBili  0.3    /  DBili  x      /  AST  10     /  ALT  9      /  AlkPhos  115    24 Sep 2021 06:49          Urine Studies:          RADIOLOGY & ADDITIONAL STUDIES:

## 2021-09-27 NOTE — PROGRESS NOTE ADULT - SUBJECTIVE AND OBJECTIVE BOX
Patient is a 89y old  Male who presents with a chief complaint of Complete Heart Block/CLIFFORD/Weakness (26 Sep 2021 11:42)      HPI:  89M with PMH Aortic Stenosis CAD s/p PCI, CKD IV, DM HTN, HLD, Fe deficiency anemia presents from [facility] with Generalized weakness that he woke up with overnight. Associated GARCIA and fatigue, Denies fever, chills, near syncope, dizziness, chest pain, nausea, vomiting, abdominal pain/discomfort. Denies any recent weight gain/loss.    In th ED. EKG with Complete Heart Block. EP called and recommended admission to CCU for PPM placement. Cr 4.89 (Baseline 3.30-3.45), WBC 13.01, Hgb (8/.5 (20 Sep 2021 14:37)      9/21 Cardiology. 89 year old male with history of CAD, s/p PCI, CKD and DM admitted with dyspnea for 24 hours. some element of orthopnea as well. No le edema. patietnt typically ambulates with walker at [facility]. No chest pain. On admission found to be in CHB with under;[place] [address]    Followup HPI:    9/22- short of breath at rest. No chest pain.  9/23- S/P Micra RV PPM on 9/22. Feels much less breathless. No chest pain. TTE shows vegetation on the atrial surface of the anterior mitral leaflet.  9/24  alert appears comfortable lying flat, groin site no overt hematoma  9/25 sitting up, only complaint is phlegm  9/27 alert , no cmplaints  PAST MEDICAL & SURGICAL HISTORY:  Hypertension    HLD (hyperlipidemia)    Gout    Prostate cancer    Anemia    Chronic kidney disease (CKD)    CAD (coronary artery disease)    DM type 2 (diabetes mellitus, type 2)    Glaucoma    Aortic stenosis    Osteoporosis    Leg edema    H/O inguinal hernia repair    History of tonsillectomy  childhood    History of colonoscopy          MEDICATIONS  (STANDING):  allopurinol 100 milliGRAM(s) Oral daily  amLODIPine   Tablet 5 milliGRAM(s) Oral daily  atorvastatin 40 milliGRAM(s) Oral at bedtime  cefTRIAXone Injectable. 2000 milliGRAM(s) IV Push every 24 hours  dextrose 40% Gel 15 Gram(s) Oral once  dextrose 5%. 1000 milliLiter(s) (50 mL/Hr) IV Continuous <Continuous>  dextrose 5%. 1000 milliLiter(s) (100 mL/Hr) IV Continuous <Continuous>  dextrose 50% Injectable 25 Gram(s) IV Push once  dextrose 50% Injectable 12.5 Gram(s) IV Push once  dextrose 50% Injectable 25 Gram(s) IV Push once  ferrous    sulfate 325 milliGRAM(s) Oral daily  furosemide    Tablet 40 milliGRAM(s) Oral two times a day  glucagon  Injectable 1 milliGRAM(s) IntraMuscular once  guaiFENesin  milliGRAM(s) Oral every 12 hours  influenza   Vaccine 0.5 milliLiter(s) IntraMuscular once  insulin glargine Injectable (LANTUS) 5 Unit(s) SubCutaneous at bedtime  insulin lispro (ADMELOG) corrective regimen sliding scale   SubCutaneous three times a day before meals  insulin lispro (ADMELOG) corrective regimen sliding scale   SubCutaneous at bedtime  metoprolol succinate ER 12.5 milliGRAM(s) Oral daily  pantoprazole    Tablet 40 milliGRAM(s) Oral before breakfast    MEDICATIONS  (PRN):  acetaminophen   Tablet .. 650 milliGRAM(s) Oral every 6 hours PRN Temp greater or equal to 38.5C (101.3F), Mild Pain (1 - 3)  aluminum hydroxide/magnesium hydroxide/simethicone Suspension 30 milliLiter(s) Oral every 4 hours PRN Dyspepsia  atropine Injectable 0.5 milliGRAM(s) IntraMuscular once PRN Bradycardia HR<50 AND SBP<95  melatonin 3 milliGRAM(s) Oral at bedtime PRN Insomnia  ondansetron Injectable 4 milliGRAM(s) IV Push every 8 hours PRN Nausea and/or Vomiting  polyethylene glycol 3350 17 Gram(s) Oral daily PRN Constipation          Vital Signs Last 24 Hrs  T(C): 35.8 (27 Sep 2021 06:00), Max: 36.1 (26 Sep 2021 09:00)  T(F): 96.5 (27 Sep 2021 06:00), Max: 97 (26 Sep 2021 16:58)  HR: 68 (27 Sep 2021 06:00) (66 - 83)  BP: 142/64 (27 Sep 2021 06:00) (129/62 - 173/56)  BP(mean): 82 (27 Sep 2021 06:00) (78 - 110)  RR: 25 (27 Sep 2021 06:00) (16 - 25)  SpO2: 93% (27 Sep 2021 00:00) (91% - 95%)    I&O's Summary    26 Sep 2021 07:01  -  27 Sep 2021 07:00  --------------------------------------------------------  IN: 560 mL / OUT: 1800 mL / NET: -1240 mL        PHYSICAL EXAM  General Appearance: NAD  HEENT:   Neck:   Back:   Lungs: dec bs but clear  Heart: [id number] 3/6 clark [place], 2/6 m llsb   Abdomen: soft   Extremities: no edema  Skin:   Neurologic:       INTERPRETATION OF TELEMETRY:    ECG:        LABS:                          9.6    9.61  )-----------( 244      ( 26 Sep 2021 06:21 )             31.0     09-26    143  |  105  |  107<H>  ----------------------------<  140<H>  3.7   |  31  |  3.44<H>    Ca    8.8      26 Sep 2021 06:21            Pro BNP  [id number] 09-24 @ 06:49  D Dimer  -- 09-24 @ 06:49  Pro BNP  [id number] 09-23 @ 04:55  D Dimer  -- 09-23 @ 04:55  Pro BNP  [id number] 09-20 @ 12:08  D Dimer  -- 09-20 @ 12:08              RADIOLOGY & ADDITIONAL STUDIES:

## 2021-09-27 NOTE — ADVANCED PRACTICE NURSE CONSULT - ASSESSMENT
Pt awake and alert. Risks and benefits of midline catheter explained, verbal consent obtained. ARROW endurance extended dwell midline catheter 20g, 8cm, lot # [id number] placed in left brachial vein under ultrasound guidance and sterile precautions. Brisk blood return, flushes well with 20ml normal saline, Okay to use.

## 2021-09-27 NOTE — PROGRESS NOTE ADULT - ASSESSMENT
89 y/o Male with h/o Aortic Stenosis, CAD s/p PCI, CKD IV, DM ninoska 2, HTN, HLD, iron deficiency anemia was admitted on 9/20 from [facility] with generalized weakness x one day. He developed associated GARCIA and fatigue, Denied fever, chills at home. In ER he was noted with Complete Heart Block. EP called and recommended admission to CCU for PPM placement. Noted with leukocytosis. He received ceftriaxone IV.     1. Mitral valve vegetation. Severe AS. Possible subacute bacterial endocarditis. Severe bradycardia s/p micra. CRF stage 4. Bronchiectasis.   -leukocytosis resolved  -concern about endocarditis was raised  -serial BC noted - no growth  -doubt Lyme disease  -on ceftriaxone 2 gm IV qd # 5  -tolerating abx well so far; no side effects noted  -f/u cultures  -cultures show no growth so far, but he was noted with MV vegetation suggestive of endocarditis  -would treat empirically with ceftriaxone for 4 weeks  -midline  -continue abx coverage  -monitor temps  -f/u CBC  -supportive care  2. Other issues:   -care per medicine

## 2021-09-28 NOTE — PROGRESS NOTE ADULT - SUBJECTIVE AND OBJECTIVE BOX
Date of service: 09-28-21 @ 11:47    Sitting in bed in NAD  No fever  Weak looking  Ambulatory with help    ROS: no fever or chills; denies dizziness, no HA, no SOB or cough, no abdominal pain, no diarrhea or constipation; no dysuria, no legs pain, no rashes    MEDICATIONS  (STANDING):  allopurinol 100 milliGRAM(s) Oral daily  amLODIPine   Tablet 5 milliGRAM(s) Oral daily  atorvastatin 40 milliGRAM(s) Oral at bedtime  cefTRIAXone Injectable. 2000 milliGRAM(s) IV Push every 24 hours  dextrose 40% Gel 15 Gram(s) Oral once  dextrose 5%. 1000 milliLiter(s) (50 mL/Hr) IV Continuous <Continuous>  dextrose 5%. 1000 milliLiter(s) (100 mL/Hr) IV Continuous <Continuous>  dextrose 50% Injectable 25 Gram(s) IV Push once  dextrose 50% Injectable 12.5 Gram(s) IV Push once  dextrose 50% Injectable 25 Gram(s) IV Push once  ferrous    sulfate 325 milliGRAM(s) Oral daily  furosemide    Tablet 40 milliGRAM(s) Oral two times a day  glucagon  Injectable 1 milliGRAM(s) IntraMuscular once  guaiFENesin  milliGRAM(s) Oral every 12 hours  influenza   Vaccine 0.5 milliLiter(s) IntraMuscular once  insulin glargine Injectable (LANTUS) 5 Unit(s) SubCutaneous at bedtime  insulin lispro (ADMELOG) corrective regimen sliding scale   SubCutaneous three times a day before meals  insulin lispro (ADMELOG) corrective regimen sliding scale   SubCutaneous at bedtime  metoprolol succinate ER 12.5 milliGRAM(s) Oral daily  pantoprazole    Tablet 40 milliGRAM(s) Oral before breakfast    Vital Signs Last 24 Hrs  T(C): 36.9 (28 Sep 2021 08:00), Max: 36.9 (28 Sep 2021 08:00)  T(F): 98.5 (28 Sep 2021 08:00), Max: 98.5 (28 Sep 2021 08:00)  HR: 79 (28 Sep 2021 10:03) (68 - 79)  BP: 144/68 (28 Sep 2021 10:03) (136/67 - 158/66)  BP(mean): 83 (27 Sep 2021 20:00) (79 - 89)  RR: 18 (28 Sep 2021 10:03) (13 - 24)  SpO2: 98% (28 Sep 2021 10:03) (91% - 98%)     Physical exam:    Constitutional:  No acute distress  HEENT: NC/AT, EOMI, PERRLA, conjunctivae clear; ears and nose atraumatic  Neck: supple; thyroid not palpable  Back: no tenderness  Respiratory: respiratory effort normal; clear to auscultation  Cardiovascular: S1S2 regular, no murmurs  Abdomen: soft, not tender, not distended, positive BS  Genitourinary: no suprapubic tenderness  Lymphatic: no LN palpable  Musculoskeletal: no muscle tenderness, no joint swelling or tenderness  Extremities: no pedal edema  Neurological/ Psychiatric: Alert, moving all extremities  Skin: no rashes; no palpable lesions    Labs: reviewed                        9.6    9.93  )-----------( 270      ( 27 Sep 2021 07:16 )             30.5     09-27    145  |  105  |  104<H>  ----------------------------<  147<H>  4.2   |  34<H>  |  3.51<H>    Ca    9.0      27 Sep 2021 07:16    C-Reactive Protein, Serum: 116 mg/L (09-24-21 @ 06:49)  Ferritin, Serum: 149 ng/mL (09-23-21 @ 04:55)  C-Reactive Protein, Serum: 137 mg/L (09-23-21 @ 04:55)  Ferritin, Serum: 150 ng/mL (09-22-21 @ 18:29)                                  9.2    10.46 )-----------( 220      ( 23 Sep 2021 04:55 )             30.4     09-23    146<H>  |  110<H>  |  119<H>  ----------------------------<  122<H>  4.2   |  26  |  4.60<H>    Ca    8.4<L>      23 Sep 2021 04:55  Phos  5.9     09-23  Mg     3.0     09-22    TPro  6.7  /  Alb  2.6<L>  /  TBili  0.3  /  DBili  x   /  AST  10<L>  /  ALT  14  /  AlkPhos  102  09-23     LIVER FUNCTIONS - ( 23 Sep 2021 04:55 )  Alb: 2.6 g/dL / Pro: 6.7 gm/dL / ALK PHOS: 102 U/L / ALT: 14 U/L / AST: 10 U/L / GGT: x             Culture Results:   Babesia microti PCR  Results: NOT detected    COVID-19 PCR: NotDetec (09-20-21 @ 12:08)      Culture - Blood (collected 24 Sep 2021 06:49)  Source: .Blood None  Preliminary Report (25 Sep 2021 13:02):    No growth to date.    Culture - Blood (collected 24 Sep 2021 06:44)  Source: .Blood None  Preliminary Report (25 Sep 2021 13:02):    No growth to date.    Culture - Blood (collected 23 Sep 2021 14:30)  Source: .Blood Blood aerobic  Preliminary Report (24 Sep 2021 19:01):    No growth to date.    Culture - Blood (collected 23 Sep 2021 14:30)  Source: .Blood None  Preliminary Report (24 Sep 2021 19:01):    No growth to date.    Culture - Blood (collected 22 Sep 2021 18:29)  Source: .Blood None  Preliminary Report (24 Sep 2021 01:01):    No growth to date.    Culture - Blood (collected 22 Sep 2021 18:29)  Source: .Blood None  Preliminary Report (24 Sep 2021 01:01):    No growth to date.    Babesia microti PCR, Blood. (collected 22 Sep 2021 13:00)  Source: .Blood None  Final Report (23 Sep 2021 00:23):    Babesia microti PCR    Results: NOT detected  Lyme C6 Interpretation: Negative  Radiology: all available radiological tests reviewed    < from: US Kidney and Bladder (09.23.21 @ 11:09) >  Bilateral increased renal cortical echogenicity compatible with medical renal disease.  Bilateral renal cysts, as described above.  < end of copied text >    < from: CT Chest No Cont (09.23.21 @ 08:07) >  *  Chronic lung changes of mild bronchiectasis and mucoid impaction as before.  *  Small bilateral pleural effusions and bibasilar atelectasis.  < end of copied text >    < from: TTE Echo Complete w/o Contrast w/ Doppler (09.22.21 @ 13:38) >   Mitral Valve   A moderate sized mobile echodense mass on the atrial aspect of the   anterior mitral leaflet is seen, Appearing more pronounced than prior   study.   A mitral valvular vegetation cannot be excluded.   The leaflet opening appears mildly restricted.   Moderate to severe (3+) mitral regurgitation is present.   Left Ventricle   Limited study to assess left ventricular function.   Mild concentric leftventricular hypertrophy is present.   Estimated left ventricular ejection fraction is 55 %.  < end of copied text >      Advanced directives addressed: full resuscitation

## 2021-09-28 NOTE — DISCHARGE NOTE PROVIDER - HOSPITAL COURSE
89M with PMH Aortic Stenosis CAD s/p PCI, CKD IV, DM HTN, HLD, Fe deficiency anemia presents from [facility] with Generalized weakness that he woke up with overnight. Associated GARCIA and fatigue, Denies fever, chills, near syncope, dizziness, chest pain, nausea, vomiting, abdominal pain/discomfort. Denies any recent weight gain/loss.    In th ED. EKG with Complete Heart Block. EP called and recommended admission to CCU for PPM placement. Cr 4.89 (Baseline 3.30-3.45), WBC 13.01, Hgb (8/.5 (20 Sep 2021 14:37)    Medical progress: Patient denies any HA, CP, SOB. very comfortable. feels tired. Palliative care eval with GOC discussions today greatly appreciated.   Complaints: no new complaints  State of mind: maintains slow and normal conversation  Care discussed with ID: patient will require a Midline for 4 weeks of abx therapy      Pt is a 89M with PMH Aortic Stenosis CAD s/p PCI, CKD IV, DM HTN, HLD, Fe deficiency anemia presents from [facility] with Generalized weakness that he woke up with overnight. Associated GARCIA and fatigue,    # Subacute bacterial endocarditis   - on ceftriaxone 2 gm IV qd # 6  - tolerating abx well so far; no side effects noted  - cultures show no growth so far, but he was noted with MV vegetation suggestive of endocarditis  - midline  - ceftriaxone for 4 weeks  - Care discussed with Dr. Lo    # Acute on chronic Diastolic Heart Failure   # Valvular heart disease  - Severe Aortic Stenosis and Moderate Mitral Regurgitation  # Vegetations on echo suspected endocarditis   # Valvular heart disease  - Strict I/Os, Daily Weight   - 2 d echo - echodense mass on mitral valve , EF 55%, + 3 MR ,  lipid profile  LDL 49   - CT chest -  mild bronchiectasis, mucoid impaction, small bilateral pleural effusions, atelectasis    # Complete Heart Block   - s/p Micra PPM placement on 9/22     # CLIFFORD on CKD IV  - Baseline Cr 3.4-3.5. Cr on admission, now getting better  - Strict I/Os, Daily Weights  - Avoid Nephrotoxic agents  - US kidney to r/o obstruction, monitor urine output    # Macrocytic anemia   - iron studies, B12, folate noted monitor while on prophylactic heparin    # DM with Hyperglycemia with A1C 7.5  - Glucose checks AC HS. Lantus 5 with correctional insulin. Titrate accordingly    # Hypocalcemia   -  vitamin D wnl    # DVT Prophylaxis with Heparin subq   89M with PMH Aortic Stenosis CAD s/p PCI, CKD IV, DM HTN, HLD, Fe deficiency anemia presents from [facility] with Generalized weakness that he woke up with overnight. Associated GARCIA and fatigue, Denies fever, chills, near syncope, dizziness, chest pain, nausea, vomiting, abdominal pain/discomfort. Denies any recent weight gain/loss.    In th ED. EKG with Complete Heart Block. EP called and recommended admission to CCU for PPM placement. Cr 4.89 (Baseline 3.30-3.45), WBC 13.01, Hgb (8/.5 (20 Sep 2021 14:37)    Medical progress: Patient denies any HA, CP, SOB. very comfortable. feels tired. Palliative care eval with GOC discussions today greatly appreciated.   Complaints: no new complaints  State of mind: maintains slow and normal conversation  Care discussed with ID: patient will require a Midline for 4 weeks of abx therapy      Pt is a 89M with PMH Aortic Stenosis CAD s/p PCI, CKD IV, DM HTN, HLD, Fe deficiency anemia presents from [facility] with Generalized weakness that he woke up with overnight. Associated GARCIA and fatigue,    # Subacute bacterial endocarditis   - on ceftriaxone 2 gm IV qd # 7 (started 9/23)  - tolerating abx well so far; no side effects noted  - cultures show no growth so far, but he was noted with MV vegetation suggestive of endocarditis  - midline  - ceftriaxone for 4 weeks- thru OCT 21  - Care discussed with Dr. Lo- check weekly labs    # Acute on chronic Diastolic Heart Failure   # Valvular heart disease  - Severe Aortic Stenosis and Moderate Mitral Regurgitation  # Vegetations on echo suspected endocarditis   # Valvular heart disease  - Strict I/Os, Daily Weight   - 2 d echo - echodense mass on mitral valve , EF 55%, + 3 MR ,  lipid profile  LDL 49   - CT chest -  mild bronchiectasis, mucoid impaction, small bilateral pleural effusions, atelectasis  - resp status stable.   - cardio f/u-  lasix 40mg BID, bb  -seen by palliative, GOC d/w family and pt- per their note: "ADVANCE DIRECTIVES:  DNR/I, no feeding tube, a trial of antibiotics, send to the hospital, and Limited medical interventions"    # Complete Heart Block   - s/p Micra PPM placement on 9/22     # CLIFFORD on CKD IV  - Baseline Cr 3.4-3.5. Cr on admission, now getting better  - Avoid Nephrotoxic agents  - f/u renal as op- Dr Hdez    # Macrocytic anemia   - iron studies, B12, folate noted monitor while on prophylactic heparin    # DM with Hyperglycemia with A1C 7.5  - Glucose checks AC HS. Lantus 5 with correctional insulin. Titrate accordingly    # Hypocalcemia   -  vitamin D wnl    # DVT Prophylaxis with Heparin subq

## 2021-09-28 NOTE — DISCHARGE NOTE PROVIDER - PROVIDER TOKENS
PROVIDER:[TOKEN:[[id number]]],PROVIDER:[TOKEN:[[id number]]],PROVIDER:[TOKEN:[[id number]]] PROVIDER:[TOKEN:[[id number]]],PROVIDER:[TOKEN:[[id number]]],PROVIDER:[TOKEN:[[id number]],FOLLOWUP:[1 week]]

## 2021-09-28 NOTE — PROGRESS NOTE ADULT - ASSESSMENT
89 y/o Male with h/o Aortic Stenosis, CAD s/p PCI, CKD IV, DM ninoska 2, HTN, HLD, iron deficiency anemia was admitted on 9/20 from [facility] with generalized weakness x one day. He developed associated GARCIA and fatigue, Denied fever, chills at home. In ER he was noted with Complete Heart Block. EP called and recommended admission to CCU for PPM placement. Noted with leukocytosis. He received ceftriaxone IV.     1. Mitral valve vegetation. Severe AS. Possible subacute bacterial endocarditis. Severe bradycardia s/p micra. CRF stage 4. Bronchiectasis.   -leukocytosis resolved  -concern about endocarditis was raised  -serial BC noted - no growth  -doubt Lyme disease  -on ceftriaxone 2 gm IV qd # 6  -tolerating abx well so far; no side effects noted  -f/u cultures  -cultures show no growth so far, but he was noted with MV vegetation suggestive of endocarditis  -midline  -would treat empirically with ceftriaxone for 4 weeks  -midline  -continue abx coverage  -monitor temps  -f/u CBC  -supportive care  2. Other issues:   -care per medicine

## 2021-09-28 NOTE — DISCHARGE NOTE PROVIDER - NSDCMRMEDTOKEN_GEN_ALL_CORE_FT
acetaminophen 325 mg oral tablet: 1 tab(s) orally every 4 hours, As Needed - for headache/bodyache  allopurinol 100 mg oral tablet: 1 tab(s) orally once a day  amLODIPine 5 mg oral tablet: 1 tab(s) orally once a day  aspirin 81 mg oral tablet, chewable: 1 tab(s) orally once a day  ***spoke to facility, confirmed that aspirin is on allergy list but pt has been taking with no known allergic reaction.***  atorvastatin 40 mg oral tablet: 1 tab(s) orally once a day (at bedtime)  calcium acetate 667 mg oral tablet: 1 tab(s) orally 3 times a day (with meals)  cholecalciferol 1250 mcg (50,000 intl units) oral capsule: 1 cap(s) orally once a month  ferrous gluconate 324 mg (38 mg elemental iron) oral tablet: 1 tab(s) orally once a day  furosemide 20 mg oral tablet: 1 tab(s) orally once a day  metoprolol succinate 25 mg oral tablet, extended release: 0.5 tab(s) orally once a day  MiraLax oral powder for reconstitution: 17 gram(s) orally once a day  pantoprazole 40 mg oral delayed release tablet: 1 tab(s) orally 2 times a day  Retacrit 40,000 units/mL preservative-free injectable solution: 1 milliliter(s) subcutaneously 2 times a week  Simbrinza 1%- 0.2% ophthalmic suspension: 1 drop(s) in the left eye 2 times a day   allopurinol 100 mg oral tablet: 1 tab(s) orally once a day  amLODIPine 5 mg oral tablet: 1 tab(s) orally once a day  aspirin 81 mg oral tablet, chewable: 1 tab(s) orally once a day  ***spoke to facility, confirmed that aspirin is on allergy list but pt has been taking with no known allergic reaction.***  atorvastatin 40 mg oral tablet: 1 tab(s) orally once a day (at bedtime)  calcium acetate 667 mg oral tablet: 1 tab(s) orally 3 times a day (with meals)  cefTRIAXone 2 g injection: 2 gram(s) intravenously once a day   cholecalciferol 1250 mcg (50,000 intl units) oral capsule: 1 cap(s) orally once a month  ferrous gluconate 324 mg (38 mg elemental iron) oral tablet: 1 tab(s) orally once a day  furosemide 40 mg oral tablet: 1 tab(s) orally 2 times a day  metoprolol succinate 25 mg oral tablet, extended release: 0.5 tab(s) orally once a day  MiraLax oral powder for reconstitution: 17 gram(s) orally once a day  pantoprazole 40 mg oral delayed release tablet: 1 tab(s) orally 2 times a day  Retacrit 40,000 units/mL preservative-free injectable solution: 1 milliliter(s) subcutaneously 2 times a week  Simbrinza 1%- 0.2% ophthalmic suspension: 1 drop(s) in the left eye 2 times a day

## 2021-09-28 NOTE — DISCHARGE NOTE PROVIDER - CARE PROVIDER_API CALL
Ha Richardson)  Cardiovascular Disease; Internal Medicine  [address] [place] [address]  Phone: [phone]  Fax: [phone]  Follow Up Time:     Soren Laguerre  NEPHROLOGY  [address], [address]  [place] [address]  Phone: [phone]  Fax: [phone]  Follow Up Time:     Anshul Lo  INFECTIOUS DISEASE  [address], [address], [place] [address]  Phone: [phone]  Fax: [phone]  Follow Up Time:    Ha Richardson)  Cardiovascular Disease; Internal Medicine  [address] [place] [address]  Phone: [phone]  Fax: [phone]  Follow Up Time:     Anshul Lo  INFECTIOUS DISEASE  [address], [place] [address]  Phone: [phone]  Fax: [phone]  Follow Up Time:     Donta Hdez  INTERNAL MEDICINE  [address] [place] [address]  Phone: [phone]  Fax: [phone]  Follow Up Time: 1 week

## 2021-09-28 NOTE — CHART NOTE - NSCHARTNOTEFT_GEN_A_CORE
89 year old man with aortic stenosis, CAD s/p PCI, CKD, DM, HTN admitted with generalized weakness and found to be in complete heart block.     Echo cardiogram today confirms severe AS with calcified mitral valve and independently mobile mass attached to valve. vegetation vs chordae.   He is afebrile has an elevated white blood count.      He is in need of urgent pacemaker due to persistent CHB after medication discontinuation. Data shows MICRA can be implanted in individulas with sepsis will there fore go forward with planned procedure.
HPI:  89M with PMH Aortic Stenosis CAD s/p PCI, CKD IV, DM HTN, HLD, Fe deficiency anemia presents from [facility] with Generalized weakness that he woke up with overnight. 20 Sep 2021 14:37)      PERTINENT PMH REVIEWED:  [ X ] YES [ ] NO           Primary Contact:   Son Benitez is HCP          HCP [ X ] Surrogate [   ] Guardian [   ]    Mental Status: [ X ] Alert  [  X ] Oriented [  ] Confused [  ] Lethargic [  ]  Concerns of Depression [  ]- not identified   Anxiety [   ]- not identified   Baseline ADLs (prior to admission):  Independent [ X ] moderately [ ] fully   Dependent   [ ] moderately [ ]fully    Family Meeting: Took place today with Palliative NP Lea Monroy Plan for pt. to go to [facility] and then back to [facility]     Anticipated Grief: Patient [ X ] Family [  ]    Caregiver [place] Assessed: Yes [ X ] No [  ]    Goals of Care: Comfort [  ] Rehabilitation [ X ] Curative [  ] Life Prolonging [  ]    Previous Services: [facility]     ADVANCE DIRECTIVES:  DNR/I, no feeding tube, a trial of antibiotics, send to the hospital, and Limited medical interventions    Anticipated D/C Plan: [facility]                     Summary:    This SW met with pt. at bedside to offer support. Role of Palliative  explained. Pt. reports that he has been at [facility] and was walking with a RW. He spent times discussing his children, who he has two of and his two grandchildren. Pt. discussed how he has had a good life. Pts feelings explored and support provided. Pt. had family meeting today with Lea Monroy NP and his son. Plan is for pt. to go to [facility] and then back to [facility]. Emotional support provided. Our team will continue to follow.

## 2021-09-28 NOTE — GOALS OF CARE CONVERSATION - ADVANCED CARE PLANNING - CONVERSATION DETAILS
I met with the Patient and his children at the bedside and reviewed the role of palliative medicine. The Patient most recently has been living for the past 1.5 years at [facility] living and can perform most ADLs independently. He stated the only this he needed help with was showering and medications.   The Patient stated that he came to the hospital because he was getting weaker and since he was here has PPM placed. The Patient and family are aware that the Patient has progressive heart disease and could continue to decline; the Patient and family are hopeful that the Patient will return to [facility] and continue to improve.  Discussed the different levels of home care, including hospice. At this time family would like to have the Patient go back to [facility] to work on strengthening and then go back to assisted living at [facility] with a palliative home care in place. The Patient will be returning with a PICC line in place for Long term antibiotics. The Patient also has chronic kidney disease, and at this time, is back to his baseline as per the Patient and has been being seen by a kidney specialist.  Did review the  MOLST for which Patient wanted to put in place, DNR/I, no feeding tube, a trial of antibiotics, send to the hospital, and Limited medical interventions I met with the Patient and his children at the bedside and reviewed the role of palliative medicine. The Patient most recently has been living for the past 1.5 years at [facility] and can perform most ADLs independently. He stated the only this he needed help with was showering and medications.   The Patient stated that he came to the hospital because he was getting weaker and since he was here has PPM placed. The Patient and family are aware that the Patient has progressive heart disease and could continue to decline; the Patient and family are hopeful that the Patient will return to [facility] and continue to improve.  Discussed the different levels of home care, including hospice. At this time family would like to have the Patient go back to [facility] to work on strengthening and then go back to assisted living at [facility] with a palliative home care in place. The Patient will be returning with a PICC line in place for Long term antibiotics. The Patient also has chronic kidney disease, and at this time, is back to his baseline as per the Patient and has been being seen by a kidney specialist.  Did review the  MOLST for which Patient wanted to put in place, DNR/I, no feeding tube, a trial of antibiotics, send to the hospital, and Limited medical interventions    Will Sign off at this time as plan is clear for patient to go to rehab and transition back in to [facility] with a palliative focus. Spoke with Dr. Rendon, please reconsult if needed.

## 2021-09-28 NOTE — DISCHARGE NOTE PROVIDER - NSDCCPCAREPLAN_GEN_ALL_CORE_FT
PRINCIPAL DISCHARGE DIAGNOSIS  Diagnosis: Third degree AV block  Assessment and Plan of Treatment:       SECONDARY DISCHARGE DIAGNOSES  Diagnosis: Endocarditis  Assessment and Plan of Treatment:     Diagnosis: Acute-on-chronic renal failure  Assessment and Plan of Treatment:     Diagnosis: Heart failure  Assessment and Plan of Treatment:

## 2021-09-28 NOTE — PROGRESS NOTE ADULT - ASSESSMENT
1. CHB. S/P Micra RV PPM. Pacing.  2. Aortic stenosis,severe.  3. Congestive heart failure due to severe bradycardia plus AS and MR   4. 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 On Rocephin. Blood cultures no growth. ID consult noted.   5. Hypertension - on amlodipine  6. CKD nephrology consult noted. Serum potassium stable. Creatinine improving.  7. CAD, stable.    PLAN:  cont  furosemide to 40 mg bid, metoprolol. ceftriaxone.

## 2021-09-28 NOTE — DISCHARGE NOTE PROVIDER - CARE PROVIDERS DIRECT ADDRESSES
,[email or web address],DirectAddress_Unknown,DirectAddress_Unknown ,[email or web address]_Unknown,[email or web address]

## 2021-09-28 NOTE — PROGRESS NOTE ADULT - SUBJECTIVE AND OBJECTIVE BOX
Patient is a 89y old  Male who presents with a chief complaint of Complete Heart Block/CLIFFORD/Weakness (27 Sep 2021 15:26)      HPI:  89M with PMH Aortic Stenosis CAD s/p PCI, CKD IV, DM HTN, HLD, Fe deficiency anemia presents from [facility] with Generalized weakness that he woke up with overnight. Associated GARCIA and fatigue, Denies fever, chills, near syncope, dizziness, chest pain, nausea, vomiting, abdominal pain/discomfort. Denies any recent weight gain/loss.    In th ED. EKG with Complete Heart Block. EP called and recommended admission to CCU for PPM placement. Cr 4.89 (Baseline 3.30-3.45), WBC 13.01, Hgb (8/.5 (20 Sep 2021 14:37)  9/21 Cardiology. 89 year old male with history of CAD, s/p PCI, CKD and DM admitted with dyspnea for 24 hours. some element of orthopnea as well. No le edema. patietnt typically ambulates with walker at [facility]. No chest pain. On admission found to be in CHB with under;[place] [id number]    Followup HPI:    9/22- short of breath at rest. No chest pain.  9/23- S/P Micra RV PPM on 9/22. Feels much less breathless. No chest pain. TTE shows vegetation on the atrial surface of the anterior mitral leaflet.  9/24  alert appears comfortable lying flat, groin site no overt hematoma  9/25 sitting up, only complaint is phlegm  9/27 alert , no cmplaints  9/28 alert, now on 5s    PAST MEDICAL & SURGICAL HISTORY:  Hypertension    HLD (hyperlipidemia)    Gout    Prostate cancer    Anemia    Chronic kidney disease (CKD)    CAD (coronary artery disease)    DM type 2 (diabetes mellitus, type 2)    Glaucoma    Aortic stenosis    Osteoporosis    Leg edema    H/O inguinal hernia repair    History of tonsillectomy  childhood    History of colonoscopy          MEDICATIONS  (STANDING):  allopurinol 100 milliGRAM(s) Oral daily  amLODIPine   Tablet 5 milliGRAM(s) Oral daily  atorvastatin 40 milliGRAM(s) Oral at bedtime  cefTRIAXone Injectable. 2000 milliGRAM(s) IV Push every 24 hours  dextrose 40% Gel 15 Gram(s) Oral once  dextrose 5%. 1000 milliLiter(s) (50 mL/Hr) IV Continuous <Continuous>  dextrose 5%. 1000 milliLiter(s) (100 mL/Hr) IV Continuous <Continuous>  dextrose 50% Injectable 25 Gram(s) IV Push once  dextrose 50% Injectable 12.5 Gram(s) IV Push once  dextrose 50% Injectable 25 Gram(s) IV Push once  ferrous    sulfate 325 milliGRAM(s) Oral daily  furosemide    Tablet 40 milliGRAM(s) Oral two times a day  glucagon  Injectable 1 milliGRAM(s) IntraMuscular once  guaiFENesin  milliGRAM(s) Oral every 12 hours  influenza   Vaccine 0.5 milliLiter(s) IntraMuscular once  insulin glargine Injectable (LANTUS) 5 Unit(s) SubCutaneous at bedtime  insulin lispro (ADMELOG) corrective regimen sliding scale   SubCutaneous three times a day before meals  insulin lispro (ADMELOG) corrective regimen sliding scale   SubCutaneous at bedtime  metoprolol succinate ER 12.5 milliGRAM(s) Oral daily  pantoprazole    Tablet 40 milliGRAM(s) Oral before breakfast    MEDICATIONS  (PRN):  acetaminophen   Tablet .. 650 milliGRAM(s) Oral every 6 hours PRN Temp greater or equal to 38.5C (101.3F), Mild Pain (1 - 3)  aluminum hydroxide/magnesium hydroxide/simethicone Suspension 30 milliLiter(s) Oral every 4 hours PRN Dyspepsia  atropine Injectable 0.5 milliGRAM(s) IntraMuscular once PRN Bradycardia HR<50 AND SBP<95  melatonin 3 milliGRAM(s) Oral at bedtime PRN Insomnia  ondansetron Injectable 4 milliGRAM(s) IV Push every 8 hours PRN Nausea and/or Vomiting  polyethylene glycol 3350 17 Gram(s) Oral daily PRN Constipation          Vital Signs Last 24 Hrs  T(C): 36.4 (27 Sep 2021 21:30), Max: 36.4 (27 Sep 2021 21:30)  T(F): 97.5 (27 Sep 2021 21:30), Max: 97.5 (27 Sep 2021 21:30)  HR: 72 (27 Sep 2021 21:30) (68 - 79)  BP: 158/66 (27 Sep 2021 21:30) (136/67 - 169/65)  BP(mean): 83 (27 Sep 2021 20:00) (79 - 99)  RR: 17 (27 Sep 2021 21:30) (13 - 27)  SpO2: 98% (27 Sep 2021 21:30) (91% - 98%)    I&O's Summary    27 Sep 2021 07:01  -  28 Sep 2021 07:00  --------------------------------------------------------  IN: 240 mL / OUT: 1150 mL / NET: -910 mL        PHYSICAL EXAM  General Appearance: NAD  HEENT:   Neck:   Back:   Lungs: dec bs but clear  Heart: [id number] 3/6 [place], 2/6 m llsb   Abdomen: soft   Extremities: no edema  Skin:   Neurologic:         INTERPRETATION OF TELEMETRY:    ECG:        LABS:                          9.3    10.12 )-----------( 246      ( 28 Sep 2021 06:54 )             30.0     09-28    144  |  104  |  105<H>  ----------------------------<  132<H>  3.8   |  33<H>  |  3.41<H>    Ca    9.2      28 Sep 2021 06:54            Pro BNP  [id number] 09-24 @ 06:49  D Dimer  -- 09-24 @ 06:49  Pro BNP  [id number] 09-23 @ 04:55  D Dimer  -- 09-23 @ 04:55              RADIOLOGY & ADDITIONAL STUDIES:

## 2021-09-29 NOTE — PROGRESS NOTE ADULT - ASSESSMENT
89M with PMH Aortic Stenosis CAD s/p PCI, CKD IV, DM HTN, HLD, Fe deficiency anemia presents from [facility] with Generalized weakness that he woke up with overnight. Associated GARCIA and fatigue, Denies fever, chills, near syncope, dizziness, chest pain, nausea, vomiting, abdominal pain/discomfort.     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

## 2021-09-29 NOTE — DISCHARGE NOTE NURSING/CASE MANAGEMENT/SOCIAL WORK - NSDCVIVACCINE_GEN_ALL_CORE_FT
Tdap; 24-Jul-2020 18:05; Gayla Ross (RN); [employer]; [id number] (Exp. Date: 04-Apr-2022); IntraMuscular; Deltoid Left.; 0.5 milliLiter(s); VIS (VIS Published: 09-May-2013, VIS Presented: 24-Jul-2020);

## 2021-09-29 NOTE — DISCHARGE NOTE NURSING/CASE MANAGEMENT/SOCIAL WORK - PATIENT PORTAL LINK FT
You can access the FollowMyHealth Patient Portal offered by [facility] by registering at the following website: [email or web address]. By joining [employer]’s FollowMyHealth portal, you will also be able to view your health information using other applications (apps) compatible with our system.

## 2021-09-29 NOTE — PROGRESS NOTE ADULT - ASSESSMENT
89 y/o Male with h/o Aortic Stenosis, CAD s/p PCI, CKD IV, DM ninoska 2, HTN, HLD, iron deficiency anemia was admitted on 9/20 from [facility] with generalized weakness x one day. He developed associated GARCIA and fatigue, Denied fever, chills at home. In ER he was noted with Complete Heart Block. EP called and recommended admission to CCU for PPM placement. Noted with leukocytosis. He received ceftriaxone IV.     1. Mitral valve vegetation. Severe AS. Possible subacute bacterial endocarditis. Severe bradycardia s/p micra. CRF stage 4. Bronchiectasis.   -no fever  -leukocytosis resolved  -serial BC noted - no growth  -doubt Lyme disease  -on ceftriaxone 2 gm IV qd # 6  -tolerating abx well so far; no side effects noted  -f/u cultures  -cultures show no growth so far, but he was noted with MV vegetation suggestive of endocarditis  -midline  -would treat empirically with ceftriaxone for 4 weeks  -plan for rehab  -weekly labs  -continue abx coverage  -monitor temps  -f/u CBC  -supportive care  2. Other issues:   -care per medicine

## 2021-09-29 NOTE — PROGRESS NOTE ADULT - REASON FOR ADMISSION
Complete Heart Block/CLIFFORD/Weakness

## 2021-09-29 NOTE — PROGRESS NOTE ADULT - SUBJECTIVE AND OBJECTIVE BOX
Patient is a 89y old  Male who presents with a chief complaint of Complete Heart Block/CLIFFORD/Weakness (29 Sep 2021 12:10)      SUBJECTIVE / OVERNIGHT EVENTS:    MEDICATIONS  (STANDING):  allopurinol 100 milliGRAM(s) Oral daily  amLODIPine   Tablet 5 milliGRAM(s) Oral daily  atorvastatin 40 milliGRAM(s) Oral at bedtime  cefTRIAXone Injectable. 2000 milliGRAM(s) IV Push every 24 hours  dextrose 40% Gel 15 Gram(s) Oral once  dextrose 5%. 1000 milliLiter(s) (50 mL/Hr) IV Continuous <Continuous>  dextrose 5%. 1000 milliLiter(s) (100 mL/Hr) IV Continuous <Continuous>  dextrose 50% Injectable 25 Gram(s) IV Push once  dextrose 50% Injectable 12.5 Gram(s) IV Push once  dextrose 50% Injectable 25 Gram(s) IV Push once  ferrous    sulfate 325 milliGRAM(s) Oral daily  furosemide    Tablet 40 milliGRAM(s) Oral two times a day  glucagon  Injectable 1 milliGRAM(s) IntraMuscular once  guaiFENesin  milliGRAM(s) Oral every 12 hours  influenza   Vaccine 0.5 milliLiter(s) IntraMuscular once  insulin glargine Injectable (LANTUS) 5 Unit(s) SubCutaneous at bedtime  insulin lispro (ADMELOG) corrective regimen sliding scale   SubCutaneous three times a day before meals  insulin lispro (ADMELOG) corrective regimen sliding scale   SubCutaneous at bedtime  metoprolol succinate ER 12.5 milliGRAM(s) Oral daily  pantoprazole    Tablet 40 milliGRAM(s) Oral before breakfast    MEDICATIONS  (PRN):  acetaminophen   Tablet .. 650 milliGRAM(s) Oral every 6 hours PRN Temp greater or equal to 38.5C (101.3F), Mild Pain (1 - 3)  aluminum hydroxide/magnesium hydroxide/simethicone Suspension 30 milliLiter(s) Oral every 4 hours PRN Dyspepsia  atropine Injectable 0.5 milliGRAM(s) IntraMuscular once PRN Bradycardia HR<50 AND SBP<95  melatonin 3 milliGRAM(s) Oral at bedtime PRN Insomnia  ondansetron Injectable 4 milliGRAM(s) IV Push every 8 hours PRN Nausea and/or Vomiting  polyethylene glycol 3350 17 Gram(s) Oral daily PRN Constipation      CAPILLARY BLOOD GLUCOSE      POCT Blood Glucose.: 213 mg/dL (29 Sep 2021 12:10)  POCT Blood Glucose.: 127 mg/dL (29 Sep 2021 08:12)  POCT Blood Glucose.: 128 mg/dL (28 Sep 2021 21:48)  POCT Blood Glucose.: 171 mg/dL (28 Sep 2021 16:52)    I&O's Summary    28 Sep 2021 07:01  -  29 Sep 2021 07:00  --------------------------------------------------------  IN: 350 mL / OUT: 1225 mL / NET: -875 mL    29 Sep 2021 07:01  -  29 Sep 2021 12:54  --------------------------------------------------------  IN: 340 mL / OUT: 0 mL / NET: 340 mL        PHYSICAL EXAM:  Vital Signs Last 24 Hrs  T(C): 36.7 (29 Sep 2021 08:11), Max: 36.8 (28 Sep 2021 20:15)  T(F): 98 (29 Sep 2021 08:11), Max: 98.2 (28 Sep 2021 20:15)  HR: 73 (29 Sep 2021 08:11) (69 - 73)  BP: 147/56 (29 Sep 2021 08:11) (114/86 - 147/56)  BP(mean): --  RR: 18 (29 Sep 2021 08:11) (17 - 18)  SpO2: 100% (29 Sep 2021 08:11) (99% - 100%)  GENERAL: NAD, well-developed  HEAD:  Atraumatic, Normocephalic  EYES: EOMI, PERRLA, conjunctiva and sclera clear  NECK: Supple, No JVD, no LAD  CHEST/LUNG: Clear to auscultation bilaterally; No wheeze, resp unlabored  HEART: Regular rate and rhythm; No murmurs, rubs, or gallops  ABDOMEN: Soft, Nontender, Nondistended; Bowel sounds present, no HSM  EXTREMITIES:  2+ Peripheral Pulses, No clubbing, cyanosis, or edema  PSYCH: AAOx3, normal behavior  NEUROLOGY: non-focal, sensory and cn 2-12 intact  SKIN: No visible rashes or lesions    LABS:                        9.3    10.12 )-----------( 246      ( 28 Sep 2021 06:54 )             30.0     09-28    144  |  104  |  105<H>  ----------------------------<  132<H>  3.8   |  33<H>  |  3.41<H>    Ca    9.2      28 Sep 2021 06:54                  RADIOLOGY & ADDITIONAL TESTS:    Imaging Personally Reviewed:    Consultant(s) Notes Reviewed:      Care Discussed with Consultants/Other Providers:   Patient is a 89y old  Male who presents with a chief complaint of Complete Heart Block/CLIFFORD/Weakness (29 Sep 2021 12:10)  89M with PMH Aortic Stenosis CAD s/p PCI, CKD IV, DM HTN, HLD, Fe deficiency anemia presents from [facility] with Generalized weakness that he woke up with overnight. Associated GARCIA and fatigue, Denies fever, chills, near syncope, dizziness, chest pain, nausea, vomiting, abdominal pain/discomfort. Denies any recent weight gain/loss.    In th ED. EKG with Complete Heart Block. EP called and recommended admission to CCU for PPM placement. Cr 4.89 (Baseline 3.30-3.45), WBC 13.01, Hgb (8/.5 (20 Sep 2021 14:37)    SUBJECTIVE / OVERNIGHT EVENTS:  9/29- s/e, sitting in chair, awaiting to go to NH, no new o/n events.    MEDICATIONS  (STANDING):  allopurinol 100 milliGRAM(s) Oral daily  amLODIPine   Tablet 5 milliGRAM(s) Oral daily  atorvastatin 40 milliGRAM(s) Oral at bedtime  cefTRIAXone Injectable. 2000 milliGRAM(s) IV Push every 24 hours  dextrose 40% Gel 15 Gram(s) Oral once  dextrose 5%. 1000 milliLiter(s) (50 mL/Hr) IV Continuous <Continuous>  dextrose 5%. 1000 milliLiter(s) (100 mL/Hr) IV Continuous <Continuous>  dextrose 50% Injectable 25 Gram(s) IV Push once  dextrose 50% Injectable 12.5 Gram(s) IV Push once  dextrose 50% Injectable 25 Gram(s) IV Push once  ferrous    sulfate 325 milliGRAM(s) Oral daily  furosemide    Tablet 40 milliGRAM(s) Oral two times a day  glucagon  Injectable 1 milliGRAM(s) IntraMuscular once  guaiFENesin  milliGRAM(s) Oral every 12 hours  influenza   Vaccine 0.5 milliLiter(s) IntraMuscular once  insulin glargine Injectable (LANTUS) 5 Unit(s) SubCutaneous at bedtime  insulin lispro (ADMELOG) corrective regimen sliding scale   SubCutaneous three times a day before meals  insulin lispro (ADMELOG) corrective regimen sliding scale   SubCutaneous at bedtime  metoprolol succinate ER 12.5 milliGRAM(s) Oral daily  pantoprazole    Tablet 40 milliGRAM(s) Oral before breakfast    MEDICATIONS  (PRN):  acetaminophen   Tablet .. 650 milliGRAM(s) Oral every 6 hours PRN Temp greater or equal to 38.5C (101.3F), Mild Pain (1 - 3)  aluminum hydroxide/magnesium hydroxide/simethicone Suspension 30 milliLiter(s) Oral every 4 hours PRN Dyspepsia  atropine Injectable 0.5 milliGRAM(s) IntraMuscular once PRN Bradycardia HR<50 AND SBP<95  melatonin 3 milliGRAM(s) Oral at bedtime PRN Insomnia  ondansetron Injectable 4 milliGRAM(s) IV Push every 8 hours PRN Nausea and/or Vomiting  polyethylene glycol 3350 17 Gram(s) Oral daily PRN Constipation      CAPILLARY BLOOD GLUCOSE  POCT Blood Glucose.: 213 mg/dL (29 Sep 2021 12:10)  POCT Blood Glucose.: 127 mg/dL (29 Sep 2021 08:12)  POCT Blood Glucose.: 128 mg/dL (28 Sep 2021 21:48)  POCT Blood Glucose.: 171 mg/dL (28 Sep 2021 16:52)    I&O's Summary    28 Sep 2021 07:01  -  29 Sep 2021 07:00  --------------------------------------------------------  IN: 350 mL / OUT: 1225 mL / NET: -875 mL    29 Sep 2021 07:01  -  29 Sep 2021 12:54  --------------------------------------------------------  IN: 340 mL / OUT: 0 mL / NET: 340 mL        PHYSICAL EXAM:  Vital Signs Last 24 Hrs  T(C): 36.7 (29 Sep 2021 08:11), Max: 36.8 (28 Sep 2021 20:15)  T(F): 98 (29 Sep 2021 08:11), Max: 98.2 (28 Sep 2021 20:15)  HR: 73 (29 Sep 2021 08:11) (69 - 73)  BP: 147/56 (29 Sep 2021 08:11) (114/86 - 147/56)  BP(mean): --  RR: 18 (29 Sep 2021 08:11) (17 - 18)  SpO2: 100% (29 Sep 2021 08:11) (99% - 100%)  GENERAL: NAD,   HEAD:  Atraumatic, Normocephalic  EYES: EOMI, PERRLA, conjunctiva and sclera clear  NECK: Supple, No JVD, no LAD  CHEST/LUNG: Clear to auscultation bilaterally; No wheeze, resp unlabored  HEART: Regular rate and rhythm; +M  ABDOMEN: Soft, Nontender, Nondistended; Bowel sounds present, no HSM  EXTREMITIES:  2+ Peripheral Pulses, No clubbing, cyanosis, +trace LE edema  PSYCH: AAOx3, normal behavior  NEUROLOGY: non-focal, sensory and cn 2-12 intact  SKIN: No visible rashes or lesions    LABS:                        9.3    10.12 )-----------( 246      ( 28 Sep 2021 06:54 )             30.0     09-28    144  |  104  |  105<H>  ----------------------------<  132<H>  3.8   |  33<H>  |  3.41<H>    Ca    9.2      28 Sep 2021 06:54                  RADIOLOGY & ADDITIONAL TESTS:    Imaging Personally Reviewed:    Consultant(s) Notes Reviewed:    renal, cardio, ID  Care Discussed with Consultants/Other Providers:

## 2021-09-29 NOTE — PROGRESS NOTE ADULT - ASSESSMENT
Pt is a 89M with PMH Aortic Stenosis CAD s/p PCI, CKD IV, DM HTN, HLD, Fe deficiency anemia presents from [facility] with Generalized weakness that he woke up with overnight. Associated GARCIA and fatigue,    # Subacute bacterial endocarditis   - on ceftriaxone 2 gm IV qd # 7 (started 9/23)  - tolerating abx well so far; no side effects noted  - cultures show no growth so far, but he was noted with MV vegetation suggestive of endocarditis  - midline  - ceftriaxone for 4 weeks- thru OCT 21  - Care discussed with Dr. Lo- check weekly labs    # Acute on chronic Diastolic Heart Failure   # Valvular heart disease  - Severe Aortic Stenosis and Moderate Mitral Regurgitation  # Vegetations on echo suspected endocarditis   # Valvular heart disease  - Strict I/Os, Daily Weight   - 2 d echo - echodense mass on mitral valve , EF 55%, + 3 MR ,  lipid profile  LDL 49   - CT chest -  mild bronchiectasis, mucoid impaction, small bilateral pleural effusions, atelectasis  - resp status stable.   - cardio f/u-  lasix 40mg BID, bb  -seen by palliative, GOC d/w family and pt- per their note: "ADVANCE DIRECTIVES:  DNR/I, no feeding tube, a trial of antibiotics, send to the hospital, and Limited medical interventions"    # Complete Heart Block   - s/p Micra PPM placement on 9/22     # CLIFFORD on CKD IV  - Baseline Cr 3.4-3.5. Cr on admission, now getting better  - Avoid Nephrotoxic agents  - f/u renal as op- Dr Hdez    # Macrocytic anemia   - iron studies, B12, folate noted monitor while on prophylactic heparin    # DM with Hyperglycemia with A1C 7.5  - Glucose checks AC HS. Lantus 5 with correctional insulin. Titrate accordingly    # Hypocalcemia   -  vitamin D wnl    # DVT Prophylaxis with Heparin subq    Time spent in coordinating discharge of this pt 80 mins.

## 2021-09-29 NOTE — PROGRESS NOTE ADULT - SUBJECTIVE AND OBJECTIVE BOX
Patient is a 89y Male who reports no complaints as new, Palliative noted    MEDICATIONS  (STANDING):  allopurinol 100 milliGRAM(s) Oral daily  amLODIPine   Tablet 5 milliGRAM(s) Oral daily  atorvastatin 40 milliGRAM(s) Oral at bedtime  cefTRIAXone Injectable. 2000 milliGRAM(s) IV Push every 24 hours  dextrose 40% Gel 15 Gram(s) Oral once  dextrose 5%. 1000 milliLiter(s) (50 mL/Hr) IV Continuous <Continuous>  dextrose 5%. 1000 milliLiter(s) (100 mL/Hr) IV Continuous <Continuous>  dextrose 50% Injectable 25 Gram(s) IV Push once  dextrose 50% Injectable 12.5 Gram(s) IV Push once  dextrose 50% Injectable 25 Gram(s) IV Push once  ferrous    sulfate 325 milliGRAM(s) Oral daily  furosemide    Tablet 40 milliGRAM(s) Oral two times a day  glucagon  Injectable 1 milliGRAM(s) IntraMuscular once  guaiFENesin  milliGRAM(s) Oral every 12 hours  influenza   Vaccine 0.5 milliLiter(s) IntraMuscular once  insulin glargine Injectable (LANTUS) 5 Unit(s) SubCutaneous at bedtime  insulin lispro (ADMELOG) corrective regimen sliding scale   SubCutaneous three times a day before meals  insulin lispro (ADMELOG) corrective regimen sliding scale   SubCutaneous at bedtime  metoprolol succinate ER 12.5 milliGRAM(s) Oral daily  pantoprazole    Tablet 40 milliGRAM(s) Oral before breakfast    MEDICATIONS  (PRN):  acetaminophen   Tablet .. 650 milliGRAM(s) Oral every 6 hours PRN Temp greater or equal to 38.5C (101.3F), Mild Pain (1 - 3)  aluminum hydroxide/magnesium hydroxide/simethicone Suspension 30 milliLiter(s) Oral every 4 hours PRN Dyspepsia  atropine Injectable 0.5 milliGRAM(s) IntraMuscular once PRN Bradycardia HR<50 AND SBP<95  melatonin 3 milliGRAM(s) Oral at bedtime PRN Insomnia  ondansetron Injectable 4 milliGRAM(s) IV Push every 8 hours PRN Nausea and/or Vomiting  polyethylene glycol 3350 17 Gram(s) Oral daily PRN Constipation        T(C): , Max: 36.8 (09-28-21 @ 20:15)  T(F): , Max: 98.2 (09-28-21 @ 20:15)  HR: 73 (09-29-21 @ 08:11)  BP: 147/56 (09-29-21 @ 08:11)  BP(mean): --  RR: 18 (09-29-21 @ 08:11)  SpO2: 100% (09-29-21 @ 08:11)  Wt(kg): --    09-28 @ 07:01  -  09-29 @ 07:00  --------------------------------------------------------  IN: 350 mL / OUT: 1225 mL / NET: -875 mL      PHYSICAL EXAM:    Constitutional: NAD, frail  HEENT: dry MM  Neck: No LAD, No JVD  Respiratory: dist  Cardiovascular: S1 and S2  Extremities: chronic peripheral edema  Neurological: Alert, Koi          LABS:                        9.3    10.12 )-----------( 246      ( 28 Sep 2021 06:54 )             30.0     28 Sep 2021 06:54    144    |  104    |  105    ----------------------------<  132    3.8     |  33     |  3.41   27 Sep 2021 07:16    145    |  105    |  104    ----------------------------<  147    4.2     |  34     |  3.51   26 Sep 2021 06:21    143    |  105    |  107    ----------------------------<  140    3.7     |  31     |  3.44     Ca    9.2        28 Sep 2021 06:54  Ca    9.0        27 Sep 2021 07:16  Ca    8.8        26 Sep 2021 06:21            Urine Studies:          RADIOLOGY & ADDITIONAL STUDIES:

## 2021-09-29 NOTE — PROGRESS NOTE ADULT - SUBJECTIVE AND OBJECTIVE BOX
Date of service: 09-29-21 @ 12:11    Sitting in bed in NAD  Alert and verbal  Denies pain  Has poor appetite    ROS: no fever or chills; denies dizziness, no HA, no SOB or cough, no abdominal pain, no diarrhea or constipation; no dysuria, no legs pain, no rashes    MEDICATIONS  (STANDING):  allopurinol 100 milliGRAM(s) Oral daily  amLODIPine   Tablet 5 milliGRAM(s) Oral daily  atorvastatin 40 milliGRAM(s) Oral at bedtime  cefTRIAXone Injectable. 2000 milliGRAM(s) IV Push every 24 hours  dextrose 40% Gel 15 Gram(s) Oral once  dextrose 5%. 1000 milliLiter(s) (50 mL/Hr) IV Continuous <Continuous>  dextrose 5%. 1000 milliLiter(s) (100 mL/Hr) IV Continuous <Continuous>  dextrose 50% Injectable 25 Gram(s) IV Push once  dextrose 50% Injectable 12.5 Gram(s) IV Push once  dextrose 50% Injectable 25 Gram(s) IV Push once  ferrous    sulfate 325 milliGRAM(s) Oral daily  furosemide    Tablet 40 milliGRAM(s) Oral two times a day  glucagon  Injectable 1 milliGRAM(s) IntraMuscular once  guaiFENesin  milliGRAM(s) Oral every 12 hours  influenza   Vaccine 0.5 milliLiter(s) IntraMuscular once  insulin glargine Injectable (LANTUS) 5 Unit(s) SubCutaneous at bedtime  insulin lispro (ADMELOG) corrective regimen sliding scale   SubCutaneous three times a day before meals  insulin lispro (ADMELOG) corrective regimen sliding scale   SubCutaneous at bedtime  metoprolol succinate ER 12.5 milliGRAM(s) Oral daily  pantoprazole    Tablet 40 milliGRAM(s) Oral before breakfast    Vital Signs Last 24 Hrs  T(C): 36.7 (29 Sep 2021 08:11), Max: 36.8 (28 Sep 2021 20:15)  T(F): 98 (29 Sep 2021 08:11), Max: 98.2 (28 Sep 2021 20:15)  HR: 73 (29 Sep 2021 08:11) (69 - 73)  BP: 147/56 (29 Sep 2021 08:11) (114/86 - 147/56)  BP(mean): --  RR: 18 (29 Sep 2021 08:11) (17 - 18)  SpO2: 100% (29 Sep 2021 08:11) (99% - 100%)     Physical exam:    Constitutional:  No acute distress  HEENT: NC/AT, EOMI, PERRLA, conjunctivae clear; ears and nose atraumatic  Neck: supple; thyroid not palpable  Back: no tenderness  Respiratory: respiratory effort normal; clear to auscultation  Cardiovascular: S1S2 regular, no murmurs  Abdomen: soft, not tender, not distended, positive BS  Genitourinary: no suprapubic tenderness  Lymphatic: no LN palpable  Musculoskeletal: no muscle tenderness, no joint swelling or tenderness  Extremities: no pedal edema  Neurological/ Psychiatric: Alert, moving all extremities  Skin: no rashes; no palpable lesions    Labs: reviewed                        9.3    10.12 )-----------( 246      ( 28 Sep 2021 06:54 )             30.0     09-28    144  |  104  |  105<H>  ----------------------------<  132<H>  3.8   |  33<H>  |  3.41<H>    Ca    9.2      28 Sep 2021 06:54    C-Reactive Protein, Serum: 116 mg/L (09-24-21 @ 06:49)  Ferritin, Serum: 149 ng/mL (09-23-21 @ 04:55)  C-Reactive Protein, Serum: 137 mg/L (09-23-21 @ 04:55)  Ferritin, Serum: 150 ng/mL (09-22-21 @ 18:29)                        9.2    10.46 )-----------( 220      ( 23 Sep 2021 04:55 )             30.4     09-23    146<H>  |  110<H>  |  119<H>  ----------------------------<  122<H>  4.2   |  26  |  4.60<H>    Ca    8.4<L>      23 Sep 2021 04:55  Phos  5.9     09-23  Mg     3.0     09-22    TPro  6.7  /  Alb  2.6<L>  /  TBili  0.3  /  DBili  x   /  AST  10<L>  /  ALT  14  /  AlkPhos  102  09-23     LIVER FUNCTIONS - ( 23 Sep 2021 04:55 )  Alb: 2.6 g/dL / Pro: 6.7 gm/dL / ALK PHOS: 102 U/L / ALT: 14 U/L / AST: 10 U/L / GGT: x             Culture Results:   Babesia microti PCR  Results: NOT detected    [facility] 12[facility]08[facility]      Culture - Blood (collected 24 Sep 2021 06:49)  Source: .Blood None  Preliminary Report (25 Sep 2021 13:02):    No growth to date.    Culture - Blood (collected 24 Sep 2021 06:44)  Source: .Blood None  Preliminary Report (25 Sep 2021 13:02):    No growth to date.    Culture - Blood (collected 23 Sep 2021 14:30)  Source: .Blood Blood aerobic  Preliminary Report (24 Sep 2021 19:01):    No growth to date.    Culture - Blood (collected 23 Sep 2021 14:30)  Source: .Blood None  Preliminary Report (24 Sep 2021 19:01):    No growth to date.    Culture - Blood (collected 22 Sep 2021 18:29)  Source: .Blood None  Preliminary Report (24 Sep 2021 01:01):    No growth to date.    Culture - Blood (collected 22 Sep 2021 18:29)  Source: .Blood None  Preliminary Report (24 Sep 2021 01:01):    No growth to date.    Babesia microti PCR, Blood. (collected 22 Sep 2021 13:00)  Source: .Blood None  Final Report (23 Sep 2021 00:23):    Babesia microti PCR    Results: NOT detected  Lyme C6 Interpretation: Negative  Radiology: all available radiological tests reviewed    < from: US Kidney and Bladder (09.23.21 @ 11:09) >  Bilateral increased renal cortical echogenicity compatible with medical renal disease.  Bilateral renal cysts, as described above.  < end of copied text >    < from: CT Chest No Cont (09.23.21 @ 08:07) >  *  Chronic lung changes of mild bronchiectasis and mucoid impaction as before.  *  Small bilateral pleural effusions and bibasilar atelectasis.  < end of copied text >    < from: TTE Echo Complete w/o Contrast w/ Doppler (09.22.21 @ 13:38) >   Mitral Valve   A moderate sized mobile echodense mass on the atrial aspect of the   anterior mitral leaflet is seen, Appearing more pronounced than prior   study.   A mitral valvular vegetation cannot be excluded.   The leaflet opening appears mildly restricted.   Moderate to severe (3+) mitral regurgitation is present.   Left Ventricle   Limited study to assess left ventricular function.   Mild concentric leftventricular hypertrophy is present.   Estimated left ventricular ejection fraction is 55 %.  < end of copied text >      Advanced directives addressed: full resuscitation

## 2021-09-29 NOTE — PROGRESS NOTE ADULT - PROVIDER SPECIALTY LIST ADULT
Cardiology
Hospitalist
Infectious Disease
Infectious Disease
Nephrology
Hospitalist
Nephrology
Nephrology
Cardiology
Electrophysiology
Electrophysiology
Hospitalist
Hospitalist
Infectious Disease
Cardiology
Hospitalist
Infectious Disease
Nephrology

## 2021-11-04 NOTE — PROGRESS NOTE ADULT - PROBLEM SELECTOR PLAN 2
DIABETES FOLLOW UP : Seen earlier today    INTERVAL HX: -230 past 24 hours without pattern. BP elevated , off pressors, on sedation (propofol) receiving 24 hours tube feeds , goc ongoing w/ palliative and family; having seizures today per d/w bedside rn       Review of Systems:  unable to obtain pt sedated     Allergies    No Known Allergies    Intolerances      MEDICATIONS  (STANDING):  amLODIPine   Tablet 10 milliGRAM(s) Oral daily  aspirin  chewable 81 milliGRAM(s) Enteral Tube daily  atorvastatin 40 milliGRAM(s) Oral at bedtime  chlorhexidine 0.12% Liquid 15 milliLiter(s) Oral Mucosa every 12 hours  chlorhexidine 4% Liquid 1 Application(s) Topical <User Schedule>  enoxaparin Injectable 80 milliGRAM(s) SubCutaneous two times a day  insulin lispro (ADMELOG) corrective regimen sliding scale   SubCutaneous every 6 hours  insulin NPH human recombinant 20 Unit(s) SubCutaneous every 6 hours  lacosamide IVPB 100 milliGRAM(s) IV Intermittent every 12 hours  levETIRAcetam  IVPB 1000 milliGRAM(s) IV Intermittent <User Schedule>  multivitamin/minerals/iron Oral Solution (CENTRUM) 15 milliLiter(s) Enteral Tube daily  pantoprazole  Injectable 40 milliGRAM(s) IV Push daily  polyethylene glycol 3350 17 Gram(s) Oral every 12 hours  propofol Infusion. 50 MICROgram(s)/kG/Min (23.5 mL/Hr) IV Continuous <Continuous>  senna Syrup 10 milliLiter(s) Oral at bedtime  valproate sodium IVPB 500 milliGRAM(s) IV Intermittent every 8 hours      PHYSICAL EXAM:  VITALS: T(C): 37.2 (11-04-21 @ 12:00)  T(F): 99 (11-04-21 @ 12:00), Max: 100.4 (11-03-21 @ 16:00)  HR: 71 (11-04-21 @ 13:00) (63 - 74)  BP: --  RR:  (13 - 42)  SpO2:  (91% - 100%)  Wt(kg): --  GENERAL: male laying in bed  in NAD, ett  Abdomen: Soft, nontender, non distended, NGT  Extremities: Warm  NEURO: sedated    LABS:  POCT Blood Glucose.: 144 mg/dL (11-04-21 @ 11:10)  POCT Blood Glucose.: 171 mg/dL (11-04-21 @ 05:21)  POCT Blood Glucose.: 202 mg/dL (11-03-21 @ 23:54)  POCT Blood Glucose.: 239 mg/dL (11-03-21 @ 17:37)  POCT Blood Glucose.: 238 mg/dL (11-03-21 @ 11:49)  POCT Blood Glucose.: 199 mg/dL (11-03-21 @ 05:02)  POCT Blood Glucose.: 99 mg/dL (11-02-21 @ 11:17)  POCT Blood Glucose.: 197 mg/dL (11-02-21 @ 05:48)  POCT Blood Glucose.: 183 mg/dL (11-01-21 @ 23:45)  POCT Blood Glucose.: 225 mg/dL (11-01-21 @ 18:14)                            9.9    9.31  )-----------( 199      ( 04 Nov 2021 00:08 )             32.1       11-04    141  |  107  |  29<H>  ----------------------------<  211<H>  4.8   |  25  |  1.04    EGFR if : 85  EGFR if non : 73    Ca    7.6<L>      11-04  Mg     2.7     11-04  Phos  3.5     11-04    TPro  5.2<L>  /  Alb  1.9<L>  /  TBili  <0.1<L>  /  DBili  x   /  AST  47<H>  /  ALT  12  /  AlkPhos  78  11-04            A1C with Estimated Average Glucose Result: 14.8 % (10-28-21 @ 02:51)      Estimated Average Glucose: 378 mg/dL (10-28-21 @ 02:51)                         cont currents meds  12/21; per cards  12/22: per ccards  12/23: stable  12/254: stable

## 2022-01-01 ENCOUNTER — INPATIENT (INPATIENT)
Facility: HOSPITAL | Age: 87
LOS: 3 days | DRG: 283 | End: 2022-01-31
Attending: INTERNAL MEDICINE | Admitting: INTERNAL MEDICINE
Payer: MEDICARE

## 2022-01-01 VITALS — WEIGHT: 143.3 LBS

## 2022-01-01 VITALS
TEMPERATURE: 98 F | HEIGHT: 67 IN | HEART RATE: 72 BPM | OXYGEN SATURATION: 93 % | DIASTOLIC BLOOD PRESSURE: 63 MMHG | WEIGHT: 175.05 LBS | SYSTOLIC BLOOD PRESSURE: 127 MMHG | RESPIRATION RATE: 25 BRPM

## 2022-01-01 DIAGNOSIS — N17.9 ACUTE KIDNEY FAILURE, UNSPECIFIED: ICD-10-CM

## 2022-01-01 DIAGNOSIS — D63.1 ANEMIA IN CHRONIC KIDNEY DISEASE: ICD-10-CM

## 2022-01-01 DIAGNOSIS — Z90.89 ACQUIRED ABSENCE OF OTHER ORGANS: Chronic | ICD-10-CM

## 2022-01-01 DIAGNOSIS — I44.0 ATRIOVENTRICULAR BLOCK, FIRST DEGREE: ICD-10-CM

## 2022-01-01 DIAGNOSIS — E11.22 TYPE 2 DIABETES MELLITUS WITH DIABETIC CHRONIC KIDNEY DISEASE: ICD-10-CM

## 2022-01-01 DIAGNOSIS — Z51.5 ENCOUNTER FOR PALLIATIVE CARE: ICD-10-CM

## 2022-01-01 DIAGNOSIS — Z95.5 PRESENCE OF CORONARY ANGIOPLASTY IMPLANT AND GRAFT: ICD-10-CM

## 2022-01-01 DIAGNOSIS — I35.0 NONRHEUMATIC AORTIC (VALVE) STENOSIS: ICD-10-CM

## 2022-01-01 DIAGNOSIS — Z98.890 OTHER SPECIFIED POSTPROCEDURAL STATES: Chronic | ICD-10-CM

## 2022-01-01 DIAGNOSIS — I13.0 HYPERTENSIVE HEART AND CHRONIC KIDNEY DISEASE WITH HEART FAILURE AND STAGE 1 THROUGH STAGE 4 CHRONIC KIDNEY DISEASE, OR UNSPECIFIED CHRONIC KIDNEY DISEASE: ICD-10-CM

## 2022-01-01 DIAGNOSIS — I25.10 ATHEROSCLEROTIC HEART DISEASE OF NATIVE CORONARY ARTERY WITHOUT ANGINA PECTORIS: ICD-10-CM

## 2022-01-01 DIAGNOSIS — J96.00 ACUTE RESPIRATORY FAILURE, UNSPECIFIED WHETHER WITH HYPOXIA OR HYPERCAPNIA: ICD-10-CM

## 2022-01-01 DIAGNOSIS — E83.51 HYPOCALCEMIA: ICD-10-CM

## 2022-01-01 DIAGNOSIS — M10.9 GOUT, UNSPECIFIED: ICD-10-CM

## 2022-01-01 DIAGNOSIS — I95.9 HYPOTENSION, UNSPECIFIED: ICD-10-CM

## 2022-01-01 DIAGNOSIS — D50.9 IRON DEFICIENCY ANEMIA, UNSPECIFIED: ICD-10-CM

## 2022-01-01 DIAGNOSIS — Z66 DO NOT RESUSCITATE: ICD-10-CM

## 2022-01-01 DIAGNOSIS — I50.33 ACUTE ON CHRONIC DIASTOLIC (CONGESTIVE) HEART FAILURE: ICD-10-CM

## 2022-01-01 DIAGNOSIS — J47.0 BRONCHIECTASIS WITH ACUTE LOWER RESPIRATORY INFECTION: ICD-10-CM

## 2022-01-01 DIAGNOSIS — I21.4 NON-ST ELEVATION (NSTEMI) MYOCARDIAL INFARCTION: ICD-10-CM

## 2022-01-01 DIAGNOSIS — E43 UNSPECIFIED SEVERE PROTEIN-CALORIE MALNUTRITION: ICD-10-CM

## 2022-01-01 DIAGNOSIS — Z79.4 LONG TERM (CURRENT) USE OF INSULIN: ICD-10-CM

## 2022-01-01 DIAGNOSIS — J15.6 PNEUMONIA DUE TO OTHER GRAM-NEGATIVE BACTERIA: ICD-10-CM

## 2022-01-01 DIAGNOSIS — N18.4 CHRONIC KIDNEY DISEASE, STAGE 4 (SEVERE): ICD-10-CM

## 2022-01-01 DIAGNOSIS — H40.9 UNSPECIFIED GLAUCOMA: ICD-10-CM

## 2022-01-01 DIAGNOSIS — E78.5 HYPERLIPIDEMIA, UNSPECIFIED: ICD-10-CM

## 2022-01-01 DIAGNOSIS — Z95.0 PRESENCE OF CARDIAC PACEMAKER: ICD-10-CM

## 2022-01-01 DIAGNOSIS — Z85.46 PERSONAL HISTORY OF MALIGNANT NEOPLASM OF PROSTATE: ICD-10-CM

## 2022-01-01 LAB
A1C WITH ESTIMATED AVERAGE GLUCOSE RESULT: 6.8 % — HIGH (ref 4–5.6)
ADD ON TEST-SPECIMEN IN LAB: SIGNIFICANT CHANGE UP
ALBUMIN SERPL ELPH-MCNC: 3.2 G/DL — LOW (ref 3.3–5)
ALP SERPL-CCNC: 124 U/L — HIGH (ref 40–120)
ALT FLD-CCNC: 27 U/L — SIGNIFICANT CHANGE UP (ref 12–78)
ANION GAP SERPL CALC-SCNC: 10 MMOL/L — SIGNIFICANT CHANGE UP (ref 5–17)
ANION GAP SERPL CALC-SCNC: 31 MMOL/L — HIGH (ref 5–17)
ANION GAP SERPL CALC-SCNC: 4 MMOL/L — LOW (ref 5–17)
ANION GAP SERPL CALC-SCNC: 7 MMOL/L — SIGNIFICANT CHANGE UP (ref 5–17)
ANION GAP SERPL CALC-SCNC: 8 MMOL/L — SIGNIFICANT CHANGE UP (ref 5–17)
APTT BLD: 31.6 SEC — SIGNIFICANT CHANGE UP (ref 27.5–35.5)
APTT BLD: 84.6 SEC — HIGH (ref 27.5–35.5)
AST SERPL-CCNC: 26 U/L — SIGNIFICANT CHANGE UP (ref 15–37)
BASOPHILS # BLD AUTO: 0.1 K/UL — SIGNIFICANT CHANGE UP (ref 0–0.2)
BASOPHILS NFR BLD AUTO: 1 % — SIGNIFICANT CHANGE UP (ref 0–2)
BILIRUB SERPL-MCNC: 0.3 MG/DL — SIGNIFICANT CHANGE UP (ref 0.2–1.2)
BUN SERPL-MCNC: 103 MG/DL — HIGH (ref 7–23)
BUN SERPL-MCNC: 119 MG/DL — HIGH (ref 7–23)
BUN SERPL-MCNC: 141 MG/DL — HIGH (ref 7–23)
BUN SERPL-MCNC: 98 MG/DL — HIGH (ref 7–23)
BUN SERPL-MCNC: 99 MG/DL — HIGH (ref 7–23)
CALCIUM SERPL-MCNC: 10.4 MG/DL — HIGH (ref 8.5–10.1)
CALCIUM SERPL-MCNC: 8.4 MG/DL — LOW (ref 8.5–10.1)
CALCIUM SERPL-MCNC: 8.6 MG/DL — SIGNIFICANT CHANGE UP (ref 8.5–10.1)
CALCIUM SERPL-MCNC: 9.8 MG/DL — SIGNIFICANT CHANGE UP (ref 8.5–10.1)
CALCIUM SERPL-MCNC: 9.8 MG/DL — SIGNIFICANT CHANGE UP (ref 8.5–10.1)
CHLORIDE SERPL-SCNC: 101 MMOL/L — SIGNIFICANT CHANGE UP (ref 96–108)
CHLORIDE SERPL-SCNC: 103 MMOL/L — SIGNIFICANT CHANGE UP (ref 96–108)
CHLORIDE SERPL-SCNC: 105 MMOL/L — SIGNIFICANT CHANGE UP (ref 96–108)
CHLORIDE SERPL-SCNC: 105 MMOL/L — SIGNIFICANT CHANGE UP (ref 96–108)
CHLORIDE SERPL-SCNC: 107 MMOL/L — SIGNIFICANT CHANGE UP (ref 96–108)
CHOLEST SERPL-MCNC: 114 MG/DL — SIGNIFICANT CHANGE UP
CK SERPL-CCNC: 174 U/L — SIGNIFICANT CHANGE UP (ref 26–308)
CO2 SERPL-SCNC: 10 MMOL/L — CRITICAL LOW (ref 22–31)
CO2 SERPL-SCNC: 27 MMOL/L — SIGNIFICANT CHANGE UP (ref 22–31)
CO2 SERPL-SCNC: 28 MMOL/L — SIGNIFICANT CHANGE UP (ref 22–31)
CO2 SERPL-SCNC: 31 MMOL/L — SIGNIFICANT CHANGE UP (ref 22–31)
CO2 SERPL-SCNC: 32 MMOL/L — HIGH (ref 22–31)
CREAT SERPL-MCNC: 3.97 MG/DL — HIGH (ref 0.5–1.3)
CREAT SERPL-MCNC: 4.13 MG/DL — HIGH (ref 0.5–1.3)
CREAT SERPL-MCNC: 4.29 MG/DL — HIGH (ref 0.5–1.3)
CREAT SERPL-MCNC: 4.5 MG/DL — HIGH (ref 0.5–1.3)
CREAT SERPL-MCNC: 6.41 MG/DL — HIGH (ref 0.5–1.3)
EOSINOPHIL # BLD AUTO: 0.21 K/UL — SIGNIFICANT CHANGE UP (ref 0–0.5)
EOSINOPHIL NFR BLD AUTO: 2 % — SIGNIFICANT CHANGE UP (ref 0–6)
ESTIMATED AVERAGE GLUCOSE: 148 MG/DL — HIGH (ref 68–114)
GLUCOSE SERPL-MCNC: 134 MG/DL — HIGH (ref 70–99)
GLUCOSE SERPL-MCNC: 143 MG/DL — HIGH (ref 70–99)
GLUCOSE SERPL-MCNC: 180 MG/DL — HIGH (ref 70–99)
GLUCOSE SERPL-MCNC: 185 MG/DL — HIGH (ref 70–99)
GLUCOSE SERPL-MCNC: 94 MG/DL — SIGNIFICANT CHANGE UP (ref 70–99)
HCT VFR BLD CALC: 25.7 % — LOW (ref 39–50)
HCT VFR BLD CALC: 25.8 % — LOW (ref 39–50)
HCT VFR BLD CALC: 26.3 % — LOW (ref 39–50)
HCT VFR BLD CALC: 27.8 % — LOW (ref 39–50)
HCT VFR BLD CALC: 29.5 % — LOW (ref 39–50)
HDLC SERPL-MCNC: 41 MG/DL — SIGNIFICANT CHANGE UP
HGB BLD-MCNC: 7.8 G/DL — LOW (ref 13–17)
HGB BLD-MCNC: 7.9 G/DL — LOW (ref 13–17)
HGB BLD-MCNC: 8 G/DL — LOW (ref 13–17)
HGB BLD-MCNC: 8.1 G/DL — LOW (ref 13–17)
HGB BLD-MCNC: 8.2 G/DL — LOW (ref 13–17)
INR BLD: 0.98 RATIO — SIGNIFICANT CHANGE UP (ref 0.88–1.16)
LACTATE SERPL-SCNC: 1.1 MMOL/L — SIGNIFICANT CHANGE UP (ref 0.7–2)
LIPID PNL WITH DIRECT LDL SERPL: 60 MG/DL — SIGNIFICANT CHANGE UP
LYMPHOCYTES # BLD AUTO: 0.63 K/UL — LOW (ref 1–3.3)
LYMPHOCYTES # BLD AUTO: 6 % — LOW (ref 13–44)
MAGNESIUM SERPL-MCNC: 2.6 MG/DL — SIGNIFICANT CHANGE UP (ref 1.6–2.6)
MAGNESIUM SERPL-MCNC: 2.8 MG/DL — HIGH (ref 1.6–2.6)
MCHC RBC-ENTMCNC: 27.1 GM/DL — LOW (ref 32–36)
MCHC RBC-ENTMCNC: 28.9 PG — SIGNIFICANT CHANGE UP (ref 27–34)
MCHC RBC-ENTMCNC: 29.1 PG — SIGNIFICANT CHANGE UP (ref 27–34)
MCHC RBC-ENTMCNC: 29.5 GM/DL — LOW (ref 32–36)
MCHC RBC-ENTMCNC: 29.5 PG — SIGNIFICANT CHANGE UP (ref 27–34)
MCHC RBC-ENTMCNC: 29.6 PG — SIGNIFICANT CHANGE UP (ref 27–34)
MCHC RBC-ENTMCNC: 30 PG — SIGNIFICANT CHANGE UP (ref 27–34)
MCHC RBC-ENTMCNC: 30.4 GM/DL — LOW (ref 32–36)
MCHC RBC-ENTMCNC: 30.6 GM/DL — LOW (ref 32–36)
MCHC RBC-ENTMCNC: 30.8 GM/DL — LOW (ref 32–36)
MCV RBC AUTO: 109.3 FL — HIGH (ref 80–100)
MCV RBC AUTO: 95.9 FL — SIGNIFICANT CHANGE UP (ref 80–100)
MCV RBC AUTO: 96.3 FL — SIGNIFICANT CHANGE UP (ref 80–100)
MCV RBC AUTO: 97.4 FL — SIGNIFICANT CHANGE UP (ref 80–100)
MCV RBC AUTO: 97.9 FL — SIGNIFICANT CHANGE UP (ref 80–100)
MONOCYTES # BLD AUTO: 0.84 K/UL — SIGNIFICANT CHANGE UP (ref 0–0.9)
MONOCYTES NFR BLD AUTO: 8 % — SIGNIFICANT CHANGE UP (ref 2–14)
NEUTROPHILS # BLD AUTO: 8.71 K/UL — HIGH (ref 1.8–7.4)
NEUTROPHILS NFR BLD AUTO: 83 % — HIGH (ref 43–77)
NON HDL CHOLESTEROL: 73 MG/DL — SIGNIFICANT CHANGE UP
NRBC # BLD: 1 /100 WBCS — HIGH (ref 0–0)
NRBC # BLD: SIGNIFICANT CHANGE UP /100 WBCS (ref 0–0)
NT-PROBNP SERPL-SCNC: 9176 PG/ML — HIGH (ref 0–450)
NT-PROBNP SERPL-SCNC: HIGH PG/ML (ref 0–450)
NT-PROBNP SERPL-SCNC: HIGH PG/ML (ref 0–450)
PHOSPHATE SERPL-MCNC: 5.4 MG/DL — HIGH (ref 2.5–4.5)
PLATELET # BLD AUTO: 220 K/UL — SIGNIFICANT CHANGE UP (ref 150–400)
PLATELET # BLD AUTO: 224 K/UL — SIGNIFICANT CHANGE UP (ref 150–400)
PLATELET # BLD AUTO: 237 K/UL — SIGNIFICANT CHANGE UP (ref 150–400)
PLATELET # BLD AUTO: 244 K/UL — SIGNIFICANT CHANGE UP (ref 150–400)
PLATELET # BLD AUTO: 265 K/UL — SIGNIFICANT CHANGE UP (ref 150–400)
POTASSIUM SERPL-MCNC: 3.6 MMOL/L — SIGNIFICANT CHANGE UP (ref 3.5–5.3)
POTASSIUM SERPL-MCNC: 3.8 MMOL/L — SIGNIFICANT CHANGE UP (ref 3.5–5.3)
POTASSIUM SERPL-MCNC: 3.9 MMOL/L — SIGNIFICANT CHANGE UP (ref 3.5–5.3)
POTASSIUM SERPL-MCNC: 3.9 MMOL/L — SIGNIFICANT CHANGE UP (ref 3.5–5.3)
POTASSIUM SERPL-MCNC: 4.9 MMOL/L — SIGNIFICANT CHANGE UP (ref 3.5–5.3)
POTASSIUM SERPL-SCNC: 3.6 MMOL/L — SIGNIFICANT CHANGE UP (ref 3.5–5.3)
POTASSIUM SERPL-SCNC: 3.8 MMOL/L — SIGNIFICANT CHANGE UP (ref 3.5–5.3)
POTASSIUM SERPL-SCNC: 3.9 MMOL/L — SIGNIFICANT CHANGE UP (ref 3.5–5.3)
POTASSIUM SERPL-SCNC: 3.9 MMOL/L — SIGNIFICANT CHANGE UP (ref 3.5–5.3)
POTASSIUM SERPL-SCNC: 4.9 MMOL/L — SIGNIFICANT CHANGE UP (ref 3.5–5.3)
PROT SERPL-MCNC: 7.4 GM/DL — SIGNIFICANT CHANGE UP (ref 6–8.3)
PROTHROM AB SERPL-ACNC: 11.5 SEC — SIGNIFICANT CHANGE UP (ref 10.6–13.6)
RAPID RVP RESULT: SIGNIFICANT CHANGE UP
RBC # BLD: 2.68 M/UL — LOW (ref 4.2–5.8)
RBC # BLD: 2.68 M/UL — LOW (ref 4.2–5.8)
RBC # BLD: 2.7 M/UL — LOW (ref 4.2–5.8)
RBC # BLD: 2.7 M/UL — LOW (ref 4.2–5.8)
RBC # BLD: 2.84 M/UL — LOW (ref 4.2–5.8)
RBC # FLD: 21.2 % — HIGH (ref 10.3–14.5)
RBC # FLD: 21.2 % — HIGH (ref 10.3–14.5)
RBC # FLD: 21.4 % — HIGH (ref 10.3–14.5)
RBC # FLD: 21.4 % — HIGH (ref 10.3–14.5)
RBC # FLD: 21.5 % — HIGH (ref 10.3–14.5)
SARS-COV-2 RNA SPEC QL NAA+PROBE: SIGNIFICANT CHANGE UP
SODIUM SERPL-SCNC: 141 MMOL/L — SIGNIFICANT CHANGE UP (ref 135–145)
SODIUM SERPL-SCNC: 142 MMOL/L — SIGNIFICANT CHANGE UP (ref 135–145)
TRIGL SERPL-MCNC: 65 MG/DL — SIGNIFICANT CHANGE UP
TROPONIN I, HIGH SENSITIVITY RESULT: 1288.41 NG/L — HIGH
TROPONIN I, HIGH SENSITIVITY RESULT: 446.71 NG/L — HIGH
WBC # BLD: 10.49 K/UL — SIGNIFICANT CHANGE UP (ref 3.8–10.5)
WBC # BLD: 10.99 K/UL — HIGH (ref 3.8–10.5)
WBC # BLD: 12.39 K/UL — HIGH (ref 3.8–10.5)
WBC # BLD: 15.37 K/UL — HIGH (ref 3.8–10.5)
WBC # BLD: 18.81 K/UL — HIGH (ref 3.8–10.5)
WBC # FLD AUTO: 10.49 K/UL — SIGNIFICANT CHANGE UP (ref 3.8–10.5)
WBC # FLD AUTO: 10.99 K/UL — HIGH (ref 3.8–10.5)
WBC # FLD AUTO: 12.39 K/UL — HIGH (ref 3.8–10.5)
WBC # FLD AUTO: 15.37 K/UL — HIGH (ref 3.8–10.5)
WBC # FLD AUTO: 18.81 K/UL — HIGH (ref 3.8–10.5)

## 2022-01-01 PROCEDURE — 82962 GLUCOSE BLOOD TEST: CPT

## 2022-01-01 PROCEDURE — 94760 N-INVAS EAR/PLS OXIMETRY 1: CPT

## 2022-01-01 PROCEDURE — 71045 X-RAY EXAM CHEST 1 VIEW: CPT | Mod: 26

## 2022-01-01 PROCEDURE — 93005 ELECTROCARDIOGRAM TRACING: CPT

## 2022-01-01 PROCEDURE — 83880 ASSAY OF NATRIURETIC PEPTIDE: CPT

## 2022-01-01 PROCEDURE — 93010 ELECTROCARDIOGRAM REPORT: CPT

## 2022-01-01 PROCEDURE — 87040 BLOOD CULTURE FOR BACTERIA: CPT

## 2022-01-01 PROCEDURE — 12345: CPT | Mod: NC

## 2022-01-01 PROCEDURE — 85730 THROMBOPLASTIN TIME PARTIAL: CPT

## 2022-01-01 PROCEDURE — 99497 ADVNCD CARE PLAN 30 MIN: CPT | Mod: 25

## 2022-01-01 PROCEDURE — 99291 CRITICAL CARE FIRST HOUR: CPT | Mod: GC

## 2022-01-01 PROCEDURE — 80061 LIPID PANEL: CPT

## 2022-01-01 PROCEDURE — 99233 SBSQ HOSP IP/OBS HIGH 50: CPT

## 2022-01-01 PROCEDURE — 71045 X-RAY EXAM CHEST 1 VIEW: CPT

## 2022-01-01 PROCEDURE — 83735 ASSAY OF MAGNESIUM: CPT

## 2022-01-01 PROCEDURE — 36415 COLL VENOUS BLD VENIPUNCTURE: CPT

## 2022-01-01 PROCEDURE — 82550 ASSAY OF CK (CPK): CPT

## 2022-01-01 PROCEDURE — 93279 PRGRMG DEV EVAL PM/LDLS PM: CPT | Mod: 26

## 2022-01-01 PROCEDURE — 83605 ASSAY OF LACTIC ACID: CPT

## 2022-01-01 PROCEDURE — 93306 TTE W/DOPPLER COMPLETE: CPT

## 2022-01-01 PROCEDURE — 84100 ASSAY OF PHOSPHORUS: CPT

## 2022-01-01 PROCEDURE — 93306 TTE W/DOPPLER COMPLETE: CPT | Mod: 26

## 2022-01-01 PROCEDURE — 94660 CPAP INITIATION&MGMT: CPT

## 2022-01-01 PROCEDURE — 85027 COMPLETE CBC AUTOMATED: CPT

## 2022-01-01 PROCEDURE — 94640 AIRWAY INHALATION TREATMENT: CPT

## 2022-01-01 PROCEDURE — 99232 SBSQ HOSP IP/OBS MODERATE 35: CPT

## 2022-01-01 PROCEDURE — 83036 HEMOGLOBIN GLYCOSYLATED A1C: CPT

## 2022-01-01 PROCEDURE — 99223 1ST HOSP IP/OBS HIGH 75: CPT

## 2022-01-01 PROCEDURE — 80048 BASIC METABOLIC PNL TOTAL CA: CPT

## 2022-01-01 PROCEDURE — 84484 ASSAY OF TROPONIN QUANT: CPT

## 2022-01-01 RX ORDER — DEXTROSE 50 % IN WATER 50 %
15 SYRINGE (ML) INTRAVENOUS ONCE
Refills: 0 | Status: DISCONTINUED | OUTPATIENT
Start: 2022-01-01 | End: 2022-01-01

## 2022-01-01 RX ORDER — INSULIN GLARGINE 100 [IU]/ML
10 INJECTION, SOLUTION SUBCUTANEOUS AT BEDTIME
Refills: 0 | Status: DISCONTINUED | OUTPATIENT
Start: 2022-01-01 | End: 2022-01-01

## 2022-01-01 RX ORDER — BRIMONIDINE TARTRATE 2 MG/MG
1 SOLUTION/ DROPS OPHTHALMIC DAILY
Refills: 0 | Status: DISCONTINUED | OUTPATIENT
Start: 2022-01-01 | End: 2022-01-01

## 2022-01-01 RX ORDER — FERROUS GLUCONATE 100 %
1 POWDER (GRAM) MISCELLANEOUS
Qty: 0 | Refills: 0 | DISCHARGE

## 2022-01-01 RX ORDER — ALBUTEROL 90 UG/1
1 AEROSOL, METERED ORAL THREE TIMES A DAY
Refills: 0 | Status: DISCONTINUED | OUTPATIENT
Start: 2022-01-01 | End: 2022-01-01

## 2022-01-01 RX ORDER — AZITHROMYCIN 500 MG/1
500 TABLET, FILM COATED ORAL DAILY
Refills: 0 | Status: DISCONTINUED | OUTPATIENT
Start: 2022-01-01 | End: 2022-01-01

## 2022-01-01 RX ORDER — INSULIN LISPRO 100/ML
VIAL (ML) SUBCUTANEOUS AT BEDTIME
Refills: 0 | Status: DISCONTINUED | OUTPATIENT
Start: 2022-01-01 | End: 2022-01-01

## 2022-01-01 RX ORDER — ATORVASTATIN CALCIUM 80 MG/1
40 TABLET, FILM COATED ORAL AT BEDTIME
Refills: 0 | Status: DISCONTINUED | OUTPATIENT
Start: 2022-01-01 | End: 2022-01-01

## 2022-01-01 RX ORDER — BRIMONIDINE TARTRATE 2 MG/MG
1 SOLUTION/ DROPS OPHTHALMIC
Qty: 0 | Refills: 0 | DISCHARGE

## 2022-01-01 RX ORDER — SODIUM CHLORIDE 9 MG/ML
250 INJECTION INTRAMUSCULAR; INTRAVENOUS; SUBCUTANEOUS ONCE
Refills: 0 | Status: COMPLETED | OUTPATIENT
Start: 2022-01-01 | End: 2022-01-01

## 2022-01-01 RX ORDER — ACETAMINOPHEN 500 MG
650 TABLET ORAL EVERY 6 HOURS
Refills: 0 | Status: DISCONTINUED | OUTPATIENT
Start: 2022-01-01 | End: 2022-01-01

## 2022-01-01 RX ORDER — POLYETHYLENE GLYCOL 3350 17 G/17G
17 POWDER, FOR SOLUTION ORAL
Qty: 0 | Refills: 0 | DISCHARGE

## 2022-01-01 RX ORDER — METOPROLOL TARTRATE 50 MG
25 TABLET ORAL DAILY
Refills: 0 | Status: DISCONTINUED | OUTPATIENT
Start: 2022-01-01 | End: 2022-01-01

## 2022-01-01 RX ORDER — MORPHINE SULFATE 50 MG/1
2 CAPSULE, EXTENDED RELEASE ORAL ONCE
Refills: 0 | Status: DISCONTINUED | OUTPATIENT
Start: 2022-01-01 | End: 2022-01-01

## 2022-01-01 RX ORDER — CEFTRIAXONE 500 MG/1
1000 INJECTION, POWDER, FOR SOLUTION INTRAMUSCULAR; INTRAVENOUS EVERY 24 HOURS
Refills: 0 | Status: DISCONTINUED | OUTPATIENT
Start: 2022-01-01 | End: 2022-01-01

## 2022-01-01 RX ORDER — ALLOPURINOL 300 MG
100 TABLET ORAL DAILY
Refills: 0 | Status: DISCONTINUED | OUTPATIENT
Start: 2022-01-01 | End: 2022-01-01

## 2022-01-01 RX ORDER — MIDODRINE HYDROCHLORIDE 2.5 MG/1
10 TABLET ORAL EVERY 8 HOURS
Refills: 0 | Status: DISCONTINUED | OUTPATIENT
Start: 2022-01-01 | End: 2022-01-01

## 2022-01-01 RX ORDER — ERYTHROPOIETIN 10000 [IU]/ML
1 INJECTION, SOLUTION INTRAVENOUS; SUBCUTANEOUS
Qty: 0 | Refills: 0 | DISCHARGE

## 2022-01-01 RX ORDER — METOPROLOL TARTRATE 50 MG
0.5 TABLET ORAL
Qty: 0 | Refills: 0 | DISCHARGE

## 2022-01-01 RX ORDER — MIDODRINE HYDROCHLORIDE 2.5 MG/1
10 TABLET ORAL ONCE
Refills: 0 | Status: COMPLETED | OUTPATIENT
Start: 2022-01-01 | End: 2022-01-01

## 2022-01-01 RX ORDER — AMLODIPINE BESYLATE 2.5 MG/1
5 TABLET ORAL DAILY
Refills: 0 | Status: DISCONTINUED | OUTPATIENT
Start: 2022-01-01 | End: 2022-01-01

## 2022-01-01 RX ORDER — DEXTROSE 50 % IN WATER 50 %
12.5 SYRINGE (ML) INTRAVENOUS ONCE
Refills: 0 | Status: DISCONTINUED | OUTPATIENT
Start: 2022-01-01 | End: 2022-01-01

## 2022-01-01 RX ORDER — DEXTROSE 50 % IN WATER 50 %
25 SYRINGE (ML) INTRAVENOUS ONCE
Refills: 0 | Status: DISCONTINUED | OUTPATIENT
Start: 2022-01-01 | End: 2022-01-01

## 2022-01-01 RX ORDER — METOPROLOL TARTRATE 50 MG
12.5 TABLET ORAL DAILY
Refills: 0 | Status: DISCONTINUED | OUTPATIENT
Start: 2022-01-01 | End: 2022-01-01

## 2022-01-01 RX ORDER — INSULIN LISPRO 100/ML
VIAL (ML) SUBCUTANEOUS
Refills: 0 | Status: DISCONTINUED | OUTPATIENT
Start: 2022-01-01 | End: 2022-01-01

## 2022-01-01 RX ORDER — GLUCAGON INJECTION, SOLUTION 0.5 MG/.1ML
1 INJECTION, SOLUTION SUBCUTANEOUS ONCE
Refills: 0 | Status: DISCONTINUED | OUTPATIENT
Start: 2022-01-01 | End: 2022-01-01

## 2022-01-01 RX ORDER — HEPARIN SODIUM 5000 [USP'U]/ML
5000 INJECTION INTRAVENOUS; SUBCUTANEOUS EVERY 12 HOURS
Refills: 0 | Status: DISCONTINUED | OUTPATIENT
Start: 2022-01-01 | End: 2022-01-01

## 2022-01-01 RX ORDER — SODIUM CHLORIDE 9 MG/ML
1000 INJECTION, SOLUTION INTRAVENOUS
Refills: 0 | Status: DISCONTINUED | OUTPATIENT
Start: 2022-01-01 | End: 2022-01-01

## 2022-01-01 RX ORDER — INSULIN GLARGINE 100 [IU]/ML
16 INJECTION, SOLUTION SUBCUTANEOUS
Qty: 0 | Refills: 0 | DISCHARGE

## 2022-01-01 RX ORDER — ACETAMINOPHEN 500 MG
1 TABLET ORAL
Qty: 0 | Refills: 0 | DISCHARGE

## 2022-01-01 RX ORDER — FUROSEMIDE 40 MG
80 TABLET ORAL ONCE
Refills: 0 | Status: COMPLETED | OUTPATIENT
Start: 2022-01-01 | End: 2022-01-01

## 2022-01-01 RX ORDER — POLYETHYLENE GLYCOL 3350 17 G/17G
17 POWDER, FOR SOLUTION ORAL DAILY
Refills: 0 | Status: DISCONTINUED | OUTPATIENT
Start: 2022-01-01 | End: 2022-01-01

## 2022-01-01 RX ORDER — CALCIUM ACETATE 667 MG
667 TABLET ORAL
Refills: 0 | Status: DISCONTINUED | OUTPATIENT
Start: 2022-01-01 | End: 2022-01-01

## 2022-01-01 RX ORDER — BRINZOLAMIDE/BRIMONIDINE TARTRATE 10; 2 MG/ML; MG/ML
1 SUSPENSION/ DROPS OPHTHALMIC
Qty: 0 | Refills: 0 | DISCHARGE

## 2022-01-01 RX ORDER — FERROUS SULFATE 325(65) MG
325 TABLET ORAL DAILY
Refills: 0 | Status: DISCONTINUED | OUTPATIENT
Start: 2022-01-01 | End: 2022-01-01

## 2022-01-01 RX ORDER — FUROSEMIDE 40 MG
40 TABLET ORAL
Refills: 0 | Status: DISCONTINUED | OUTPATIENT
Start: 2022-01-01 | End: 2022-01-01

## 2022-01-01 RX ORDER — ASPIRIN/CALCIUM CARB/MAGNESIUM 324 MG
81 TABLET ORAL DAILY
Refills: 0 | Status: DISCONTINUED | OUTPATIENT
Start: 2022-01-01 | End: 2022-01-01

## 2022-01-01 RX ORDER — HEPARIN SODIUM 5000 [USP'U]/ML
4100 INJECTION INTRAVENOUS; SUBCUTANEOUS ONCE
Refills: 0 | Status: COMPLETED | OUTPATIENT
Start: 2022-01-01 | End: 2022-01-01

## 2022-01-01 RX ORDER — PANTOPRAZOLE SODIUM 20 MG/1
40 TABLET, DELAYED RELEASE ORAL
Refills: 0 | Status: DISCONTINUED | OUTPATIENT
Start: 2022-01-01 | End: 2022-01-01

## 2022-01-01 RX ORDER — ASPIRIN/CALCIUM CARB/MAGNESIUM 324 MG
1 TABLET ORAL
Qty: 0 | Refills: 0 | DISCHARGE

## 2022-01-01 RX ORDER — DORZOLAMIDE HYDROCHLORIDE 20 MG/ML
1 SOLUTION/ DROPS OPHTHALMIC
Qty: 0 | Refills: 0 | DISCHARGE

## 2022-01-01 RX ORDER — HEPARIN SODIUM 5000 [USP'U]/ML
4100 INJECTION INTRAVENOUS; SUBCUTANEOUS EVERY 6 HOURS
Refills: 0 | Status: DISCONTINUED | OUTPATIENT
Start: 2022-01-01 | End: 2022-01-01

## 2022-01-01 RX ORDER — DORZOLAMIDE HYDROCHLORIDE 20 MG/ML
1 SOLUTION/ DROPS OPHTHALMIC
Refills: 0 | Status: DISCONTINUED | OUTPATIENT
Start: 2022-01-01 | End: 2022-01-01

## 2022-01-01 RX ORDER — CHOLECALCIFEROL (VITAMIN D3) 125 MCG
1 CAPSULE ORAL
Qty: 0 | Refills: 0 | DISCHARGE

## 2022-01-01 RX ORDER — HEPARIN SODIUM 5000 [USP'U]/ML
INJECTION INTRAVENOUS; SUBCUTANEOUS
Qty: 25000 | Refills: 0 | Status: DISCONTINUED | OUTPATIENT
Start: 2022-01-01 | End: 2022-01-01

## 2022-01-01 RX ADMIN — Medication 325 MILLIGRAM(S): at 10:22

## 2022-01-01 RX ADMIN — DORZOLAMIDE HYDROCHLORIDE 1 DROP(S): 20 SOLUTION/ DROPS OPHTHALMIC at 12:07

## 2022-01-01 RX ADMIN — Medication 81 MILLIGRAM(S): at 20:55

## 2022-01-01 RX ADMIN — HEPARIN SODIUM 650 UNIT(S)/HR: 5000 INJECTION INTRAVENOUS; SUBCUTANEOUS at 05:34

## 2022-01-01 RX ADMIN — Medication 100 MILLIGRAM(S): at 09:20

## 2022-01-01 RX ADMIN — Medication 40 MILLIGRAM(S): at 10:22

## 2022-01-01 RX ADMIN — INSULIN GLARGINE 10 UNIT(S): 100 INJECTION, SOLUTION SUBCUTANEOUS at 21:02

## 2022-01-01 RX ADMIN — AMLODIPINE BESYLATE 5 MILLIGRAM(S): 2.5 TABLET ORAL at 10:21

## 2022-01-01 RX ADMIN — Medication 667 MILLIGRAM(S): at 12:07

## 2022-01-01 RX ADMIN — Medication 1: at 06:35

## 2022-01-01 RX ADMIN — INSULIN GLARGINE 10 UNIT(S): 100 INJECTION, SOLUTION SUBCUTANEOUS at 21:53

## 2022-01-01 RX ADMIN — Medication 2: at 06:40

## 2022-01-01 RX ADMIN — PANTOPRAZOLE SODIUM 40 MILLIGRAM(S): 20 TABLET, DELAYED RELEASE ORAL at 06:39

## 2022-01-01 RX ADMIN — Medication 1: at 16:12

## 2022-01-01 RX ADMIN — Medication 81 MILLIGRAM(S): at 12:07

## 2022-01-01 RX ADMIN — PANTOPRAZOLE SODIUM 40 MILLIGRAM(S): 20 TABLET, DELAYED RELEASE ORAL at 06:40

## 2022-01-01 RX ADMIN — HEPARIN SODIUM 5000 UNIT(S): 5000 INJECTION INTRAVENOUS; SUBCUTANEOUS at 21:52

## 2022-01-01 RX ADMIN — CEFTRIAXONE 100 MILLIGRAM(S): 500 INJECTION, POWDER, FOR SOLUTION INTRAMUSCULAR; INTRAVENOUS at 21:53

## 2022-01-01 RX ADMIN — DORZOLAMIDE HYDROCHLORIDE 1 DROP(S): 20 SOLUTION/ DROPS OPHTHALMIC at 09:30

## 2022-01-01 RX ADMIN — Medication 81 MILLIGRAM(S): at 11:18

## 2022-01-01 RX ADMIN — HEPARIN SODIUM 0 UNIT(S)/HR: 5000 INJECTION INTRAVENOUS; SUBCUTANEOUS at 04:29

## 2022-01-01 RX ADMIN — POLYETHYLENE GLYCOL 3350 17 GRAM(S): 17 POWDER, FOR SOLUTION ORAL at 09:30

## 2022-01-01 RX ADMIN — HEPARIN SODIUM 5000 UNIT(S): 5000 INJECTION INTRAVENOUS; SUBCUTANEOUS at 09:31

## 2022-01-01 RX ADMIN — ATORVASTATIN CALCIUM 40 MILLIGRAM(S): 80 TABLET, FILM COATED ORAL at 21:53

## 2022-01-01 RX ADMIN — Medication 667 MILLIGRAM(S): at 11:55

## 2022-01-01 RX ADMIN — Medication 81 MILLIGRAM(S): at 12:22

## 2022-01-01 RX ADMIN — CEFTRIAXONE 100 MILLIGRAM(S): 500 INJECTION, POWDER, FOR SOLUTION INTRAMUSCULAR; INTRAVENOUS at 21:03

## 2022-01-01 RX ADMIN — Medication 667 MILLIGRAM(S): at 17:51

## 2022-01-01 RX ADMIN — CEFTRIAXONE 100 MILLIGRAM(S): 500 INJECTION, POWDER, FOR SOLUTION INTRAMUSCULAR; INTRAVENOUS at 09:21

## 2022-01-01 RX ADMIN — AZITHROMYCIN 500 MILLIGRAM(S): 500 TABLET, FILM COATED ORAL at 09:30

## 2022-01-01 RX ADMIN — MIDODRINE HYDROCHLORIDE 10 MILLIGRAM(S): 2.5 TABLET ORAL at 02:25

## 2022-01-01 RX ADMIN — Medication 1: at 16:43

## 2022-01-01 RX ADMIN — Medication 0: at 17:03

## 2022-01-01 RX ADMIN — PANTOPRAZOLE SODIUM 40 MILLIGRAM(S): 20 TABLET, DELAYED RELEASE ORAL at 10:21

## 2022-01-01 RX ADMIN — ATORVASTATIN CALCIUM 40 MILLIGRAM(S): 80 TABLET, FILM COATED ORAL at 21:02

## 2022-01-01 RX ADMIN — INSULIN GLARGINE 10 UNIT(S): 100 INJECTION, SOLUTION SUBCUTANEOUS at 21:56

## 2022-01-01 RX ADMIN — HEPARIN SODIUM 4100 UNIT(S): 5000 INJECTION INTRAVENOUS; SUBCUTANEOUS at 21:22

## 2022-01-01 RX ADMIN — Medication 125 MILLIGRAM(S): at 23:49

## 2022-01-01 RX ADMIN — POLYETHYLENE GLYCOL 3350 17 GRAM(S): 17 POWDER, FOR SOLUTION ORAL at 09:25

## 2022-01-01 RX ADMIN — Medication 667 MILLIGRAM(S): at 10:22

## 2022-01-01 RX ADMIN — Medication 100 MILLIGRAM(S): at 10:22

## 2022-01-01 RX ADMIN — BRIMONIDINE TARTRATE 1 DROP(S): 2 SOLUTION/ DROPS OPHTHALMIC at 09:31

## 2022-01-01 RX ADMIN — HEPARIN SODIUM 5000 UNIT(S): 5000 INJECTION INTRAVENOUS; SUBCUTANEOUS at 21:02

## 2022-01-01 RX ADMIN — Medication 667 MILLIGRAM(S): at 09:30

## 2022-01-01 RX ADMIN — ALBUTEROL 1 PUFF(S): 90 AEROSOL, METERED ORAL at 05:41

## 2022-01-01 RX ADMIN — ALBUTEROL 1 PUFF(S): 90 AEROSOL, METERED ORAL at 18:42

## 2022-01-01 RX ADMIN — ATORVASTATIN CALCIUM 40 MILLIGRAM(S): 80 TABLET, FILM COATED ORAL at 21:52

## 2022-01-01 RX ADMIN — Medication 40 MILLIGRAM(S): at 21:02

## 2022-01-01 RX ADMIN — Medication 2: at 11:42

## 2022-01-01 RX ADMIN — Medication 667 MILLIGRAM(S): at 09:20

## 2022-01-01 RX ADMIN — Medication 40 MILLIGRAM(S): at 17:03

## 2022-01-01 RX ADMIN — Medication 80 MILLIGRAM(S): at 19:40

## 2022-01-01 RX ADMIN — Medication 667 MILLIGRAM(S): at 17:03

## 2022-01-01 RX ADMIN — BRIMONIDINE TARTRATE 1 DROP(S): 2 SOLUTION/ DROPS OPHTHALMIC at 12:07

## 2022-01-01 RX ADMIN — Medication 325 MILLIGRAM(S): at 09:20

## 2022-01-01 RX ADMIN — DORZOLAMIDE HYDROCHLORIDE 1 DROP(S): 20 SOLUTION/ DROPS OPHTHALMIC at 09:21

## 2022-01-01 RX ADMIN — Medication 3: at 10:57

## 2022-01-01 RX ADMIN — PANTOPRAZOLE SODIUM 40 MILLIGRAM(S): 20 TABLET, DELAYED RELEASE ORAL at 06:06

## 2022-01-01 RX ADMIN — AZITHROMYCIN 500 MILLIGRAM(S): 500 TABLET, FILM COATED ORAL at 10:22

## 2022-01-01 RX ADMIN — Medication 667 MILLIGRAM(S): at 17:48

## 2022-01-01 RX ADMIN — Medication 100 MILLIGRAM(S): at 09:30

## 2022-01-01 RX ADMIN — HEPARIN SODIUM 800 UNIT(S)/HR: 5000 INJECTION INTRAVENOUS; SUBCUTANEOUS at 21:25

## 2022-01-01 RX ADMIN — BRIMONIDINE TARTRATE 1 DROP(S): 2 SOLUTION/ DROPS OPHTHALMIC at 09:19

## 2022-01-01 RX ADMIN — Medication 667 MILLIGRAM(S): at 12:22

## 2022-01-01 RX ADMIN — SODIUM CHLORIDE 250 MILLILITER(S): 9 INJECTION INTRAMUSCULAR; INTRAVENOUS; SUBCUTANEOUS at 03:56

## 2022-01-01 RX ADMIN — Medication 325 MILLIGRAM(S): at 09:32

## 2022-01-06 NOTE — PATIENT PROFILE ADULT - DO YOU FEEL UNSAFE AT HOME, WORK, OR SCHOOL?
Brenna PENA [address]  Dept: [phone]  Dept Fax: [phone]    Visit type: Established patient    Reason for Visit: Post-COVID Symptoms (requesting extended work excuse), Shortness of Breath (having to take frequent breaks ), Otalgia (left; throbbing ), and Discuss Medications (requesting inhaler/neb solution )         Assessment and Plan       1. Persistent shortness of breath after COVID-19  -     albuterol sulfate HFA (PROVENTIL HFA) 108 (90 Base) MCG/ACT inhaler; Inhale 2 puffs into the lungs every 6 hours as needed for Wheezing, Disp-18 g, R-3Normal  -     albuterol (PROVENTIL) (2.5 MG/3ML) 0.083% nebulizer solution; Take 3 mLs by nebulization every 6 hours as needed for Wheezing, Disp-120 each, R-3Normal  2. Postviral fatigue syndrome  3. Mild intermittent asthma, unspecified whether complicated  -     albuterol sulfate HFA (PROVENTIL HFA) 108 (90 Base) MCG/ACT inhaler; Inhale 2 puffs into the lungs every 6 hours as needed for Wheezing, Disp-18 g, R-3Normal  -     albuterol (PROVENTIL) (2.5 MG/3ML) 0.083% nebulizer solution; Take 3 mLs by nebulization every 6 hours as needed for Wheezing, Disp-120 each, R-3Normal  4. Seasonal allergies  -     loratadine (CLARITIN) 10 MG tablet; Take 1 tablet by mouth daily, Disp-30 tablet, R-5Normal  5. Non-recurrent acute serous otitis media of left ear  -     fluticasone (FLONASE) 50 MCG/ACT nasal spray; 2 sprays by Each Nostril route daily, Disp-16 g, R-2Normal  -     methylPREDNISolone (MEDROL DOSEPACK) 4 MG tablet; Take by mouth., Disp-1 kit, R-0Normal  -     loratadine (CLARITIN) 10 MG tablet; Take 1 tablet by mouth daily, Disp-30 tablet, R-5Normal    Discussed diagnosis, expected course, and proper use of medication, including OTC medications if prescription is too expensive or insurance does not cover. Discussed signs and symptoms requiring medical attention.   All questions were answered and patient voiced understanding and agreement with plan as discussed. Return if symptoms worsen or fail to improve, for next scheduled follow up with PCP. Subjective       HPI   Patient reports that he just had COVID-19 and got off quarantine yesterday but is still winded and easily getting fatigued. He states that he works a very labor-intensive job and does not know that he is ready to be able to go back to work and perform his duties. He states he has been getting some shortness of breath and does have a history of asthma so he has nebulizer at home but is in need of some albuterol. He does state that when he uses the nebulizer seems to be beneficial for him. He also states he has been having some throbbing in his left ear and some dizziness that all started the other day. He has not been taking thing or do anything in efforts to improve the ear pain and symptoms and denies any specific aggravating or relieving factors for symptoms. He denies any fever and states that he is over 6 symptoms just still having the fatigue, shortness of breath, and this new ear pain. Review of Systems   Constitutional: Positive for fatigue. Negative for activity change, appetite change and fever. HENT: Positive for ear pain. Negative for congestion, rhinorrhea and sore throat. Eyes: Negative for pain and discharge. Respiratory: Positive for shortness of breath. Negative for cough. Cardiovascular: Negative for chest pain and palpitations. Gastrointestinal: Negative for abdominal pain, constipation, diarrhea, nausea and vomiting. Endocrine: Negative for cold intolerance and heat intolerance. Genitourinary: Negative for dysuria and hematuria. Musculoskeletal: Negative for back pain, gait problem, myalgias and neck pain. Skin: Negative for rash and wound.         Allergies   Allergen Reactions    Penicillins        Outpatient Medications Prior to Visit   Medication Sig Dispense Refill    loratadine (CLARITIN) 10 MG tablet Take 1 tablet by mouth daily 30 tablet 5     No facility-administered medications prior to visit. Past Medical History:   Diagnosis Date    ADHD (attention deficit hyperactivity disorder)     Asthma         Social History     Tobacco Use    Smoking status: Never Smoker    Smokeless tobacco: Never Used   Substance Use Topics    Alcohol use: No        Past Surgical History:   Procedure Laterality Date    COSMETIC SURGERY  2 mo old    Birthmark removal    TYMPANOSTOMY TUBE PLACEMENT      Age 1       Family History   Problem Relation Age of Onset    Heart Disease Mother         Heart murmur    Asthma Father     Heart Disease Maternal Grandfather     Heart Disease Maternal Cousin         MI at 28        Objective       /86 (Site: Right Upper Arm, Position: Sitting, Cuff Size: Large Adult)   Pulse 96   Temp 98.4 °F (36.9 °C) (Temporal)   Ht 5' 11\" (1.803 m)   Wt 265 lb (120.2 kg)   SpO2 98%   BMI 36.96 kg/m²   Physical Exam  Vitals and nursing note reviewed. Constitutional:       Appearance: He is well-developed. HENT:      Head: Normocephalic and atraumatic. Right Ear: Hearing, tympanic membrane, ear canal and external ear normal.      Left Ear: Ear canal and external ear normal. Tympanic membrane is bulging. Tympanic membrane is not erythematous. Nose: Congestion and rhinorrhea present. Mouth/Throat:      Mouth: Mucous membranes are moist.      Pharynx: No oropharyngeal exudate or posterior oropharyngeal erythema. Eyes:      General: No scleral icterus. Right eye: No discharge. Left eye: No discharge. Conjunctiva/sclera: Conjunctivae normal.      Pupils: Pupils are equal, round, and reactive to light. Neck:      Trachea: No tracheal deviation. Cardiovascular:      Rate and Rhythm: Normal rate and regular rhythm. Heart sounds: Normal heart sounds. No murmur heard. No friction rub. No gallop.     Pulmonary:      Effort: Pulmonary effort is normal. No respiratory distress. Breath sounds: Normal breath sounds. No wheezing or rales. Abdominal:      General: Bowel sounds are normal. There is no distension. Palpations: Abdomen is soft. Tenderness: There is no abdominal tenderness. Musculoskeletal:         General: No deformity ( no gross deformities of upper or lower extremities bilaterally). Normal range of motion. Cervical back: Normal range of motion and neck supple. Right lower leg: No edema. Left lower leg: No edema. Lymphadenopathy:      Cervical: No cervical adenopathy. Skin:     General: Skin is warm and dry. Findings: No erythema or rash. Neurological:      Mental Status: He is alert and oriented to person, place, and time. Cranial Nerves: No cranial nerve deficit. Motor: No abnormal muscle tone. Psychiatric:         Behavior: Behavior normal.         Thought Content:  Thought content normal.           Data Reviewed and Summarized       Labs:     Imaging/Testing:        Talia Sher MD no

## 2022-01-27 NOTE — ED PROVIDER NOTE - ENMT, MLM
Oral pharynx clear. Airway patent, Nasal mucosa clear. Mouth with fairly moist mucosa. Throat has no vesicles, no oropharyngeal exudates and uvula is midline. +Lesion on tongue, patient states is chronic and painless.

## 2022-01-27 NOTE — ED PROVIDER NOTE - CARE PLAN
1 Principal Discharge DX:	NSTEMI (non-ST elevation myocardial infarction)   Principal Discharge DX:	NSTEMI (non-ST elevation myocardial infarction)  Secondary Diagnosis:	Acute cardiogenic pulmonary edema

## 2022-01-27 NOTE — ED ADULT NURSE NOTE - OBJECTIVE STATEMENT
Patient BIBEMS complaining of SOB. Patient denies any chest pain or headache. Patient states that he has had shortness of breath for a while but today he feels worse. Patient states that he feels better with oxygen. Patient denies any chest pain, headaches, fevers, cough or chills.

## 2022-01-27 NOTE — ED ADULT NURSE NOTE - NS ED NURSE PATIENT LEFT UNIT TIME
Called patient and she states that she is ok now. She just wants to give Dr. Hiro Flores an update. Since the last she saw Dr. Hiro Flores she tried to get in with [facility] but couldn't there was a long wait. She then was able to get in at [facility] in which they diagnosed her with spinal stenosis. That is when she called over to [facility] and they needed an MRI before she could be seen. She called them again today and was able to get Dr. Jane Larson to order her MRI. Spo she no longer need Dr. Hiro Flores to do an order.  Please review update 01:58

## 2022-01-27 NOTE — ED PROVIDER NOTE - MUSCULOSKELETAL, MLM
Spine appears normal, range of motion is not limited, no muscle or joint tenderness. HUNTRE x4, no focal swelling/ tenderness.

## 2022-01-27 NOTE — ED PROVIDER NOTE - CLINICAL SUMMARY MEDICAL DECISION MAKING FREE TEXT BOX
89 y/o male with a PMHX of CAD s/p 2 stents, COPD, HTN on Lasix, Prostate CA s/p RT 25yrs ago, CKD4-5, anemia (iron def, CKD), DMT2, CHF, 2nd degree AV block, Gout, glaucoma, severe AS presents to the ED for SOB x3 few days, initially with exertion now progressively constant. Per [facility], pt with intermittent tachycardia. Pt in moderate acute respiratory distress 92-93% RA sat, bibasilar rales, respirations labored. Plan: EKG, CXR, labs including RVP/COVID, magnesium, BNP, troponin, IV Lasix, monitor, observe, reassess. pt may require biPAP, expect tele admission for suspected CHF exacerbation.

## 2022-01-27 NOTE — ED ADULT TRIAGE NOTE - CHIEF COMPLAINT QUOTE
Pt a/ox3, MAYRA from [facility], for worsening sob. As per EMS pt went to wellness center for symptoms, and was instructed to come to ED. pt reports "I have had shortness of breath for a while but today is got worse I do feel better with oxygen". pt denies fever,chills, cough, Chest pain, sick contacts.

## 2022-01-27 NOTE — PHARMACOTHERAPY INTERVENTION NOTE - COMMENTS
Medication History Complete. Medications and allergies reviewed with patient's MAR provided by [facility] and compared to [facility].

## 2022-01-27 NOTE — ED PROVIDER NOTE - ATTENDING CONTRIBUTION TO CARE
Dr. Latham: I have personally performed a face to face bedside history and physical examination of this patient. I have discussed the history, examination, review of systems, assessment and plan of management with the resident. I have reviewed the electronic medical record and amended it to reflect my history, review of systems, physical exam, assessment and plan.  BiPAP management & other acute medical management for acute pulmonary edema with NSTEMI.

## 2022-01-27 NOTE — ED PROVIDER NOTE - OBJECTIVE STATEMENT
89 y/o male with a PMHX of CAD s/p 2 stents, COPD, HTN on Lasix, Prostate CA s/p RT 25yrs ago, CKD4-5, anemia (iron def, CKD), DMT2, CHF, 2nd degree AV block, Gout, glaucoma, severe AS presents to the ED for SOB x3 few days, initially with exertion now progressively constant. Per [facility], pt with intermittent tachycardia. Pt is not usually on oxygen. no cough, no fever, no chest pain. Non drinker. Non smoker. Pt reports he has a pacemaker. +MOLST on file, pt is DNR/DNI, limited medical interventions.

## 2022-01-27 NOTE — ED ADULT NURSE NOTE - CHIEF COMPLAINT QUOTE
Pt a/ox3, MAYRA from [facility], for worsening sob. As per EMS pt went to wellness center for symptoms, and was instructed to come to ED. pt reports "I have had shortness of breath for a while but today is got worse I do feel better with oxygen". pt denies fever,chills, cough, Chest pain, sick contacts.

## 2022-01-28 NOTE — H&P ADULT - ASSESSMENT
#Acute decomp CHF  #NSTEMI  #Hx HFpEF  #Hx SBE w MV lesion  #AS  unable to take ASA, ACE inhibitor  1. admit to CICU  2. daily weights  3. serial trop  4. continue BiPAP overnight  5. s/p lasix 80 in ED  6. lasix 40 mg q 12 hrs  7. echo  8. cardiology      #Intermittent tachycardia  no added details from Assisted Living  #PPM  ekg w 1st degree AV block 72   when I compared to prior EKGs they were paced or junction fro   Has had 1st degree block as well as hx Mobitz I  1. monitor        #CKD4-5  #elevated Cr w pt's prior baseline in 3.5 range  1. trend Cr, P  2. calcium acetate      #Anemia  chronic disease and iron deficiency JUSTINE  1. trend  2. contiue fe sulfate as gluconate NA        #DM II on insulin  1. reg diet  2. lantius 10  3. BGM  4. ISS        #Glaucoma  1. gtts ordered    #GI  1. pantoprazole  2. miralax        #Gout  1. allopurinol        #HLD  1. atorvastatin 40 mg      #HTN  1. amlodipine 5 mg w parameters          #VTE  on IV heparin          #GOALS OF CARE  pr has prior MOLST w DNR/DNI  no feeding tube, a trial of antibiotics, send to the  hospital, and limited medical interventions"    MOLST updated    Pt requested that son be updated w his progress           #Acute decomp CHF  #NSTEMI  #Hx HFpEF  #Hx SBE w MV lesion  #AS  unable to take ASA, ACE inhibitor  1. admit to CICU  2. daily weights  3. serial trop  4. continue BiPAP overnight  5. s/p lasix 80 in ED  6. lasix 40 mg q 12 hrs  7. echo  8. cardiology      #Intermittent tachycardia  no added details from Assisted Living  #PPM  ekg w 1st degree AV block 72   when I compared to prior EKGs they were paced or junction fro   Has had 1st degree block as well as hx Mobitz I  1. monitor        #CKD4-5  #elevated Cr w pt's prior baseline in 3.5 range  1. trend Cr, P  2. calcium acetate      #Anemia  chronic disease and iron deficiency JUSTINE  1. trend  2. contiue fe sulfate as gluconate NA        #DM II on insulin  1. reg diet  2. lantius 10  3. BGM  4. ISS low given renal failure        #Glaucoma  1. gtts ordered    #GI  1. pantoprazole  2. miralax        #Gout  1. allopurinol        #HLD  1. atorvastatin 40 mg      #HTN  1. amlodipine 5 mg w parameters          #VTE  on IV heparin          #GOALS OF CARE  pr has prior MOL w DNR/DNI  no feeding tube, a trial of antibiotics, send to the  hospital, and limited medical interventions"    RITA updated    Pt requested that son be updated w his progress        report for coverage of pt in CCU given to critical care PA

## 2022-01-28 NOTE — PATIENT PROFILE ADULT - FALL HARM RISK - HARM RISK INTERVENTIONS

## 2022-01-28 NOTE — DIETITIAN NUTRITION RISK NOTIFICATION - ADDITIONAL COMMENTS/DIETITIAN RECOMMENDATIONS
· Additional Recommendations  1) assist with meals as needed and encourage protein enriched foods with all meals 2) Ensure enlive 8 oz po TID 3) monitor BM if none x > 3days consider additional bowel meds 4) MVI w/minerals to meet RDI's 5) history of DM 2 obtain HgA1C. 6) monitor daily weights track trends

## 2022-01-28 NOTE — DIETITIAN INITIAL EVALUATION ADULT. - MALNUTRITION
Severe protein/calorie malnutrition in context of acute on chronic illness r/t inability to meet nutritional needs 2/2 cardiac event AEB significant weight loss x 9 months (17.6%) and moderate muscle/fat wasting

## 2022-01-28 NOTE — H&P ADULT - NSHPOUTPATIENTPROVIDERS_GEN_ALL_CORE
PCP Dr. Bora Reynolds  renal Dr. Donta Hdez PCP Dr. Bora Mata  Card Dr. Cross   at  seen by Dr. Debra Reynolds  renal Dr. Donta Hdez

## 2022-01-28 NOTE — DIETITIAN INITIAL EVALUATION ADULT. - OTHER INFO
89 year old male w hx AS, SBE w MV lesion, CAD s/p 2 stents, HFpEF, PPM for CHB, CKD 4-5, COPD, DM II who presented to ED by EMS from [facility] w intermittent tachycardia and SOB. #Acute decomp CHF. #NSTEMI. Currently on venti-mask and BIPAP overnight. Pt with poor intake at this time. NFPE indicates moderate muscle/fat wasting. Weight documented in past hospital chart x 9 months ago (180#) indicating (32#/17.6%) clinically significant weight loss. Suggest additional protein shakes to increase nutrition intake. Will continue to monitor and follow up prn. DNR/DNI -advanced directives. GOC-nutrition support not addressed. Nutrition support is not recommended due to overall declining medical status which evidenced based studies indicate EN is not effective in prolonging survival and improving quality of life. It can also increase risk of aspiration pneumonia as well as other related issues. Will continue to monitor and follow up prn. See below for recommendations. 89 year old male w hx AS, SBE w MV lesion, CAD s/p 2 stents, HFpEF, PPM for CHB, CKD 4-5, COPD, DM II who presented to ED by EMS from [facility] w intermittent tachycardia and SOB. #Acute decomp CHF. #NSTEMI. Currently on venti-mask and BIPAP overnight. Pt with poor intake at this time. NFPE indicates moderate muscle/fat wasting. Weight documented in past hospital chart x 9 months ago (180#) indicating (32#/17.6%) clinically significant weight loss. Suggest additional protein shakes to increase nutrition intake. Will continue to monitor and follow up prn. MOLST form completed: DNR/DNI and NO TF- Will continue to monitor and follow up prn. See below for recommendations.

## 2022-01-28 NOTE — H&P ADULT - HISTORY OF PRESENT ILLNESS
89 year old male w hx AS, SBE, CAD s/p 2 stents, HFpEF, CKD 4-5, COPD 89 year old male w hx AS, SBE w MV lesion, CAD s/p 2 stents, HFpEF, PPM for CHB, CKD 4-5, COPD, DM II who presented to ED by EMS from [facility] w intermittent tachycardia and SOB    Details of [facility]cardia at [facility] not provided  Pt reports onset of SOB for awhile w exertion that progressed to SOB at rest  [facility]  Denied chest pain  Denied nausea, vomiting , abd pain  Denied fever or chills      In ED /63  HR 72   RR 25  T 98  93% sat RA  +NSTEMI w trop 400s on IV heparin  BNP 9K  lasix 80 mg  placed on BiPAP      PAST MEDICAL HISTORY:  Anemia   Aortic stenosis   CAD (coronary artery disease)   Chronic kidney disease (CKD)  Complete heart block    DM type 2 (diabetes mellitus, type 2)  First degree AV block    Glaucoma   Gout   HFpEF heart failure w preserved ejection fraction  HLD (hyperlipidemia)   Hypertension   Leg edema   Mobitz type I Wenckebach atrioventricular block  Osteoporosis   Prostate cancer s/p RT 25yrs ago  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 lesion on mitral valve      PAST SURGICAL HX  PPM permanent pacemaker Dr. Reynolds 09-  H/O inguinal hernia repair   History of colonoscopy   History of tonsillectomy childhood.     FAMILY HX  Family history of CVA  Family hx of lung cancer.    SOCIAL HX  at [facility]  never smoker

## 2022-01-28 NOTE — CDI QUERY NOTE - NSCDIOTHERTXTBX_GEN_ALL_CORE_HH
The Physician's or Provider's documentation of the patient's presentation, evaluation and medical management, as identified below, may support a diagnosis that is not documented to the furthest specificity in the medical record. Please clarify in your progress notes and/or discharge summary if there is a corresponding diagnosis associated with the clinical information described below:      -Acute respiratory failure                                       -Chronic respiratory failure                               -Acute on chronic respiratory failure    -Acute Respiratory Distress Syndrome  -Cardiorespiratory Failure  -Other (specify)  -Unknown    ALSO: Specify if with:  Hypoxia  Hypercapnea    Link Respiratory Failure to the underlying cause if known (e.g., respiratory failure secondary to _______).      SUPPORTING DOCUMENTATION AND/OR CLINICAL EVIDENCE:  presented to ED by EMS from [facility] w intermittent tachycardia and SOB    ED POA RR 22, 25 BIPAP 93 %  1/27, 1/8 BIPAB now o2 3L NC    CXR IMPRESSION:Perihilar and lower lobe interstitial opacities consistent with infiltrate in the correct clinical context, grossly unchanged    Lungs: scattered bilateral upper airway sounds    Pulse Oximeter   RR: 18 (01-28-22) (14 - 25)  SpO2: 98% (01-28-22) (93% - 100%)    Bipap          Cpap  O2 3 L       Cardiology note 1/27 1. Acute CHF with elevated troponin and BNP. Clinically improved after IV furosemide and Bipap.  2.CAD S/P prior stent x 2. Current NSTEMI may be Type 1 due to plaque erosion or thrombus or Type 2 due to CHF, CKD, anemia. There has been no chest pain and no ST elevation.  3.S/P Micra leadless PPM with evidence of PPM malfunction.

## 2022-01-28 NOTE — PROCEDURE NOTE - ADDITIONAL PROCEDURE DETAILS
AV conduction mode switch 73%  AM- 22.8%   only 2% AV conduction mode switch 73%  AM- 22.8%   only 2%  Noted 2.7 sec pause episode in ER @12:30 AM likely d/t mode switch, AV conduction mode switch turned off. AV delay adjusted as well in VDD mode.

## 2022-01-28 NOTE — DIETITIAN INITIAL EVALUATION ADULT. - PHYSCIAL ASSESSMENT
Claude scale- 14  Skin: sacrum stage 1  Last BM documented- none since admission x 1 day (meds: miralax)

## 2022-01-28 NOTE — PROCEDURE NOTE - INTERROGATION NOTE: COMMENTS
Normal functioning Micra AV PM with battery longevity >8 years. Current mode is VVI+ since pt's own rhythm SR 70's bpm

## 2022-01-28 NOTE — PROGRESS NOTE ADULT - ASSESSMENT
A/P: 89 male P/W SOB, NSTEMI, ? PPM malfunction    Plan:    Pulm: Patient refused NIV, now on NC O2    Cardio: PPM interrogated, functioning correctly.  Dr. Brown spoke to the patients son and there is to be no aggressive intervention, and maybe move toward palliative care    GI: PO Diet    All other care per Hospital Medicine team    Transfer to a Reg floor as he does not require the ICU/SDU/CICU/Tele           #GOALS OF CARE  pr has prior MOLST w DNR/DNI  no feeding tube, a trial of antibiotics, send to the  hospital, and limited medical interventions"    RITA updated    Pt requested that son be updated w his progress

## 2022-01-28 NOTE — H&P ADULT - NSHPPHYSICALEXAM_GEN_ALL_CORE
Vital Signs Last 24 Hrs  T(C): 36.7 (27 Jan 2022 19:07), Max: 36.7 (27 Jan 2022 19:07)  T(F): 98 (27 Jan 2022 19:07), Max: 98 (27 Jan 2022 19:07)  HR: 69 (27 Jan 2022 22:30) (68 - 72)  BP: 137/73 (27 Jan 2022 22:30) (127/63 - 137/73)  BP(mean): --  RR: 17 (27 Jan 2022 22:30) (17 - 25)  SpO2: 100% (27 Jan 2022 22:30) (93% - 100%)

## 2022-01-28 NOTE — DIETITIAN INITIAL EVALUATION ADULT. - PERTINENT LABORATORY DATA
01-28    142  |  107  |  98<H>  ----------------------------<  94  3.6   |  31  |  3.97<H>    Ca    8.4<L>      28 Jan 2022 06:59  Phos  5.4     01-28  Mg     2.6     01-28    TPro  7.4  /  Alb  3.2<L>  /  TBili  0.3  /  DBili  x   /  AST  26  /  ALT  27  /  AlkPhos  124<H>  01-27  BMI: BMI (kg/m2): 23.4 (01-27-22 @ 20:39)  HbA1c: A1C with Estimated Average Glucose Result: 7.5 % (09-21-21 @ 07:32)    Glucose: POCT Blood Glucose.: 213 mg/dL (09-29-21 @ 12:10)    BP: 131/59 (01-28-22 @ 06:00) (124/57 - 137/73)  Lipid Panel: Date/Time: 09-23-21 @ 04:55  Cholesterol, Serum: 101  Direct LDL: --  HDL Cholesterol, Serum: 33  Total Cholesterol/HDL Ration Measurement: --  Triglycerides, Serum: 96

## 2022-01-28 NOTE — PATIENT PROFILE ADULT - PATIENT REPRESENTATIVE: ( YOU CAN CHOOSE ANY PERSON THAT CAN ASSIST YOU WITH YOUR HEALTH CARE PREFERENCES, DOES NOT HAVE TO BE A SPOUSE, IMMEDIATE FAMILY OR SIGNIFICANT OTHER/PARTNER)
[place] received a viral test for COVID-19. They were educated on isolation and quarantine as appropriate. For any symptoms, they were directed to seek care from their PCP, given contact information to establish with a doctor, directed to an urgent care or the emergency room.
yes

## 2022-01-28 NOTE — DIETITIAN INITIAL EVALUATION ADULT. - ADD RECOMMEND
1) assist with meals as needed and encourage protein enriched foods with all meals 2) Ensure enlive 8 oz po TID 3) monitor BM if none x > 3days consider additional bowel meds 4) MVI w/minerals to meet RDI's 5) history of DM 2 obtain HgA1C. 6) monitor daily weights track trends

## 2022-01-29 NOTE — PROVIDER CONTACT NOTE (OTHER) - SITUATION
called and spoke to EP regarding consult.  They are aware
patient resides at [facility]

## 2022-01-30 NOTE — CONSULT NOTE ADULT - SUBJECTIVE AND OBJECTIVE BOX
Patient is a 89y old  Male who presents with a chief complaint of acute CHF  NSTEMI (28 Jan 2022 00:00)    1/28/2022:Cardiology Consultation - History from the chart and patient. 89 year old man residing at [facility] living sent by ambulance on 1/27 due to tachycardia and shortness of breath. No chest pain. Troponin and BNP elevated. Received IV furosemide 80 mg and Bipap and is improved. Currently denies shortness of breath. 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    HPI:  89 year old male w hx AS, SBE w MV lesion, CAD s/p 2 stents, HFpEF, PPM for CHB, CKD 4-5, COPD, DM II who presented to ED by EMS from [facility] w intermittent tachycardia and SOB    Details of tachycardia at Assisted Living not provided  Pt reports onset of SOB for awhile w exertion that progressed to SOB at rest  [facility]  Denied chest pain  Denied nausea, vomiting , abd pain  Denied fever or chills      In ED /63  HR 72   RR 25  T 98  93% sat RA  +NSTEMI w trop 400s on IV heparin  BNP 9K  lasix 80 mg  placed on BiPAP      PAST MEDICAL HISTORY:  Anemia   Aortic stenosis   CAD (coronary artery disease)   Chronic kidney disease (CKD)  Complete heart block    DM type 2 (diabetes mellitus, type 2)  First degree AV block    Glaucoma   Gout   HFpEF heart failure w preserved ejection fraction  HLD (hyperlipidemia)   Hypertension   Leg edema   Mobitz type I Wenckebach atrioventricular block  Osteoporosis   Prostate cancer s/p RT 25yrs ago  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 lesion on mitral valve      PAST SURGICAL HX  PPM permanent pacemaker Dr. Reynolds 09-  H/O inguinal hernia repair   History of colonoscopy   History of tonsillectomy childhood.     FAMILY HX  Family history of CVA  Family hx of lung cancer.    SOCIAL HX  at [facility]  never smoker       (28 Jan 2022 00:00)      PAST MEDICAL & SURGICAL HISTORY:  Hypertension    HLD (hyperlipidemia)    Gout    Prostate cancer    Anemia    Chronic kidney disease (CKD)    CAD (coronary artery disease)    DM type 2 (diabetes mellitus, type 2)    Glaucoma    Aortic stenosis    Osteoporosis    Leg edema    H/O inguinal hernia repair    History of tonsillectomy  childhood    History of colonoscopy          Allergies and Intolerances:        Allergies:  	aspirin: Drug, Unknown, medication allergy/contraindication noted on medical clearance-patient denies knowledge of  	enalapril: Drug, Unknown  	ACE inhibitors: Drug Category, Unknown    Home Medications:   * Patient Currently Takes Medications as of 27-Jan-2022 23:55 documented in Structured Notes  · 	furosemide 40 mg oral tablet: Last Dose Taken:  , 1 tab(s) orally 2 times a day  · 	pantoprazole 40 mg oral delayed release tablet: Last Dose Taken:  , 1 tab(s) orally 2 times a day  · 	amLODIPine 5 mg oral tablet: Last Dose Taken:  , 1 tab(s) orally once a day  · 	allopurinol 100 mg oral tablet: Last Dose Taken:  , 1 tab(s) orally once a day  · 	atorvastatin 40 mg oral tablet: Last Dose Taken:  , 1 tab(s) orally once a day (at bedtime)  · 	calcium acetate 667 mg oral tablet: Last Dose Taken:  , 1 tab(s) orally 3 times a day (with meals)  · 	MiraLax oral powder for reconstitution: Last Dose Taken:  , 17 gram(s) orally once a day  · 	ferrous gluconate 324 mg (38 mg elemental iron) oral tablet: Last Dose Taken:  , 1 tab(s) orally once a day  · 	metoprolol succinate 25 mg oral tablet, extended release: Last Dose Taken:  , 0.5 tab(s) orally once a day  · 	cholecalciferol 1250 mcg (50,000 intl units) oral capsule: Last Dose Taken:  , 1 cap(s) orally once a month  · 	acetaminophen 325 mg oral tablet: Last Dose Taken:  , 1 tab(s) orally every 4 hours, As Needed - for headache, for mild pain  · 	Basaglar KwikPen 100 units/mL subcutaneous solution: Last Dose Taken:  , 16 unit(s) subcutaneous once a day (at bedtime)  · 	brimonidine 0.2% ophthalmic solution: Last Dose Taken:  , 1 drop(s) in the left eye once a day  · 	dorzolamide 2% ophthalmic solution: Last Dose Taken:  , 1 drop(s) in the left eye once a day  · 	Retacrit 40,000 units/mL preservative-free injectable solution: Last Dose Taken:  , 1 milliliter(s) subcutaneously every other week  MEDICATIONS  (STANDING):  allopurinol 100 milliGRAM(s) Oral daily  amLODIPine   Tablet 5 milliGRAM(s) Oral daily  aspirin enteric coated 81 milliGRAM(s) Oral daily  atorvastatin 40 milliGRAM(s) Oral at bedtime  brimonidine 0.2% Ophthalmic Solution 1 Drop(s) Left EYE daily  calcium acetate 667 milliGRAM(s) Oral three times a day with meals  dextrose 40% Gel 15 Gram(s) Oral once  dextrose 5%. 1000 milliLiter(s) (50 mL/Hr) IV Continuous <Continuous>  dextrose 5%. 1000 milliLiter(s) (100 mL/Hr) IV Continuous <Continuous>  dextrose 50% Injectable 25 Gram(s) IV Push once  dextrose 50% Injectable 12.5 Gram(s) IV Push once  dextrose 50% Injectable 25 Gram(s) IV Push once  dorzolamide 2% Ophthalmic Solution 1 Drop(s) Left EYE <User Schedule>  ferrous    sulfate 325 milliGRAM(s) Oral daily  furosemide   Injectable 40 milliGRAM(s) IV Push two times a day  glucagon  Injectable 1 milliGRAM(s) IntraMuscular once  heparin  Infusion.  Unit(s)/Hr (8 mL/Hr) IV Continuous <Continuous>  insulin glargine Injectable (LANTUS) 10 Unit(s) SubCutaneous at bedtime  insulin lispro (ADMELOG) corrective regimen sliding scale   SubCutaneous three times a day before meals  insulin lispro (ADMELOG) corrective regimen sliding scale   SubCutaneous at bedtime  metoprolol succinate ER 12.5 milliGRAM(s) Oral daily  pantoprazole    Tablet 40 milliGRAM(s) Oral before breakfast  polyethylene glycol 3350 17 Gram(s) Oral daily    MEDICATIONS  (PRN):  acetaminophen     Tablet .. 650 milliGRAM(s) Oral every 6 hours PRN Mild Pain (1 - 3)  heparin   Injectable 4100 Unit(s) IV Push every 6 hours PRN For aPTT less than 40  morphine  - Injectable 2 milliGRAM(s) IV Push once PRN Chest Pain      FAMILY HISTORY:  Family history of CVA    Family hx of lung cancer        SOCIAL HISTORY:  Tobacco-  no         Alcohol-              Illicit drugs-              Occupation-              Marital  status-  REVIEW OF SYSTEM:  Pertinent items are noted in HPI.    Vital Signs Last 24 Hrs  T(C): 36.3 (28 Jan 2022 04:51), Max: 36.7 (27 Jan 2022 19:07)  T(F): 97.4 (28 Jan 2022 04:51), Max: 98 (27 Jan 2022 19:07)  HR: 64 (28 Jan 2022 06:33) (62 - 72)  BP: 131/59 (28 Jan 2022 06:00) (124/57 - 137/73)  BP(mean): 78 (28 Jan 2022 06:00) (75 - 89)  RR: 18 (28 Jan 2022 06:00) (16 - 25)  SpO2: 98% (28 Jan 2022 06:33) (93% - 100%)    I&O's Summary    27 Jan 2022 07:01  -  28 Jan 2022 07:00  --------------------------------------------------------  IN: 125 mL / OUT: 0 mL / NET: 125 mL      PHYSICAL EXAM  General Appearance:  comfortable . Face mask oxygen  HEENT: PERRL, conjunctiva clear, EOM's intact, non injected pharynx, no exudate, TM   normal  Neck: Supple, , no adenopathy, thyroid: not enlarged, no carotid bruit or JVD  Back: Symmetric, no  tenderness,no soft tissue tenderness  Lungs: scattered bilateral upper airway sounds  Heart: Regular rate and rhythm, S1 normal, S2 reduced, 3/6 JACKELIN LSB and base radiating to the neck.  Abdomen: Soft, non-tender, bowel sounds active , no hepatosplenomegaly  Extremities: no cyanosis or edema, no joint swelling  Skin: Skin color, texture normal, no rashes   Neurologic: Alert and oriented X3 , cranial nerves intact, sensory and motor normal,        INTERPRETATION OF TELEMETRY: RSR with 2.70 second pause due to AV block     ECG:# 1-sinus rhythm 72 BPM, first degree AV block, LAD, LAE, LVH, ST depression consistent with anterolateral ischemia.   # 2 today- sinus 67 BPM, multiple irregular PPM spikes without sensing or capture ,LVH, ST-T changes consistent with anterolateral ischemia        LABS:                          7.8    10.99 )-----------( 237      ( 28 Jan 2022 06:59 )             25.7     01-28    142  |  107  |  98<H>  ----------------------------<  94  3.6   |  31  |  3.97<H>    Ca    8.4<L>      28 Jan 2022 06:59  Phos  5.4     01-28  Mg     2.6     01-28    TPro  7.4  /  Alb  3.2<L>  /  TBili  0.3  /  DBili  x   /  AST  26  /  ALT  27  /  AlkPhos  124<H>  01-27    CARDIAC MARKERS ( 28 Jan 2022 06:59 )  x     / x     / 174 U/L / x     / x            Pro BNP  9176 01-27 @ 19:44  D Dimer  -- 01-27 @ 19:44    PT/INR - ( 27 Jan 2022 20:33 )   PT: 11.5 sec;   INR: 0.98 ratio         PTT - ( 28 Jan 2022 03:44 )  PTT:84.6 sec          RADIOLOGY & ADDITIONAL STUDIES: CXR not available for my review.    IMPRESSION:  1. Acute CHF with elevated troponin and BNP. Clinically improved after IV furosemide and Bipap.  2.CAD S/P prior stent x 2. Current NSTEMI may be Type 1 due to plaque erosion or thrombus or Type 2 due to CHF, CKD, anemia. There has been no chest pain and no ST elevation.  3.S/P Micra leadless PPM with evidence of PPM malfunction.  4.Severe aortic valve stenosis.  5.CKD stage 4-5.  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   7. Chronic anemia.    PLAN:  TTE for LV function, re-evaluation of AS and mitral vegetation. EP consult for PPM interrogation.  Blood cultures to rule out bacteremia. Continue IV furosemide. Consider nephrology consultation. Will discontinue IV heparin and continue aspirin. Hold metoprolol until EP evaluates. Continue atorvastatin. Spoke with kevon Jackson who states patient goal is quality of life. Will not pursue invasive testing or treatment. Palliative care is appropriate. Will follow.   
  HPI:    89 year old male w hx AS, SBE w MV lesion, CAD s/p 2 stents, HFpEF, PPM for CHB, CKD 4-5, COPD, DM II who admitted from [facility] w intermittent tachycardia and SOB, In ED /63  HR 72   RR 25  T 98  93% sat RA, +NSTEMI w trop 400s on IV heparin, BNP 9K, lasix 80 mg,placed on BiPAP, pat not a surgical candidate as per cardic, on 50% VM sat 93%, lying comfortably.      PAST MEDICAL HISTORY:  Anemia   Aortic stenosis   CAD (coronary artery disease)   Chronic kidney disease (CKD)  Complete heart block    DM type 2 (diabetes mellitus, type 2)  First degree AV block    Glaucoma   Gout   HFpEF heart failure w preserved ejection fraction  HLD (hyperlipidemia)   Hypertension   Leg edema   Mobitz type I Wenckebach atrioventricular block  Osteoporosis   Prostate cancer s/p RT 25yrs ago  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 lesion on mitral valve      PAST SURGICAL HX  PPM permanent pacemaker Dr. Reynolds 09-  H/O inguinal hernia repair   History of colonoscopy   History of tonsillectomy childhood.     FAMILY HX  Family history of CVA  Family hx of lung cancer.    SOCIAL HX  at [facility]  never smoker       (28 Jan 2022 00:00)      PAST MEDICAL & SURGICAL HISTORY:  Hypertension    HLD (hyperlipidemia)    Gout    Prostate cancer    Anemia    Chronic kidney disease (CKD)    CAD (coronary artery disease)    DM type 2 (diabetes mellitus, type 2)    Glaucoma    Aortic stenosis    Osteoporosis    Leg edema    H/O inguinal hernia repair    History of tonsillectomy  childhood    History of colonoscopy        Home Medications:  acetaminophen 325 mg oral tablet: 1 tab(s) orally every 4 hours, As Needed - for headache, for mild pain (27 Jan 2022 23:55)  allopurinol 100 mg oral tablet: 1 tab(s) orally once a day (27 Jan 2022 23:55)  amLODIPine 5 mg oral tablet: 1 tab(s) orally once a day (27 Jan 2022 23:55)  atorvastatin 40 mg oral tablet: 1 tab(s) orally once a day (at bedtime) (27 Jan 2022 23:55)  Basaglar KwikPen 100 units/mL subcutaneous solution: 16 unit(s) subcutaneous once a day (at bedtime) (27 Jan 2022 23:55)  brimonidine 0.2% ophthalmic solution: 1 drop(s) in the left eye once a day (27 Jan 2022 23:55)  calcium acetate 667 mg oral tablet: 1 tab(s) orally 3 times a day (with meals) (27 Jan 2022 23:55)  cholecalciferol 1250 mcg (50,000 intl units) oral capsule: 1 cap(s) orally once a month (27 Jan 2022 23:55)  dorzolamide 2% ophthalmic solution: 1 drop(s) in the left eye once a day (27 Jan 2022 23:55)  ferrous gluconate 324 mg (38 mg elemental iron) oral tablet: 1 tab(s) orally once a day (27 Jan 2022 23:55)  furosemide 40 mg oral tablet: 1 tab(s) orally 2 times a day (27 Jan 2022 23:55)  metoprolol succinate 25 mg oral tablet, extended release: 0.5 tab(s) orally once a day (27 Jan 2022 23:55)  MiraLax oral powder for reconstitution: 17 gram(s) orally once a day (27 Jan 2022 23:55)  pantoprazole 40 mg oral delayed release tablet: 1 tab(s) orally 2 times a day (27 Jan 2022 23:55)  Retacrit 40,000 units/mL preservative-free injectable solution: 1 milliliter(s) subcutaneously every other week (27 Jan 2022 23:55)      MEDICATIONS  (STANDING):  ALBUTerol    90 MICROgram(s) HFA Inhaler 1 Puff(s) Inhalation three times a day  allopurinol 100 milliGRAM(s) Oral daily  aspirin enteric coated 81 milliGRAM(s) Oral daily  atorvastatin 40 milliGRAM(s) Oral at bedtime  azithromycin   Tablet 500 milliGRAM(s) Oral daily  brimonidine 0.2% Ophthalmic Solution 1 Drop(s) Left EYE daily  calcium acetate 667 milliGRAM(s) Oral three times a day with meals  cefTRIAXone   IVPB 1000 milliGRAM(s) IV Intermittent every 24 hours  dextrose 40% Gel 15 Gram(s) Oral once  dextrose 5%. 1000 milliLiter(s) (50 mL/Hr) IV Continuous <Continuous>  dextrose 5%. 1000 milliLiter(s) (100 mL/Hr) IV Continuous <Continuous>  dextrose 50% Injectable 25 Gram(s) IV Push once  dextrose 50% Injectable 12.5 Gram(s) IV Push once  dextrose 50% Injectable 25 Gram(s) IV Push once  dorzolamide 2% Ophthalmic Solution 1 Drop(s) Left EYE <User Schedule>  ferrous    sulfate 325 milliGRAM(s) Oral daily  glucagon  Injectable 1 milliGRAM(s) IntraMuscular once  heparin   Injectable 5000 Unit(s) SubCutaneous every 12 hours  insulin glargine Injectable (LANTUS) 10 Unit(s) SubCutaneous at bedtime  insulin lispro (ADMELOG) corrective regimen sliding scale   SubCutaneous three times a day before meals  insulin lispro (ADMELOG) corrective regimen sliding scale   SubCutaneous at bedtime  methylPREDNISolone sodium succinate Injectable 40 milliGRAM(s) IV Push two times a day  metoprolol succinate ER 25 milliGRAM(s) Oral daily  pantoprazole    Tablet 40 milliGRAM(s) Oral before breakfast  polyethylene glycol 3350 17 Gram(s) Oral daily    MEDICATIONS  (PRN):  acetaminophen     Tablet .. 650 milliGRAM(s) Oral every 6 hours PRN Mild Pain (1 - 3)      Allergies    ACE inhibitors (Unknown)  aspirin (Unknown)  enalapril (Unknown)    Intolerances        SOCIAL HISTORY: Denies tobacco, etoh abuse or illicit drug use    FAMILY HISTORY:  Family history of CVA    Family hx of lung cancer        Vital Signs Last 24 Hrs  T(C): 36.3 (30 Jan 2022 09:45), Max: 36.6 (29 Jan 2022 23:19)  T(F): 97.3 (30 Jan 2022 09:45), Max: 97.9 (29 Jan 2022 23:19)  HR: 81 (30 Jan 2022 09:45) (81 - 89)  BP: 120/64 (30 Jan 2022 09:45) (110/57 - 127/64)  BP(mean): --  RR: 18 (30 Jan 2022 09:45) (17 - 18)  SpO2: 94% (30 Jan 2022 09:45) (94% - 96%)        REVIEW OF SYSTEMS:    CONSTITUTIONAL:  As per HPI.  HEENT:  Eyes:  No diplopia or blurred vision. ENT:  No earache, sore throat or runny nose.  CARDIOVASCULAR:  No pressure, squeezing, tightness, heaviness or aching about the chest, neck, axilla or epigastrium.  RESPIRATORY:  No cough, +shortness of breath, PND or orthopnea.  GASTROINTESTINAL:  No nausea, vomiting or diarrhea.  GENITOURINARY:  No dysuria, frequency or urgency.  MUSCULOSKELETAL:  As per HPI.  SKIN:  No change in skin, hair or nails.  NEUROLOGIC:  No paresthesias, fasciculations, seizures or weakness.  PSYCHIATRIC:  No disorder of thought or mood.  ENDOCRINE:  No heat or cold intolerance, polyuria or polydipsia.  HEMATOLOGICAL:  No easy bruising or bleedings:  .     PHYSICAL EXAMINATION:    GENERAL APPEARANCE:  Pt. is not currently dyspneic, in no distress. Pt. is alert, oriented, and pleasant.  HEENT:  Pupils are normal and react normally. No icterus. Mucous membranes well colored.  NECK:  Supple. No lymphadenopathy. Jugular venous pressure not elevated. Carotids equal.   HEART:   The cardiac impulse has a normal quality. Regular. Normal S1 and S2. There are no murmurs, rubs or gallops noted  CHEST:  Chest crackles to auscultation. Normal respiratory effort.  ABDOMEN:  Soft and nontender.   EXTREMITIES:  There is no cyanosis, clubbing or edema.   SKIN:  No rash or significant lesions are noted.    LABS:                        7.8    18.48 )-----------( 239      ( 30 Jan 2022 06:57 )             25.6     01-30    142  |  103  |  119<H>  ----------------------------<  134<H>  3.9   |  32<H>  |  4.50<H>    Ca    9.8      30 Jan 2022 06:57    Culture - Blood (collected 01-28-22 @ 12:25)  Source: .Blood None  Preliminary Report (01-29-22 @ 15:05):    No growth to date.        RADIOLOGY & ADDITIONAL STUDIES:     
Patient is a 89y Male whom presented to the hospital with AS, SBE w MV lesion, CAD s/p 2 stents, HFpEF, PPM for CHB, CKD 4-5, COPD, DM II who presented to ED by EMS from [facility] w intermittent tachycardia and SOB. Patient to  and transferred to the medical floors.     PAST MEDICAL & SURGICAL HISTORY:  Hypertension    HLD (hyperlipidemia)    Gout    Prostate cancer    Anemia    Chronic kidney disease (CKD)    CAD (coronary artery disease)    DM type 2 (diabetes mellitus, type 2)    Glaucoma    Aortic stenosis    Osteoporosis    Leg edema    H/O inguinal hernia repair    History of tonsillectomy  childhood    History of colonoscopy        MEDICATIONS  (STANDING):  allopurinol 100 milliGRAM(s) Oral daily  amLODIPine   Tablet 5 milliGRAM(s) Oral daily  aspirin enteric coated 81 milliGRAM(s) Oral daily  atorvastatin 40 milliGRAM(s) Oral at bedtime  brimonidine 0.2% Ophthalmic Solution 1 Drop(s) Left EYE daily  calcium acetate 667 milliGRAM(s) Oral three times a day with meals  dextrose 40% Gel 15 Gram(s) Oral once  dextrose 5%. 1000 milliLiter(s) (50 mL/Hr) IV Continuous <Continuous>  dextrose 5%. 1000 milliLiter(s) (100 mL/Hr) IV Continuous <Continuous>  dextrose 50% Injectable 25 Gram(s) IV Push once  dextrose 50% Injectable 12.5 Gram(s) IV Push once  dextrose 50% Injectable 25 Gram(s) IV Push once  dorzolamide 2% Ophthalmic Solution 1 Drop(s) Left EYE <User Schedule>  ferrous    sulfate 325 milliGRAM(s) Oral daily  furosemide   Injectable 40 milliGRAM(s) IV Push two times a day  glucagon  Injectable 1 milliGRAM(s) IntraMuscular once  insulin glargine Injectable (LANTUS) 10 Unit(s) SubCutaneous at bedtime  insulin lispro (ADMELOG) corrective regimen sliding scale   SubCutaneous three times a day before meals  insulin lispro (ADMELOG) corrective regimen sliding scale   SubCutaneous at bedtime  metoprolol succinate ER 12.5 milliGRAM(s) Oral daily  pantoprazole    Tablet 40 milliGRAM(s) Oral before breakfast  polyethylene glycol 3350 17 Gram(s) Oral daily    MEDICATIONS  (PRN):  acetaminophen     Tablet .. 650 milliGRAM(s) Oral every 6 hours PRN Mild Pain (1 - 3)  morphine  - Injectable 2 milliGRAM(s) IV Push once PRN Chest Pain      Allergies    ACE inhibitors (Unknown)  aspirin (Unknown)  enalapril (Unknown)    Intolerances        SOCIAL HISTORY:    FAMILY HISTORY:  Family history of CVA    Family hx of lung cancer        REVIEW OF SYSTEMS:    CONSTITUTIONAL: No weakness, fevers or chills  EYES/ENT: No visual changes;  No vertigo or throat pain   NECK: No pain or stiffness  RESPIRATORY: No cough, wheezing, hemoptysis; No shortness of breath  CARDIOVASCULAR: No chest pain or palpitations  GASTROINTESTINAL: No abdominal or epigastric pain. No nausea, vomiting, or hematemesis; No diarrhea or constipation. No melena or hematochezia.  GENITOURINARY: No dysuria, frequency or hematuria  NEUROLOGICAL: No numbness or weakness  SKIN: No itching, burning, rashes, or lesions   All other review of systems is negative unless indicated above.      T(C): , Max: 36.7 (01-27-22 @ 19:07)  T(F): , Max: 98 (01-27-22 @ 19:07)  HR: 83 (01-28-22 @ 15:27)  BP: 122/62 (01-28-22 @ 17:05)  BP(mean): 79 (01-28-22 @ 13:00)  RR: 18 (01-28-22 @ 15:27)  SpO2: 93% (01-28-22 @ 15:27)  Wt(kg): --    01-27 @ 07:01  -  01-28 @ 07:00  --------------------------------------------------------  IN: 125 mL / OUT: 0 mL / NET: 125 mL    01-28 @ 07:01  -  01-28 @ 17:39  --------------------------------------------------------  IN: 0 mL / OUT: 1000 mL / NET: -1000 mL      Height (cm): 170.2 (01-27 @ 19:07)  Weight (kg): 67.7 (01-27 @ 20:39)  BMI (kg/m2): 23.4 (01-27 @ 20:39)  BSA (m2): 1.79 (01-27 @ 20:39)    PHYSICAL EXAM:    Constitutional: NAD, well-groomed, well-developed  HEENT: PERRLA, EOMI,  MMM  Neck: No LAD, No JVD  Respiratory: CTAB  Cardiovascular: S1 and S2, RRR  Gastrointestinal: BS+, soft, NT/ND  Extremities: No peripheral edema  Neurological: A/O x 3, no focal deficits  Psychiatric: Normal mood, normal affect  : No Akins  Skin: No rashes  Access: Not applicable        LABS:                        7.8    10.99 )-----------( 237      ( 28 Jan 2022 06:59 )             25.7     28 Jan 2022 06:59    142    |  107    |  98     ----------------------------<  94     3.6     |  31     |  3.97   27 Jan 2022 19:44    141    |  105    |  99     ----------------------------<  180    3.8     |  28     |  4.13     Ca    8.4        28 Jan 2022 06:59  Ca    8.6        27 Jan 2022 19:44  Phos  5.4       28 Jan 2022 06:59  Mg     2.6       28 Jan 2022 06:59  Mg     2.8       27 Jan 2022 19:44    TPro  7.4    /  Alb  3.2    /  TBili  0.3    /  DBili  x      /  AST  26     /  ALT  27     /  AlkPhos  124    27 Jan 2022 19:44      Creatine Kinase, Serum: 174 U/L [26 - 308] (01-28 @ 06:59)  Cholesterol, Serum: 114 mg/dL (01-28 @ 06:59)      Urine Studies:          RADIOLOGY & ADDITIONAL STUDIES:

## 2022-01-30 NOTE — PROGRESS NOTE ADULT - NUTRITIONAL ASSESSMENT
This patient has been assessed with a concern for Malnutrition and has been determined to have a diagnosis/diagnoses of Severe protein-calorie malnutrition.    This patient is being managed with:   Diet Consistent Carbohydrate w/Evening Snack-  Entered: Jan 29 2022 12:19PM    
This patient has been assessed with a concern for Malnutrition and has been determined to have a diagnosis/diagnoses of Severe protein-calorie malnutrition.    This patient is being managed with:   Diet Consistent Carbohydrate w/Evening Snack-  Entered: Jan 29 2022 12:19PM    
This patient has been assessed with a concern for Malnutrition and has been determined to have a diagnosis/diagnoses of Severe protein-calorie malnutrition.    This patient is being managed with:   Diet Regular-  Entered: Jan 27 2022  9:05PM

## 2022-01-30 NOTE — PROGRESS NOTE ADULT - SUBJECTIVE AND OBJECTIVE BOX
CC: SOB    HPI:  89 year old male w hx AS, SBE w MV lesion, CAD s/p 2 stents, HFpEF, PPM for CHB, CKD 4-5, COPD, DM II who presented to ED by EMS from [facility] w intermittent tachycardia and SOB.   [facility].  Pt found to have NSTEMI, admitted to CCU, initially declined NIV but placed on supplemental O2 for respiratory failure.  He was seen by cardiology, goal is conservative management.  He completed heparin gtt.  He was also diuresed for acute on chronic diastolic CHF in setting of severe AS.  Pt was stabilized and downgraded to GMF.  Upon interview pt sitting in chair, O2 in place.  He states he was SOB in morning but better now with O2 in place.  I spoke to son and updated on plan of care.    REVIEW OF SYSTEMS: All other review of systems is negative unless indicated above.      Vital Signs Last 24 Hrs  T(C): 36.8 (29 Jan 2022 07:58), Max: 37.3 (28 Jan 2022 23:15)  T(F): 98.3 (29 Jan 2022 07:58), Max: 99.1 (28 Jan 2022 23:15)  HR: 90 (29 Jan 2022 07:58) (75 - 90)  BP: 110/57 (29 Jan 2022 07:58) (110/57 - 143/59)  BP(mean): 79 (28 Jan 2022 13:00) (79 - 79)  RR: 18 (29 Jan 2022 07:58) (18 - 19)  SpO2: 92% (29 Jan 2022 07:58) (92% - 98%)      PHYSICAL EXAM:    Constitutional: NAD, awake and alert, frail, elderly  HEENT: PERR, EOMI, Normal Hearing, MMM  Neck: Soft and supple  Respiratory: Breath sounds diminished, crackles at bases  Cardiovascular: S1 and S2, regular rate and rhythm, no Murmurs, gallops or rubs  Gastrointestinal: Bowel Sounds present, soft, nontender, nondistended, no guarding, no rebound  Extremities: No peripheral edema  Neurological: A/O x 3, no focal deficits in my limited exam      MEDICATIONS  (STANDING):  allopurinol 100 milliGRAM(s) Oral daily  aspirin enteric coated 81 milliGRAM(s) Oral daily  atorvastatin 40 milliGRAM(s) Oral at bedtime  azithromycin   Tablet 500 milliGRAM(s) Oral daily  brimonidine 0.2% Ophthalmic Solution 1 Drop(s) Left EYE daily  calcium acetate 667 milliGRAM(s) Oral three times a day with meals  dextrose 40% Gel 15 Gram(s) Oral once  dextrose 5%. 1000 milliLiter(s) (50 mL/Hr) IV Continuous <Continuous>  dextrose 5%. 1000 milliLiter(s) (100 mL/Hr) IV Continuous <Continuous>  dextrose 50% Injectable 25 Gram(s) IV Push once  dextrose 50% Injectable 12.5 Gram(s) IV Push once  dextrose 50% Injectable 25 Gram(s) IV Push once  dorzolamide 2% Ophthalmic Solution 1 Drop(s) Left EYE <User Schedule>  ferrous    sulfate 325 milliGRAM(s) Oral daily  glucagon  Injectable 1 milliGRAM(s) IntraMuscular once  heparin   Injectable 5000 Unit(s) SubCutaneous every 12 hours  insulin glargine Injectable (LANTUS) 10 Unit(s) SubCutaneous at bedtime  insulin lispro (ADMELOG) corrective regimen sliding scale   SubCutaneous three times a day before meals  insulin lispro (ADMELOG) corrective regimen sliding scale   SubCutaneous at bedtime  metoprolol succinate ER 25 milliGRAM(s) Oral daily  pantoprazole    Tablet 40 milliGRAM(s) Oral before breakfast  polyethylene glycol 3350 17 Gram(s) Oral daily    MEDICATIONS  (PRN):  acetaminophen     Tablet .. 650 milliGRAM(s) Oral every 6 hours PRN Mild Pain (1 - 3)      CARDIAC MARKERS ( 28 Jan 2022 06:59 )  x     / x     / 174 U/L / x     / x                                8.2    18.81 )-----------( 265      ( 29 Jan 2022 08:03 )             27.8     01-29    142  |  105  |  103<H>  ----------------------------<  143<H>  3.9   |  27  |  4.29<H>    Ca    9.8      29 Jan 2022 08:03  Phos  5.4     01-28  Mg     2.6     01-28    TPro  7.4  /  Alb  3.2<L>  /  TBili  0.3  /  DBili  x   /  AST  26  /  ALT  27  /  AlkPhos  124<H>  01-27    CAPILLARY BLOOD GLUCOSE      POCT Blood Glucose.: 295 mg/dL (29 Jan 2022 10:53)  POCT Blood Glucose.: 300 mg/dL (29 Jan 2022 10:52)  POCT Blood Glucose.: 155 mg/dL (29 Jan 2022 06:31)  POCT Blood Glucose.: 206 mg/dL (28 Jan 2022 21:49)  POCT Blood Glucose.: 143 mg/dL (28 Jan 2022 16:54)    LIVER FUNCTIONS - ( 27 Jan 2022 19:44 )  Alb: 3.2 g/dL / Pro: 7.4 gm/dL / ALK PHOS: 124 U/L / ALT: 27 U/L / AST: 26 U/L / GGT: x           PT/INR - ( 27 Jan 2022 20:33 )   PT: 11.5 sec;   INR: 0.98 ratio         PTT - ( 28 Jan 2022 03:44 )  PTT:84.6 sec          Assessment and Plan:  8 male P/W SOB, found to have NSTEMI, acute respiratory failure, CHF exacerbation.      NSTEMI / Acute on chronic diastolic CHF / acute respiratory failure / Severe Aortic Stenosis / PNA:  -  Dr. Brown spoke to the patients son and there is to be no aggressive intervention, and maybe move toward palliative care  - supplemental O2, wean as tolerates   - Stop further IV diuresis due to worsening Scr  - No ACE/ARB due to renal failure  - aspirin  - statin  - stop amlodipine  - metoprolol   - Echo noted, preserved EF  - xray noted, also with leukocytosis --> start ceftriaxone and azithromycin for probable gram neg guerda PNA      Hx Complete Heart Block s/p Micra PPM placement on 9/22   - PPM interrogated, functioning correctly.   - on metoprolol 12.5mg, increase to 25mg      # CLIFFORD on CKD IV:  - Baseline Cr 3.4-3.5, now 4.29  - stop IV lasix  - nephro following    # Chronic anemia: Stable  - monitor    # DM2:  - A1c 6.8  - Lantus  - Raiss    # Hypocalcemia   -  vitamin D wnl    # DVT Prophylaxis:  -heparin subc       Code status:  -DNR/DNI  -no feeding tube, a trial of antibiotics, send to the  hospital, and limited medical interventions"  -updated son Manuel [phone] on plan of care            Nutritional Assessment:  · Nutritional Assessment	This patient has been assessed with a concern for Malnutrition and has been determined to have a diagnosis/diagnoses of Severe protein-calorie malnutrition.    This patient is being managed with:   Diet Regular-  Entered: Jan 27 2022  9:05PM  
Progress Note:   · Provider Specialty	Hospitalist    CC: SOB    HPI:  89 year old male w hx AS, SBE w MV lesion, CAD s/p 2 stents, HFpEF, PPM for CHB, CKD 4-5, COPD, DM II who presented to ED by EMS from [facility] w intermittent tachycardia and SOB.   [facility].  Pt found to have NSTEMI, admitted to CCU, initially declined NIV but placed on supplemental O2 for respiratory failure.  He was seen by cardiology, goal is conservative management.  He completed heparin gtt.  He was also diuresed for acute on chronic diastolic CHF in setting of severe AS.  Pt was stabilized and downgraded to GMF.  Upon interview pt sitting in chair, O2 in place.  He states he was SOB in morning but better now with O2 in place.  I spoke to son and updated on plan of care.    1/30: Sitting, alert, O2 in place, on 4-5L NC. States breathing slightly better at this time.  Offers no other specific complaints.      REVIEW OF SYSTEMS: All other review of systems is negative unless indicated above.    Vital Signs Last 24 Hrs  T(C): 36.3 (30 Jan 2022 09:45), Max: 36.6 (29 Jan 2022 23:19)  T(F): 97.3 (30 Jan 2022 09:45), Max: 97.9 (29 Jan 2022 23:19)  HR: 81 (30 Jan 2022 09:45) (81 - 89)  BP: 120/64 (30 Jan 2022 09:45) (110/57 - 127/64)  BP(mean): --  RR: 18 (30 Jan 2022 09:45) (17 - 18)  SpO2: 94% (30 Jan 2022 09:45) (94% - 96%)    PHYSICAL EXAM:    Constitutional: NAD, awake and alert, frail, elderly  HEENT: PERR, EOMI, Normal Hearing, MMM  Neck: Soft and supple  Respiratory: Breath sounds diminished, crackles at bases  Cardiovascular: S1 and S2, regular rate and rhythm, no Murmurs, gallops or rubs  Gastrointestinal: Bowel Sounds present, soft, nontender, nondistended, no guarding, no rebound  Extremities: No peripheral edema  Neurological: A/O x 3, no focal deficits in my limited exam      MEDICATIONS  (STANDING):  allopurinol 100 milliGRAM(s) Oral daily  aspirin enteric coated 81 milliGRAM(s) Oral daily  atorvastatin 40 milliGRAM(s) Oral at bedtime  azithromycin   Tablet 500 milliGRAM(s) Oral daily  brimonidine 0.2% Ophthalmic Solution 1 Drop(s) Left EYE daily  calcium acetate 667 milliGRAM(s) Oral three times a day with meals  cefTRIAXone   IVPB 1000 milliGRAM(s) IV Intermittent every 24 hours  dextrose 40% Gel 15 Gram(s) Oral once  dextrose 5%. 1000 milliLiter(s) (50 mL/Hr) IV Continuous <Continuous>  dextrose 5%. 1000 milliLiter(s) (100 mL/Hr) IV Continuous <Continuous>  dextrose 50% Injectable 25 Gram(s) IV Push once  dextrose 50% Injectable 12.5 Gram(s) IV Push once  dextrose 50% Injectable 25 Gram(s) IV Push once  dorzolamide 2% Ophthalmic Solution 1 Drop(s) Left EYE <User Schedule>  ferrous    sulfate 325 milliGRAM(s) Oral daily  glucagon  Injectable 1 milliGRAM(s) IntraMuscular once  heparin   Injectable 5000 Unit(s) SubCutaneous every 12 hours  insulin glargine Injectable (LANTUS) 10 Unit(s) SubCutaneous at bedtime  insulin lispro (ADMELOG) corrective regimen sliding scale   SubCutaneous three times a day before meals  insulin lispro (ADMELOG) corrective regimen sliding scale   SubCutaneous at bedtime  metoprolol succinate ER 25 milliGRAM(s) Oral daily  pantoprazole    Tablet 40 milliGRAM(s) Oral before breakfast  polyethylene glycol 3350 17 Gram(s) Oral daily    MEDICATIONS  (PRN):  acetaminophen     Tablet .. 650 milliGRAM(s) Oral every 6 hours PRN Mild Pain (1 - 3)                                7.8    18.48 )-----------( 239      ( 30 Jan 2022 06:57 )             25.6     01-30    142  |  103  |  119<H>  ----------------------------<  134<H>  3.9   |  32<H>  |  4.50<H>    Ca    9.8      30 Jan 2022 06:57      CAPILLARY BLOOD GLUCOSE      POCT Blood Glucose.: 134 mg/dL (30 Jan 2022 06:37)  POCT Blood Glucose.: 189 mg/dL (29 Jan 2022 21:47)  POCT Blood Glucose.: 196 mg/dL (29 Jan 2022 16:21)            Assessment and Plan:  8 male P/W SOB, found to have NSTEMI, acute respiratory failure, CHF exacerbation.      NSTEMI / Acute on chronic diastolic CHF / acute respiratory failure / Severe Aortic Stenosis / PNA:  -  Dr. Brown spoke to the patients son and there is to be no aggressive intervention, and maybe move toward palliative care  - supplemental O2, currently needs 4-5L NC  - Stop further IV diuresis due to worsening Scr, monitor daily   - No ACE/ARB due to renal failure  - aspirin  - statin  - stop amlodipine  - metoprolol   - Echo noted, preserved EF  - xray noted, also with leukocytosis --> start ceftriaxone and azithromycin for probable gram neg guerda PNA  - pulm eval       Hx Complete Heart Block s/p Micra PPM placement on 9/22   - PPM interrogated, functioning correctly.   - on metoprolol 12.5mg, increased to 25mg      # CLIFFORD on CKD IV:  - (Baseline Cr 3.4-3.5) now 4.29 --> 4.5  - off IV lasix  - f/u cardio and nephro    # Chronic anemia: Stable  - monitor    # DM2:  - A1c 6.8  - Lantus  - Raiss    # Hypocalcemia   -  vitamin D wnl    # DVT Prophylaxis:  -heparin subc       Code status:  -DNR/DNI  -no feeding tube, a trial of antibiotics, send to the  hospital, and limited medical interventions"  -updated son Manuel [phone] on plan of care    Dispo:  -Poor prognosis, would benefit from hospice services of some sort.  Will discuss with son.         Nutritional Assessment:  · Nutritional Assessment	This patient has been assessed with a concern for Malnutrition and has been determined to have a diagnosis/diagnoses of Severe protein-calorie malnutrition.    This patient is being managed with:   Diet Regular-  Entered: Jan 27 2022  9:05PM    
    HPI:  89 year old male w hx AS, SBE w MV lesion, CAD s/p 2 stents, HFpEF, PPM for CHB, CKD 4-5, COPD, DM II who presented to ED by EMS from [facility] w intermittent tachycardia and SOB    Details of tachycardia at Assisted Living not provided  Pt reports onset of SOB for awhile w exertion that progressed to SOB at rest  [facility]    1/28: Patient seen in the CCU, he refused NIV, now on NC O2, seen by Cardio and EP as well.      In ED /63  HR 72   RR 25  T 98  93% sat RA  +NSTEMI w trop 400s on IV heparin  BNP 9K  lasix 80 mg  placed on BiPAP      PAST MEDICAL HISTORY:  Anemia   Aortic stenosis   CAD (coronary artery disease)   Chronic kidney disease (CKD)  Complete heart block    DM type 2 (diabetes mellitus, type 2)  First degree AV block    Glaucoma   Gout   HFpEF heart failure w preserved ejection fraction  HLD (hyperlipidemia)   Hypertension   Leg edema   Mobitz type I Wenckebach atrioventricular block  Osteoporosis   Prostate cancer s/p RT 25yrs ago  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 lesion on mitral valve      PAST SURGICAL HX  PPM permanent pacemaker Dr. Reynolds 09-  H/O inguinal hernia repair   History of colonoscopy   History of tonsillectomy childhood.     FAMILY HX  Family history of CVA  Family hx of lung cancer.    SOCIAL HX  at [facility]  never smoker       (28 Jan 2022 00:00)       PAST MEDICAL & SURGICAL HISTORY:  Hypertension    HLD (hyperlipidemia)    Gout    Prostate cancer    Anemia    Chronic kidney disease (CKD)    CAD (coronary artery disease)    DM type 2 (diabetes mellitus, type 2)    Glaucoma    Aortic stenosis    Osteoporosis    Leg edema    H/O inguinal hernia repair    History of tonsillectomy  childhood    History of colonoscopy        FAMILY HISTORY:  Family history of CVA    Family hx of lung cancer        Social Hx:    Allergies    ACE inhibitors (Unknown)  aspirin (Unknown)  enalapril (Unknown)    Intolerances        Height (cm): 170.2 (01-27 @ 19:07)  Weight (kg): 67.7 (01-27 @ 20:39)  BMI (kg/m2): 23.4 (01-27 @ 20:39)    ICU Vital Signs Last 24 Hrs  T(C): 36.3 (28 Jan 2022 04:51), Max: 36.7 (27 Jan 2022 19:07)  T(F): 97.4 (28 Jan 2022 04:51), Max: 98 (27 Jan 2022 19:07)  HR: 70 (28 Jan 2022 10:00) (62 - 76)  BP: 126/53 (28 Jan 2022 10:00) (124/57 - 137/73)  BP(mean): 71 (28 Jan 2022 10:00) (71 - 115)  ABP: --  ABP(mean): --  RR: 15 (28 Jan 2022 10:00) (15 - 25)  SpO2: 95% (28 Jan 2022 10:00) (93% - 100%)          I&O's Summary    27 Jan 2022 07:01  -  28 Jan 2022 07:00  --------------------------------------------------------  IN: 125 mL / OUT: 0 mL / NET: 125 mL                              7.8    10.99 )-----------( 237      ( 28 Jan 2022 06:59 )             25.7       01-28    142  |  107  |  98<H>  ----------------------------<  94  3.6   |  31  |  3.97<H>    Ca    8.4<L>      28 Jan 2022 06:59  Phos  5.4     01-28  Mg     2.6     01-28    TPro  7.4  /  Alb  3.2<L>  /  TBili  0.3  /  DBili  x   /  AST  26  /  ALT  27  /  AlkPhos  124<H>  01-27      CARDIAC MARKERS ( 28 Jan 2022 06:59 )  x     / x     / 174 U/L / x     / x                    MEDICATIONS  (STANDING):  allopurinol 100 milliGRAM(s) Oral daily  amLODIPine   Tablet 5 milliGRAM(s) Oral daily  aspirin enteric coated 81 milliGRAM(s) Oral daily  atorvastatin 40 milliGRAM(s) Oral at bedtime  brimonidine 0.2% Ophthalmic Solution 1 Drop(s) Left EYE daily  calcium acetate 667 milliGRAM(s) Oral three times a day with meals  dextrose 40% Gel 15 Gram(s) Oral once  dextrose 5%. 1000 milliLiter(s) (50 mL/Hr) IV Continuous <Continuous>  dextrose 5%. 1000 milliLiter(s) (100 mL/Hr) IV Continuous <Continuous>  dextrose 50% Injectable 25 Gram(s) IV Push once  dextrose 50% Injectable 12.5 Gram(s) IV Push once  dextrose 50% Injectable 25 Gram(s) IV Push once  dorzolamide 2% Ophthalmic Solution 1 Drop(s) Left EYE <User Schedule>  ferrous    sulfate 325 milliGRAM(s) Oral daily  furosemide   Injectable 40 milliGRAM(s) IV Push two times a day  glucagon  Injectable 1 milliGRAM(s) IntraMuscular once  insulin glargine Injectable (LANTUS) 10 Unit(s) SubCutaneous at bedtime  insulin lispro (ADMELOG) corrective regimen sliding scale   SubCutaneous three times a day before meals  insulin lispro (ADMELOG) corrective regimen sliding scale   SubCutaneous at bedtime  metoprolol succinate ER 12.5 milliGRAM(s) Oral daily  pantoprazole    Tablet 40 milliGRAM(s) Oral before breakfast  polyethylene glycol 3350 17 Gram(s) Oral daily    MEDICATIONS  (PRN):  acetaminophen     Tablet .. 650 milliGRAM(s) Oral every 6 hours PRN Mild Pain (1 - 3)  morphine  - Injectable 2 milliGRAM(s) IV Push once PRN Chest Pain      DVT Prophylaxis:    Advanced Directives:  Discussed with:    Visit Information:    ** Time is exclusive of billed procedures and/or teaching and/or routine family updates.

## 2022-01-30 NOTE — PROVIDER CONTACT NOTE (CHANGE IN STATUS NOTIFICATION) - ACTION/TREATMENT ORDERED:
PA made aware and came to bedside, ordered methylprednisone 125mg IVPush, administered as ordered. Pt currently on venti mask 50% at 95%.

## 2022-01-30 NOTE — CONSULT NOTE ADULT - ASSESSMENT
89  M with PMH Aortic Stenosis CAD s/p PCI, CKD IV, DM HTN, HLD, Fe deficiency anemia presents from [facility] with Generalized weakness and SOB.     CKD IV  Baseline creatinine near 3  IV diuresis   Patient would  not be a candidate for aggressive renal care with co-moridities  Trend labs/lytes  Based on notes, potentially heading towards palliation    HTN  -HOld norvasc with soft bp  -Diuresis/bbl    THanks, will follow as needed  Dr Laguerre to cover weekend only as needed. 
89 male P/W SOB, found to have NSTEMI, acute respiratory failure, CHF exacerbation      PROBLEMS:    S/P NSTEMI  Acute on chronic diastolic CHF  Severe Aortic Stenosis  Acute respiratory failure  B/L Bronechactasis/COPD/MUCUS PLUGGING  Pneumonia  Hx Complete Heart Block s/p Micra PPM placement on 9/22-PPM interrogated, functioning correctly   CLIFFORD on CKD IV  Chronic anemia: Stable  DM2    PLAN;    Dr. Brown spoke to the patients son and there is to be no aggressive intervention, and maybe move toward palliative care  Supplemental O2-VM 50%  IV ceftriaxone and azithromycin for probable gram neg guerda PNA  IV solu-medrols 20mg q12hr  aerosols  Chest PT  Stop further IV diuresis due to worsening Scr, monitor daily   No ACE/ARB due to renal failure  Echo noted, preserved EF  DVT Prophylaxis:-heparin subc

## 2022-01-31 NOTE — CHART NOTE - NSCHARTNOTEFT_GEN_A_CORE
HOSPITALIST PA EVENT NOTE      Pt is 89 y/o M (DNR/DNI)  originally admitted with NSTEMI, admitted to CCU, initially declined NIV but placed on supplemental O2 for respiratory failure.  He was seen by cardiology, goal is conservative management.  He completed heparin gtt.  He was also diuresed for acute on chronic diastolic CHF in setting of severe AS.  Pt was stabilized and downgraded to medicine floor.     Received call from RN regarding this patient with complaints of SOB. Patient on NC 5-6 L sating 89-low 90s. Patient's oxygen demands have varied from Nc to 50% VM.   Per notes, family wishes conservative measures, contemplating possible palliative care/ hospice.   Currently Avoiding further IV diuresis as patient's creatine has been worsening. Patient received respiratory treatment and orders were given to place on VM, however patient has difficulty complying with VM.     Patient seen at bedside.     Physical exam:   Gen- frail, chronically ill appearing  Resp- breathing with increased effort and increased respiratory rate. VM in place, patient pulled off a few times. Sating  94-95% on monitor. Lung sounds: b/l stridor   Cardiac- regular rate and rhythm   Abdomen- soft, NT, ND   Extremities- no edema     Vital Signs Last 24 Hrs  T(C): 36.2 (30 Jan 2022 21:16), Max: 36.3 (30 Jan 2022 09:45)  T(F): 97.2 (30 Jan 2022 21:16), Max: 97.3 (30 Jan 2022 09:45)  HR: 92 (30 Jan 2022 23:31) (81 - 95)  BP: 87/46 (30 Jan 2022 23:31) (87/46 - 123/59)  BP(mean): --  RR: 22 (30 Jan 2022 23:31) (18 - 22)  SpO2: 95% (30 Jan 2022 23:51) (91% - 98%)    Patient currently on Solumedrol 40mg BID. Gave 1 x dose of Solumedrol 125mg   Improvement in lung sounds, still with some labored breathing   C/w with VM at 50%, can wean down to NC as tolerated

## 2022-01-31 NOTE — PROVIDER CONTACT NOTE (CHANGE IN STATUS NOTIFICATION) - SITUATION
Mews Score 7, hypotensive 78/41 Mews Score 7, hypotensive 78/41, pt c/o of weakness and dizziness, no change in mental status

## 2022-01-31 NOTE — DISCHARGE NOTE FOR THE EXPIRED PATIENT - HOSPITAL COURSE
89 male P/W SOB, found to have NSTEMI, acute respiratory failure, CHF exacerbation.      Pt  at 8:47AM of cardiopulmonary arrest due to NSTEMI / Acute on chronic diastolic CHF / acute respiratory failure / Severe Aortic Stenosis / PNA

## 2022-01-31 NOTE — PROVIDER CONTACT NOTE (CHANGE IN STATUS NOTIFICATION) - ACTION/TREATMENT ORDERED:
Attending called and arrived at bedside to assess patient. 250cc bolus ordered and started. Provider will contact family to decide on plan of care.

## 2022-01-31 NOTE — CHART NOTE - NSCHARTNOTEFT_GEN_A_CORE
HOSPITALIST PA CHART NOTE     Patient's clinical state continuing to decline. Increased work of breathing, and difficulty maintaining adequate oxygenation. SpO2 fluctuating between 80s-90s. BP is low in the 70s, given a dose a midodrine, without noted response yet.     Call placed out to the son (Manuel Quezada [phone]) HOSPITALIST PA CHART NOTE     Brief Hospital Summary: 89 y/o M patient (DNR/DNI) originally admitted with NSTEMI and CHF exacerbation with elevated trops and BNP on admission, admitted to CCU. Goal is conservative management (no aggressive treatment) as per family. Patient completed heparin gtt, was diuresed for acute on chronic diastolic CHF (normal EF) in setting of severe AS, and trial of BiPAP for respiratory failure. Patient later declined NIV but placed on supplemental O2 for respiratory failure. No further diuresis due to worsening creatine.  Pt was stabilized and downgraded to medicine floor. Per note, family was contemplating moving towards palliative care.     This evening patient with respiratory distress, difficulty maintaining adequate oxygen saturation on VM, s/p multiple respiratory treatments and IV Solumedrol. Patient's clinical state continuing to decline. Increased work of breathing, SpO2 fluctuating between 80s-low90s. BP is low in the 70s, given a dose a midodrine, without noted response.     Physical Exam:   Gen- frail, chronically ill appearing, in moderate distress   Resp- breathing with increased effort and increased respiratory rate. VM in place. Sating mid 80s-low 90s on monitor. Lung sounds: b/l stridor   Cardiac- regular rate and rhythm   Abdomen- soft, NT, ND   Extremities- no edema present     Call placed out to the son (Manuel Quezada [phone]). Updated on patient's current condition, and discussed goals of care. Per son, would like to pursue comfort measures at this time. Case discussed with Attending MD (Dr. Keene). Spoke with son on phone with Attending and confirmed change of GOC to comfort measures only. New MOLST completed with verbal consent from son.   Son is making arrangements to make his way to hospital, currently in [place].       Comfort measure orders are in effect. Ordered 250cc bolus, BP improved to 83/43. Continue with midodrine for BP support.   Continue with supplemental oxygen         Vital Signs Last 24 Hrs  T(C): 36.2 (30 Jan 2022 21:16), Max: 36.3 (30 Jan 2022 09:45)  T(F): 97.2 (30 Jan 2022 21:16), Max: 97.3 (30 Jan 2022 09:45)  HR: 77 (31 Jan 2022 04:00) (77 - 95)  BP: 83/43 (31 Jan 2022 04:00) (75/40 - 123/59)  BP(mean): --  RR: 22 (31 Jan 2022 04:00) (18 - 22)  SpO2: 94% (31 Jan 2022 04:00) (91% - 98%)

## 2022-02-02 LAB
CULTURE RESULTS: SIGNIFICANT CHANGE UP
SPECIMEN SOURCE: SIGNIFICANT CHANGE UP

## 2022-03-03 NOTE — ED PROVIDER NOTE - OBJECTIVE STATEMENT
[Initial Visit] : an initial visit for 89 y/o M with a PMHx of aortic stenosis, T2DM, CAD s/p stents last week, CKD, prostate CA, HTN, HLD [facility] with a hemoglobin of 6.5 found on recent outpt labs. States he has not [facility] at [facility] a long time and has been staying at there s/p cardiac stents. States he was sent to ED for rectal bleeding and endorses dark stools. Pt has had similar symptoms in the past for which he had a colonoscopy which resulted normally. Denies dizziness, abd pain, N/V, CP, or weakness. GI: Dr. Yarbrough. Pt is anticoagulated on Effient.

## 2022-05-11 NOTE — ED ADULT NURSE NOTE - NSFALLRSKASSESASSIST_ED_ALL_ED
Case Management Discharge Planning Note    Patient name Garrett Ybarra  Location [facility] 643/-[id number] MRN [id number]  : 1981 Date 2022       Current Admission Date: 2022  Current Admission Diagnosis:Substance use  Patient Active Problem List    Diagnosis Date Noted    Substance use 2022      LOS (days): 7  Geometric Mean LOS (GMLOS) (days):   Days to GMLOS:     OBJECTIVE:  Risk of Unplanned Readmission Score: 13 49         Current admission status: Inpatient Psych   Preferred Pharmacy: No Pharmacies Listed  Primary Care Provider: No primary care provider on file  Primary Insurance: BLUE CROSS  Secondary Insurance:     DISCHARGE DETAILS:    Discharge planning discussed with[de-identified] Patient                      Contacts  Patient Contacts: Pamela Myrick  Relationship to Patient[de-identified] Family  Contact Method: Phone  Phone Number: [phone]  Reason/Outcome: Emergency Contact, Discharge Planning              Other Referral/Resources/Interventions Provided:  Referrals Provided[de-identified] Therapist, Crisis Hotline  Post Acute Placement to[de-identified] Substance Abuse Treatment    Would you like to participate in our [facility] [address] service program?  : No - Declined      Patient is being discharged to her home and to inpatient rehab Rehab at [facility], no SI/HI, no psychosis or A/V  Patient is in agreement with discharge  No questions verbalized  Patient provided with after care appointment, discharge instructions and prescriptions  IMM, quality core measures, transition of care, discharge instructions, and treatment plan complete  Patient will be transported by   Jayda Guzmán will continue to follow up as needed  Patient's discharge was faxed to outpatient providers  This writer scheduled patient's follow up visits wit Ramsey and completed a referral on her behalf with [facility]  Patient's  will pick her up from the unit and patient will be transported to [facility] from her home  yes

## 2022-07-18 NOTE — PHYSICAL THERAPY INITIAL EVALUATION ADULT - REFERRING PHYSICIAN, REHAB EVAL
[FreeTextEntry1] : SIMON MONREAL is a 68 year male with history of Erectile Dysfunction presenting for trial/training of Trimix injection. \par \par Patient states that he has used oral medications in the past without improvement.\par \par Patient educated on how to prep skin and medication, properly pull back medication, and inject medication intracavernosal. \par All questions and concerns addressed/answered prior to performing injection. \par \par Patient performed injection himself this visit. There were no complications with injection. All patients questions were answered.\par \par Rx:[id number]\par Expiration 08/10/2022\par 
Jerome Pina

## 2022-07-22 NOTE — PROGRESS NOTE ADULT - PROBLEM SELECTOR PLAN 4
Will an INR be checked pre procedure?   patient was told to have AS but not severe enough to consider TAVR as per son.

## 2022-08-15 NOTE — INPATIENT CERTIFICATION FOR MEDICARE PATIENTS - THE STATUS OF COMORBIDITIES.
2. The status of comorbities. (See ED/admit documents) RLQ pain  IVF labs u/a CT scan RLQ pain  IVF labs u/a CT scan reporting appendicitis, transfer to [facility]

## 2022-09-01 NOTE — ED ADULT TRIAGE NOTE - ADDITIONAL SAFETY/BANDS...
Health Maintenance Due   Topic Date Due   • Hepatitis B Vaccine (1 of 3 - 3-dose series) Never done   • DTaP/Tdap/Td Vaccine (1 - Tdap) Never done   • Shingles Vaccine (1 of 2) Never done   • Breast Cancer Screening  Never done   • Pneumococcal Vaccine 0-64 (2 - PCV) 06/11/2020   • COVID-19 Vaccine (3 - Booster for Pfizer series) 09/30/2021   • Influenza Vaccine (1) 09/01/2022       Patient is due for topics listed above, she wishes to proceed with Mammogram, but is not proceeding with Immunization(s) COVID-19, Dtap/Tdap/Td, Hep B, Pneumococcal and Shingles at this time. The following has occurred: declined   Additional Safety/Bands:

## 2022-09-01 NOTE — ED ADULT NURSE NOTE - NSIMPLEMENTINTERV_GEN_ALL_ED
Detail Level: Zone
Hide Additional Notes?: No
Implemented All Fall with Harm Risk Interventions:  [place] to call system. Call bell, personal items and telephone within reach. Instruct patient to call for assistance. Room bathroom lighting operational. Non-slip footwear when patient is off stretcher. Physically safe environment: no spills, clutter or unnecessary equipment. Stretcher in lowest position, wheels locked, appropriate side rails in place. Provide visual cue, wrist band, yellow gown, etc. Monitor gait and stability. Monitor for mental status changes and reorient to person, place, and time. Review medications for side effects contributing to fall risk. Reinforce activity limits and safety measures with patient and family. Provide visual clues: red socks.

## 2022-10-13 PROBLEM — I51.9 SEVERE LEFT VENTRICULAR SYSTOLIC DYSFUNCTION: Status: ACTIVE | Noted: 2019-12-30

## 2022-10-19 NOTE — PATIENT PROFILE ADULT - NSPROIMPLANTSMEDDEV_GEN_A_NUR
spoke with Waleska/[facility] [phone] states patient will have INR done today by PRASHANT  
other
stents last month
,[email or web address]

## 2023-01-01 NOTE — PROGRESS NOTE ADULT - SUBJECTIVE AND OBJECTIVE BOX
[facility] NEPHROLOGY PRACTICE   MD WALLACE DAHL, DO YULIANA GODFERY, HECTOR JAMES    TEL:  OFFICE: [phone]  DR SCOTT CELL: [phone]  DR. MAURICE CELL: [phone]  DR. GODFREY CELL: [phone]  DEJUAN MOREAU CELL: [phone]        Patient is a 89y old  Male who presents with a chief complaint of SOB, GARCIA, R/O acute CHF, (24 Dec 2019 12:19)      Patient seen and examined at bedside. No chest pain/sob    VITALS:  T(F): 97.9 (12-24-19 @ 12:13), Max: 98.5 (12-24-19 @ 08:00)  HR: 67 (12-24-19 @ 12:13)  BP: 126/59 (12-24-19 @ 12:13)  RR: 18 (12-24-19 @ 12:13)  SpO2: 98% (12-24-19 @ 12:13)  Wt(kg): --    12-23 @ 07:01  -  12-24 @ 07:00  --------------------------------------------------------  IN: 1530 mL / OUT: 875 mL / NET: 655 mL    12-24 @ 07:01  -  12-24 @ 15:00  --------------------------------------------------------  IN: 240 mL / OUT: 550 mL / NET: -310 mL          PHYSICAL EXAM:  Constitutional: NAD  Neck: No JVD  Respiratory: CTAB, no wheezes, rales or rhonchi  Cardiovascular: S1, S2, RRR  Gastrointestinal: BS+, soft, NT/ND  Extremities: No peripheral edema    Hospital Medications:   MEDICATIONS  (STANDING):  allopurinol 100 milliGRAM(s) Oral daily  amLODIPine   Tablet 5 milliGRAM(s) Oral daily  atorvastatin 40 milliGRAM(s) Oral at bedtime  brinzolamide 1% Ophthalmic Suspension 1 Drop(s) Both EYES two times a day  calcium acetate 667 milliGRAM(s) Oral two times a day with meals  cholecalciferol 1000 Unit(s) Oral daily  dextrose 5%. 1000 milliLiter(s) (50 mL/Hr) IV Continuous <Continuous>  dextrose 50% Injectable 12.5 Gram(s) IV Push once  dextrose 50% Injectable 25 Gram(s) IV Push once  dextrose 50% Injectable 25 Gram(s) IV Push once  ferrous    sulfate 325 milliGRAM(s) Oral daily  heparin  Injectable 5000 Unit(s) SubCutaneous every 8 hours  hydrALAZINE 25 milliGRAM(s) Oral three times a day  insulin lispro (HumaLOG) corrective regimen sliding scale   SubCutaneous Before meals and at bedtime  metoprolol tartrate 12.5 milliGRAM(s) Oral two times a day  senna 2 Tablet(s) Oral at bedtime  sodium chloride 0.9%. 500 milliLiter(s) (50 mL/Hr) IV Continuous <Continuous>      LABS:  12-24    144  |  103  |  87<H>  ----------------------------<  121<H>  3.6   |  26  |  3.20<H>    Ca    9.6      24 Dec 2019 06:04  Phos  5.3     12-24  Mg     2.3     12-24    TPro  6.4  /  Alb  3.6  /  TBili  0.5  /  DBili      /  AST  15  /  ALT  16  /  AlkPhos  83  12-24    Creatinine Trend: 3.20 <--, 3.28 <--, 3.13 <--, 2.94 <--, 2.84 <--, 2.54 <--, 2.54 <--, 2.52 <--, 2.57 <--    Phosphorus Level, Serum: 5.3 mg/dL (12-24 @ 06:04)  Albumin, Serum: 3.6 g/dL (12-24 @ 06:04)                              9.6    6.91  )-----------( 256      ( 24 Dec 2019 06:04 )             31.1     Urine Studies:  Urinalysis - [12-18-19 @ 02:46]      Color LT. YELLOW / Appearance CLEAR / SG 1.015 / pH 6.0      Gluc NEGATIVE / Ketone NEGATIVE  / Bili NEGATIVE / Urobili NORMAL       Blood TRACE / Protein 70 / Leuk Est NEGATIVE / Nitrite NEGATIVE      RBC 0-2 / WBC  / Hyaline  / Gran  / Sq Epi  / Non Sq Epi  / Bacteria     Urine Creatinine 20.20      [12-19-19 @ 21:25]  Urine Protein 12.1      [12-19-19 @ 21:25]    Iron 19, TIBC 271, %sat --      [12-18-19 @ 02:45]  Ferritin 53.67      [12-18-19 @ 02:45]  PTH 60.31 (Ca --)      [12-20-19 @ 04:37]   --  PTH 89.27 (Ca --)      [12-18-19 @ 02:46]   --  HbA1c 5.6      [12-19-19 @ 06:00]  TSH 2.75      [12-18-19 @ 02:45]  Lipid: chol 118, TG 65, HDL 58, LDL 52      [12-18-19 @ 02:45]    HBsAg Nonreactive      [12-18-19 @ 02:46]  HCV 0.20, Nonreactive Hepatitis C AB  S/CO Ratio                        Interpretation  < 1.00                                   Non-Reactive  1.00 - 4.99                         Weakly-Reactive  >= 5.00                                Reactive  Non-Reactive: Aperson with a non-reactive HCV antibody  result is considered uninfected.  No further action is  needed unless recent infection is suspected.  In these  cases, consider repeat testing later to detect  seroconversion..  Weakly-Reactive: HCV antibody test is abnormal, HCV RNA  Qualitative test will follow.  Reactive: HCV antibody test is abnormal, HCV RNA  Qualitative test will follow.  Note: HCV antibody testing is performed on the [employer]   system.      [12-18-19 @ 02:46]      RADIOLOGY & ADDITIONAL STUDIES:  Infant (Birth)

## 2023-01-20 NOTE — DISCHARGE NOTE PROVIDER - NSDCADMDATE_GEN_ALL_CORE_FT
Last visit: 11/02/2022  Last Med refill: 07/28/2022  Does patient have enough medication for 72 hours: Yes    Next Visit Date:  Future Appointments   Date Time Provider Nash Fontenot   2/9/2023  3:30 PM Erling Heimlich, MD Resp Spec [facility] [place]   2/16/2023  1:00 PM STA CT SCAN RM 1 STAZ CT SCAN STA Radiolog   2/21/2023  3:30 PM SCHEDULE, STVZ PBURG RAD ONC NURSE [facility] [address] Maintenance   Topic Date Due    HIV screen  Never done    Shingles vaccine (1 of 2) Never done    Cervical cancer screen  Never done    Breast cancer screen  Never done    Low dose CT lung screening  Never done    Flu vaccine (1) Never done    Pneumococcal 0-64 years Vaccine (1 - PCV) 12/14/2023 (Originally 8/12/1967)    COVID-19 Vaccine (1) 12/14/2024 (Originally 2/12/1962)    Hepatitis C screen  12/14/2024 (Originally 8/12/1979)    Depression Screen  06/14/2023    Lipids  04/13/2025    Colorectal Cancer Screen  05/30/2027    DTaP/Tdap/Td vaccine (2 - Td or Tdap) 05/03/2028    Hepatitis A vaccine  Aged Out    Hib vaccine  Aged Out    Meningococcal (ACWY) vaccine  Aged Out       Hemoglobin A1C (%)   Date Value   12/19/2019 5.6             ( goal A1C is < 7)   No results found for: LABMICR  LDL Cholesterol (mg/dL)   Date Value   04/13/2020        02/10/2020 150 (H)       (goal LDL is <100)   AST (U/L)   Date Value   07/20/2022 28     ALT (U/L)   Date Value   07/20/2022 22     BUN (mg/dL)   Date Value   07/20/2022 8     BP Readings from Last 3 Encounters:   11/02/22 128/78   08/16/22 137/87   07/20/22 134/87          (goal 120/80)    All Future Testing planned in [facility]  Lab Frequency Next Occurrence   ELLE DIGITAL SCREEN UNILATERAL LEFT Once 04/03/2022   TSH With Reflex Ft4 Once 07/28/2022   XR ABDOMEN (2 VIEWS) Once 08/19/2022   CT CHEST WO CONTRAST Once 42/23/1207   Basic Metabolic Panel Once 84/31/0230   Sodium, Urine, Random Once 11/21/2022   Osmolality, Urine Once 11/21/2022               Patient Active Problem List:     Panlobular emphysema ([facility] [address]     Essential hypertension     History of right breast cancer     Loculated pleural effusion     Hyponatremia     Coronary artery disease involving native coronary artery of native heart without angina pectoris     Tobacco abuse disorder     Alcohol abuse     Leukocytosis     Adenocarcinoma, lung, left (HCC)     Shortness of breath     Insomnia     Anxiety     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

## 2023-03-31 NOTE — ED ADULT NURSE NOTE - CHIEF COMPLAINT QUOTE
pt presents to ED with complaints of mechanical trip and fall while ambulating at assisted living. denies LOC and head trauma. denies blood thinners. pt endorsing only left knee pain. 3

## 2023-07-17 NOTE — ED PROVIDER NOTE - NS ED MD EM SELECTION
1/2023 TSH normal, to be checked annually unless symptomatic, continue present regimen.  CURRENT DOSE: Synthroid 75 mcg daily.   [address] Critical Care - 30 to 74 minutes

## 2023-12-02 NOTE — PATIENT PROFILE ADULT - DO YOU FEEL UNSAFE AT HOME, WORK, OR SCHOOL?
no 11:08-11:38am Pt encountered in semi-palencia position in bed, A & O x 3 in NAD, +tele, +O2 2L via NC, no c/o pain resting, spouse present at b/s. Pt agreeable to PT and performed bed mobility supine/sit with Max A, Mod A in transfer mobility and ambulated 4 ft Mod A using RW with TTWB RLE. Pt needs vc's for safety during mobility and RLE wt bearing prec. Pt demonstrated limited mobility secondary to RLE weakness, poor endurance desating to 88% on O2 during mobility and inc Rt hip pain 8/10. Pt left seated in chair in NAD after PT session. Pt will benefit from skilled PT 3-5x/wk for thera ex, functional mobility training, balance and gait training to increase mobility status and return to previous level of function.

## 2024-01-04 NOTE — ED ADULT NURSE REASSESSMENT NOTE - SYMPTOMS
Pt discharged per MD order. IV removed,  catheter tip intact. Telemetry box removed. No distress noted. Paperwork given to [facility]. VSS. Afebrile. Pt left via stretcher per [facility]. ANIYAH    none

## 2024-01-09 NOTE — ED PROVIDER NOTE - DISPOSITION TYPE
Render Risk Assessment In Note?: no Detail Level: Simple Additional Notes: Purchased defenage body lotion and clinical power trio from [employer] with eye cream. DISCHARGE

## 2024-02-15 NOTE — DIETITIAN INITIAL EVALUATION ADULT. - PERTINENT MEDS FT
MEDICATIONS  (STANDING):  allopurinol 100 milliGRAM(s) Oral daily  amLODIPine   Tablet 5 milliGRAM(s) Oral daily  aspirin enteric coated 81 milliGRAM(s) Oral daily  atorvastatin 40 milliGRAM(s) Oral at bedtime  brimonidine 0.2% Ophthalmic Solution 1 Drop(s) Left EYE daily  calcium acetate 667 milliGRAM(s) Oral three times a day with meals  dextrose 40% Gel 15 Gram(s) Oral once  dextrose 5%. 1000 milliLiter(s) (50 mL/Hr) IV Continuous <Continuous>  dextrose 5%. 1000 milliLiter(s) (100 mL/Hr) IV Continuous <Continuous>  dextrose 50% Injectable 25 Gram(s) IV Push once  dextrose 50% Injectable 12.5 Gram(s) IV Push once  dextrose 50% Injectable 25 Gram(s) IV Push once  dorzolamide 2% Ophthalmic Solution 1 Drop(s) Left EYE <User Schedule>  ferrous    sulfate 325 milliGRAM(s) Oral daily  furosemide   Injectable 40 milliGRAM(s) IV Push two times a day  glucagon  Injectable 1 milliGRAM(s) IntraMuscular once  heparin  Infusion.  Unit(s)/Hr (8 mL/Hr) IV Continuous <Continuous>  insulin glargine Injectable (LANTUS) 10 Unit(s) SubCutaneous at bedtime  insulin lispro (ADMELOG) corrective regimen sliding scale   SubCutaneous three times a day before meals  insulin lispro (ADMELOG) corrective regimen sliding scale   SubCutaneous at bedtime  metoprolol succinate ER 12.5 milliGRAM(s) Oral daily  pantoprazole    Tablet 40 milliGRAM(s) Oral before breakfast  polyethylene glycol 3350 17 Gram(s) Oral daily    MEDICATIONS  (PRN):  acetaminophen     Tablet .. 650 milliGRAM(s) Oral every 6 hours PRN Mild Pain (1 - 3)  heparin   Injectable 4100 Unit(s) IV Push every 6 hours PRN For aPTT less than 40  morphine  - Injectable 2 milliGRAM(s) IV Push once PRN Chest Pain
no

## 2024-04-15 NOTE — ED ADULT NURSE NOTE - NS ED NURSE LEVEL OF CONSCIOUSNESS MENTAL STATUS
[FreeTextEntry1] : Labs show -Creatinine of 1.4 Recommendations -24-hour urine/renal sonogram Awake/Alert/Cooperative/Sleepy

## 2025-01-07 NOTE — CONSULT NOTE ADULT - NSPROBSELRECBLANK_5_GEN
DISPLAY PLAN FREE TEXT Please make sure to follow up with your primary care doctor within 1-2 days.  Bring a copy of all of your results with you to your follow up appointments.   Return to the ER as discussed if you develop any new or worsening symptoms.     You may take Tylenol 1000mg every 6 hours as needed for pain and/or Ibuprofen 400mg every 6-8 hours as needed for pain. Take Ibuprofen with food. Please make sure to follow up with your primary care doctor within 1-2 days.  Bring a copy of all of your results with you to your follow up appointments.   Return to the ER as discussed if you develop any new or worsening symptoms.     You may take Tylenol 1000mg every 6 hours as needed for pain and/or Ibuprofen 400mg every 6-8 hours as needed for pain. Take Ibuprofen with food.    Keep dressing clean and dry.

## 2025-03-14 NOTE — PROGRESS NOTE ADULT - SUBJECTIVE AND OBJECTIVE BOX
Patient is a 89y old  Male who presents with a chief complaint of SOB, GARCIA, R/O acute CHF, (21 Dec 2019 10:43)      SUBJECTIVE / OVERNIGHT EVENTS:  feels better  on diuresis  tele no event  no cp, no sob, no n/v/d. no abdominal pain.  no headache, no dizziness.  TTE with worse systolic function, though hard to visualize, suspected severe AS  card on the case.       Vital Signs Last 24 Hrs  T(C): 36.5 (21 Dec 2019 05:43), Max: 36.9 (20 Dec 2019 20:49)  T(F): 97.7 (21 Dec 2019 05:43), Max: 98.4 (20 Dec 2019 20:49)  HR: 68 (21 Dec 2019 05:43) (68 - 75)  BP: 129/53 (21 Dec 2019 05:43) (129/53 - 135/63)  BP(mean): --  RR: 17 (21 Dec 2019 05:43) (17 - 18)  SpO2: 99% (21 Dec 2019 05:43) (99% - 99%)  I&O's Summary    20 Dec 2019 07:01  -  21 Dec 2019 07:00  --------------------------------------------------------  IN: 240 mL / OUT: 950 mL / NET: -710 mL      PHYSICAL EXAM:  GENERAL: NAD, Comfortable, on nasal canula, sitting up eating breakfast  HEAD:  Atraumatic, Normocephalic  EYES: EOMI, PERRLA, conjunctiva and sclera clear  NECK: Supple, No JVD  CHEST/LUNG: mild decrease breath sounds bilaterally; No wheeze   HEART: Regular rate and rhythm; No murmurs, rubs, or gallops  ABDOMEN: Soft, Nontender, Nondistended; Bowel sounds present  Neuro: AAO x 3, no focal deficit, 5/5 b/l extremities  EXTREMITIES:  2+ Peripheral Pulses, No clubbing, cyanosis, +edema  SKIN: No rashes or lesions      LABS:                        8.9    6.55  )-----------( 237      ( 21 Dec 2019 05:36 )             29.2     12-21    143  |  103  |  74<H>  ----------------------------<  110<H>  3.6   |  23  |  2.94<H>    Ca    9.1      21 Dec 2019 05:36  Phos  5.7     12-21  Mg     2.4     12-21    TPro  6.0  /  Alb  3.3  /  TBili  0.3  /  DBili  x   /  AST  16  /  ALT  14  /  AlkPhos  82  12-21      CAPILLARY BLOOD GLUCOSE      POCT Blood Glucose.: 119 mg/dL (21 Dec 2019 08:38)  POCT Blood Glucose.: 192 mg/dL (20 Dec 2019 21:20)  POCT Blood Glucose.: 116 mg/dL (20 Dec 2019 17:51)  POCT Blood Glucose.: 127 mg/dL (20 Dec 2019 12:39)            RADIOLOGY & ADDITIONAL TESTS:    Imaging Personally Reviewed:  [x] YES  [ ] NO    Consultant(s) Notes Reviewed:  [x] YES  [ ] NO      MEDICATIONS  (STANDING):  allopurinol 100 milliGRAM(s) Oral daily  amLODIPine   Tablet 5 milliGRAM(s) Oral daily  atorvastatin 40 milliGRAM(s) Oral at bedtime  brinzolamide 1% Ophthalmic Suspension 1 Drop(s) Both EYES two times a day  calcium acetate 667 milliGRAM(s) Oral two times a day with meals  cholecalciferol 1000 Unit(s) Oral daily  dextrose 5%. 1000 milliLiter(s) (50 mL/Hr) IV Continuous <Continuous>  dextrose 50% Injectable 12.5 Gram(s) IV Push once  dextrose 50% Injectable 25 Gram(s) IV Push once  dextrose 50% Injectable 25 Gram(s) IV Push once  ferrous    sulfate 325 milliGRAM(s) Oral daily  furosemide   Injectable 40 milliGRAM(s) IV Push daily  heparin  Injectable 5000 Unit(s) SubCutaneous every 8 hours  insulin lispro (HumaLOG) corrective regimen sliding scale   SubCutaneous Before meals and at bedtime  senna 2 Tablet(s) Oral at bedtime    MEDICATIONS  (PRN):  dextrose 40% Gel 15 Gram(s) Oral once PRN Blood Glucose LESS THAN 70 milliGRAM(s)/deciliter  glucagon  Injectable 1 milliGRAM(s) IntraMuscular once PRN Glucose LESS THAN 70 milligrams/deciliter      Care Discussed with Consultants/Other Providers [x] YES  [ ] NO    HEALTH ISSUES - PROBLEM Dx:  Anemia: Anemia  Glaucoma: Glaucoma  Preventive measure: Preventive measure  Hypertension: Hypertension  Chronic kidney disease (CKD): Chronic kidney disease (CKD)  DM type 2 (diabetes mellitus, type 2): DM type 2 (diabetes mellitus, type 2)  Aortic stenosis: Aortic stenosis  Leg edema: Leg edema  CAD (coronary artery disease): CAD (coronary artery disease)  SOB (shortness of breath): SOB (shortness of breath)  Suspected deep vein thrombosis (DVT) 14-Mar-2025 11:12

## 2025-05-16 NOTE — ED ADULT TRIAGE NOTE - NS ED NURSE BANDS TYPE
URETERAL STENT EXCHANGE performed by Mirella Wilkins MD at [facility] OR   • CYSTOSCOPY Left 12/10/2024    LEFT CYSTOSCOPY URETERAL STENT EXCHANGE performed by Mirella Wilkins MD at [facility] OR   • FOOT DEBRIDEMENT Left 2024    FOOT DEBRIDEMENT INCISION AND DRAINAGE performed by Darion Wallace II, MD at [facility] OR   • OTHER SURGICAL HISTORY  2013    Cysto with removal of artificial sphinter and placement of suprapubic catheter.   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 ENDOSCOPY       Social History:    Social History     Tobacco Use   • Smoking status: Former     Current packs/day: 0.00     Types: Cigarettes     Quit date: 1976     Years since quittin.9   • Smokeless tobacco: Never   Substance Use Topics   • Alcohol use: No     Comment: Quit in                                 Counseling given: Not Answered      Vital Signs (Current):   Vitals:    25 1000 25 1100 25 1200 25 1244   BP: (!) 91/43 (!) 110/46 (!) 110/44    Pulse: 60 67 58 69   Resp: 10 18 11 17   Temp:    36.4 °C (97.5 °F)   TempSrc:    Oral   SpO2: 99% 99% 99% 100%   Weight:       Height:                                                  BP Readings from Last 3 Encounters:   25 (!) 110/44   25 (!) 152/60   25 (!) 159/70       NPO Status:                                                                                 BMI:   Wt Readings from Last 3 Encounters:   05/15/25 86.2 kg (190 lb 0.6 oz)   25 86.2 kg (190 lb)   25 86.2 kg (190 lb)     Body mass index is 28.89 kg/m².    CBC:   Lab Results   Component Value Date/Time    WBC 7.7 2025 03:29 AM    RBC 2.56 2025 03:29 AM    HGB 7.7 2025 03:29 AM    HCT 26.4 2025 03:29 AM    .1 2025 03:29 AM    
Name band;